# Patient Record
Sex: FEMALE | Race: NATIVE HAWAIIAN OR OTHER PACIFIC ISLANDER | NOT HISPANIC OR LATINO | Employment: UNEMPLOYED | URBAN - METROPOLITAN AREA
[De-identification: names, ages, dates, MRNs, and addresses within clinical notes are randomized per-mention and may not be internally consistent; named-entity substitution may affect disease eponyms.]

---

## 2017-01-17 ENCOUNTER — HOSPITAL ENCOUNTER (OUTPATIENT)
Dept: LAB | Facility: MEDICAL CENTER | Age: 42
End: 2017-01-17
Attending: NURSE PRACTITIONER
Payer: COMMERCIAL

## 2017-01-17 ENCOUNTER — HOSPITAL ENCOUNTER (OUTPATIENT)
Dept: LAB | Facility: MEDICAL CENTER | Age: 42
End: 2017-01-17
Attending: FAMILY MEDICINE
Payer: COMMERCIAL

## 2017-01-17 DIAGNOSIS — N95.1 PERI-MENOPAUSE: ICD-10-CM

## 2017-01-17 DIAGNOSIS — G43.109 MIGRAINE WITH AURA AND WITHOUT STATUS MIGRAINOSUS, NOT INTRACTABLE: ICD-10-CM

## 2017-01-17 DIAGNOSIS — E03.9 HYPOTHYROIDISM, UNSPECIFIED TYPE: ICD-10-CM

## 2017-01-17 DIAGNOSIS — E53.8 VITAMIN B 12 DEFICIENCY: ICD-10-CM

## 2017-01-17 LAB
25(OH)D3 SERPL-MCNC: 21 NG/ML (ref 30–100)
ALBUMIN SERPL BCP-MCNC: 3.9 G/DL (ref 3.2–4.9)
ALBUMIN/GLOB SERPL: 1.6 G/DL
ALP SERPL-CCNC: 57 U/L (ref 30–99)
ALT SERPL-CCNC: 10 U/L (ref 2–50)
ANION GAP SERPL CALC-SCNC: 8 MMOL/L (ref 0–11.9)
AST SERPL-CCNC: 15 U/L (ref 12–45)
BASOPHILS # BLD AUTO: 0.05 K/UL (ref 0–0.12)
BASOPHILS NFR BLD AUTO: 0.8 % (ref 0–1.8)
BILIRUB SERPL-MCNC: 0.5 MG/DL (ref 0.1–1.5)
BUN SERPL-MCNC: 11 MG/DL (ref 8–22)
CALCIUM SERPL-MCNC: 9.3 MG/DL (ref 8.5–10.5)
CHLORIDE SERPL-SCNC: 107 MMOL/L (ref 96–112)
CO2 SERPL-SCNC: 23 MMOL/L (ref 20–33)
CREAT SERPL-MCNC: 0.62 MG/DL (ref 0.5–1.4)
EOSINOPHIL # BLD: 0.09 K/UL (ref 0–0.51)
EOSINOPHIL NFR BLD AUTO: 1.5 % (ref 0–6.9)
ERYTHROCYTE [DISTWIDTH] IN BLOOD BY AUTOMATED COUNT: 43.6 FL (ref 35.9–50)
FERRITIN SERPL-MCNC: 21.4 NG/ML (ref 10–291)
FSH SERPL-ACNC: 3.7 MIU/ML
GLOBULIN SER CALC-MCNC: 2.4 G/DL (ref 1.9–3.5)
GLUCOSE SERPL-MCNC: 92 MG/DL (ref 65–99)
HCT VFR BLD AUTO: 41.4 % (ref 37–47)
HGB BLD-MCNC: 13.9 G/DL (ref 12–16)
IMM GRANULOCYTES # BLD AUTO: 0.01 K/UL (ref 0–0.11)
IMM GRANULOCYTES NFR BLD AUTO: 0.2 % (ref 0–0.9)
LH SERPL-ACNC: 9 IU/L
LYMPHOCYTES # BLD: 1.53 K/UL (ref 1–4.8)
LYMPHOCYTES NFR BLD AUTO: 24.8 % (ref 22–41)
MCH RBC QN AUTO: 29.1 PG (ref 27–33)
MCHC RBC AUTO-ENTMCNC: 33.6 G/DL (ref 33.6–35)
MCV RBC AUTO: 86.8 FL (ref 81.4–97.8)
MONOCYTES # BLD: 0.45 K/UL (ref 0–0.85)
MONOCYTES NFR BLD AUTO: 7.3 % (ref 0–13.4)
NEUTROPHILS # BLD: 4.05 K/UL (ref 2–7.15)
NEUTROPHILS NFR BLD AUTO: 65.4 % (ref 44–72)
NRBC # BLD AUTO: 0 K/UL
NRBC BLD-RTO: 0 /100 WBC
PLATELET # BLD AUTO: 184 K/UL (ref 164–446)
PMV BLD AUTO: 12.4 FL (ref 9–12.9)
POTASSIUM SERPL-SCNC: 4.1 MMOL/L (ref 3.6–5.5)
PROLACTIN SERPL-MCNC: 6.18 NG/ML (ref 2.8–26)
PROT SERPL-MCNC: 6.3 G/DL (ref 6–8.2)
RBC # BLD AUTO: 4.77 M/UL (ref 4.2–5.4)
SODIUM SERPL-SCNC: 138 MMOL/L (ref 135–145)
T4 FREE SERPL-MCNC: 0.88 NG/DL (ref 0.53–1.43)
TSH SERPL DL<=0.005 MIU/L-ACNC: 0.78 UIU/ML (ref 0.3–3.7)
TSH SERPL DL<=0.005 MIU/L-ACNC: 0.84 UIU/ML (ref 0.3–3.7)
VIT B12 SERPL-MCNC: 201 PG/ML (ref 211–911)
VIT B12 SERPL-MCNC: 216 PG/ML (ref 211–911)
WBC # BLD AUTO: 6.2 K/UL (ref 4.8–10.8)

## 2017-01-17 PROCEDURE — 82607 VITAMIN B-12: CPT

## 2017-01-17 PROCEDURE — 80053 COMPREHEN METABOLIC PANEL: CPT

## 2017-01-17 PROCEDURE — 84439 ASSAY OF FREE THYROXINE: CPT

## 2017-01-17 PROCEDURE — 82306 VITAMIN D 25 HYDROXY: CPT

## 2017-01-17 PROCEDURE — 82728 ASSAY OF FERRITIN: CPT

## 2017-01-17 PROCEDURE — 36415 COLL VENOUS BLD VENIPUNCTURE: CPT

## 2017-01-17 PROCEDURE — 83001 ASSAY OF GONADOTROPIN (FSH): CPT

## 2017-01-17 PROCEDURE — 85025 COMPLETE CBC W/AUTO DIFF WBC: CPT

## 2017-01-17 PROCEDURE — 82607 VITAMIN B-12: CPT | Mod: 91

## 2017-01-17 PROCEDURE — 84443 ASSAY THYROID STIM HORMONE: CPT

## 2017-01-17 PROCEDURE — 84146 ASSAY OF PROLACTIN: CPT

## 2017-01-17 PROCEDURE — 84443 ASSAY THYROID STIM HORMONE: CPT | Mod: 91

## 2017-01-17 PROCEDURE — 83002 ASSAY OF GONADOTROPIN (LH): CPT

## 2017-01-19 ENCOUNTER — TELEPHONE (OUTPATIENT)
Dept: NEUROLOGY | Facility: MEDICAL CENTER | Age: 42
End: 2017-01-19

## 2017-01-19 NOTE — TELEPHONE ENCOUNTER
Please let Tamela know that her Vit B12 and Vit D3 levels are low-- needs to supplement Vit D3 2000IU per day and start Vit B12 100mcg per day.

## 2017-01-27 ENCOUNTER — HOSPITAL ENCOUNTER (EMERGENCY)
Facility: MEDICAL CENTER | Age: 42
End: 2017-01-27
Attending: EMERGENCY MEDICINE
Payer: COMMERCIAL

## 2017-01-27 VITALS
WEIGHT: 167.55 LBS | BODY MASS INDEX: 32.89 KG/M2 | HEART RATE: 65 BPM | OXYGEN SATURATION: 98 % | RESPIRATION RATE: 18 BRPM | SYSTOLIC BLOOD PRESSURE: 97 MMHG | HEIGHT: 60 IN | TEMPERATURE: 97.6 F | DIASTOLIC BLOOD PRESSURE: 62 MMHG

## 2017-01-27 DIAGNOSIS — G43.809 OTHER MIGRAINE WITHOUT STATUS MIGRAINOSUS, NOT INTRACTABLE: ICD-10-CM

## 2017-01-27 PROCEDURE — 700105 HCHG RX REV CODE 258: Performed by: EMERGENCY MEDICINE

## 2017-01-27 PROCEDURE — 700111 HCHG RX REV CODE 636 W/ 250 OVERRIDE (IP): Performed by: EMERGENCY MEDICINE

## 2017-01-27 PROCEDURE — 36415 COLL VENOUS BLD VENIPUNCTURE: CPT

## 2017-01-27 PROCEDURE — 96374 THER/PROPH/DIAG INJ IV PUSH: CPT

## 2017-01-27 PROCEDURE — 96375 TX/PRO/DX INJ NEW DRUG ADDON: CPT

## 2017-01-27 PROCEDURE — 99284 EMERGENCY DEPT VISIT MOD MDM: CPT

## 2017-01-27 PROCEDURE — 96361 HYDRATE IV INFUSION ADD-ON: CPT

## 2017-01-27 RX ORDER — METOCLOPRAMIDE HYDROCHLORIDE 5 MG/ML
20 INJECTION INTRAMUSCULAR; INTRAVENOUS ONCE
Status: COMPLETED | OUTPATIENT
Start: 2017-01-27 | End: 2017-01-27

## 2017-01-27 RX ORDER — DEXAMETHASONE SODIUM PHOSPHATE 10 MG/ML
10 INJECTION, SOLUTION INTRAMUSCULAR; INTRAVENOUS ONCE
Status: COMPLETED | OUTPATIENT
Start: 2017-01-27 | End: 2017-01-27

## 2017-01-27 RX ORDER — SODIUM CHLORIDE 9 MG/ML
1000 INJECTION, SOLUTION INTRAVENOUS ONCE
Status: COMPLETED | OUTPATIENT
Start: 2017-01-27 | End: 2017-01-27

## 2017-01-27 RX ORDER — DIPHENHYDRAMINE HYDROCHLORIDE 50 MG/ML
50 INJECTION INTRAMUSCULAR; INTRAVENOUS ONCE
Status: COMPLETED | OUTPATIENT
Start: 2017-01-27 | End: 2017-01-27

## 2017-01-27 RX ORDER — KETOROLAC TROMETHAMINE 30 MG/ML
30 INJECTION, SOLUTION INTRAMUSCULAR; INTRAVENOUS ONCE
Status: COMPLETED | OUTPATIENT
Start: 2017-01-27 | End: 2017-01-27

## 2017-01-27 RX ADMIN — SODIUM CHLORIDE 1000 ML: 9 INJECTION, SOLUTION INTRAVENOUS at 19:28

## 2017-01-27 RX ADMIN — DEXAMETHASONE SODIUM PHOSPHATE 10 MG: 10 INJECTION, SOLUTION INTRAMUSCULAR; INTRAVENOUS at 19:28

## 2017-01-27 RX ADMIN — KETOROLAC TROMETHAMINE 30 MG: 30 INJECTION, SOLUTION INTRAMUSCULAR; INTRAVENOUS at 19:29

## 2017-01-27 RX ADMIN — METOCLOPRAMIDE 20 MG: 5 INJECTION, SOLUTION INTRAMUSCULAR; INTRAVENOUS at 19:29

## 2017-01-27 RX ADMIN — DIPHENHYDRAMINE HYDROCHLORIDE 50 MG: 50 INJECTION INTRAMUSCULAR; INTRAVENOUS at 19:29

## 2017-01-27 ASSESSMENT — PAIN SCALES - GENERAL
PAINLEVEL_OUTOF10: 10
PAINLEVEL_OUTOF10: 6

## 2017-01-27 NOTE — ED AVS SNAPSHOT
After Visit Summary                                                                                                                Tamela Vasquez   MRN: [id number]    Department:  [facility], Emergency Dept   Date of Visit:  1/27/2017            [facility], Emergency Dept    [address]    [place] [address]    Phone:  [phone]      You were seen by     Lee Lennon M.D.      Your Diagnosis Was     Other migraine without status migrainosus, not intractable     G43.809       These are the medications you received during your hospitalization from 01/27/2017 1740 to 01/27/2017 2213     Date/Time Order Dose Route Action    01/27/2017 1928 dexamethasone pf (DECADRON) injection 10 mg 10 mg Oral Given    01/27/2017 1929 diphenhydrAMINE (BENADRYL) injection 50 mg 50 mg Intravenous Given    01/27/2017 1929 ketorolac (TORADOL) injection 30 mg 30 mg Intravenous Given    01/27/2017 1929 metoclopramide (REGLAN) injection 20 mg 20 mg Intravenous Given    01/27/2017 1928 NS infusion 1,000 mL 1,000 mL Intravenous New Bag      Follow-up Information     1. Schedule an appointment as soon as possible for a visit with Asheyl Bender PA-C.    Specialty:  Family Medicine    Contact information    [place] [address] [phone]          2. Follow up with [facility], Emergency Dept.    Specialty:  Emergency Medicine    Why:  If symptoms worsen    Contact information    [address] [address] [phone]      Medication Information     Review all of your home medications and newly ordered medications with your primary doctor and/or pharmacist as soon as possible. Follow medication instructions as directed by your doctor and/or pharmacist.     Please keep your complete medication list with you and share with your physician. Update the information when medications are discontinued, doses are  changed, or new medications (including over-the-counter products) are added; and carry medication information at all times in the event of emergency situations.               Medication List      START taking these medications        Instructions    mupirocin 2 % Oint   Commonly known as:  BACTROBAN    Apply 1 Tube to affected area(s) every day.   Dose:  1 Tube         ASK your doctor about these medications        Instructions    * albuterol 108 (90 BASE) MCG/ACT Aers inhalation aerosol    Inhale 2 Puffs by mouth every 6 hours as needed for Shortness of Breath.   Dose:  2 Puff       * albuterol 2.5mg/3ml Nebu solution for nebulization   Commonly known as:  PROVENTIL    3 mL by Nebulization route every four hours as needed for Shortness of Breath.   Dose:  2.5 mg       FISH OIL PO    Take  by mouth every day.       levothyroxine 50 MCG Tabs   Commonly known as:  SYNTHROID    Take 1 Tab by mouth Every morning on an empty stomach.   Dose:  50 mcg       multivitamin Tabs    Take 1 Tab by mouth every day.   Dose:  1 Tab       ondansetron 8 MG Tbdp   Commonly known as:  ZOFRAN ODT    Take 1/2-1 tablet PO PRN nausea. Do not exceed more than 2 tablets in 24 hours.       rizatriptan 10 MG disintegrating tablet   Commonly known as:  MAXALT-MLT    Take 1/2-1 tablet po at onset of headache, may repeat dose in 2 hours if unrelieved.  Do not exceed more than 2 tablets in 24 hours.       * Notice:  This list has 2 medication(s) that are the same as other medications prescribed for you. Read the directions carefully, and ask your doctor or other care provider to review them with you.              Discharge Instructions       Recurrent Migraine Headache  A migraine headache is an intense, throbbing pain on one or both sides of your head. Recurrent migraines keep coming back. A migraine can last for 30 minutes to several hours.  CAUSES   The exact cause of a migraine headache is not always known. However, a migraine may be caused  when nerves in the brain become irritated and release chemicals that cause inflammation. This causes pain.  Certain things may also trigger migraines, such as:   · Alcohol.  · Smoking.  · Stress.  · Menstruation.  · Aged cheeses.  · Foods or drinks that contain nitrates, glutamate, aspartame, or tyramine.  · Lack of sleep.  · Chocolate.  · Caffeine.  · Hunger.  · Physical exertion.  · Fatigue.  · Medicines used to treat chest pain (nitroglycerine), birth control pills, estrogen, and some blood pressure medicines.  SYMPTOMS   · Pain on one or both sides of your head.  · Pulsating or throbbing pain.  · Severe pain that prevents daily activities.  · Pain that is aggravated by any physical activity.  · Nausea, vomiting, or both.  · Dizziness.  · Pain with exposure to bright lights, loud noises, or activity.  · General sensitivity to bright lights, loud noises, or smells.  Before you get a migraine, you may get warning signs that a migraine is coming (aura). An aura may include:  · Seeing flashing lights.  · Seeing bright spots, halos, or zigzag lines.  · Having tunnel vision or blurred vision.  · Having feelings of numbness or tingling.  · Having trouble talking.  · Having muscle weakness.  DIAGNOSIS   A recurrent migraine headache is often diagnosed based on:  · Symptoms.  · Physical examination.  · A CT scan or MRI of your head. These imaging tests cannot diagnose migraines but can help rule out other causes of headaches.    TREATMENT   Medicines may be given for pain and nausea. Medicines can also be given to help prevent recurrent migraines.  HOME CARE INSTRUCTIONS  · Only take over-the-counter or prescription medicines for pain or discomfort as directed by your health care provider. The use of long-term narcotics is not recommended.  · Lie down in a dark, quiet room when you have a migraine.  · Keep a journal to find out what may trigger your migraine headaches. For example, write down:  ¨ What you eat and  drink.  ¨ How much sleep you get.  ¨ Any change to your diet or medicines.  · Limit alcohol consumption.  · Quit smoking if you smoke.  · Get 7-9 hours of sleep, or as recommended by your health care provider.  · Limit stress.  · Keep lights dim if bright lights bother you and make your migraines worse.  SEEK MEDICAL CARE IF:   · You do not get relief from the medicines given to you.  · You have a recurrence of pain.  · You have a fever.  SEEK IMMEDIATE MEDICAL CARE IF:  · Your migraine becomes severe.  · You have a stiff neck.  · You have loss of vision.  · You have muscular weakness or loss of muscle control.  · You start losing your balance or have trouble walking.  · You feel faint or pass out.  · You have severe symptoms that are different from your first symptoms.  MAKE SURE YOU:   · Understand these instructions.  · Will watch your condition.  · Will get help right away if you are not doing well or get worse.     This information is not intended to replace advice given to you by your health care provider. Make sure you discuss any questions you have with your health care provider.     Document Released: 09/12/2002 Document Revised: 01/08/2016 Document Reviewed: 08/25/2014  [employer] Patient Education ©2016 [employer]            Patient Information     Patient Information    Following emergency treatment: all patient requiring follow-up care must return either to a private physician or a clinic if your condition worsens before you are able to obtain further medical attention, please return to the emergency room.     Billing Information    At [facility], we work to make the billing process streamlined for our patients.  Our Representatives are here to answer any questions you may have regarding your hospital bill.  If you have insurance coverage and have supplied your insurance information to us, we will submit a claim to your insurer on your behalf.  Should you have any questions regarding  your bill, we can be reached online or by phone as follows:  Online: You are able pay your bills online or live chat with our representatives about any billing questions you may have. We are here to help Monday - Friday from 8:00am to 7:30pm and 9:00am - 12:00pm on Saturdays.  Please visit [email or web address]  for more information.   Phone:  [phone] or [phone]    Please note that your emergency physician, surgeon, pathologist, radiologist, anesthesiologist, and other specialists are not employed by [facility] and will therefore bill separately for their services.  Please contact them directly for any questions concerning their bills at the numbers below:     Emergency Physician Services:  [phone]  [facility]:  [phone]  [facility]:  [phone]  [facility]:  [phone]    1. Your final bill may vary from the amount quoted upon discharge if all procedures are not complete at that time, or if your doctor has additional procedures of which we are not aware. You will receive an additional bill if you return to the Emergency Department at [facility] for suture removal regardless of the facility of which the sutures were placed.     2. Please arrange for settlement of this account at the emergency registration.    3. All self-pay accounts are due in full at the time of treatment.  If you are unable to meet this obligation then payment is expected within 4-5 days.     4. If you have had radiology studies (CT, X-ray, Ultrasound, MRI), you have received a preliminary result during your emergency department visit. Please contact the radiology department [phone] to receive a copy of your final result. Please discuss the Final result with your primary physician or with the follow up physician provided.     Crisis Hotline:  National Crisis Hotline:  6-894-OOHNLMM or 1-434.124.2275  [place] Crisis Hotline:     4-168-558-4792 or 925-502-7541         ED Discharge Follow Up Questions    1. In order to provide you with very good care, we would like to follow up with a phone call in the next few days.  May we have your permission to contact you?     YES /  NO    2. What is the best phone number to call you? (       )_____-__________    3. What is the best time to call you?      Morning  /  Afternoon  /  Evening                   Patient Signature:  ____________________________________________________________    Date:  ____________________________________________________________      Your appointments     Jan 31, 2017 10:40 AM   Follow Up Visit with MARYJO Madsen   [facility] (--)    [address], [address]  [facility] [address] [phone]           You will be receiving a confirmation call a few days before your appointment from our automated call confirmation system.

## 2017-01-27 NOTE — ED AVS SNAPSHOT
[employer] Access Code: Activation code not generated  Current [employer] Status: Active    [employer] logohart  A secure, online tool to manage your health information     [employer]’s [employer]® is a secure, online tool that connects you to your personalized health information from the privacy of your home -- day or night - making it very easy for you to manage your healthcare. Once the activation process is completed, you can even access your medical information using the [employer] christos, which is available for free in the Apple Christos store or Google Play store.     [employer] provides the following levels of access (as shown below):   My Chart Features   [facility] Primary Care Doctor [facility]  Specialists [facility]  Urgent  Care Non-[facility]  Primary Care  Doctor   Email your healthcare team securely and privately 24/7 X X X X   Manage appointments: schedule your next appointment; view details of past/upcoming appointments X      Request prescription refills. X      View recent personal medical records, including lab and immunizations X X X X   View health record, including health history, allergies, medications X X X X   Read reports about your outpatient visits, procedures, consult and ER notes X X X X   See your discharge summary, which is a recap of your hospital and/or ER visit that includes your diagnosis, lab results, and care plan. X X       How to register for [employer]:  1. Go to  [email or web address].  2. Click on the Sign Up Now box, which takes you to the New Member Sign Up page. You will need to provide the following information:  a. Enter your [employer] Access Code exactly as it appears at the top of this page. (You will not need to use this code after you’ve completed the sign-up process. If you do not sign up before the expiration date, you must request a new code.)   b. Enter your date of birth.   c. Enter your home email address.   d. Click Submit, and follow the next screen’s instructions.  3. Create a [employer] ID. This will  be your [employer] login ID and cannot be changed, so think of one that is secure and easy to remember.  4. Create a [employer] password. You can change your password at any time.  5. Enter your Password Reset Question and Answer. This can be used at a later time if you forget your password.   6. Enter your e-mail address. This allows you to receive e-mail notifications when new information is available in [employer].  7. Click Sign Up. You can now view your health information.    For assistance activating your [employer] account, call [phone]

## 2017-01-27 NOTE — ED AVS SNAPSHOT
1/27/2017          Tamela Vasquez  [id number]  [place] [address]    Dear Tamela:    [facility] wants to ensure your discharge home is safe and you or your loved ones have had all your questions answered regarding your care after you leave the hospital.    You may receive a telephone call within two days of your discharge.  This call is to make certain you understand your discharge instructions as well as ensure we provided you with the best care possible during your stay with us.     The call will only last approximately 3-5 minutes and will be done by a nurse.    Once again, we want to ensure your discharge home is safe and that you have a clear understanding of any next steps in your care.  If you have any questions or concerns, please do not hesitate to contact us, we are here for you.  Thank you for choosing [facility] for your healthcare needs.    Sincerely,    August Hess    [facility]

## 2017-01-28 NOTE — ED NOTES
Skin pink and warm, walking with steady gait, breathing with equal and unlabored respirations. Discharged in good condition after discharge instructions reviewed with pt in detail, pt verbalizes understanding of all. Ambulated out with steady gait accompanied by significant other with follow up instructions in hand. Instructed pt to not drive d/t receiving benadryl in ER, pt states understanding.

## 2017-01-28 NOTE — ED PROVIDER NOTES
ED Provider Note    ED Provider Note        CHIEF COMPLAINT  Chief Complaint   Patient presents with   • Headache       HPI  Tamela Vasquez is a 41 y.o. female who presents to the Emergency Department with chief complaint of worsening chronic migraines. Patient states that today her headache is bilateral retrobulbar with radiation to the vertex. Minor nausea vision changes at the onset since resolved no neck pain no fevers no chills no sun onset. No thunderclap not aware she's ever had. Patient also complains that she has a small lesion on the top of her head that seems to be intermittently swelling up and progressing.    REVIEW OF SYSTEMS  10 systems reviewed and otherwise negative, pertinent positives and negatives listed in the history of present illness.        PAST MEDICAL HISTORY   has a past medical history of Constipation; Concussion; Thyroid activity decreased; Other specified symptom associated with female genital organs; Right acoustic neuroma (CMS-[facility]); Migraine; Cervical spondylosis (12/11/2015); Shingles (1/13/2016); Pain; Major depression (1/20/2015); Sleep apnea; and Seizure disorder (CMS-[facility]) (1999).    SURGICAL HISTORY   has past surgical history that includes gastric bypass laparoscopic; hchg tube, ear ultrasil; gastroscopy-endo (1/20/2009); colonoscopy - endo (1/22/2009); carpal tunnel release; gyn surgery; pelviscopy (6/16/2009); lysis adhesions gyn (6/16/2009); gastroscopy-endo (8/12/2010); lizz by laparoscopy (8/15/2010); lysis adhesions general (8/15/2010); appendectomy (1994); other abdominal surgery (1997); other abdominal surgery (2009); and cervical disk and fusion anterior (4/27/2016).    SOCIAL HISTORY  Social History   Substance Use Topics   • Smoking status: Current Every Day Smoker -- 0.25 packs/day for 27 years     Types: Cigarettes   • Smokeless tobacco: Never Used      Comment: 1/2 pack per day   • Alcohol Use: No      Comment:                                               uses + e cigarettes      History   Drug Use   • Yes   • Special: Marijuana     Comment: marijuana, last smoked  3 days ago       FAMILY HISTORY  Non-Contributory    CURRENT MEDICATIONS  Home Medications     Reviewed by Miguel Knight R.N. (Registered Nurse) on 01/27/17 at 1750  Med List Status: Complete    Medication Last Dose Status    albuterol (PROVENTIL) 2.5mg/3ml Nebu Soln solution for nebulization  Active    albuterol 108 (90 BASE) MCG/ACT Aero Soln inhalation aerosol 12/22/2016 Active    levothyroxine (SYNTHROID) 50 MCG Tab 1/27/2017 Active    multivitamin (THERAGRAN) Tab 1/26/2017 Active    Omega-3 Fatty Acids (FISH OIL PO) 1/26/2017 Active    ondansetron (ZOFRAN ODT) 8 MG TABLET DISPERSIBLE 1/27/2017 Active    rizatriptan (MAXALT-MLT) 10 MG disintegrating tablet 1/27/2017 Active                ALLERGIES  Allergies   Allergen Reactions   • Phentermine Hcl      Swelling short of breath       PHYSICAL EXAM  VITAL SIGNS: BP 97/62 mmHg  Pulse 65  Temp(Src) 36.4 °C (97.6 °F)  Resp 18  Ht 1.524 m (5')  Wt 76 kg (167 lb 8.8 oz)  BMI 32.72 kg/m2  SpO2 98%  LMP 01/25/2017  Pulse ox interpretation: I interpret this pulse ox as normal.  Constitutional: Alert and oriented x 3, minimal Distress  HEENT: Atraumatic normocephalic, small erythematous lesions surrounding hair follicles right at the vertex approximately half a centimeter. No fluctuance no drainage, pupils are equal round reactive to light extraocular movements are intact. The nares is clear, external ears are normal, mouth shows moist mucous membranes  Neck: Supple, no JVD no tracheal deviation  Cardiovascular: Regular rate and rhythm no murmur rub or gallop 2+ pulses peripherally x4  Thorax & Lungs: No respiratory distress, no wheezes rales or rhonchi, No chest tenderness.   GI: Soft nontender nondistended positive bowel sounds, no peritoneal signs  Skin: Warm dry no acute rash or lesion  Musculoskeletal: Moving all extremities with full  range and 5 of 5 strength, no acute  deformity  Neurologic: Cranial nerves III through XII are grossly intact, no sensory deficit, no cerebellar dysfunction   Psychiatric: Appropriate affect for situation at this time            Medical Decision Making: Patient has no concerning symptoms of intracranial bleed or infection. That's consistent with previous migraines states character the same pain somewhat worse today. She also small area that appears erythematous at the vertex of her head could be small area of cellulitis versus actinic keratosis. She given a prescription of Bactroban for this. In the ER she was given Reglan and Toradol Decadron Benadryl and fluids her headache completely resolved feeling much better. Discharge instructions follow-up with dermatology as well as her neurologist return for worsening pain headache altered mental status confusion dizziness nauseousness any other worrisome symptoms otherwise discharged home in stable condition.  BP 97/62 mmHg  Pulse 65  Temp(Src) 36.4 °C (97.6 °F)  Resp 18  Ht 1.524 m (5')  Wt 76 kg (167 lb 8.8 oz)  BMI 32.72 kg/m2  SpO2 98%  LMP 01/25/2017          FINAL IMPRESSION  1. Other migraine without status migrainosus, not intractable     2. Cellulitis versus actinic keratosis of the scalp      This dictation has been created using voice recognition software and/or scribes. The accuracy of the dictation is limited by the abilities of the software and the expertise of the scribes. I expect there may be some errors of grammar and possibly content. I made every attempt to manually correct the errors within my dictation. However, errors related to voice recognition software and/or scribes may still exist and should be interpreted within the appropriate context.

## 2017-01-28 NOTE — DISCHARGE INSTRUCTIONS
Recurrent Migraine Headache  A migraine headache is an intense, throbbing pain on one or both sides of your head. Recurrent migraines keep coming back. A migraine can last for 30 minutes to several hours.  CAUSES   The exact cause of a migraine headache is not always known. However, a migraine may be caused when nerves in the brain become irritated and release chemicals that cause inflammation. This causes pain.  Certain things may also trigger migraines, such as:   · Alcohol.  · Smoking.  · Stress.  · Menstruation.  · Aged cheeses.  · Foods or drinks that contain nitrates, glutamate, aspartame, or tyramine.  · Lack of sleep.  · Chocolate.  · Caffeine.  · Hunger.  · Physical exertion.  · Fatigue.  · Medicines used to treat chest pain (nitroglycerine), birth control pills, estrogen, and some blood pressure medicines.  SYMPTOMS   · Pain on one or both sides of your head.  · Pulsating or throbbing pain.  · Severe pain that prevents daily activities.  · Pain that is aggravated by any physical activity.  · Nausea, vomiting, or both.  · Dizziness.  · Pain with exposure to bright lights, loud noises, or activity.  · General sensitivity to bright lights, loud noises, or smells.  Before you get a migraine, you may get warning signs that a migraine is coming (aura). An aura may include:  · Seeing flashing lights.  · Seeing bright spots, halos, or zigzag lines.  · Having tunnel vision or blurred vision.  · Having feelings of numbness or tingling.  · Having trouble talking.  · Having muscle weakness.  DIAGNOSIS   A recurrent migraine headache is often diagnosed based on:  · Symptoms.  · Physical examination.  · A CT scan or MRI of your head. These imaging tests cannot diagnose migraines but can help rule out other causes of headaches.    TREATMENT   Medicines may be given for pain and nausea. Medicines can also be given to help prevent recurrent migraines.  HOME CARE INSTRUCTIONS  · Only take over-the-counter or prescription  medicines for pain or discomfort as directed by your health care provider. The use of long-term narcotics is not recommended.  · Lie down in a dark, quiet room when you have a migraine.  · Keep a journal to find out what may trigger your migraine headaches. For example, write down:  ¨ What you eat and drink.  ¨ How much sleep you get.  ¨ Any change to your diet or medicines.  · Limit alcohol consumption.  · Quit smoking if you smoke.  · Get 7-9 hours of sleep, or as recommended by your health care provider.  · Limit stress.  · Keep lights dim if bright lights bother you and make your migraines worse.  SEEK MEDICAL CARE IF:   · You do not get relief from the medicines given to you.  · You have a recurrence of pain.  · You have a fever.  SEEK IMMEDIATE MEDICAL CARE IF:  · Your migraine becomes severe.  · You have a stiff neck.  · You have loss of vision.  · You have muscular weakness or loss of muscle control.  · You start losing your balance or have trouble walking.  · You feel faint or pass out.  · You have severe symptoms that are different from your first symptoms.  MAKE SURE YOU:   · Understand these instructions.  · Will watch your condition.  · Will get help right away if you are not doing well or get worse.     This information is not intended to replace advice given to you by your health care provider. Make sure you discuss any questions you have with your health care provider.     Document Released: 09/12/2002 Document Revised: 01/08/2016 Document Reviewed: 08/25/2014  [employer] Interactive Patient Education ©2016 [employer]

## 2017-01-31 ENCOUNTER — OFFICE VISIT (OUTPATIENT)
Dept: NEUROLOGY | Facility: MEDICAL CENTER | Age: 42
End: 2017-01-31
Payer: COMMERCIAL

## 2017-01-31 VITALS
OXYGEN SATURATION: 91 % | SYSTOLIC BLOOD PRESSURE: 112 MMHG | RESPIRATION RATE: 16 BRPM | WEIGHT: 165 LBS | BODY MASS INDEX: 32.39 KG/M2 | TEMPERATURE: 98.1 F | DIASTOLIC BLOOD PRESSURE: 70 MMHG | HEIGHT: 60 IN | HEART RATE: 83 BPM

## 2017-01-31 DIAGNOSIS — E53.8 VITAMIN B 12 DEFICIENCY: ICD-10-CM

## 2017-01-31 DIAGNOSIS — E66.9 OBESITY (BMI 30-39.9): ICD-10-CM

## 2017-01-31 DIAGNOSIS — G89.4 CHRONIC PAIN SYNDROME: ICD-10-CM

## 2017-01-31 PROCEDURE — 99213 OFFICE O/P EST LOW 20 MIN: CPT | Mod: 25 | Performed by: NURSE PRACTITIONER

## 2017-01-31 PROCEDURE — 96372 THER/PROPH/DIAG INJ SC/IM: CPT | Performed by: NURSE PRACTITIONER

## 2017-01-31 RX ORDER — CYANOCOBALAMIN 1000 UG/ML
1000 INJECTION, SOLUTION INTRAMUSCULAR; SUBCUTANEOUS ONCE
Status: COMPLETED | OUTPATIENT
Start: 2017-01-31 | End: 2017-01-31

## 2017-01-31 RX ADMIN — CYANOCOBALAMIN 1000 MCG: 1000 INJECTION, SOLUTION INTRAMUSCULAR; SUBCUTANEOUS at 11:26

## 2017-01-31 ASSESSMENT — ENCOUNTER SYMPTOMS
MYALGIAS: 1
DOUBLE VISION: 1
HEADACHES: 1
NAUSEA: 1
NERVOUS/ANXIOUS: 0
ABDOMINAL PAIN: 0
COUGH: 0
VOMITING: 0
DEPRESSION: 0
SORE THROAT: 0
DIZZINESS: 0
CONSTIPATION: 1
SEIZURES: 0
DIARRHEA: 0
CONSTITUTIONAL NEGATIVE: 1

## 2017-01-31 NOTE — MR AVS SNAPSHOT
Tamela Romerojetparis Christina   2017 10:40 AM   Office Visit   MRN: [id number]    Department:  [facility]   Dept Phone:  [phone]    Description:  Female : 1975   Provider:  MARYJO Madsen           Reason for Visit     Follow-Up Migraine with aura and without status migrainosus, not intractable      Allergies as of 2017     Allergen Noted Reactions    Phentermine Hcl 2007       Swelling short of breath      You were diagnosed with     Chronic pain syndrome   [338.4.ICD-9-CM]       Vitamin B 12 deficiency   [120268]         Vital Signs     Blood Pressure Pulse Temperature Respirations Height Weight    112/70 mmHg 83 36.7 °C (98.1 °F) 16 1.524 m (5') 74.844 kg (165 lb)    Body Mass Index Oxygen Saturation Last Menstrual Period Smoking Status          32.22 kg/m2 91% 2017 Light Tobacco Smoker        Basic Information     Date Of Birth Sex Race Ethnicity Preferred Language    1975 Female  or other  Non- English      Problem List              ICD-10-CM Priority Class Noted - Resolved    Anxiety F41.9   2010 - Present    Migraine G43.909   2014 - Present    Chronic pain G89.29   2014 - Present    Seizure disorder (CMS-HCC) G40.909   2015 - Present    Obesity (BMI 30-39.9)- s/p gastric bypass E66.9   2015 - Present    Vitamin B 12 deficiency E53.8   2015 - Present    Acute sinus infection J01.90   2015 - Present    Chronic pain syndrome G89.4   2015 - Present    Nausea R11.0   2015 - Present    Major depression F32.9   2015 - Present    Hypothyroid E03.9   2015 - Present    Cervicogenic headache R51   2015 - Present    Cervical spondylosis M47.812   2015 - Present    Cervical radiculopathy M54.12   2015 - Present    Chronic neck pain M54.2, G89.29   2015 - Present    DDD (degenerative disc disease), cervical M50.30   2015 - Present    Shingles B02.9   1/13/2016 - Present    Controlled substance agreement signed Z79.899   3/2/2016 - Present    Cervical radiculitis M54.12   4/27/2016 - Present    Cervical postlaminectomy syndrome M96.1   7/13/2016 - Present    Laine-menopause N95.1   10/6/2016 - Present      Health Maintenance        Date Due Completion Dates    IMM INFLUENZA (1) 9/1/2016 12/2/2012    MAMMOGRAM 11/20/2016 ---    PAP SMEAR 4/22/2018 4/22/2015, 4/22/2015    IMM DTaP/Tdap/Td Vaccine (2 - Td) 8/5/2022 8/5/2012            Current Immunizations     Influenza TIV (IM) 12/2/2012    Pneumococcal polysaccharide vaccine (PPSV-23) 4/3/2013  1:54 PM    Tdap Vaccine 8/5/2012 11:00 PM      Below and/or attached are the medications your provider expects you to take. Review all of your home medications and newly ordered medications with your provider and/or pharmacist. Follow medication instructions as directed by your provider and/or pharmacist. Please keep your medication list with you and share with your provider. Update the information when medications are discontinued, doses are changed, or new medications (including over-the-counter products) are added; and carry medication information at all times in the event of emergency situations     Allergies:  PHENTERMINE HCL - (reactions not documented)               Medications  Valid as of: January 31, 2017 - 11:27 AM    Generic Name Brand Name Tablet Size Instructions for use    Albuterol Sulfate (Aero Soln) albuterol 108 (90 BASE) MCG/ACT Inhale 2 Puffs by mouth every 6 hours as needed for Shortness of Breath.        Albuterol Sulfate (Nebu Soln) PROVENTIL 2.5mg/3ml 3 mL by Nebulization route every four hours as needed for Shortness of Breath.        Levothyroxine Sodium (Tab) SYNTHROID 50 MCG Take 1 Tab by mouth Every morning on an empty stomach.        Multiple Vitamin (Tab) THERAGRAN  Take 1 Tab by mouth every day.        Mupirocin (Ointment) BACTROBAN 2 % Apply 1 Tube to affected area(s) every  day.        Omega-3 Fatty Acids   Take  by mouth every day.        Ondansetron (TABLET DISPERSIBLE) ZOFRAN ODT 8 MG Take 1/2-1 tablet PO PRN nausea. Do not exceed more than 2 tablets in 24 hours.        Rizatriptan Benzoate (TABLET DISPERSIBLE) MAXALT-MLT 10 MG Take 1/2-1 tablet po at onset of headache, may repeat dose in 2 hours if unrelieved.  Do not exceed more than 2 tablets in 24 hours.        .                 Medicines prescribed today were sent to:     [place] [id number] [facility] ([place]), [place] - [address]    [facility] [place] [address]    Phone: [phone] Fax: [phone]    Open 24 Hours?: No      Medication refill instructions:       If your prescription bottle indicates you have medication refills left, it is not necessary to call your provider’s office. Please contact your pharmacy and they will refill your medication.    If your prescription bottle indicates you do not have any refills left, you may request refills at any time through one of the following ways: The online Tugg system (except Urgent Care), by calling your provider’s office, or by asking your pharmacy to contact your provider’s office with a refill request. Medication refills are processed only during regular business hours and may not be available until the next business day. Your provider may request additional information or to have a follow-up visit with you prior to refilling your medication.   *Please Note: Medication refills are assigned a new Rx number when refilled electronically. Your pharmacy may indicate that no refills were authorized even though a new prescription for the same medication is available at the pharmacy. Please request the medicine by name with the pharmacy before contacting your provider for a refill.           Tugg Access Code: Activation code not generated  Current Tugg Status: Active

## 2017-01-31 NOTE — PROGRESS NOTES
"Subjective:      Tamela Vasquez is a 41 y.o. female who presents with Follow-Up Chronic Migraine.      HPI     For the past month, she has \"always been having a headache of some kind\".  Double vision, and nausea which she is not happy with.  She is reporting that there is a place in the crown of her head which will swell and is causing extreme pressure and pain.  There is a small mole in the area indicated by Tamela but no appreciation of swelling, redness, etc.    Here with two different women who are case coordinators.    Vit B12 low  Vit D level low    Current Outpatient Prescriptions   Medication Sig Dispense Refill   • levothyroxine (SYNTHROID) 50 MCG Tab Take 1 Tab by mouth Every morning on an empty stomach. 90 Tab 0   • Omega-3 Fatty Acids (FISH OIL PO) Take  by mouth every day.     • multivitamin (THERAGRAN) Tab Take 1 Tab by mouth every day.     • mupirocin (BACTROBAN) 2 % Ointment Apply 1 Tube to affected area(s) every day. 1 Tube 0   • albuterol (PROVENTIL) 2.5mg/3ml Nebu Soln solution for nebulization 3 mL by Nebulization route every four hours as needed for Shortness of Breath. 75 mL 2   • ondansetron (ZOFRAN ODT) 8 MG TABLET DISPERSIBLE Take 1/2-1 tablet PO PRN nausea. Do not exceed more than 2 tablets in 24 hours. 20 Tab 2   • rizatriptan (MAXALT-MLT) 10 MG disintegrating tablet Take 1/2-1 tablet po at onset of headache, may repeat dose in 2 hours if unrelieved.  Do not exceed more than 2 tablets in 24 hours. 9 Tab 2   • albuterol 108 (90 BASE) MCG/ACT Aero Soln inhalation aerosol Inhale 2 Puffs by mouth every 6 hours as needed for Shortness of Breath. 8.5 g 0     No current facility-administered medications for this visit.       Review of Systems   Constitutional: Negative.    HENT: Negative for hearing loss, nosebleeds and sore throat.         No recent head injury.   Eyes: Positive for double vision.        No new loss of vision.   Respiratory: Negative for cough.         No " recent lung infections.   Cardiovascular: Negative for chest pain.   Gastrointestinal: Positive for nausea and constipation. Negative for vomiting, abdominal pain and diarrhea.   Genitourinary: Negative.    Musculoskeletal: Positive for myalgias and joint pain.   Skin: Negative.    Neurological: Positive for headaches. Negative for dizziness and seizures.   Endo/Heme/Allergies:        No history of endocrine dysfunction.  No new problems.   Psychiatric/Behavioral: Negative for depression. The patient is not nervous/anxious.         No recent mood changes.          Objective:     /70 mmHg  Pulse 83  Temp(Src) 36.7 °C (98.1 °F)  Resp 16  Ht 1.524 m (5')  Wt 74.844 kg (165 lb)  BMI 32.22 kg/m2  SpO2 91%  LMP 01/25/2017     Physical Exam   Constitutional: She is oriented to person, place, and time. She appears well-developed and well-nourished. No distress.   Minimal exam, she is quite irritable today.    Overweight       HENT:   Head: Normocephalic and atraumatic.   Eyes: EOM are normal.   Neck: Normal range of motion.   Cardiovascular: Normal rate and regular rhythm.    Pulmonary/Chest: Effort normal.   Neurological: She is alert and oriented to person, place, and time. She exhibits normal muscle tone. Gait (wide based) abnormal.   No observable changes in neurologic status.  See initial new patient examination for details.    Skin: Skin is warm. There is pallor.   Psychiatric: Her mood appears anxious. Her affect is labile. Thought content is paranoid. She expresses inappropriate judgment.           Assessment/Plan:     Chronic Migraine:  Reporting a daily headache which is different from those in the past.   Vit B12 level is quite low and she has received monthly treatment in years past.    Vit B12 IM provided per MA.    Needs to discuss other lab findings with PCP.  Recommend starting Vit D3 2000IU per day.    Will take ibuprofen 800mg for abortive treatment and small serving of caffeine if she  wishes.    Encourage her to care for herself in the best way possible.    Return for follow-up in 3 months.    I will not be providing an pain management medications.  She is asking what to do in that regard and does not wish to return to narcotics.  We discussed how she can avoid pain medication.  Needs to be active with her counseling team.

## 2017-01-31 NOTE — Clinical Note
January 31, 2017        Tamela Montana [address]  [place] [address]        To whom this may concern:    Due to migraines and low Vitamin B12, please allow Tamela to have a snack at her work space.  Also, she needs to be allowed to drink water which will in turn increase bathroom trips.  Please consider her health needs when at work.    If you have any questions or concerns, please don't hesitate to call.        Sincerely,        CHIRAG Madsen.    Electronically Signed

## 2017-02-06 ENCOUNTER — OFFICE VISIT (OUTPATIENT)
Dept: MEDICAL GROUP | Age: 42
End: 2017-02-06
Payer: COMMERCIAL

## 2017-02-06 ENCOUNTER — TELEPHONE (OUTPATIENT)
Dept: MEDICAL GROUP | Age: 42
End: 2017-02-06

## 2017-02-06 VITALS
TEMPERATURE: 98.7 F | OXYGEN SATURATION: 96 % | SYSTOLIC BLOOD PRESSURE: 100 MMHG | WEIGHT: 168 LBS | HEART RATE: 87 BPM | HEIGHT: 59 IN | DIASTOLIC BLOOD PRESSURE: 62 MMHG | BODY MASS INDEX: 33.87 KG/M2

## 2017-02-06 DIAGNOSIS — F33.42 RECURRENT MAJOR DEPRESSIVE DISORDER, IN FULL REMISSION (HCC): ICD-10-CM

## 2017-02-06 DIAGNOSIS — G89.29 CHRONIC NECK PAIN: ICD-10-CM

## 2017-02-06 DIAGNOSIS — E66.9 OBESITY (BMI 30-39.9): ICD-10-CM

## 2017-02-06 DIAGNOSIS — E03.9 ACQUIRED HYPOTHYROIDISM: ICD-10-CM

## 2017-02-06 DIAGNOSIS — E55.9 VITAMIN D DEFICIENCY: ICD-10-CM

## 2017-02-06 DIAGNOSIS — G43.109 MIGRAINE WITH AURA AND WITHOUT STATUS MIGRAINOSUS, NOT INTRACTABLE: ICD-10-CM

## 2017-02-06 DIAGNOSIS — Z12.31 ENCOUNTER FOR SCREENING MAMMOGRAM FOR BREAST CANCER: ICD-10-CM

## 2017-02-06 DIAGNOSIS — E53.8 VITAMIN B 12 DEFICIENCY: ICD-10-CM

## 2017-02-06 DIAGNOSIS — L98.9 SKIN LESION: ICD-10-CM

## 2017-02-06 DIAGNOSIS — M54.2 CHRONIC NECK PAIN: ICD-10-CM

## 2017-02-06 PROCEDURE — 99214 OFFICE O/P EST MOD 30 MIN: CPT | Performed by: PHYSICIAN ASSISTANT

## 2017-02-06 RX ORDER — ONDANSETRON 8 MG/1
TABLET, ORALLY DISINTEGRATING ORAL
Qty: 20 TAB | Refills: 2 | Status: SHIPPED | OUTPATIENT
Start: 2017-02-06 | End: 2017-06-22 | Stop reason: SDUPTHER

## 2017-02-06 RX ORDER — CYANOCOBALAMIN 1000 UG/ML
1000 INJECTION, SOLUTION INTRAMUSCULAR; SUBCUTANEOUS
Qty: 1 VIAL | Refills: 3 | Status: SHIPPED | OUTPATIENT
Start: 2017-02-06 | End: 2017-06-22 | Stop reason: SDUPTHER

## 2017-02-06 RX ORDER — CYANOCOBALAMIN 1000 UG/ML
1000 INJECTION, SOLUTION INTRAMUSCULAR; SUBCUTANEOUS
Status: COMPLETED | OUTPATIENT
Start: 2017-02-06 | End: 2017-02-06

## 2017-02-06 RX ADMIN — CYANOCOBALAMIN 1000 MCG: 1000 INJECTION, SOLUTION INTRAMUSCULAR; SUBCUTANEOUS at 08:30

## 2017-02-06 ASSESSMENT — PAIN SCALES - GENERAL: PAINLEVEL: NO PAIN

## 2017-02-06 NOTE — TELEPHONE ENCOUNTER
Phone Number Called: [facility] [phone]    Message: Pharmacy states that the needles come with the syringes. Prescription has been verified.    Left Message for patient to call back: no

## 2017-02-06 NOTE — MR AVS SNAPSHOT
"        Tamela DavisJyoti   2017 7:50 AM   Office Visit   MRN: [id number]    Department:  [address] [place] [address]   Dept Phone:  [phone]    Description:  Female : 1975   Provider:  Ashely Bender PA-C           Reason for Visit     Hypothyroidism follow up on labs    Skin Lesion on top of head, would like a referral todermatology       Allergies as of 2017     Allergen Noted Reactions    Phentermine Hcl 2007       Swelling short of breath      You were diagnosed with     Skin lesion   [242358]       Encounter for screening mammogram for breast cancer   [5607340]       Migraine with aura and without status migrainosus, not intractable   [255008]       Vitamin B 12 deficiency   [129454]       Obesity (BMI 30-39.9)   [912805]       Acquired hypothyroidism   [5937203]       Chronic neck pain   [058832]         Vital Signs     Blood Pressure Pulse Temperature Height Weight Body Mass Index    100/62 mmHg 87 37.1 °C (98.7 °F) 1.499 m (4' 11.02\") 76.204 kg (168 lb) 33.91 kg/m2    Oxygen Saturation Last Menstrual Period Smoking Status             96% 2017 Light Tobacco Smoker         Basic Information     Date Of Birth Sex Race Ethnicity Preferred Language    1975 Female  or other  Non- English      Your appointments     Aug 07, 2017 10:50 AM   Established Patient with Ashely Bender PA-C   [address] [id number] [phone]           You will be receiving a confirmation call a few days before your appointment from our automated call confirmation system.              Problem List              ICD-10-CM Priority Class Noted - Resolved    Anxiety F41.9   2010 - Present    Migraine G43.909   2014 - Present    Chronic pain G89.29   2014 - Present    Seizure disorder (CMS-HCC) G40.909   2015 - Present    Obesity (BMI 30-39.9)- s/p gastric bypass E66.9   2015 " - Present    Vitamin B 12 deficiency E53.8   1/20/2015 - Present    Chronic pain syndrome G89.4   1/20/2015 - Present    Nausea R11.0   1/20/2015 - Present    Major depression F32.9   1/20/2015 - Present    Hypothyroid E03.9   1/20/2015 - Present    Cervicogenic headache R51   12/11/2015 - Present    Cervical spondylosis M47.812   12/11/2015 - Present    Cervical radiculopathy M54.12   12/11/2015 - Present    Chronic neck pain M54.2, G89.29   12/11/2015 - Present    DDD (degenerative disc disease), cervical M50.30   12/11/2015 - Present    Shingles B02.9   1/13/2016 - Present    Cervical radiculitis M54.12   4/27/2016 - Present    Cervical postlaminectomy syndrome M96.1   7/13/2016 - Present    Laine-menopause N95.1   10/6/2016 - Present      Health Maintenance        Date Due Completion Dates    MAMMOGRAM 11/20/2016 ---    IMM INFLUENZA (1) 2/6/2018 (Originally 9/1/2016) 12/2/2012    PAP SMEAR 4/22/2018 4/22/2015, 4/22/2015    IMM DTaP/Tdap/Td Vaccine (2 - Td) 8/5/2022 8/5/2012            Current Immunizations     Influenza TIV (IM) 12/2/2012    Pneumococcal polysaccharide vaccine (PPSV-23) 4/3/2013  1:54 PM    Tdap Vaccine 8/5/2012 11:00 PM      Below and/or attached are the medications your provider expects you to take. Review all of your home medications and newly ordered medications with your provider and/or pharmacist. Follow medication instructions as directed by your provider and/or pharmacist. Please keep your medication list with you and share with your provider. Update the information when medications are discontinued, doses are changed, or new medications (including over-the-counter products) are added; and carry medication information at all times in the event of emergency situations     Allergies:  PHENTERMINE HCL - (reactions not documented)               Medications  Valid as of: February 06, 2017 -  8:28 AM    Generic Name Brand Name Tablet Size Instructions for use    Cyanocobalamin (Solution)  VITAMIN B-12 1000 MCG/ML 1 mL by Intramuscular route every 30 days.        Levothyroxine Sodium (Tab) SYNTHROID 50 MCG Take 1 Tab by mouth Every morning on an empty stomach.        Multiple Vitamin (Tab) THERAGRAN  Take 1 Tab by mouth every day.        Needles & Syringes (Misc) Needles & Syringes  1 Application by Does not apply route Q30 DAYS.        Omega-3 Fatty Acids   Take  by mouth every day.        Ondansetron (TABLET DISPERSIBLE) ZOFRAN ODT 8 MG Take 1/2-1 tablet PO PRN nausea. Do not exceed more than 2 tablets in 24 hours.        .                 Medicines prescribed today were sent to:     [facility] [address] [facility] ([place]), [place] - [address]    [address] [facility] [place] [address]    Phone: [phone] Fax: [phone]    Open 24 Hours?: No      Medication refill instructions:       If your prescription bottle indicates you have medication refills left, it is not necessary to call your provider’s office. Please contact your pharmacy and they will refill your medication.    If your prescription bottle indicates you do not have any refills left, you may request refills at any time through one of the following ways: The online Grade[employer] system (except Urgent Care), by calling your provider’s office, or by asking your pharmacy to contact your provider’s office with a refill request. Medication refills are processed only during regular business hours and may not be available until the next business day. Your provider may request additional information or to have a follow-up visit with you prior to refilling your medication.   *Please Note: Medication refills are assigned a new Rx number when refilled electronically. Your pharmacy may indicate that no refills were authorized even though a new prescription for the same medication is available at the pharmacy. Please request the medicine by name with the pharmacy before contacting your provider for a refill.        Your To Do List     Future Labs/Procedures  Complete By Expires    CBC WITH DIFFERENTIAL  As directed 2/7/2018    COMP METABOLIC PANEL  As directed 2/7/2018    LIPID PROFILE  As directed 2/7/2018    MA-SCREEN MAMMO W/CAD-BILAT  As directed 3/10/2018    TSH WITH REFLEX TO FT4  As directed 2/6/2018    VITAMIN B12  As directed 2/7/2018      Referral     A referral request has been sent to our patient care coordination department. Please allow 3-5 business days for us to process this request and contact you either by phone or mail. If you do not hear from us by the 5th business day, please call us at [phone].           Trekea Access Code: Activation code not generated  Current Trekea Status: Active

## 2017-02-06 NOTE — TELEPHONE ENCOUNTER
1. Caller Name: [facility]                                         Call Back Number: [phone]      Patient approves a detailed voicemail message: N\A    Recieved fax from [facility] needing clarification on if you are dispensing pen needles AND syringes? They also need specific directions (times per day)

## 2017-02-06 NOTE — PROGRESS NOTES
"Subjective:     Chief Complaint   Patient presents with   • Hypothyroidism     follow up on labs   • Skin Lesion     on top of head, would like a referral todermatology      Tamela DavisRuddyLeena is a 41 y.o. female here today for evaluation and management of:    Skin lesion  New problem.  Reports lesion on top of head that intermittently is swelling up and getting larger.  Friend (Ashli) in office with her has concerns that it is secondary to her seizure where she struck her head on the ground.  Pt reports it only flares up and is tender when she has migraines.   Was given Bactroban with no relief.  Would like referral to derm.       Migraine with aura and without status migrainosus, not intractable/Vitamin B 12 deficiency/Vitamin D deficiency  Reports under care of neuro (CLIFF Ruggiero)  Recurrent migraines resulting in ED visits.  Recently given B12 injection by neuro and advised to start Vitamin D 3 5,000 IU. Has noticed improvement since then.  Hx of gastric bypass  + vomiting associated with migraines--Zofran helped some but doesn't feel is enough.    Obesity (BMI 30-39.9)- s/p gastric bypass  Reports strict diet and exercising regularly. Trying to lose weight.    Acquired hypothyroidism  Stable. Currently taking levothyroxine 50 mcg daily as prescribed.  Denies palpitations, skin changes, temperature intolerance, or changes in bowel habits    Chronic neck pain  Reports anterior cervical disk fusion in April of 2016.  Found she was becoming too reliant on opioids so discontinued use.  Reports now has \"medical marijuana\" and it is helping significantly.   Has reduced migraines some but not entirely.     Recurrent major depressive disorder, in full remission (CMS-HCC)/Menses  Stable. Not currently on any medications.  Very happy with life.  Depression Screen (PHQ-2/PHQ-9) 4/1/2015 6/19/2015 4/5/2016   PHQ-2 Total Score 0 0 0   PHQ-9 Total Score 0 0 0     Denies SI/HI. (Denies wishing to be " dead, thinking about death, intent to commit suicide)  Hoping to conceive--she has been trying. Wondering how labs were.  Menses monthly but only lasting 1 day--light.          ROS   Denies any recent fevers or chills.   . No diarrhea.   No chest pains or shortness of breath.   No lower extremity edema.    Allergies   Allergen Reactions   • Phentermine Hcl      Swelling short of breath       Current medicines (including changes today)  Current Outpatient Prescriptions   Medication Sig Dispense Refill   • ondansetron (ZOFRAN ODT) 8 MG TABLET DISPERSIBLE Take 1/2-1 tablet PO PRN nausea. Do not exceed more than 2 tablets in 24 hours. 20 Tab 2   • cyanocobalamin (VITAMIN B-12) 1000 MCG/ML Solution 1 mL by Intramuscular route every 30 days. 1 Vial 3   • Needles & Syringes Misc 1 Application by Does not apply route Q30 DAYS. 100 Units 1   • levothyroxine (SYNTHROID) 50 MCG Tab Take 1 Tab by mouth Every morning on an empty stomach. 90 Tab 0   • Omega-3 Fatty Acids (FISH OIL PO) Take  by mouth every day.     • multivitamin (THERAGRAN) Tab Take 1 Tab by mouth every day.       No current facility-administered medications for this visit.       Patient Active Problem List    Diagnosis Date Noted   • Seizure disorder (CMS-HCC) 01/20/2015     Priority: High   • Obesity (BMI 30-39.9)- s/p gastric bypass 01/20/2015     Priority: High   • Migraine 09/01/2014     Priority: High   • Vitamin B 12 deficiency 01/20/2015     Priority: Medium   • Nausea 01/20/2015     Priority: Medium   • Recurrent major depressive disorder, in full remission (CMS-HCC) 01/20/2015     Priority: Medium   • Hypothyroidism due to acquired atrophy of thyroid 01/20/2015     Priority: Medium   • Anxiety 08/13/2010     Priority: Medium   • Laine-menopause 10/06/2016     Priority: Low   • Cervical postlaminectomy syndrome 07/13/2016     Priority: Low   • Cervical spondylosis 12/11/2015     Priority: Low   • Cervical radiculopathy 12/11/2015     Priority: Low   •  "Chronic neck pain 12/11/2015     Priority: Low   • DDD (degenerative disc disease), cervical 12/11/2015     Priority: Low   • Vitamin D deficiency 02/06/2017       Family History   Problem Relation Age of Onset   • Alcohol/Drug Mother      ETOH   • Diabetes Mother    • Cancer Paternal Aunt      breast   • Diabetes Maternal Grandfather    • Cancer Paternal Grandmother      cervical    • Heart Disease Paternal Grandfather      MI   • Cancer Paternal Grandfather      lung   • Hypertension Paternal Grandfather    • Hyperlipidemia Paternal Grandfather           Objective:     Blood pressure 100/62, pulse 87, temperature 37.1 °C (98.7 °F), height 1.499 m (4' 11.02\"), weight 76.204 kg (168 lb), last menstrual period 01/25/2017, SpO2 96 %. Body mass index is 33.91 kg/(m^2).     Physical Exam:  Gen: Well developed, well nourished in no acute distress. Obese female.  Skin: Pink, warm, and dry. Scalp with 3 mm brown papule. No surrounding erythema, edema, no visible wound.  HEENT: conjunctiva non-injected, sclera non-icteric. EOMs intact.   Neck: Supple, trachea midline. No adenopathy or masses in the neck or supraclavicular regions.  Lungs: Effort is normal. Clear to auscultation bilaterally with good excursion.  CV: regular rate and rhythm.  Abdomen: soft, nontender, + BS. No HSM.  No CVAT  Alert and oriented Eye contact is good, speech goal directed, affect calm. Pleasant    Component      Latest Ref Rn 1/17/2017           8:47 AM   WBC      4.8 - 10.8 K/uL 6.2   RBC      4.20 - 5.40 M/uL 4.77   Hemoglobin      12.0 - 16.0 g/dL 13.9   Hematocrit      37.0 - 47.0 % 41.4   MCV      81.4 - 97.8 fL 86.8   MCH      27.0 - 33.0 pg 29.1   MCHC      33.6 - 35.0 g/dL 33.6   RDW      35.9 - 50.0 fL 43.6   Platelet Count      164 - 446 K/uL 184   MPV      9.0 - 12.9 fL 12.4   Neutrophils-Polys      44.00 - 72.00 % 65.40   Lymphocytes      22.00 - 41.00 % 24.80   Monocytes      0.00 - 13.40 % 7.30   Eosinophils      0.00 - 6.90 % 1.50 "   Basophils      0.00 - 1.80 % 0.80   Immature Granulocytes      0.00 - 0.90 % 0.20   Nucleated RBC       0.00   Neutrophils (Absolute)      2.00 - 7.15 K/uL 4.05   Lymphs (Absolute)      1.00 - 4.80 K/uL 1.53   Monos (Absolute)      0.00 - 0.85 K/uL 0.45   Eos (Absolute)      0.00 - 0.51 K/uL 0.09   Baso (Absolute)      0.00 - 0.12 K/uL 0.05   Immature Granulocytes (abs)      0.00 - 0.11 K/uL 0.01   NRBC (Absolute)       0.00   Sodium      135 - 145 mmol/L 138   Potassium      3.6 - 5.5 mmol/L 4.1   Chloride      96 - 112 mmol/L 107   Co2      20 - 33 mmol/L 23   Anion Gap      0.0 - 11.9 8.0   Glucose      65 - 99 mg/dL 92   Bun      8 - 22 mg/dL 11   Creatinine      0.50 - 1.40 mg/dL 0.62   Calcium      8.5 - 10.5 mg/dL 9.3   AST(SGOT)      12 - 45 U/L 15   ALT(SGPT)      2 - 50 U/L 10   Alkaline Phosphatase      30 - 99 U/L 57   Total Bilirubin      0.1 - 1.5 mg/dL 0.5   Albumin      3.2 - 4.9 g/dL 3.9   Total Protein      6.0 - 8.2 g/dL 6.3   Globulin      1.9 - 3.5 g/dL 2.4   A-G Ratio       1.6   Follicle Stimulating Hormone       3.7   Luteinizing Hormone       9.0   Prolactin      2.80 - 26.00 ng/mL 6.18   TSH      0.300 - 3.700 uIU/mL 0.840   Vitamin B12 -True Cobalamin      211 - 911 pg/mL 201 (L)   25-Hydroxy   Vitamin D 25      30 - 100 ng/mL 21 (L)   Ferritin      10.0 - 291.0 ng/mL 21.4   Reviewed and discussed above labs with patient in office.      Assessment and Plan:   The following treatment plan was discussed:     1. Skin lesion - Discussed unlikely of infectious etiology. Does not appear to be a cyst rather a seborrheic keratosis. Will send to [facility] for further management as it will take 4+ months to get in with derm. She agrees.   REFERRAL TO PLASTIC SURGERY    CBC WITH DIFFERENTIAL   2. Migraine with aura and without status migrainosus, not intractable -Stable. Continue current medicines. Monitor labs regularly. Follow-up with specialist as directed.  - refilled Zofran. If pain or nausea  unmanageable advise  or same day appointment. ondansetron (ZOFRAN ODT) 8 MG TABLET DISPERSIBLE    COMP METABOLIC PANEL   3. Vitamin B 12 deficiency - Uncontrolled. 1,000 mcg administered IM today. Rx of cyanocobalamin with needles/syringes to pharmacy for Q monthly administration. Reports  used to administer in past.   Labs ordered for recheck in 6 months.  cyanocobalamin (VITAMIN B-12) 1000 MCG/ML Solution  Needles & Syringes Misc   CBC WITH DIFFERENTIAL   VITAMIN B12                  4. Vitamin D deficiency - Uncontrolled. Continue 5,000 IU daily of Vitamin D3 x 1 month then decrease to 1,000 IU daily.     5. Obesity (BMI 30-39.9)- s/p gastric bypass --Counseled on diet modification and exercise.   Patient identified as having weight management issue.  Appropriate orders and counseling given.   6. Acquired hypothyroidism -Stable. Continue current medicines. Monitor labs regularly. LIPID PROFILE    TSH WITH REFLEX TO FT4   7. Chronic neck pain - Stable. Follow-up with specialists as directed    8. Recurrent major depressive disorder, in full remission (CMS-HCC) - stable. Continue with lifestyle management through meditation and deep breathing. RTC if any return of symptoms.    9. Encounter for screening mammogram for breast cancer -Educated on importance of mammograms for breast cancer screening and current recommendations. Mammogram ordered. MA-SCREEN MAMMO W/CAD-BILAT     - HM: Declines flu. Mammo ordered.  -Any change or worsening of signs or symptoms, patient encouraged to follow-up or report to emergency room for further evaluation. Patient understands and agrees.    Followup: Return in about 6 months (around 8/6/2017) for follow-up on labs.

## 2017-02-10 NOTE — TELEPHONE ENCOUNTER
Called and spoke with pt. All information was given and pt and she verbally understood and will comply with all given. BEBETO

## 2017-02-24 ENCOUNTER — OFFICE VISIT (OUTPATIENT)
Dept: URGENT CARE | Facility: CLINIC | Age: 42
End: 2017-02-24
Payer: COMMERCIAL

## 2017-02-24 VITALS
SYSTOLIC BLOOD PRESSURE: 102 MMHG | DIASTOLIC BLOOD PRESSURE: 64 MMHG | WEIGHT: 167 LBS | OXYGEN SATURATION: 96 % | HEIGHT: 60 IN | RESPIRATION RATE: 14 BRPM | HEART RATE: 68 BPM | TEMPERATURE: 98.2 F | BODY MASS INDEX: 32.79 KG/M2

## 2017-02-24 DIAGNOSIS — J01.00 ACUTE MAXILLARY SINUSITIS, RECURRENCE NOT SPECIFIED: Primary | ICD-10-CM

## 2017-02-24 DIAGNOSIS — R05.9 COUGH: ICD-10-CM

## 2017-02-24 PROCEDURE — 99214 OFFICE O/P EST MOD 30 MIN: CPT | Performed by: PHYSICIAN ASSISTANT

## 2017-02-24 RX ORDER — FLUTICASONE PROPIONATE 50 MCG
1 SPRAY, SUSPENSION (ML) NASAL DAILY
Qty: 16 G | Refills: 0 | Status: SHIPPED | OUTPATIENT
Start: 2017-02-24

## 2017-02-24 RX ORDER — CODEINE PHOSPHATE/GUAIFENESIN 10-100MG/5
5 LIQUID (ML) ORAL 4 TIMES DAILY PRN
Qty: 200 ML | Refills: 0 | Status: SHIPPED | OUTPATIENT
Start: 2017-02-24 | End: 2017-03-28

## 2017-02-24 RX ORDER — AMOXICILLIN AND CLAVULANATE POTASSIUM 400; 57 MG/5ML; MG/5ML
800 POWDER, FOR SUSPENSION ORAL 2 TIMES DAILY
Qty: 200 ML | Refills: 0 | Status: SHIPPED | OUTPATIENT
Start: 2017-02-24 | End: 2017-03-06

## 2017-02-24 ASSESSMENT — ENCOUNTER SYMPTOMS
WHEEZING: 0
VOMITING: 0
RHINORRHEA: 1
SINUS PAIN: 1
SWOLLEN GLANDS: 0
SPUTUM PRODUCTION: 0
COUGH: 1
HEADACHES: 1
FEVER: 0
SORE THROAT: 0

## 2017-02-24 NOTE — MR AVS SNAPSHOT
Tamela DavisRuddyLeena   2017 9:45 AM   Office Visit   MRN: [id number]    Department:  [facility]   Dept Phone:  [phone]    Description:  Female : 1975   Provider:  Ara Dixon PA-C           Reason for Visit     URI with bilateral ear pain mostly on (R) ear. congestion and cough      Allergies as of 2017     Allergen Noted Reactions    Phentermine Hcl 2007       Swelling short of breath      You were diagnosed with     Acute maxillary sinusitis, recurrence not specified   [[id number]]       Cough   [786.2.ICD-9-CM]         Vital Signs     Blood Pressure Pulse Temperature Respirations Height Weight    102/64 mmHg 68 36.8 °C (98.2 °F) 14 1.524 m (5') 75.751 kg (167 lb)    Body Mass Index Oxygen Saturation Last Menstrual Period Smoking Status          32.62 kg/m2 96% 2017 Light Tobacco Smoker        Basic Information     Date Of Birth Sex Race Ethnicity Preferred Language    1975 Female  or other  Non- English      Your appointments     Mar 08, 2017  3:40 PM   MA [id number] with RB MG 2   [facility] ([address])    [address]  [address]  [place] [address]   [phone]           No deodorant, powder, perfume or lotion under the arm or breast area.            May 02, 2017 10:00 AM   New Patient with MARYJO Lopez   [facility] ([facility] [address] [facility])    [address]  [place] [address]   [phone]           Please bring Photo ID, Insurance Cards, All Medication Bottles and copies of any legal documents (such as Living Will, Power of ) If speaking a language besides English please bring an adult . Please arrive 30 minutes prior for check in and registration. You will be receiving a confirmation call a few days before your appointment from our automated call confirmation system.            Aug 07, 2017 10:50 AM   Established Patient  with Ashely Bender PA-C   [facility] [address] ([facility])    [address] [place] [address] [phone]           You will be receiving a confirmation call a few days before your appointment from our automated call confirmation system.              Problem List              ICD-10-CM Priority Class Noted - Resolved    Anxiety F41.9 Medium  8/13/2010 - Present    Migraine G43.909 High  9/1/2014 - Present    Seizure disorder (CMS-HCC) G40.909 High  1/20/2015 - Present    Obesity (BMI 30-39.9)- s/p gastric bypass E66.9 High  1/20/2015 - Present    Vitamin B 12 deficiency E53.8 Medium  1/20/2015 - Present    Nausea R11.0 Medium  1/20/2015 - Present    Recurrent major depressive disorder, in full remission (CMS-HCC) F33.42 Medium  1/20/2015 - Present    Hypothyroidism due to acquired atrophy of thyroid E03.4 Medium  1/20/2015 - Present    Cervical spondylosis M47.812 Low  12/11/2015 - Present    Cervical radiculopathy M54.12 Low  12/11/2015 - Present    Chronic neck pain M54.2, G89.29 Low  12/11/2015 - Present    DDD (degenerative disc disease), cervical M50.30 Low  12/11/2015 - Present    Cervical postlaminectomy syndrome M96.1 Low  7/13/2016 - Present    Laine-menopause N95.1 Low  10/6/2016 - Present    Vitamin D deficiency E55.9   2/6/2017 - Present      Health Maintenance        Date Due Completion Dates    MAMMOGRAM 11/20/2016 ---    IMM INFLUENZA (1) 2/6/2018 (Originally 9/1/2016) 12/2/2012    PAP SMEAR 4/22/2018 4/22/2015, 4/22/2015    IMM DTaP/Tdap/Td Vaccine (2 - Td) 8/5/2022 8/5/2012            Current Immunizations     Influenza TIV (IM) 12/2/2012    Pneumococcal polysaccharide vaccine (PPSV-23) 4/3/2013  1:54 PM    Tdap Vaccine 8/5/2012 11:00 PM      Below and/or attached are the medications your provider expects you to take. Review all of your home medications and newly ordered medications with your provider and/or pharmacist. Follow medication instructions as directed by your provider  and/or pharmacist. Please keep your medication list with you and share with your provider. Update the information when medications are discontinued, doses are changed, or new medications (including over-the-counter products) are added; and carry medication information at all times in the event of emergency situations     Allergies:  PHENTERMINE HCL - (reactions not documented)               Medications  Valid as of: February 24, 2017 - 10:36 AM    Generic Name Brand Name Tablet Size Instructions for use    Amoxicillin-Pot Clavulanate (Recon Susp) AUGMENTIN 400-57 MG/5ML Take 10 mL by mouth 2 times a day for 10 days.        Cyanocobalamin (Solution) VITAMIN B-12 1000 MCG/ML 1 mL by Intramuscular route every 30 days.        Fluticasone Propionate (Suspension) FLONASE 50 MCG/ACT Spray 1 Spray in nose every day.        Guaifenesin-Codeine (Syrup) TUSSI-ORGANIDIN -10 MG/5ML Take 5 mL by mouth 4 times a day as needed for Cough.        Levothyroxine Sodium (Tab) SYNTHROID 50 MCG Take 1 Tab by mouth Every morning on an empty stomach.        Multiple Vitamin (Tab) THERAGRAN  Take 1 Tab by mouth every day.        Needles & Syringes (Misc) Needles & Syringes  1 Application by Does not apply route Q30 DAYS.        Omega-3 Fatty Acids   Take  by mouth every day.        Ondansetron (TABLET DISPERSIBLE) ZOFRAN ODT 8 MG Take 1/2-1 tablet PO PRN nausea. Do not exceed more than 2 tablets in 24 hours.        .                 Medicines prescribed today were sent to:     [address] ([place]), [place] - [address] [address] [facility] ([place]) [place] [address]    Phone: [phone] Fax: [phone]    Open 24 Hours?: No      Medication refill instructions:       If your prescription bottle indicates you have medication refills left, it is not necessary to call your provider’s office. Please contact your pharmacy and they will refill your medication.    If your prescription bottle indicates you do not have any refills  left, you may request refills at any time through one of the following ways: The online [employer] system (except Urgent Care), by calling your provider’s office, or by asking your pharmacy to contact your provider’s office with a refill request. Medication refills are processed only during regular business hours and may not be available until the next business day. Your provider may request additional information or to have a follow-up visit with you prior to refilling your medication.   *Please Note: Medication refills are assigned a new Rx number when refilled electronically. Your pharmacy may indicate that no refills were authorized even though a new prescription for the same medication is available at the pharmacy. Please request the medicine by name with the pharmacy before contacting your provider for a refill.           [employer] Access Code: Activation code not generated  Current [employer] Status: Active

## 2017-02-24 NOTE — PROGRESS NOTES
Subjective:      Tamela Vasquez is a 41 y.o. female who presents with URI    PMH:  has a past medical history of Constipation; Concussion; Thyroid activity decreased; Other specified symptom associated with female genital organs; Right acoustic neuroma (CMS-HCC); Migraine; Cervical spondylosis (12/11/2015); Shingles (1/13/2016); Pain; Major depression (1/20/2015); Sleep apnea; and Seizure disorder (CMS-HCC) (1999).  MEDS:   Current outpatient prescriptions:   •  ondansetron (ZOFRAN ODT) 8 MG TABLET DISPERSIBLE, Take 1/2-1 tablet PO PRN nausea. Do not exceed more than 2 tablets in 24 hours., Disp: 20 Tab, Rfl: 2  •  cyanocobalamin (VITAMIN B-12) 1000 MCG/ML Solution, 1 mL by Intramuscular route every 30 days., Disp: 1 Vial, Rfl: 3  •  Needles & Syringes Misc, 1 Application by Does not apply route Q30 DAYS., Disp: 100 Units, Rfl: 1  •  levothyroxine (SYNTHROID) 50 MCG Tab, Take 1 Tab by mouth Every morning on an empty stomach., Disp: 90 Tab, Rfl: 0  •  Omega-3 Fatty Acids (FISH OIL PO), Take  by mouth every day., Disp: , Rfl:   •  multivitamin (THERAGRAN) Tab, Take 1 Tab by mouth every day., Disp: , Rfl:   ALLERGIES:   Allergies   Allergen Reactions   • Phentermine Hcl      Swelling short of breath     SURGHX:   Past Surgical History   Procedure Laterality Date   • Gastric bypass laparoscopic     • Hchg tube, ear ultrasil     • Gastroscopy-endo  1/20/2009     Performed by BRENDA BENNETT at SURGERY [facility] ORS   • Colonoscopy - endo  1/22/2009     Performed by ASHLEY TALBOT at SURGERY [facility] ORS   • Carpal tunnel release       RUE - 1998, LUE - 2000   • Gyn surgery     • Pelviscopy  6/16/2009     Performed by ABDULKADIR SMITH at SURGERY SAME DAY [facility] ORS   • Lysis adhesions gyn  6/16/2009     Performed by ABDULKADIR SMITH at SURGERY SAME DAY [facility] ORS   • Gastroscopy-endo  8/12/2010     Performed by KHUSHI MURCIA at ENDOSCOPY [facility] ORS   • Shruti by  "laparoscopy  8/15/2010     Performed by DANIEL DONOVAN at SURGERY [facility]   • Lysis adhesions general  8/15/2010     Performed by DANIEL DONOVAN at SURGERY [facility]   • Appendectomy  1994   • Other abdominal surgery  1997     C - section   • Other abdominal surgery  2009     lizz   • Cervical disk and fusion anterior  4/27/2016     Procedure: CERVICAL DISK AND FUSION ANTERIOR C5-6;  Surgeon: Jorge Pozo M.D.;  Location: SURGERY [facility];  Service:      SOCHX:  reports that she has been smoking Cigarettes.  She has a 6.75 pack-year smoking history. She uses smokeless tobacco. She reports that she uses illicit drugs (Marijuana). She reports that she does not drink alcohol.  FH: family history includes Alcohol/Drug in her mother; Cancer in her paternal aunt, paternal grandfather, and paternal grandmother; Diabetes in her maternal grandfather and mother; Heart Disease in her paternal grandfather; Hyperlipidemia in her paternal grandfather; Hypertension in her paternal grandfather.            HPI Comments: Patient presents with:  URI: with bilateral ear pain mostly on (R) ear. congestion and cough \"going on 3 weeks now.\"         URI   This is a new problem. The current episode started 1 to 4 weeks ago. The problem has been gradually worsening. There has been no fever. Associated symptoms include congestion, coughing, ear pain, headaches, a plugged ear sensation, rhinorrhea and sinus pain. Pertinent negatives include no sore throat, swollen glands, vomiting or wheezing. She has tried nothing for the symptoms. The treatment provided no relief.       Review of Systems   Constitutional: Negative for fever.   HENT: Positive for congestion, ear pain and rhinorrhea. Negative for sore throat.    Respiratory: Positive for cough. Negative for sputum production and wheezing.    Gastrointestinal: Negative for vomiting.   Neurological: Positive for headaches.   All other systems reviewed and are " negative.         Objective:     /64 mmHg  Pulse 68  Temp(Src) 36.8 °C (98.2 °F)  Resp 14  Ht 1.524 m (5')  Wt 75.751 kg (167 lb)  BMI 32.62 kg/m2  SpO2 96%  LMP 01/25/2017     Physical Exam   Constitutional: She is oriented to person, place, and time. She appears well-developed and well-nourished.   HENT:   Head: Normocephalic.   Right Ear: Ear canal normal. No middle ear effusion.   Left Ear: Ear canal normal.  No middle ear effusion.   Nose: Mucosal edema and rhinorrhea present. Right sinus exhibits maxillary sinus tenderness. Left sinus exhibits maxillary sinus tenderness.   Mouth/Throat: Uvula is midline and oropharynx is clear and moist.   Pt has scarring to bilateral TM without acute findings.     Eyes: Conjunctivae and EOM are normal. Pupils are equal, round, and reactive to light.   Neck: Normal range of motion. Neck supple.   Cardiovascular: Normal rate, regular rhythm and normal heart sounds.    Pulmonary/Chest: Effort normal. No respiratory distress. She has no decreased breath sounds. She has no wheezes. She has rhonchi.   Abdominal: Soft.   Musculoskeletal: Normal range of motion.   Neurological: She is alert and oriented to person, place, and time.   Skin: Skin is warm and dry.          Assessment/Plan:     1. Acute maxillary sinusitis, recurrence not specified  fluticasone (FLONASE) 50 MCG/ACT nasal spray    amoxicillin-clavulanate (AUGMENTIN) 400-57 MG/5ML Recon Susp suspension    guaifenesin-codeine (TUSSI-ORGANIDIN NR) 100-10 MG/5ML syrup   2. Cough  fluticasone (FLONASE) 50 MCG/ACT nasal spray    amoxicillin-clavulanate (AUGMENTIN) 400-57 MG/5ML Recon Susp suspension    guaifenesin-codeine (TUSSI-ORGANIDIN NR) 100-10 MG/5ML syrup     PT requests liquid medications secondary to gastric bypass surgery, written.      PT should follow up with PCP in 1-2 days for re-evaluation if symptoms have not improved.  Discussed red flags and reasons to return to UC or ED.  Pt and/or family  verbalized understanding of diagnosis and follow up instructions and was given informational handout on diagnosis.  PT discharged.

## 2017-02-24 NOTE — PATIENT INSTRUCTIONS

## 2017-02-24 NOTE — Clinical Note
February 24, 2017         Patient: Tamela Vasquez   YOB: 1975   Date of Visit: 2/24/2017           To Whom it May Concern:    Tamela Vasquez was seen in my clinic on 2/24/2017. She may return to work on 02/27/2017.    If you have any questions or concerns, please don't hesitate to call.        Sincerely,           Ara Dixon PA-C  Electronically Signed

## 2017-03-11 ENCOUNTER — HOSPITAL ENCOUNTER (EMERGENCY)
Facility: MEDICAL CENTER | Age: 42
End: 2017-03-11
Attending: EMERGENCY MEDICINE
Payer: COMMERCIAL

## 2017-03-11 ENCOUNTER — APPOINTMENT (OUTPATIENT)
Dept: RADIOLOGY | Facility: MEDICAL CENTER | Age: 42
End: 2017-03-11
Attending: EMERGENCY MEDICINE
Payer: COMMERCIAL

## 2017-03-11 VITALS — BODY MASS INDEX: 32.79 KG/M2 | HEIGHT: 60 IN | WEIGHT: 167 LBS | RESPIRATION RATE: 15 BRPM

## 2017-03-11 DIAGNOSIS — F10.920 ACUTE ALCOHOL INTOXICATION, UNCOMPLICATED (HCC): ICD-10-CM

## 2017-03-11 DIAGNOSIS — R56.9 SEIZURE (HCC): ICD-10-CM

## 2017-03-11 LAB
ALBUMIN SERPL BCP-MCNC: 4.1 G/DL (ref 3.2–4.9)
ALBUMIN/GLOB SERPL: 1.7 G/DL
ALP SERPL-CCNC: 59 U/L (ref 30–99)
ALT SERPL-CCNC: 10 U/L (ref 2–50)
ANION GAP SERPL CALC-SCNC: 11 MMOL/L (ref 0–11.9)
APPEARANCE UR: CLEAR
AST SERPL-CCNC: 18 U/L (ref 12–45)
BASOPHILS # BLD AUTO: 1.1 % (ref 0–1.8)
BASOPHILS # BLD: 0.07 K/UL (ref 0–0.12)
BILIRUB SERPL-MCNC: 0.2 MG/DL (ref 0.1–1.5)
BILIRUB UR QL STRIP.AUTO: NEGATIVE
BUN SERPL-MCNC: 8 MG/DL (ref 8–22)
CALCIUM SERPL-MCNC: 9.2 MG/DL (ref 8.5–10.5)
CHLORIDE SERPL-SCNC: 111 MMOL/L (ref 96–112)
CO2 SERPL-SCNC: 19 MMOL/L (ref 20–33)
COLOR UR: NORMAL
CREAT SERPL-MCNC: 0.5 MG/DL (ref 0.5–1.4)
CULTURE IF INDICATED INDCX: NO UA CULTURE
EOSINOPHIL # BLD AUTO: 0.05 K/UL (ref 0–0.51)
EOSINOPHIL NFR BLD: 0.8 % (ref 0–6.9)
ERYTHROCYTE [DISTWIDTH] IN BLOOD BY AUTOMATED COUNT: 44.3 FL (ref 35.9–50)
ETHANOL BLD-MCNC: 0.23 G/DL
GFR SERPL CREATININE-BSD FRML MDRD: >60 ML/MIN/1.73 M 2
GLOBULIN SER CALC-MCNC: 2.4 G/DL (ref 1.9–3.5)
GLUCOSE SERPL-MCNC: 100 MG/DL (ref 65–99)
GLUCOSE UR STRIP.AUTO-MCNC: NEGATIVE MG/DL
HCT VFR BLD AUTO: 45.1 % (ref 37–47)
HGB BLD-MCNC: 13.6 G/DL (ref 12–16)
IMM GRANULOCYTES # BLD AUTO: 0.02 K/UL (ref 0–0.11)
IMM GRANULOCYTES NFR BLD AUTO: 0.3 % (ref 0–0.9)
KETONES UR STRIP.AUTO-MCNC: NEGATIVE MG/DL
LEUKOCYTE ESTERASE UR QL STRIP.AUTO: NEGATIVE
LYMPHOCYTES # BLD AUTO: 1.47 K/UL (ref 1–4.8)
LYMPHOCYTES NFR BLD: 22.2 % (ref 22–41)
MCH RBC QN AUTO: 28.2 PG (ref 27–33)
MCHC RBC AUTO-ENTMCNC: 30.2 G/DL (ref 33.6–35)
MCV RBC AUTO: 93.6 FL (ref 81.4–97.8)
MICRO URNS: NORMAL
MONOCYTES # BLD AUTO: 0.36 K/UL (ref 0–0.85)
MONOCYTES NFR BLD AUTO: 5.4 % (ref 0–13.4)
NEUTROPHILS # BLD AUTO: 4.65 K/UL (ref 2–7.15)
NEUTROPHILS NFR BLD: 70.2 % (ref 44–72)
NITRITE UR QL STRIP.AUTO: NEGATIVE
NRBC # BLD AUTO: 0 K/UL
NRBC BLD AUTO-RTO: 0 /100 WBC
PH UR STRIP.AUTO: 6 [PH]
PLATELET # BLD AUTO: 188 K/UL (ref 164–446)
PMV BLD AUTO: 12.1 FL (ref 9–12.9)
POTASSIUM SERPL-SCNC: 3.4 MMOL/L (ref 3.6–5.5)
PROT SERPL-MCNC: 6.5 G/DL (ref 6–8.2)
PROT UR QL STRIP: NEGATIVE MG/DL
RBC # BLD AUTO: 4.82 M/UL (ref 4.2–5.4)
RBC UR QL AUTO: NEGATIVE
SODIUM SERPL-SCNC: 141 MMOL/L (ref 135–145)
SP GR UR STRIP.AUTO: 1.01
WBC # BLD AUTO: 6.6 K/UL (ref 4.8–10.8)

## 2017-03-11 PROCEDURE — 99284 EMERGENCY DEPT VISIT MOD MDM: CPT

## 2017-03-11 PROCEDURE — 85025 COMPLETE CBC W/AUTO DIFF WBC: CPT

## 2017-03-11 PROCEDURE — 81003 URINALYSIS AUTO W/O SCOPE: CPT

## 2017-03-11 PROCEDURE — 80053 COMPREHEN METABOLIC PANEL: CPT

## 2017-03-11 PROCEDURE — 80307 DRUG TEST PRSMV CHEM ANLYZR: CPT

## 2017-03-11 PROCEDURE — 700105 HCHG RX REV CODE 258: Performed by: EMERGENCY MEDICINE

## 2017-03-11 PROCEDURE — 94760 N-INVAS EAR/PLS OXIMETRY 1: CPT

## 2017-03-11 PROCEDURE — 71010 DX-CHEST-PORTABLE (1 VIEW): CPT

## 2017-03-11 RX ORDER — SODIUM CHLORIDE 9 MG/ML
1000 INJECTION, SOLUTION INTRAVENOUS ONCE
Status: COMPLETED | OUTPATIENT
Start: 2017-03-11 | End: 2017-03-11

## 2017-03-11 RX ADMIN — SODIUM CHLORIDE 1000 ML: 9 INJECTION, SOLUTION INTRAVENOUS at 02:30

## 2017-03-11 ASSESSMENT — ENCOUNTER SYMPTOMS: SEIZURES: 1

## 2017-03-11 NOTE — DISCHARGE INSTRUCTIONS
Return if you have a seizure that last longer than 5 minutes, confusion, or fever. Stop drinking alcohol.   Seizure, Adult  A seizure is abnormal electrical activity in the brain. Seizures usually last from 30 seconds to 2 minutes. There are various types of seizures.  Before a seizure, you may have a warning sensation (aura) that a seizure is about to occur. An aura may include the following symptoms:   · Fear or anxiety.  · Nausea.  · Feeling like the room is spinning (vertigo).  · Vision changes, such as seeing flashing lights or spots.  Common symptoms during a seizure include:  · A change in attention or behavior (altered mental status).  · Convulsions with rhythmic jerking movements.  · Drooling.  · Rapid eye movements.  · Grunting.  · Loss of bladder and bowel control.  · Bitter taste in the mouth.  · Tongue biting.  After a seizure, you may feel confused and sleepy. You may also have an injury resulting from convulsions during the seizure.  HOME CARE INSTRUCTIONS   · If you are given medicines, take them exactly as prescribed by your health care provider.  · Keep all follow-up appointments as directed by your health care provider.  · Do not swim or drive or engage in risky activity during which a seizure could cause further injury to you or others until your health care provider says it is OK.  · Get adequate rest.  · Teach friends and family what to do if you have a seizure. They should:  · Lay you on the ground to prevent a fall.  · Put a cushion under your head.  · Loosen any tight clothing around your neck.  · Turn you on your side. If vomiting occurs, this helps keep your airway clear.  · Stay with you until you recover.  · Know whether or not you need emergency care.  SEEK IMMEDIATE MEDICAL CARE IF:  · The seizure lasts longer than 5 minutes.  · The seizure is severe or you do not wake up immediately after the seizure.  · You have an altered mental status after the seizure.  · You are having more  frequent or worsening seizures.  Someone should drive you to the emergency department or call local emergency services (911 in [place]  MAKE SURE YOU:  · Understand these instructions.  · Will watch your condition.  · Will get help right away if you are not doing well or get worse.     This information is not intended to replace advice given to you by your health care provider. Make sure you discuss any questions you have with your health care provider.     Document Released: 12/15/2001 Document Revised: 01/08/2016 Document Reviewed: 07/30/2014  [employer] Interactive Patient Education ©2016 [employer]      Alcohol Intoxication  Alcohol intoxication occurs when you drink enough alcohol that it affects your ability to function. It can be mild or very severe. Drinking a lot of alcohol in a short time is called binge drinking. This can be very harmful. Drinking alcohol can also be more dangerous if you are taking medicines or other drugs. Some of the effects caused by alcohol may include:  · Loss of coordination.  · Changes in mood and behavior.  · Unclear thinking.  · Trouble talking (slurred speech).  · Throwing up (vomiting).  · Confusion.  · Slowed breathing.  · Twitching and shaking (seizures).  · Loss of consciousness.  HOME CARE  · Do not drive after drinking alcohol.  · Drink enough water and fluids to keep your pee (urine) clear or pale yellow. Avoid caffeine.  · Only take medicine as told by your doctor.  GET HELP IF:  · You throw up (vomit) many times.  · You do not feel better after a few days.  · You frequently have alcohol intoxication. Your doctor can help decide if you should see a substance use treatment counselor.  GET HELP RIGHT AWAY IF:  · You become shaky when you stop drinking.  · You have twitching and shaking.  · You throw up blood. It may look bright red or like coffee grounds.  · You notice blood in your poop (bowel movements).  · You become lightheaded or pass out (faint).  MAKE SURE YOU:    · Understand these instructions.  · Will watch your condition.  · Will get help right away if you are not doing well or get worse.     This information is not intended to replace advice given to you by your health care provider. Make sure you discuss any questions you have with your health care provider.     Document Released: 06/05/2009 Document Revised: 08/20/2014 Document Reviewed: 05/23/2014  Elsevier Interactive Patient Education ©2016 [employer] Inc.

## 2017-03-11 NOTE — ED AVS SNAPSHOT
3/11/2017          Tamela Davis[address]  [facility] [address]    Dear Tamela:    [facility] wants to ensure your discharge home is safe and you or your loved ones have had all your questions answered regarding your care after you leave the hospital.    You may receive a telephone call within two days of your discharge.  This call is to make certain you understand your discharge instructions as well as ensure we provided you with the best care possible during your stay with us.     The call will only last approximately 3-5 minutes and will be done by a nurse.    Once again, we want to ensure your discharge home is safe and that you have a clear understanding of any next steps in your care.  If you have any questions or concerns, please do not hesitate to contact us, we are here for you.  Thank you for choosing [facility] for your healthcare needs.    Sincerely,    August Hess    [facility]
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
## 2017-03-11 NOTE — ED AVS SNAPSHOT
ebindle Access Code: Activation code not generated  Current ebindle Status: Active    Ciafohart  A secure, online tool to manage your health information     [employer]’s ebindle® is a secure, online tool that connects you to your personalized health information from the privacy of your home -- day or night - making it very easy for you to manage your healthcare. Once the activation process is completed, you can even access your medical information using the ebindle christos, which is available for free in the Apple Christos store or Google Play store.     ebindle provides the following levels of access (as shown below):   My Chart Features   [facility] Primary Care Doctor [facility]  Specialists [facility]  Urgent  Care Non-[facility]  Primary Care  Doctor   Email your healthcare team securely and privately 24/7 X X X X   Manage appointments: schedule your next appointment; view details of past/upcoming appointments X      Request prescription refills. X      View recent personal medical records, including lab and immunizations X X X X   View health record, including health history, allergies, medications X X X X   Read reports about your outpatient visits, procedures, consult and ER notes X X X X   See your discharge summary, which is a recap of your hospital and/or ER visit that includes your diagnosis, lab results, and care plan. X X       How to register for ebindle:  1. Go to  [email or web address].  2. Click on the Sign Up Now box, which takes you to the New Member Sign Up page. You will need to provide the following information:  a. Enter your ebindle Access Code exactly as it appears at the top of this page. (You will not need to use this code after you’ve completed the sign-up process. If you do not sign up before the expiration date, you must request a new code.)   b. Enter your date of birth.   c. Enter your home email address.   d. Click Submit, and follow the next screen’s instructions.  3. Create a ebindle ID. This will  be your Vertascale login ID and cannot be changed, so think of one that is secure and easy to remember.  4. Create a Vert[employer] password. You can change your password at any time.  5. Enter your Password Reset Question and Answer. This can be used at a later time if you forget your password.   6. Enter your e-mail address. This allows you to receive e-mail notifications when new information is available in Vert[employer]ale.  7. Click Sign Up. You can now view your health information.    For assistance activating your Vertascale account, call [phone]
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
## 2017-03-11 NOTE — ED NOTES
Additionally Pt has a recent hx of a brain mass, states it's benign, doesn't know what type.  at bedside. Pt is highly agitated, irritable, and slightly aggressive/combative. Pt was given 2 mg versed PTA by EMS. Seizure activity described as tonic by  to EMS, which states is out of normal from usual seizure. Unable to draw from line, lab called.

## 2017-03-11 NOTE — ED AVS SNAPSHOT
Home Care Instructions                                                                                                                Tamela Vasquez   MRN: [id number]    Department:  [facility] [place], Emergency Dept   Date of Visit:  3/11/2017            [facility] [place], Emergency Dept    [address]    [place] [address]    Phone:  [phone]      You were seen by     Rosendo Dawson M.D.      Your Diagnosis Was     Seizure (CMS-HCC)     R56.9       These are the medications you received during your hospitalization from 03/11/2017 0110 to 03/11/2017 0620     Date/Time Order Dose Route Action    03/11/2017 0230 NS infusion 1,000 mL 1,000 mL Intravenous New Bag      Follow-up Information     1. Follow up with Ashely Bender PA-C. Schedule an appointment as soon as possible for a visit in 3 days.    Specialty:  Family Medicine    Contact information    [address]  [place] [address] [phone]          2. Follow up with MARYJO Madsen. Schedule an appointment as soon as possible for a visit in 3 days.    Specialty:  Pediatrics    Why:  Tell them you had a seizure.     Contact information    [address] [place] [address] [phone]        Medication Information     Review all of your home medications and newly ordered medications with your primary doctor and/or pharmacist as soon as possible. Follow medication instructions as directed by your doctor and/or pharmacist.     Please keep your complete medication list with you and share with your physician. Update the information when medications are discontinued, doses are changed, or new medications (including over-the-counter products) are added; and carry medication information at all times in the event of emergency situations.               Medication List      ASK your doctor about these medications        Instructions    Morning Afternoon Evening Bedtime    cyanocobalamin 1000  MCG/ML Soln   Commonly known as:  VITAMIN B-12        1 mL by Intramuscular route every 30 days.   Dose:  1000 mcg                        FISH OIL PO        Take  by mouth every day.                        fluticasone 50 MCG/ACT nasal spray   Commonly known as:  FLONASE        Spray 1 Spray in nose every day.   Dose:  1 Spray                        guaifenesin-codeine 100-10 MG/5ML syrup   Commonly known as:  TUSSI-ORGANIDIN NR        Take 5 mL by mouth 4 times a day as needed for Cough.   Dose:  5 mL                        levothyroxine 50 MCG Tabs   Commonly known as:  SYNTHROID        Take 1 Tab by mouth Every morning on an empty stomach.   Dose:  50 mcg                        multivitamin Tabs        Take 1 Tab by mouth every day.   Dose:  1 Tab                        Needles & Syringes Misc        1 Application by Does not apply route Q30 DAYS.   Dose:  1 Application                        ondansetron 8 MG Tbdp   Commonly known as:  ZOFRAN ODT        Take 1/2-1 tablet PO PRN nausea. Do not exceed more than 2 tablets in 24 hours.                                Procedures and tests performed during your visit     CBC WITH DIFFERENTIAL    COMP METABOLIC PANEL    Cardiac Monitoring    DIAGNOSTIC ALCOHOL    DX-CHEST-PORTABLE (1 VIEW)    ESTIMATED GFR    IV Saline Lock    Pulse Ox    URINALYSIS CULTURE, IF INDICATED        Discharge Instructions       Return if you have a seizure that last longer than 5 minutes, confusion, or fever. Stop drinking alcohol.   Seizure, Adult  A seizure is abnormal electrical activity in the brain. Seizures usually last from 30 seconds to 2 minutes. There are various types of seizures.  Before a seizure, you may have a warning sensation (aura) that a seizure is about to occur. An aura may include the following symptoms:   · Fear or anxiety.  · Nausea.  · Feeling like the room is spinning (vertigo).  · Vision changes, such as seeing flashing lights or spots.  Common symptoms during a  seizure include:  · A change in attention or behavior (altered mental status).  · Convulsions with rhythmic jerking movements.  · Drooling.  · Rapid eye movements.  · Grunting.  · Loss of bladder and bowel control.  · Bitter taste in the mouth.  · Tongue biting.  After a seizure, you may feel confused and sleepy. You may also have an injury resulting from convulsions during the seizure.  HOME CARE INSTRUCTIONS   · If you are given medicines, take them exactly as prescribed by your health care provider.  · Keep all follow-up appointments as directed by your health care provider.  · Do not swim or drive or engage in risky activity during which a seizure could cause further injury to you or others until your health care provider says it is OK.  · Get adequate rest.  · Teach friends and family what to do if you have a seizure. They should:  · Lay you on the ground to prevent a fall.  · Put a cushion under your head.  · Loosen any tight clothing around your neck.  · Turn you on your side. If vomiting occurs, this helps keep your airway clear.  · Stay with you until you recover.  · Know whether or not you need emergency care.  SEEK IMMEDIATE MEDICAL CARE IF:  · The seizure lasts longer than 5 minutes.  · The seizure is severe or you do not wake up immediately after the seizure.  · You have an altered mental status after the seizure.  · You are having more frequent or worsening seizures.  Someone should drive you to the emergency department or call local emergency services (911 in [place]  MAKE SURE YOU:  · Understand these instructions.  · Will watch your condition.  · Will get help right away if you are not doing well or get worse.     This information is not intended to replace advice given to you by your health care provider. Make sure you discuss any questions you have with your health care provider.     Document Released: 12/15/2001 Document Revised: 01/08/2016 Document Reviewed: 07/30/2014  [facility]  Patient Education ©2016 [employer]      Alcohol Intoxication  Alcohol intoxication occurs when you drink enough alcohol that it affects your ability to function. It can be mild or very severe. Drinking a lot of alcohol in a short time is called binge drinking. This can be very harmful. Drinking alcohol can also be more dangerous if you are taking medicines or other drugs. Some of the effects caused by alcohol may include:  · Loss of coordination.  · Changes in mood and behavior.  · Unclear thinking.  · Trouble talking (slurred speech).  · Throwing up (vomiting).  · Confusion.  · Slowed breathing.  · Twitching and shaking (seizures).  · Loss of consciousness.  HOME CARE  · Do not drive after drinking alcohol.  · Drink enough water and fluids to keep your pee (urine) clear or pale yellow. Avoid caffeine.  · Only take medicine as told by your doctor.  GET HELP IF:  · You throw up (vomit) many times.  · You do not feel better after a few days.  · You frequently have alcohol intoxication. Your doctor can help decide if you should see a substance use treatment counselor.  GET HELP RIGHT AWAY IF:  · You become shaky when you stop drinking.  · You have twitching and shaking.  · You throw up blood. It may look bright red or like coffee grounds.  · You notice blood in your poop (bowel movements).  · You become lightheaded or pass out (faint).  MAKE SURE YOU:   · Understand these instructions.  · Will watch your condition.  · Will get help right away if you are not doing well or get worse.     This information is not intended to replace advice given to you by your health care provider. Make sure you discuss any questions you have with your health care provider.     Document Released: 06/05/2009 Document Revised: 08/20/2014 Document Reviewed: 05/23/2014  [employer] Patient Education ©2016 [employer].              Patient Information     Patient Information    Following emergency treatment: all patient requiring  follow-up care must return either to a private physician or a clinic if your condition worsens before you are able to obtain further medical attention, please return to the emergency room.     Billing Information    At [facility], we work to make the billing process streamlined for our patients.  Our Representatives are here to answer any questions you may have regarding your hospital bill.  If you have insurance coverage and have supplied your insurance information to us, we will submit a claim to your insurer on your behalf.  Should you have any questions regarding your bill, we can be reached online or by phone as follows:  Online: You are able pay your bills online or live chat with our representatives about any billing questions you may have. We are here to help Monday - Friday from 8:00am to 7:30pm and 9:00am - 12:00pm on Saturdays.  Please visit [email or web address]  for more information.   Phone:  [phone] or [phone]    Please note that your emergency physician, surgeon, pathologist, radiologist, anesthesiologist, and other specialists are not employed by [facility] and will therefore bill separately for their services.  Please contact them directly for any questions concerning their bills at the numbers below:     Emergency Physician Services:  [phone]  [facility]:  [phone]  [facility]:  [phone]  [facility]:  [phone]    1. Your final bill may vary from the amount quoted upon discharge if all procedures are not complete at that time, or if your doctor has additional procedures of which we are not aware. You will receive an additional bill if you return to the Emergency Department at [facility] for suture removal regardless of the facility of which the sutures were placed.     2. Please arrange for settlement of this account at the emergency registration.    3. All self-pay accounts are due in  full at the time of treatment.  If you are unable to meet this obligation then payment is expected within 4-5 days.     4. If you have had radiology studies (CT, X-ray, Ultrasound, MRI), you have received a preliminary result during your emergency department visit. Please contact the radiology department [phone] to receive a copy of your final result. Please discuss the Final result with your primary physician or with the follow up physician provided.     Crisis Hotline:  [place] Crisis Hotline:  4-506-IFEQIVZ or 1-128.254.4608  [place] Crisis Hotline:    1-771.438.4169 or 912-762-0549         ED Discharge Follow Up Questions    1. In order to provide you with very good care, we would like to follow up with a phone call in the next few days.  May we have your permission to contact you?     YES /  NO    2. What is the best phone number to call you? (       )_____-__________    3. What is the best time to call you?      Morning  /  Afternoon  /  Evening                   Patient Signature:  ____________________________________________________________    Date:  ____________________________________________________________      Your appointments     May 02, 2017 10:00 AM   New Patient with MARYJO Lopez   [facility] ([facility])    [address]  [place] [address]   [phone]           Please bring Photo ID, Insurance Cards, All Medication Bottles and copies of any legal documents (such as Living Will, Power of ) If speaking a language besides English please bring an adult . Please arrive 30 minutes prior for check in and registration. You will be receiving a confirmation call a few days before your appointment from our automated call confirmation system.            May 17, 2017  3:00 PM   MA SCRN10 with RB[facility] MG 2   [facility] ([address])    [address]  [facility] [address] [phone]           No  deodorant, powder, perfume or lotion under the arm or breast area.            Aug 07, 2017 10:50 AM   Established Patient with Ashely Bender PA-C   [facility] [address] [place] [address])    [address] [place] [address] [phone]           You will be receiving a confirmation call a few days before your appointment from our automated call confirmation system.

## 2017-03-11 NOTE — ED NOTES
Room 39    [facility] BERNARDINO pt was at bar drinking when she had a seizure witnessed by her  to have lasted 2 mins.  Pt has a hx of seizures with her B12 deficiency and mixing with alcohol.     .  Chief Complaint   Patient presents with   • Seizure     2 mins has low B12 causes seizures when drinking, pt was drinking tonight

## 2017-03-28 ENCOUNTER — OFFICE VISIT (OUTPATIENT)
Dept: URGENT CARE | Facility: CLINIC | Age: 42
End: 2017-03-28
Payer: COMMERCIAL

## 2017-03-28 VITALS
RESPIRATION RATE: 16 BRPM | TEMPERATURE: 99.1 F | BODY MASS INDEX: 31.41 KG/M2 | SYSTOLIC BLOOD PRESSURE: 122 MMHG | DIASTOLIC BLOOD PRESSURE: 70 MMHG | OXYGEN SATURATION: 96 % | HEART RATE: 78 BPM | HEIGHT: 60 IN | WEIGHT: 160 LBS

## 2017-03-28 DIAGNOSIS — L08.9 LOCAL SKIN INFECTION: ICD-10-CM

## 2017-03-28 DIAGNOSIS — L30.9 DERMATITIS: ICD-10-CM

## 2017-03-28 PROCEDURE — 99214 OFFICE O/P EST MOD 30 MIN: CPT | Performed by: NURSE PRACTITIONER

## 2017-03-28 RX ORDER — SULFAMETHOXAZOLE AND TRIMETHOPRIM 800; 160 MG/1; MG/1
1 TABLET ORAL 2 TIMES DAILY
Qty: 14 TAB | Refills: 0 | Status: SHIPPED | OUTPATIENT
Start: 2017-03-28 | End: 2017-04-04

## 2017-03-28 RX ORDER — TRIAMCINOLONE ACETONIDE 1 MG/ML
1 LOTION TOPICAL 2 TIMES DAILY
Qty: 1 BOTTLE | Refills: 0 | Status: SHIPPED | OUTPATIENT
Start: 2017-03-28 | End: 2022-04-08

## 2017-03-28 NOTE — MR AVS SNAPSHOT
Tamela Montana Yinguli   3/28/2017 3:30 PM   Office Visit   MRN: [id number]    Department:  [facility]   Dept Phone:  [phone]    Description:  Female : 1975   Provider:  ALBERTO Roland           Reason for Visit     Herpes Zoster x 3 weeks/ bleeding/blister/red/spreading to shouders and neck/itchy/burning      Allergies as of 3/28/2017     Allergen Noted Reactions    Phentermine Hcl 2007       Swelling short of breath      You were diagnosed with     Local skin infection   [193690]       Dermatitis   [817285]         Vital Signs     Blood Pressure Pulse Temperature Respirations Height Weight    122/70 mmHg 78 37.3 °C (99.1 °F) 16 1.524 m (5') 72.576 kg (160 lb)    Body Mass Index Oxygen Saturation Last Menstrual Period Breastfeeding? Smoking Status       31.25 kg/m2 96% 2017 No Light Tobacco Smoker       Basic Information     Date Of Birth Sex Race Ethnicity Preferred Language    1975 Female  or other  Non- English      Your appointments     May 02, 2017 10:00 AM   New Patient with MARYJO Lopez   [facility] ([facility])    [address]  [place] [address]   [phone]           Please bring Photo ID, Insurance Cards, All Medication Bottles and copies of any legal documents (such as Living Will, Power of ) If speaking a language besides English please bring an adult . Please arrive 30 minutes prior for check in and registration. You will be receiving a confirmation call a few days before your appointment from our automated call confirmation system.            May 17, 2017  3:00 PM   MA SCRN10 with RB[facility] MG 2   [facility] ([address])    [address]  [place] [address] [phone]           No deodorant, powder, perfume or lotion under the arm or breast area.            Aug 07, 2017 10:50 AM   Established  Patient with Ashely Bender PA-C   [facility] [address] ([facility])    [address] [place] [address] [phone]           You will be receiving a confirmation call a few days before your appointment from our automated call confirmation system.              Problem List              ICD-10-CM Priority Class Noted - Resolved    Anxiety F41.9 Medium  8/13/2010 - Present    Migraine G43.909 High  9/1/2014 - Present    Seizure disorder (CMS-HCC) G40.909 High  1/20/2015 - Present    Obesity (BMI 30-39.9)- s/p gastric bypass E66.9 High  1/20/2015 - Present    Vitamin B 12 deficiency E53.8 Medium  1/20/2015 - Present    Nausea R11.0 Medium  1/20/2015 - Present    Recurrent major depressive disorder, in full remission (CMS-HCC) F33.42 Medium  1/20/2015 - Present    Hypothyroidism due to acquired atrophy of thyroid E03.4 Medium  1/20/2015 - Present    Cervical spondylosis M47.812 Low  12/11/2015 - Present    Cervical radiculopathy M54.12 Low  12/11/2015 - Present    Chronic neck pain M54.2, G89.29 Low  12/11/2015 - Present    DDD (degenerative disc disease), cervical M50.30 Low  12/11/2015 - Present    Cervical postlaminectomy syndrome M96.1 Low  7/13/2016 - Present    Laine-menopause N95.1 Low  10/6/2016 - Present    Vitamin D deficiency E55.9   2/6/2017 - Present      Health Maintenance        Date Due Completion Dates    MAMMOGRAM 11/20/2016 ---    IMM INFLUENZA (1) 2/6/2018 (Originally 9/1/2016) 12/2/2012    PAP SMEAR 4/22/2018 4/22/2015, 4/22/2015    IMM DTaP/Tdap/Td Vaccine (2 - Td) 8/5/2022 8/5/2012            Current Immunizations     Influenza TIV (IM) 12/2/2012    Pneumococcal polysaccharide vaccine (PPSV-23) 4/3/2013  1:54 PM    Tdap Vaccine 8/5/2012 11:00 PM      Below and/or attached are the medications your provider expects you to take. Review all of your home medications and newly ordered medications with your provider and/or pharmacist. Follow medication instructions as directed by your  provider and/or pharmacist. Please keep your medication list with you and share with your provider. Update the information when medications are discontinued, doses are changed, or new medications (including over-the-counter products) are added; and carry medication information at all times in the event of emergency situations     Allergies:  PHENTERMINE HCL - (reactions not documented)               Medications  Valid as of: March 28, 2017 -  6:00 PM    Generic Name Brand Name Tablet Size Instructions for use    Cyanocobalamin (Solution) VITAMIN B-12 1000 MCG/ML 1 mL by Intramuscular route every 30 days.        Fluticasone Propionate (Suspension) FLONASE 50 MCG/ACT Spray 1 Spray in nose every day.        Levothyroxine Sodium (Tab) SYNTHROID 50 MCG Take 1 Tab by mouth Every morning on an empty stomach.        Multiple Vitamin (Tab) THERAGRAN  Take 1 Tab by mouth every day.        Needles & Syringes (Misc) Needles & Syringes  1 Application by Does not apply route Q30 DAYS.        Ondansetron (TABLET DISPERSIBLE) ZOFRAN ODT 8 MG Take 1/2-1 tablet PO PRN nausea. Do not exceed more than 2 tablets in 24 hours.        Sulfamethoxazole-Trimethoprim (Tab) BACTRIM -160 MG Take 1 Tab by mouth 2 times a day for 7 days.        Triamcinolone Acetonide (Lotion) KENALOG 0.1 % Apply 1 Application to affected area(s) 2 Times a Day.        .                 Medicines prescribed today were sent to:     [facility] [address] [place] [address] (S), [place] - [address] [id number] [facility] (S) [place] [address]    Phone: [phone] Fax: [phone]    Open 24 Hours?: No      Medication refill instructions:       If your prescription bottle indicates you have medication refills left, it is not necessary to call your provider’s office. Please contact your pharmacy and they will refill your medication.    If your prescription bottle indicates you do not have any refills left, you may request refills at any time through one of the following  ways: The online [employer] system (except Urgent Care), by calling your provider’s office, or by asking your pharmacy to contact your provider’s office with a refill request. Medication refills are processed only during regular business hours and may not be available until the next business day. Your provider may request additional information or to have a follow-up visit with you prior to refilling your medication.   *Please Note: Medication refills are assigned a new Rx number when refilled electronically. Your pharmacy may indicate that no refills were authorized even though a new prescription for the same medication is available at the pharmacy. Please request the medicine by name with the pharmacy before contacting your provider for a refill.           True Style Access Code: Activation code not generated  Current True Style Status: Active          Quit Tobacco Information     Do you want to quit using tobacco?    Quitting tobacco decreases risks of cancer, heart and lung disease, increases life expectancy, improves sense of taste and smell, and increases spending money, among other benefits.    If you are thinking about quitting, we can help.  • [facility] Quit Tobacco Program: 395.508.9068  o Program occurs weekly for four weeks and includes pharmacist consultation on products to support quitting smoking or chewing tobacco. A provider referral is needed for pharmacist consultation.  • Tobacco Users Help Hotline: 3-031-QUITNOW (649-3783) or https://[place].quitlogix.org/  o Free, confidential telephone and online coaching for [place] residents. Sessions are designed on a schedule that is convenient for you. Eligible clients receive free nicotine replacement therapy.  • Nationally: www.smokefree.gov  o Information and professional assistance to support both immediate and long-term needs as you become, and remain, a non-smoker. Smokefree.gov allows you to choose the help that best fits your needs.

## 2017-03-28 NOTE — Clinical Note
March 28, 2017         Patient: Tamela Vasquez   YOB: 1975   Date of Visit: 3/28/2017           To Whom it May Concern:    Tamela Vasquez was seen in my clinic on 3/28/2017. She may be excused from work today.     If you have any questions or concerns, please don't hesitate to call.        Sincerely,           OLMAN Roland.  Electronically Signed

## 2017-03-28 NOTE — PROGRESS NOTES
Subjective:      Chief Complaint   Patient presents with   • Rash     x 3 weeks/ bleeding/blister/red/spreading to shouders and neck/itchy/burning         HPI    The patient is here today with concerns of a rash to her chest. The rash started three weeks ago and has spread. The rash started on the left side of her upper anterior chest and has now spread to the left side. She describes the rash as burning and extremely itchy. She denies associated fever, chills, vomiting, HA, malaise. She denies changes in detergents, soaps, foods. Denies recent travel or camping. She lives at home with her  and he is symptom free. She is concerned because she was hospitalized in 2015 for shingles, states this feels somewhat different but is concerned it may be shingles again. She does admit that the rash started after she stopped using tiger balm to the areas for chronic neck pain. She stopped using the cream after rash started. She has tried OTC steroid cream for symptom relief.     Past Medical History   Diagnosis Date   • Constipation      medicated   • Concussion    • Thyroid activity decreased      medicated - hypothyroid   • Other specified symptom associated with female genital organs      scar tissue   • Right acoustic neuroma (CMS-[facility])      removed 3/3/2105   • Migraine    • Cervical spondylosis 12/11/2015   • Shingles 1/13/2016   • Pain    • Major depression 1/20/2015     depression and anxiety   • Sleep apnea      does not use cpap, last sleep study showed she was clear for apnea   • Seizure disorder (CMS-[facility]) 1999     not medicated at present - was due to apnea in past, last seizure was 12/1/15, last EEG was clear     Past Surgical History   Procedure Laterality Date   • Gastric bypass laparoscopic     • Hchg tube, ear ultrasil     • Gastroscopy-endo  1/20/2009     Performed by BRENDA BENNETT at [facility]   • Colonoscopy - endo  1/22/2009     Performed by ASHLEY TALBOT at [facility]   • Carpal tunnel release       RUE - 1998, LUE - 2000   • Gyn surgery     • Pelviscopy  6/16/2009     Performed by ABDULKADIR SMITH at SURGERY SAME DAY [facility] ORS   • Lysis adhesions gyn  6/16/2009     Performed by ABDULKADIR SMITH at SURGERY SAME DAY [facility] ORS   • Gastroscopy-endo  8/12/2010     Performed by KHUSHI MURCIA at ENDOSCOPY [facility] ORS   • Shruti by laparoscopy  8/15/2010     Performed by DANIEL DONOVAN at SURGERY [facility] ORS   • Lysis adhesions general  8/15/2010     Performed by DANIEL DONOVAN at SURGERY [facility] ORS   • Appendectomy  1994   • Other abdominal surgery  1997     C - section   • Other abdominal surgery  2009     shruti   • Cervical disk and fusion anterior  4/27/2016     Procedure: CERVICAL DISK AND FUSION ANTERIOR C5-6;  Surgeon: Jorge Pozo M.D.;  Location: SURGERY [facility];  Service:      Current Outpatient Prescriptions on File Prior to Visit   Medication Sig Dispense Refill   • fluticasone (FLONASE) 50 MCG/ACT nasal spray Spray 1 Spray in nose every day. 16 g 0   • ondansetron (ZOFRAN ODT) 8 MG TABLET DISPERSIBLE Take 1/2-1 tablet PO PRN nausea. Do not exceed more than 2 tablets in 24 hours. 20 Tab 2   • cyanocobalamin (VITAMIN B-12) 1000 MCG/ML Solution 1 mL by Intramuscular route every 30 days. 1 Vial 3   • levothyroxine (SYNTHROID) 50 MCG Tab Take 1 Tab by mouth Every morning on an empty stomach. 90 Tab 0   • multivitamin (THERAGRAN) Tab Take 1 Tab by mouth every day.     • Needles & Syringes Misc 1 Application by Does not apply route Q30 DAYS. 100 Units 1     No current facility-administered medications on file prior to visit.     ALL: Phentermine hcl    Review of Systems   Constitutional: Negative.  Negative for fever, chills and malaise/fatigue.   HENT: Negative.    Eyes: Negative.    Respiratory: Negative.    Cardiovascular: Negative.    Gastrointestinal: Negative.    Genitourinary: Negative.    Musculoskeletal: Negative.     Skin: Positive for itching and rash.   Neurological: Negative.  Negative for weakness.   Endo/Heme/Allergies: Negative.    Psychiatric/Behavioral: Negative.           Objective:     Pulse 78  Temp(Src) 37.3 °C (99.2 °F)  Resp 16  Ht 1.524 m (5')  Wt 72.576 kg (160 lb)  BMI 31.25 kg/m2  SpO2 96%  LMP 03/18/2017  Breastfeeding? No     Physical Exam   Constitutional: She is oriented to person, place, and time. Vital signs are normal. She appears well-developed and well-nourished. She does not appear ill. No distress.   HENT:   Head: Normocephalic and atraumatic.   Right Ear: External ear normal.   Left Ear: External ear normal.   Nose: Nose normal.   Mouth/Throat: Oropharynx is clear and moist. No oropharyngeal exudate.   Eyes: Conjunctivae are normal. Pupils are equal, round, and reactive to light. Right eye exhibits no discharge. Left eye exhibits no discharge. No scleral icterus.   Neck: Normal range of motion and full passive range of motion without pain. Neck supple. No JVD present. No spinous process tenderness and no muscular tenderness present. No rigidity. No tracheal deviation, no edema, no erythema and normal range of motion present. No thyromegaly present.   Cardiovascular: Normal rate, regular rhythm, normal heart sounds and intact distal pulses.    Pulmonary/Chest: Effort normal and breath sounds normal. No stridor. No respiratory distress. She has no wheezes.   Musculoskeletal: Normal range of motion. She exhibits no edema or tenderness.   Lymphadenopathy:     She has no cervical adenopathy.   Neurological: She is alert and oriented to person, place, and time. She has normal strength. No cranial nerve deficit or sensory deficit. She exhibits normal muscle tone. Coordination and gait normal. GCS eye subscore is 4. GCS verbal subscore is 5. GCS motor subscore is 6.   Skin: Skin is warm. Rash noted. Rash is maculopapular and urticarial. She is not diaphoretic. There is erythema. No pallor.         There is a scattered and confluent erythematous rash to her left and right anterior chest walls, radiating to both shoulders, L>R. The rash is both maculopapular, and in some areas, vesicular where areas have been scratched. There is tenderness, surrounding erythema and swelling to some of those rash lesions. Patient states these are areas she has been scratching more frequently.    Psychiatric: She has a normal mood and affect. Her behavior is normal. Judgment and thought content normal.   Vitals reviewed.              Assessment/Plan:     1. Dermatitis  triamcinolone (KENALOG) 0.1 % lotion   2. Local skin infection  sulfamethoxazole-trimethoprim (BACTRIM DS) 800-160 MG tablet           The patient will be treated for dermatitis, secondary to tiger balm? Instructed to dc tiger balm, kenalog cream as directed.   PO allergy medication such as Allegra and/or Benadryl for symptom relief. Oatmeal baths. Avoid itching.   She will be given a prescription for Bactrim for suspected secondary infection of lesions.   Supportive care, differential diagnoses, and indications for immediate follow-up discussed with patient.   Pathogenesis of diagnosis discussed including typical length and natural progression.   Instructed to return to clinic or nearest emergency department for any change in condition, further concerns, or worsening of symptoms.  Patient states understanding of the plan of care and discharge instructions.  Instructed to make an appointment, for follow up, with their primary care provider.        OLMAN Roland.

## 2017-03-29 ASSESSMENT — ENCOUNTER SYMPTOMS
MUSCULOSKELETAL NEGATIVE: 1
PSYCHIATRIC NEGATIVE: 1
CHILLS: 0
RESPIRATORY NEGATIVE: 1
WEAKNESS: 0
CONSTITUTIONAL NEGATIVE: 1
CARDIOVASCULAR NEGATIVE: 1
FEVER: 0
GASTROINTESTINAL NEGATIVE: 1
EYES NEGATIVE: 1
NEUROLOGICAL NEGATIVE: 1

## 2017-05-02 ENCOUNTER — HOSPITAL ENCOUNTER (OUTPATIENT)
Facility: MEDICAL CENTER | Age: 42
End: 2017-05-02
Attending: NURSE PRACTITIONER
Payer: COMMERCIAL

## 2017-05-02 ENCOUNTER — OFFICE VISIT (OUTPATIENT)
Dept: PLASTIC SURGERY | Facility: IMAGING CENTER | Age: 42
End: 2017-05-02
Payer: COMMERCIAL

## 2017-05-02 DIAGNOSIS — D48.5 NEOPLASM OF UNCERTAIN BEHAVIOR OF SKIN: ICD-10-CM

## 2017-05-02 PROCEDURE — 88305 TISSUE EXAM BY PATHOLOGIST: CPT

## 2017-05-02 PROCEDURE — 11100 PR BIOPSY OF SKIN LESION: CPT | Performed by: NURSE PRACTITIONER

## 2017-05-02 NOTE — MR AVS SNAPSHOT
Tamela Rubén Vasquez   2017 10:00 AM   Office Visit   MRN: [id number]    Department:  [facility]   Dept Phone:  [phone]    Description:  Female : 1975   Provider:  MARYJO Lopez           Reason for Visit     Skin Lesion           Allergies as of 2017     Allergen Noted Reactions    Phentermine Hcl 2007       Swelling short of breath      You were diagnosed with     Neoplasm of uncertain behavior of skin   [238.2.ICD-9-CM]         Vital Signs     Smoking Status                   Light Tobacco Smoker           Basic Information     Date Of Birth Sex Race Ethnicity Preferred Language    1975 Female  or other  Non- English      Your appointments     May 17, 2017  3:00 PM   QUYEN [id number] with RB MG 2   [facility] [place] [facility] ([address])    [address]  [address]  [place] [id number] [phone]           No deodorant, powder, perfume or lotion under the arm or breast area.            Aug 07, 2017 10:50 AM   Established Patient with Ashely Bender PA-C   [address] ([facility])    [address]  [place] [address] [phone]           You will be receiving a confirmation call a few days before your appointment from our automated call confirmation system.              Problem List              ICD-10-CM Priority Class Noted - Resolved    Anxiety F41.9 Medium  2010 - Present    Migraine G43.909 High  2014 - Present    Seizure disorder (CMS-HCC) G40.909 High  2015 - Present    Obesity (BMI 30-39.9)- s/p gastric bypass E66.9 High  2015 - Present    Vitamin B 12 deficiency E53.8 Medium  2015 - Present    Nausea R11.0 Medium  2015 - Present    Recurrent major depressive disorder, in full remission (CMS-[facility]) F33.42 Medium  2015 - Present    Hypothyroidism due to acquired atrophy of thyroid E03.4 Medium  2015 - Present    Cervical  spondylosis M47.812 Low  12/11/2015 - Present    Cervical radiculopathy M54.12 Low  12/11/2015 - Present    Chronic neck pain M54.2, G89.29 Low  12/11/2015 - Present    DDD (degenerative disc disease), cervical M50.30 Low  12/11/2015 - Present    Cervical postlaminectomy syndrome M96.1 Low  7/13/2016 - Present    Laine-menopause N95.1 Low  10/6/2016 - Present    Vitamin D deficiency E55.9   2/6/2017 - Present    Neoplasm of uncertain behavior of skin D48.5   5/2/2017 - Present      Health Maintenance        Date Due Completion Dates    MAMMOGRAM 11/20/2016 ---    PAP SMEAR 4/22/2018 4/22/2015, 4/22/2015    IMM DTaP/Tdap/Td Vaccine (2 - Td) 8/5/2022 8/5/2012            Current Immunizations     Influenza TIV (IM) 12/2/2012    Pneumococcal polysaccharide vaccine (PPSV-23) 4/3/2013  1:54 PM    Tdap Vaccine 8/5/2012 11:00 PM      Below and/or attached are the medications your provider expects you to take. Review all of your home medications and newly ordered medications with your provider and/or pharmacist. Follow medication instructions as directed by your provider and/or pharmacist. Please keep your medication list with you and share with your provider. Update the information when medications are discontinued, doses are changed, or new medications (including over-the-counter products) are added; and carry medication information at all times in the event of emergency situations     Allergies:  PHENTERMINE HCL - (reactions not documented)               Medications  Valid as of: May 02, 2017 - 11:04 AM    Generic Name Brand Name Tablet Size Instructions for use    Cyanocobalamin (Solution) VITAMIN B-12 1000 MCG/ML 1 mL by Intramuscular route every 30 days.        Fluticasone Propionate (Suspension) FLONASE 50 MCG/ACT Spray 1 Spray in nose every day.        Levothyroxine Sodium (Tab) SYNTHROID 50 MCG Take 1 Tab by mouth Every morning on an empty stomach.        Multiple Vitamin (Tab) THERAGRAN  Take 1 Tab by mouth every  day.        Needles & Syringes (Misc) Needles & Syringes  1 Application by Does not apply route Q30 DAYS.        Ondansetron (TABLET DISPERSIBLE) ZOFRAN ODT 8 MG Take 1/2-1 tablet PO PRN nausea. Do not exceed more than 2 tablets in 24 hours.        Triamcinolone Acetonide (Lotion) KENALOG 0.1 % Apply 1 Application to affected area(s) 2 Times a Day.        .                 Medicines prescribed today were sent to:     [address] ([address]), [place] [address] [employer]    [facility] [place] [address]    Phone: [phone] Fax: [phone]    Open 24 Hours?: No      Medication refill instructions:       If your prescription bottle indicates you have medication refills left, it is not necessary to call your provider’s office. Please contact your pharmacy and they will refill your medication.    If your prescription bottle indicates you do not have any refills left, you may request refills at any time through one of the following ways: The online [employer] system (except Urgent Care), by calling your provider’s office, or by asking your pharmacy to contact your provider’s office with a refill request. Medication refills are processed only during regular business hours and may not be available until the next business day. Your provider may request additional information or to have a follow-up visit with you prior to refilling your medication.   *Please Note: Medication refills are assigned a new Rx number when refilled electronically. Your pharmacy may indicate that no refills were authorized even though a new prescription for the same medication is available at the pharmacy. Please request the medicine by name with the pharmacy before contacting your provider for a refill.           [employer] Access Code: Activation code not generated  Current [employer] Status: Active          Quit Tobacco Information     Do you want to quit using tobacco?    Quitting tobacco decreases risks of cancer, heart and lung disease,  increases life expectancy, improves sense of taste and smell, and increases spending money, among other benefits.    If you are thinking about quitting, we can help.  • [facility] Quit Tobacco Program: 177.171.2010  o Program occurs weekly for four weeks and includes pharmacist consultation on products to support quitting smoking or chewing tobacco. A provider referral is needed for pharmacist consultation.  • Tobacco Users Help Hotline: 1-025-QUIT-NOW (333-5595) or https://[place].quitlogix.org/  o Free, confidential telephone and online coaching for [place] residents. Sessions are designed on a schedule that is convenient for you. Eligible clients receive free nicotine replacement therapy.  • Nationally: www.smokefree.gov  o Information and professional assistance to support both immediate and long-term needs as you become, and remain, a non-smoker. Smokefree.gov allows you to choose the help that best fits your needs.

## 2017-05-02 NOTE — ASSESSMENT & PLAN NOTE
She has had a skin lesion on her scalp that has been present for three years but has recently been increasing in size and she has had some pain and tenderness under the lesion.

## 2017-05-03 NOTE — PROGRESS NOTES
Chief Complaint   Patient presents with   • Skin Lesion         HISTORY OF THE PRESENT ILLNESS: Patient is a 41 y.o. Female patient of sAhely Bender. This pleasant patient is here today regarding a skin lesion on her scalp, first noticed by her hairdresser that is tender at times.      Neoplasm of uncertain behavior of skin  She has had a skin lesion on her scalp that has been present for three years but has recently been increasing in size and she has had some pain and tenderness under the lesion.       Allergies: Phentermine hcl    Current Outpatient Prescriptions   Medication Sig Dispense Refill   • triamcinolone (KENALOG) 0.1 % lotion Apply 1 Application to affected area(s) 2 Times a Day. 1 Bottle 0   • fluticasone (FLONASE) 50 MCG/ACT nasal spray Spray 1 Spray in nose every day. 16 g 0   • ondansetron (ZOFRAN ODT) 8 MG TABLET DISPERSIBLE Take 1/2-1 tablet PO PRN nausea. Do not exceed more than 2 tablets in 24 hours. 20 Tab 2   • cyanocobalamin (VITAMIN B-12) 1000 MCG/ML Solution 1 mL by Intramuscular route every 30 days. 1 Vial 3   • Needles & Syringes Misc 1 Application by Does not apply route Q30 DAYS. 100 Units 1   • levothyroxine (SYNTHROID) 50 MCG Tab Take 1 Tab by mouth Every morning on an empty stomach. 90 Tab 0   • multivitamin (THERAGRAN) Tab Take 1 Tab by mouth every day.       No current facility-administered medications for this visit.       Past Medical History   Diagnosis Date   • Constipation      medicated   • Concussion    • Thyroid activity decreased      medicated - hypothyroid   • Other specified symptom associated with female genital organs      scar tissue   • Right acoustic neuroma (CMS-HCC)      removed 3/3/2105   • Migraine    • Cervical spondylosis 12/11/2015   • Shingles 1/13/2016   • Pain    • Major depression (CMS-HCC) 1/20/2015     depression and anxiety   • Sleep apnea      does not use cpap, last sleep study showed she was clear for apnea   • Seizure disorder (CMS-HCC) 1999     not  medicated at present - was due to apnea in past, last seizure was 12/1/15, last EEG was clear   • Neoplasm of uncertain behavior of skin 2017       Past Surgical History   Procedure Laterality Date   • Gastric bypass laparoscopic     • Hchg tube, ear ultrasil     • Gastroscopy-endo  2009     Performed by BRENDA BENNETT at [facility]   • Colonoscopy - endo  2009     Performed by ASHLEY TALBOT at SURGERY [facility]   • Carpal tunnel release       RUE - , LUE -    • Gyn surgery     • Pelviscopy  2009     Performed by ABDULKADIR SMITH at SURGERY SAME DAY [facility] ORS   • Lysis adhesions gyn  2009     Performed by ABDULKADIR SMITH at SURGERY SAME DAY [facility]   • Gastroscopy-endo  2010     Performed by KHUSHI MURCIA at ENDOSCOPY [facility]   • Shruti by laparoscopy  8/15/2010     Performed by DANIEL DONOVAN at SURGERY [facility]   • Lysis adhesions general  8/15/2010     Performed by DANIEL DONOVAN at SURGERY [facility]   • Appendectomy     • Other abdominal surgery       C - section   • Other abdominal surgery       shruti   • Cervical disk and fusion anterior  2016     Procedure: CERVICAL DISK AND FUSION ANTERIOR C5-6;  Surgeon: Jorge Pozo M.D.;  Location: SURGERY [facility];  Service:        Social History   Substance Use Topics   • Smoking status: Light Tobacco Smoker -- 0.25 packs/day for 27 years     Types: Cigarettes   • Smokeless tobacco: Current User      Comment: 1/2 pack per day; uses  e cigarettes   • Alcohol Use: No       Family Status   Relation Status Death Age   • Mother       cirrhosis   • Father Alive    • Paternal Aunt Alive    • Maternal Grandfather Alive    • Paternal Grandfather       Family History   Problem Relation Age of Onset   • Alcohol/Drug Mother      ETOH   • Diabetes Mother    • Cancer Paternal Aunt      breast   • Diabetes Maternal Grandfather    •  Cancer Paternal Grandmother      cervical    • Heart Disease Paternal Grandfather      MI   • Cancer Paternal Grandfather      lung   • Hypertension Paternal Grandfather    • Hyperlipidemia Paternal Grandfather        Review of Systems   Constitutional: Negative for fever, chills, weight loss and malaise/fatigue.   Skin: She has a brown skin lesion on her scalp that is somewhat tender at times    Exam: There were no vitals taken for this visit.  General: Normal appearing. No distress.  Skin: 10 x 9 mm light brown circular lesion on the occiput of the scalp slightly tender to palpation   Consent is signed     Preoperative diagnosis: Neoplasm of uncertain behavior of skin  Postoperative diagnosis: Neoplasm of uncertain behavior of skin          Anesthesia: Half to 1 cc of lidocaine with epinephrine    EBL: Less than 2 cc    Complications: None    Procedure: After informed consent patient was placed on the table supine. An area 2 cm superior lateral to the skin neoplasm was swabbed with iodine. Using a 27-gauge in 1-1/2 inch needle local anesthesia was obtained with half cc of lidocaine with epinephrine.  Using a #for punch biopsy a punch biopsy was performed, lesion wassent to pathology. A #3 Prolene suture was placed. Patient tolerated the procedure well . Antibacterial ointment and a sterile dressing was applied, patient was instructed on wound care. Patient was instructed on signs and symptoms of infection. She will call the office if these occur.  Please note that this dictation was created using voice recognition software. I have made every reasonable attempt to correct obvious errors, but I expect that there are errors of grammar and possibly content that I did not discover before finalizing the note.      Assessment/Plan  1. Neoplasm of uncertain behavior of skin   further treatment based on pathology results

## 2017-05-09 ENCOUNTER — OFFICE VISIT (OUTPATIENT)
Dept: PLASTIC SURGERY | Facility: IMAGING CENTER | Age: 42
End: 2017-05-09
Payer: COMMERCIAL

## 2017-05-09 DIAGNOSIS — L57.0 KERATOSIS: ICD-10-CM

## 2017-05-09 NOTE — PROGRESS NOTES
One suture was removed from the biopsy site on her scalp,site was clean and dry.   Pathology result was reviewed with her that was previously discussed by phone that showed a benign keratosis .   Follow up as needed.

## 2017-05-09 NOTE — MR AVS SNAPSHOT
Tamela Rubén Vasquez   2017 8:30 AM   Office Visit   MRN: [id number]    Department:  [facility]   Dept Phone:  [phone]    Description:  Female : 1975   Provider:  MARYJO Lopez           Reason for Visit     Suture / Staple Removal           Allergies as of 2017     Allergen Noted Reactions    Phentermine Hcl 2007       Swelling short of breath      You were diagnosed with     Keratosis   [252189]         Vital Signs     Smoking Status                   Light Tobacco Smoker           Basic Information     Date Of Birth Sex Race Ethnicity Preferred Language    1975 Female  or other  Non- English      Your appointments     May 17, 2017  3:00 PM   MA SCRN10 with RB MG 2   [facility] ([address])    [address]  [facility] [id number] [phone]           No deodorant, powder, perfume or lotion under the arm or breast area.            Aug 07, 2017 10:50 AM   Established Patient with Ashely Bender PA-C   [address] ([facility])    [address]  [facility] [address] [phone]           You will be receiving a confirmation call a few days before your appointment from our automated call confirmation system.              Problem List              ICD-10-CM Priority Class Noted - Resolved    Anxiety F41.9 Medium  2010 - Present    Migraine G43.909 High  2014 - Present    Seizure disorder (CMS-[facility]) G40.909 High  2015 - Present    Obesity (BMI 30-39.9)- s/p gastric bypass E66.9 High  2015 - Present    Vitamin B 12 deficiency E53.8 Medium  2015 - Present    Nausea R11.0 Medium  2015 - Present    Recurrent major depressive disorder, in full remission (CMS-[facility]) F33.42 Medium  2015 - Present    Hypothyroidism due to acquired atrophy of thyroid E03.4 Medium  2015 - Present    Cervical spondylosis M47.812 Low   12/11/2015 - Present    Cervical radiculopathy M54.12 Low  12/11/2015 - Present    Chronic neck pain M54.2, G89.29 Low  12/11/2015 - Present    DDD (degenerative disc disease), cervical M50.30 Low  12/11/2015 - Present    Cervical postlaminectomy syndrome M96.1 Low  7/13/2016 - Present    Laine-menopause N95.1 Low  10/6/2016 - Present    Vitamin D deficiency E55.9   2/6/2017 - Present    Neoplasm of uncertain behavior of skin D48.5   5/2/2017 - Present    Keratosis L57.0   5/9/2017 - Present      Health Maintenance        Date Due Completion Dates    MAMMOGRAM 11/20/2016 ---    PAP SMEAR 4/22/2018 4/22/2015, 4/22/2015    IMM DTaP/Tdap/Td Vaccine (2 - Td) 8/5/2022 8/5/2012            Current Immunizations     Influenza TIV (IM) 12/2/2012    Pneumococcal polysaccharide vaccine (PPSV-23) 4/3/2013  1:54 PM    Tdap Vaccine 8/5/2012 11:00 PM      Below and/or attached are the medications your provider expects you to take. Review all of your home medications and newly ordered medications with your provider and/or pharmacist. Follow medication instructions as directed by your provider and/or pharmacist. Please keep your medication list with you and share with your provider. Update the information when medications are discontinued, doses are changed, or new medications (including over-the-counter products) are added; and carry medication information at all times in the event of emergency situations     Allergies:  PHENTERMINE HCL - (reactions not documented)               Medications  Valid as of: May 09, 2017 -  3:01 PM    Generic Name Brand Name Tablet Size Instructions for use    Cyanocobalamin (Solution) VITAMIN B-12 1000 MCG/ML 1 mL by Intramuscular route every 30 days.        Fluticasone Propionate (Suspension) FLONASE 50 MCG/ACT Spray 1 Spray in nose every day.        Levothyroxine Sodium (Tab) SYNTHROID 50 MCG Take 1 Tab by mouth Every morning on an empty stomach.        Multiple Vitamin (Tab) THERAGRAN  Take 1 Tab by  mouth every day.        Needles & Syringes (Misc) Needles & Syringes  1 Application by Does not apply route Q30 DAYS.        Ondansetron (TABLET DISPERSIBLE) ZOFRAN ODT 8 MG Take 1/2-1 tablet PO PRN nausea. Do not exceed more than 2 tablets in 24 hours.        Triamcinolone Acetonide (Lotion) KENALOG 0.1 % Apply 1 Application to affected area(s) 2 Times a Day.        .                 Medicines prescribed today were sent to:     [address] ([place]), [place] [address] [place] [address] [facility] ([place]) [place] [address]    Phone: [phone] Fax: [phone]    Open 24 Hours?: No      Medication refill instructions:       If your prescription bottle indicates you have medication refills left, it is not necessary to call your provider’s office. Please contact your pharmacy and they will refill your medication.    If your prescription bottle indicates you do not have any refills left, you may request refills at any time through one of the following ways: The online EndoShape system (except Urgent Care), by calling your provider’s office, or by asking your pharmacy to contact your provider’s office with a refill request. Medication refills are processed only during regular business hours and may not be available until the next business day. Your provider may request additional information or to have a follow-up visit with you prior to refilling your medication.   *Please Note: Medication refills are assigned a new Rx number when refilled electronically. Your pharmacy may indicate that no refills were authorized even though a new prescription for the same medication is available at the pharmacy. Please request the medicine by name with the pharmacy before contacting your provider for a refill.           EndoShape Access Code: Activation code not generated  Current EndoShape Status: Active          Quit Tobacco Information     Do you want to quit using tobacco?    Quitting tobacco decreases risks of cancer, heart and  lung disease, increases life expectancy, improves sense of taste and smell, and increases spending money, among other benefits.    If you are thinking about quitting, we can help.  • [facility] Quit Tobacco Program: 664.769.6963  o Program occurs weekly for four weeks and includes pharmacist consultation on products to support quitting smoking or chewing tobacco. A provider referral is needed for pharmacist consultation.  • Tobacco Users Help Hotline: 8-800QUIT-NOW (333-1948) or https://[place].quitlogix.org/  o Free, confidential telephone and online coaching for [place] residents. Sessions are designed on a schedule that is convenient for you. Eligible clients receive free nicotine replacement therapy.  • Nationally: www.smokefree.gov  o Information and professional assistance to support both immediate and long-term needs as you become, and remain, a non-smoker. Smokefree.gov allows you to choose the help that best fits your needs.

## 2017-05-25 ENCOUNTER — APPOINTMENT (OUTPATIENT)
Dept: RADIOLOGY | Facility: MEDICAL CENTER | Age: 42
End: 2017-05-25
Attending: EMERGENCY MEDICINE
Payer: COMMERCIAL

## 2017-05-25 ENCOUNTER — HOSPITAL ENCOUNTER (EMERGENCY)
Facility: MEDICAL CENTER | Age: 42
End: 2017-05-25
Attending: EMERGENCY MEDICINE
Payer: COMMERCIAL

## 2017-05-25 VITALS
DIASTOLIC BLOOD PRESSURE: 66 MMHG | BODY MASS INDEX: 32.85 KG/M2 | WEIGHT: 167.33 LBS | HEART RATE: 64 BPM | HEIGHT: 60 IN | RESPIRATION RATE: 15 BRPM | OXYGEN SATURATION: 94 % | SYSTOLIC BLOOD PRESSURE: 124 MMHG | TEMPERATURE: 97.9 F

## 2017-05-25 DIAGNOSIS — R56.9 SEIZURE (HCC): ICD-10-CM

## 2017-05-25 LAB
ALBUMIN SERPL BCP-MCNC: 3.8 G/DL (ref 3.2–4.9)
ALBUMIN/GLOB SERPL: 1.7 G/DL
ALP SERPL-CCNC: 62 U/L (ref 30–99)
ALT SERPL-CCNC: 10 U/L (ref 2–50)
ANION GAP SERPL CALC-SCNC: 11 MMOL/L (ref 0–11.9)
APPEARANCE UR: CLEAR
AST SERPL-CCNC: 14 U/L (ref 12–45)
BASOPHILS # BLD AUTO: 0.7 % (ref 0–1.8)
BASOPHILS # BLD: 0.07 K/UL (ref 0–0.12)
BILIRUB SERPL-MCNC: 0.4 MG/DL (ref 0.1–1.5)
BUN SERPL-MCNC: 14 MG/DL (ref 8–22)
CALCIUM SERPL-MCNC: 9 MG/DL (ref 8.5–10.5)
CHLORIDE SERPL-SCNC: 106 MMOL/L (ref 96–112)
CO2 SERPL-SCNC: 18 MMOL/L (ref 20–33)
COLOR UR AUTO: YELLOW
CREAT SERPL-MCNC: 0.57 MG/DL (ref 0.5–1.4)
EOSINOPHIL # BLD AUTO: 0.18 K/UL (ref 0–0.51)
EOSINOPHIL NFR BLD: 1.7 % (ref 0–6.9)
ERYTHROCYTE [DISTWIDTH] IN BLOOD BY AUTOMATED COUNT: 42 FL (ref 35.9–50)
GFR SERPL CREATININE-BSD FRML MDRD: >60 ML/MIN/1.73 M 2
GLOBULIN SER CALC-MCNC: 2.3 G/DL (ref 1.9–3.5)
GLUCOSE SERPL-MCNC: 123 MG/DL (ref 65–99)
GLUCOSE UR QL STRIP.AUTO: NEGATIVE MG/DL
HCG SERPL QL: NEGATIVE
HCT VFR BLD AUTO: 42 % (ref 37–47)
HGB BLD-MCNC: 13.5 G/DL (ref 12–16)
IMM GRANULOCYTES # BLD AUTO: 0.02 K/UL (ref 0–0.11)
IMM GRANULOCYTES NFR BLD AUTO: 0.2 % (ref 0–0.9)
KETONES UR QL STRIP.AUTO: NEGATIVE MG/DL
LEUKOCYTE ESTERASE UR QL STRIP.AUTO: NEGATIVE
LYMPHOCYTES # BLD AUTO: 2.49 K/UL (ref 1–4.8)
LYMPHOCYTES NFR BLD: 23.2 % (ref 22–41)
MCH RBC QN AUTO: 28.4 PG (ref 27–33)
MCHC RBC AUTO-ENTMCNC: 32.1 G/DL (ref 33.6–35)
MCV RBC AUTO: 88.4 FL (ref 81.4–97.8)
MONOCYTES # BLD AUTO: 0.88 K/UL (ref 0–0.85)
MONOCYTES NFR BLD AUTO: 8.2 % (ref 0–13.4)
NEUTROPHILS # BLD AUTO: 7.07 K/UL (ref 2–7.15)
NEUTROPHILS NFR BLD: 66 % (ref 44–72)
NITRITE UR QL STRIP.AUTO: NEGATIVE
NRBC # BLD AUTO: 0 K/UL
NRBC BLD AUTO-RTO: 0 /100 WBC
PH UR STRIP.AUTO: 5.5 [PH]
PLATELET # BLD AUTO: 182 K/UL (ref 164–446)
PMV BLD AUTO: 12.3 FL (ref 9–12.9)
POTASSIUM SERPL-SCNC: 4.2 MMOL/L (ref 3.6–5.5)
PROT SERPL-MCNC: 6.1 G/DL (ref 6–8.2)
PROT UR QL STRIP: NEGATIVE MG/DL
RBC # BLD AUTO: 4.75 M/UL (ref 4.2–5.4)
RBC UR QL AUTO: NEGATIVE
SODIUM SERPL-SCNC: 135 MMOL/L (ref 135–145)
SP GR UR: <=1.005
WBC # BLD AUTO: 10.7 K/UL (ref 4.8–10.8)

## 2017-05-25 PROCEDURE — 700111 HCHG RX REV CODE 636 W/ 250 OVERRIDE (IP): Performed by: EMERGENCY MEDICINE

## 2017-05-25 PROCEDURE — 70450 CT HEAD/BRAIN W/O DYE: CPT

## 2017-05-25 PROCEDURE — 99284 EMERGENCY DEPT VISIT MOD MDM: CPT

## 2017-05-25 PROCEDURE — 85025 COMPLETE CBC W/AUTO DIFF WBC: CPT

## 2017-05-25 PROCEDURE — 81002 URINALYSIS NONAUTO W/O SCOPE: CPT

## 2017-05-25 PROCEDURE — 96375 TX/PRO/DX INJ NEW DRUG ADDON: CPT

## 2017-05-25 PROCEDURE — 84703 CHORIONIC GONADOTROPIN ASSAY: CPT

## 2017-05-25 PROCEDURE — 36415 COLL VENOUS BLD VENIPUNCTURE: CPT

## 2017-05-25 PROCEDURE — 80053 COMPREHEN METABOLIC PANEL: CPT

## 2017-05-25 PROCEDURE — A9270 NON-COVERED ITEM OR SERVICE: HCPCS | Performed by: EMERGENCY MEDICINE

## 2017-05-25 PROCEDURE — 700102 HCHG RX REV CODE 250 W/ 637 OVERRIDE(OP): Performed by: EMERGENCY MEDICINE

## 2017-05-25 PROCEDURE — 96374 THER/PROPH/DIAG INJ IV PUSH: CPT

## 2017-05-25 RX ORDER — TOPIRAMATE SPINKLE 15 MG/1
15 CAPSULE ORAL 2 TIMES DAILY
Qty: 60 CAP | Refills: 3 | Status: SHIPPED | OUTPATIENT
Start: 2017-05-25 | End: 2022-04-08

## 2017-05-25 RX ORDER — PROMETHAZINE HYDROCHLORIDE 25 MG/1
25 TABLET ORAL ONCE
Status: COMPLETED | OUTPATIENT
Start: 2017-05-25 | End: 2017-05-25

## 2017-05-25 RX ORDER — KETOROLAC TROMETHAMINE 30 MG/ML
30 INJECTION, SOLUTION INTRAMUSCULAR; INTRAVENOUS ONCE
Status: COMPLETED | OUTPATIENT
Start: 2017-05-25 | End: 2017-05-25

## 2017-05-25 RX ORDER — DIPHENHYDRAMINE HYDROCHLORIDE 50 MG/ML
25 INJECTION INTRAMUSCULAR; INTRAVENOUS ONCE
Status: COMPLETED | OUTPATIENT
Start: 2017-05-25 | End: 2017-05-25

## 2017-05-25 RX ORDER — OXYCODONE HYDROCHLORIDE AND ACETAMINOPHEN 5; 325 MG/1; MG/1
1 TABLET ORAL ONCE
Status: COMPLETED | OUTPATIENT
Start: 2017-05-25 | End: 2017-05-25

## 2017-05-25 RX ADMIN — DIPHENHYDRAMINE HYDROCHLORIDE 25 MG: 50 INJECTION, SOLUTION INTRAMUSCULAR; INTRAVENOUS at 21:30

## 2017-05-25 RX ADMIN — KETOROLAC TROMETHAMINE 30 MG: 30 INJECTION, SOLUTION INTRAMUSCULAR at 23:46

## 2017-05-25 RX ADMIN — OXYCODONE HYDROCHLORIDE AND ACETAMINOPHEN 1 TABLET: 5; 325 TABLET ORAL at 23:45

## 2017-05-25 RX ADMIN — PROMETHAZINE HYDROCHLORIDE 25 MG: 25 TABLET ORAL at 21:30

## 2017-05-25 ASSESSMENT — PAIN SCALES - GENERAL
PAINLEVEL_OUTOF10: 8
PAINLEVEL_OUTOF10: 8

## 2017-05-25 ASSESSMENT — LIFESTYLE VARIABLES: DO YOU DRINK ALCOHOL: NO

## 2017-05-25 NOTE — ED AVS SNAPSHOT
Yorumla.com Access Code: Activation code not generated  Current Yorumla.com Status: Active    [email or web address]  A secure, online tool to manage your health information     [employer]’s Yorumla.com® is a secure, online tool that connects you to your personalized health information from the privacy of your home -- day or night - making it very easy for you to manage your healthcare. Once the activation process is completed, you can even access your medical information using the Yorumla.com christos, which is available for free in the Apple Christos store or Google Play store.     Yorumla.com provides the following levels of access (as shown below):   My Chart Features   [facility] Primary Care Doctor [facility]  Specialists [facility]  Urgent  Care [facility]  Primary Care  Doctor   Email your healthcare team securely and privately 24/7 X X X X   Manage appointments: schedule your next appointment; view details of past/upcoming appointments X      Request prescription refills. X      View recent personal medical records, including lab and immunizations X X X X   View health record, including health history, allergies, medications X X X X   Read reports about your outpatient visits, procedures, consult and ER notes X X X X   See your discharge summary, which is a recap of your hospital and/or ER visit that includes your diagnosis, lab results, and care plan. X X       How to register for Yorumla.com:  1. Go to  [email or web address].  2. Click on the Sign Up Now box, which takes you to the New Member Sign Up page. You will need to provide the following information:  a. Enter your Yorumla.com Access Code exactly as it appears at the top of this page. (You will not need to use this code after you’ve completed the sign-up process. If you do not sign up before the expiration date, you must request a new code.)   b. Enter your date of birth.   c. Enter your home email address.   d. Click Submit, and follow the next screen’s instructions.  3. Create a Yorumla.com ID. This will  be your Mozenda login ID and cannot be changed, so think of one that is secure and easy to remember.  4. Create a Mozenda password. You can change your password at any time.  5. Enter your Password Reset Question and Answer. This can be used at a later time if you forget your password.   6. Enter your e-mail address. This allows you to receive e-mail notifications when new information is available in Mozenda.  7. Click Sign Up. You can now view your health information.    For assistance activating your Mozenda account, call [phone]

## 2017-05-25 NOTE — ED AVS SNAPSHOT
Home Care Instructions                                                                                                                Tamela Vasquez   MRN: [id number]    Department:  [facility], Emergency Dept   Date of Visit:  5/25/2017            [facility], Emergency Dept    [address] [address]    Phone:  [phone]      You were seen by     Avinash Membreno M.D.      Your Diagnosis Was     Seizure (CMS-HCC)     R56.9       These are the medications you received during your hospitalization from 05/25/2017 1710 to 05/25/2017 2317     Date/Time Order Dose Route Action    05/25/2017 2130 diphenhydrAMINE (BENADRYL) injection 25 mg 25 mg Intravenous Given    05/25/2017 2130 promethazine (PHENERGAN) tablet 25 mg 25 mg Oral Given      Follow-up Information     1. Follow up with Ashely Bender PA-C In 1 week.    Specialty:  Family Medicine    Contact information    [address]  [facility] [address] [phone]          2. Follow up with [facility], Emergency Dept.    Specialty:  Emergency Medicine    Why:  If symptoms worsen    Contact information    [address] [place] [address] [phone]      Medication Information     Review all of your home medications and newly ordered medications with your primary doctor and/or pharmacist as soon as possible. Follow medication instructions as directed by your doctor and/or pharmacist.     Please keep your complete medication list with you and share with your physician. Update the information when medications are discontinued, doses are changed, or new medications (including over-the-counter products) are added; and carry medication information at all times in the event of emergency situations.               Medication List      START taking these medications        Instructions    Morning Afternoon Evening Bedtime    topiramate 15 MG Cpsp   Commonly known as:   TOPOMAX        Take 1 Cap by mouth 2 times a day.   Dose:  15 mg                          ASK your doctor about these medications        Instructions    Morning Afternoon Evening Bedtime    cyanocobalamin 1000 MCG/ML Soln   Commonly known as:  VITAMIN B-12        1 mL by Intramuscular route every 30 days.   Dose:  1000 mcg                        fluticasone 50 MCG/ACT nasal spray   Commonly known as:  FLONASE        Spray 1 Spray in nose every day.   Dose:  1 Spray                        levothyroxine 50 MCG Tabs   Commonly known as:  SYNTHROID        Take 1 Tab by mouth Every morning on an empty stomach.   Dose:  50 mcg                        multivitamin Tabs        Take 1 Tab by mouth every day.   Dose:  1 Tab                        Needles & Syringes Misc        1 Application by Does not apply route Q30 DAYS.   Dose:  1 Application                        ondansetron 8 MG Tbdp   Commonly known as:  ZOFRAN ODT        Take 1/2-1 tablet PO PRN nausea. Do not exceed more than 2 tablets in 24 hours.                        triamcinolone 0.1 % lotion   Commonly known as:  KENALOG        Apply 1 Application to affected area(s) 2 Times a Day.   Dose:  1 Application                             Where to Get Your Medications      You can get these medications from any pharmacy     Bring a paper prescription for each of these medications    - topiramate 15 MG Cpsp            Procedures and tests performed during your visit     BETA-HCG QUALITATIVE SERUM    CARDIAC MONITORING    CBC WITH DIFFERENTIAL    COMP METABOLIC PANEL    CT-HEAD W/O    ESTIMATED GFR    O2 Protocol    POC UA    SALINE LOCK        Discharge Instructions       Epilepsy  People with epilepsy have times when they shake and jerk uncontrollably (seizures). This happens when there is a sudden change in brain function. Epilepsy may have many possible causes. Anything that disturbs the normal pattern of brain cell activity can lead to seizures.  HOME CARE    · Follow your doctor's instructions about driving and safety during normal activities.  · Get enough sleep.  · Only take medicine as told by your doctor.  · Avoid things that you know can cause you to have seizures (triggers).  · Write down when your seizures happen and what you remember about each seizure. Write down anything you think may have caused the seizure to happen.  · Tell the people you live and work with that you have seizures. Make sure they know how to help you. They should:  ¨ Cushion your head and body.  ¨ Turn you on your side.  ¨ Not restrain you.  ¨ Not place anything inside your mouth.  ¨ Call for local emergency medical help if there is any question about what has happened.  · Keep all follow-up visits with your doctor. This is very important.  GET HELP IF:  · You get an infection or start to feel sick. You may have more seizures when you are sick.  · You are having seizures more often.  · Your seizure pattern is changing.  GET HELP RIGHT AWAY IF:   · A seizure does not stop after a few seconds or minutes.  · A seizure causes you to have trouble breathing.  · A seizure gives you a very bad headache.  · A seizure makes you unable to speak or use a part of your body.     This information is not intended to replace advice given to you by your health care provider. Make sure you discuss any questions you have with your health care provider.     Document Released: 10/15/2010 Document Revised: 10/08/2014 Document Reviewed: 07/30/2014  Elsevier Interactive Patient Education ©2016 Elsevier Inc.            Patient Information     Patient Information    Following emergency treatment: all patient requiring follow-up care must return either to a private physician or a clinic if your condition worsens before you are able to obtain further medical attention, please return to the emergency room.     Billing Information    At [facility], we work to make the billing process streamlined for our patients.   Our Representatives are here to answer any questions you may have regarding your hospital bill.  If you have insurance coverage and have supplied your insurance information to us, we will submit a claim to your insurer on your behalf.  Should you have any questions regarding your bill, we can be reached online or by phone as follows:  Online: You are able pay your bills online or live chat with our representatives about any billing questions you may have. We are here to help Monday - Friday from 8:00am to 7:30pm and 9:00am - 12:00pm on Saturdays.  Please visit [email or web address]  for more information.   Phone:  [phone] or [phone]    Please note that your emergency physician, surgeon, pathologist, radiologist, anesthesiologist, and other specialists are not employed by [facility] and will therefore bill separately for their services.  Please contact them directly for any questions concerning their bills at the numbers below:     Emergency Physician Services:  [phone]  [facility]:  [phone]  [facility]:  [phone]  [facility]:  [phone]    1. Your final bill may vary from the amount quoted upon discharge if all procedures are not complete at that time, or if your doctor has additional procedures of which we are not aware. You will receive an additional bill if you return to the Emergency Department at [facility] for suture removal regardless of the facility of which the sutures were placed.     2. Please arrange for settlement of this account at the emergency registration.    3. All self-pay accounts are due in full at the time of treatment.  If you are unable to meet this obligation then payment is expected within 4-5 days.     4. If you have had radiology studies (CT, X-ray, Ultrasound, MRI), you have received a preliminary result during your emergency department visit. Please contact the radiology  department [phone] to receive a copy of your final result. Please discuss the Final result with your primary physician or with the follow up physician provided.     Crisis Hotline:  [place] Crisis Hotline:  0-693-HWGLDQY or 1-623.952.9007  [place] Crisis Hotline:    1-187.663.8040 or 473-831-8728         ED Discharge Follow Up Questions    1. In order to provide you with very good care, we would like to follow up with a phone call in the next few days.  May we have your permission to contact you?     YES /  NO    2. What is the best phone number to call you? (       )_____-__________    3. What is the best time to call you?      Morning  /  Afternoon  /  Evening                   Patient Signature:  ____________________________________________________________    Date:  ____________________________________________________________      Your appointments     Aug 07, 2017 10:50 AM   Establ[facility] [address] [place] [address] ([facility])    [address] [place] [id number] [phone]           You will be receiving a confirmation call a few days before your appointment from our automated call confirmation system.

## 2017-05-25 NOTE — ED AVS SNAPSHOT
5/25/2017    Tameladeshaun Romeorerrol Vasquez  [address]  [place] [address]    Dear Tamela:    [facility] wants to ensure your discharge home is safe and you or your loved ones have had all of your questions answered regarding your care after you leave the hospital.    Below is a list of resources and contact information should you have any questions regarding your hospital stay, follow-up instructions, or active medical symptoms.    Questions or Concerns Regarding… Contact   Medical Questions Related to Your Discharge  (7 days a week, 8am-5pm) Contact a Nurse Care Coordinator   [phone]   Medical Questions Not Related to Your Discharge  (24 hours a day / 7 days a week)  Contact the Nurse Health Line   [phone]    Medications or Discharge Instructions Refer to your discharge packet   or contact your [facility] Primary Care Provider   [phone]   Follow-up Appointment(s) Schedule your appointment via [employer]   or contact Scheduling [phone]   Billing Review your statement via [employer]  or contact Billing [phone]   Medical Records Review your records via [employer]   or contact Medical Records [phone]     You may receive a telephone call within two days of discharge. This call is to make certain you understand your discharge instructions and have the opportunity to have any questions answered. You can also easily access your medical information, test results and upcoming appointments via the [employer] free online health management tool. You can learn more and sign up at [employer]. For assistance setting up your [employer] account, please call [phone].    Once again, we want to ensure your discharge home is safe and that you have a clear understanding of any next steps in your care. If you have any questions or concerns, please do not hesitate to contact us, we are here for you. Thank you for choosing [facility] for your healthcare needs.    Sincerely,    Your [facility] Healthcare Team

## 2017-05-26 NOTE — ED NOTES
Discharge instructions given to patient, prescriptions provided, a verbal understanding of all instructions was stated. IV removed, cathlon intact, site without s/s of infection. Pt preferred to walk out accompanied by . VSS,  all belongings accounted for.

## 2017-05-26 NOTE — ED NOTES
Pt. Ambulated back to Blue 22 with a slow and steady gait via ED Tech. Urine sample obtained. Pt. Hooked up to monitoring equipment. Call light within reach. Will continue to monitor.

## 2017-05-26 NOTE — ED NOTES
Pt. One person assist to the bathroom with stead and slow gait via ED tech. Will continue to monitor.

## 2017-05-26 NOTE — ED PROVIDER NOTES
ED Provider Note    CHIEF COMPLAINT  Chief Complaint   Patient presents with   • Seizure     at work tuesday, EMS assessed pt refused to go to hospital, h/o same, seizure last night in her sleep   • Head Pain     pressure and pain increasing, unsure if she hit her head when she was work but pt reports she was help to the ground but just wants to make sure there is nothing else going on   • N/V     vomiting 3 x today.        HPI  Tamela Vasquez is a 41 y.o. female who presents to the emergency department with a seizure. The patient states she has a known seizure disorder however her neurologist took her off her Topamax possibly a year ago. The patient has been having frequent seizures over the last week. This was her approximate third seizure. She says she does have a headache but does have typical headaches with her seizures. She is unaware if she struck her head. She does have some photophobia. She states her pain is diffusely located and described as pressure and sharp. She states the pain is moderate to severe in intensity. She is also 3 episodes of emesis today. She has not had any associated fevers. She does not have any focal weakness nor pain throughout her extremities. She denies chest pain or difficulty breathing. She does not have any abdominal pain.    REVIEW OF SYSTEMS  See HPI for further details. All other systems are negative.     PAST MEDICAL HISTORY  Past Medical History   Diagnosis Date   • Constipation      medicated   • Concussion    • Thyroid activity decreased      medicated - hypothyroid   • Other specified symptom associated with female genital organs      scar tissue   • Right acoustic neuroma (CMS-HCC)      removed 3/3/2105   • Migraine    • Cervical spondylosis 12/11/2015   • Shingles 1/13/2016   • Pain    • Major depression (CMS-HCC) 1/20/2015     depression and anxiety   • Sleep apnea      does not use cpap, last sleep study showed she was clear for apnea   • Seizure  "disorder (CMS-[facility]) 1999     not medicated at present - was due to apnea in past, last seizure was 12/1/15, last EEG was clear   • Neoplasm of uncertain behavior of skin 5/2/2017       SOCIAL HISTORY  Social History     Social History   • Marital Status:      Spouse Name: N/A   • Number of Children: N/A   • Years of Education: N/A     Social History Main Topics   • Smoking status: Light Tobacco Smoker -- 0.25 packs/day for 27 years     Types: Cigarettes   • Smokeless tobacco: Current User      Comment: 1/2 pack per day; uses  e cigarettes   • Alcohol Use: No   • Drug Use: Yes     Special: Marijuana      Comment: \"medical\" marijuana, last smoked  3 days ago   • Sexual Activity:     Partners: Male     Other Topics Concern   • None     Social History Narrative           PHYSICAL EXAM  VITAL SIGNS: /66 mmHg  Pulse 66  Temp(Src) 36.6 °C (97.9 °F) (Temporal)  Resp 16  Ht 1.524 m (5')  Wt 75.9 kg (167 lb 5.3 oz)  BMI 32.68 kg/m2  SpO2 96%  LMP 05/15/2017  Constitutional: in acute distress, Non-toxic appearance.   HENT: Normocephalic, Atraumatic, tympanic membranes are intact and nonerythematous bilaterally, Oropharynx moist without exudates or erythema, Nose normal.   Eyes: PERRLA, EOMI, Conjunctiva normal.  Neck: Supple without meningismus  Lymphatic: No lymphadenopathy noted.   Cardiovascular: Normal heart rate, Normal rhythm, No murmurs, No rubs, No gallops.   Thorax & Lungs: Normal breath sounds, No respiratory distress, No wheezing, No chest tenderness.   Abdomen: Bowel sounds normal, Soft, No tenderness, no rebound, no guarding, no distention, No masses, No pulsatile masses.   Skin: Warm, Dry, No erythema, No rash.   Back: No tenderness, No CVA tenderness.   Extremities: Atraumatic with symmetric distal pulses, No edema, No tenderness, No cyanosis, No clubbing.   Neurologic: Alert & oriented x 3, cranial nerves II through XII are intact, Normal motor function, Normal sensory function, No focal " deficits noted.   Psychiatric: Affect normal, Judgment normal, Mood normal.     RADIOLOGY/PROCEDURES  CT-HEAD W/O   Final Result      No acute intracranial abnormality is identified.      Right mastoidectomy            COURSE & MEDICAL DECISION MAKING  Pertinent Labs & Imaging studies reviewed. (See chart for details)  This a 41-year-old female who presents emergency department after seizure. I did perform a CT scan the patient's head because she's had a headache and its unclear whether she struck her head or not. CT scan was negative. Initially the patient did not want any narcotics for the pain and we attempted to give Benadryl as well as Phenergan orally and this was not effective. She does not have any meningeal findings. She will receive Toradol intravenously with a CT scan of the head is clear from an intracranial hemorrhage standpoint. She'll also receive Percocet orally. The patient be discharged back on her Topamax for seizure control. She is instructed to follow-up with her neurologist as soon as possible as well as her primary care doctor in 1 week. The patient will return to the emergency department for any further symptoms. She was discharged home in stable condition and neurologically intact.    FINAL IMPRESSION  1. Seizure  2. Headache       Disposition  The patient will be discharged in stable condition    Electronically signed by: Avinash Membreno, 5/25/2017 9:08 PM

## 2017-05-26 NOTE — DISCHARGE INSTRUCTIONS
Epilepsy  People with epilepsy have times when they shake and jerk uncontrollably (seizures). This happens when there is a sudden change in brain function. Epilepsy may have many possible causes. Anything that disturbs the normal pattern of brain cell activity can lead to seizures.  HOME CARE   · Follow your doctor's instructions about driving and safety during normal activities.  · Get enough sleep.  · Only take medicine as told by your doctor.  · Avoid things that you know can cause you to have seizures (triggers).  · Write down when your seizures happen and what you remember about each seizure. Write down anything you think may have caused the seizure to happen.  · Tell the people you live and work with that you have seizures. Make sure they know how to help you. They should:  ¨ Cushion your head and body.  ¨ Turn you on your side.  ¨ Not restrain you.  ¨ Not place anything inside your mouth.  ¨ Call for local emergency medical help if there is any question about what has happened.  · Keep all follow-up visits with your doctor. This is very important.  GET HELP IF:  · You get an infection or start to feel sick. You may have more seizures when you are sick.  · You are having seizures more often.  · Your seizure pattern is changing.  GET HELP RIGHT AWAY IF:   · A seizure does not stop after a few seconds or minutes.  · A seizure causes you to have trouble breathing.  · A seizure gives you a very bad headache.  · A seizure makes you unable to speak or use a part of your body.     This information is not intended to replace advice given to you by your health care provider. Make sure you discuss any questions you have with your health care provider.     Document Released: 10/15/2010 Document Revised: 10/08/2014 Document Reviewed: 07/30/2014  [employer] Interactive Patient Education ©2016 [employer]

## 2017-05-26 NOTE — ED NOTES
Pt to triage with   Chief Complaint   Patient presents with   • Seizure     at work tuesday, EMS assessed pt refused to go to hospital, h/o same, seizure last night in her sleep   • Head Pain     pressure and pain increasing, unsure if she hit her head when she was work but pt reports she was help to the ground but just wants to make sure there is nothing else going on   • N/V     vomiting 3 x today.      Pt here with .  Pt reports compliance with medication.  B12 given last night.  Pt reports head pain 8/10 at this time.  Pt Informed regarding triage process and verbalized understanding to inform triage tech or RN for any changes in condition. Placed in lobby.

## 2017-05-26 NOTE — ED NOTES
Pt. Updated on the plan of care for CT imaging results to come back. Call light within reach.  at bedside. Will continue to monitor.

## 2017-05-26 NOTE — ED NOTES
Pt brought back to [facility] via wheelchair. Assumed care of this pt. Pt. States she had a seizure Tuesday afternoon and this morning but doesn't know if she had hit her head or not. Pt. States she has had associated N/V x 2 since Tuesday and has generalized weakness. Paramedic student at bedside for IV and labs.

## 2017-06-22 DIAGNOSIS — E03.4 HYPOTHYROIDISM DUE TO ACQUIRED ATROPHY OF THYROID: ICD-10-CM

## 2017-06-22 DIAGNOSIS — E53.8 VITAMIN B 12 DEFICIENCY: ICD-10-CM

## 2017-06-22 DIAGNOSIS — G40.909 SEIZURE DISORDER (HCC): ICD-10-CM

## 2017-06-22 DIAGNOSIS — G43.109 MIGRAINE WITH AURA AND WITHOUT STATUS MIGRAINOSUS, NOT INTRACTABLE: ICD-10-CM

## 2017-06-23 ENCOUNTER — TELEPHONE (OUTPATIENT)
Dept: NEUROLOGY | Facility: MEDICAL CENTER | Age: 42
End: 2017-06-23

## 2017-06-23 NOTE — TELEPHONE ENCOUNTER
Patient's Pharmacy is requesting a Rx of Rizatriptan 10MG Tab, Sig: Take 1/2 -1 tab po at onset of migraine.         Was the patient seen in the last year in this department? Yes  1/31/17    Does patient have an active prescription for medications requested? Yes     Received Request Via: Pharmacy     No follow up appointment scheduled at this time.

## 2017-06-26 DIAGNOSIS — G43.109 MIGRAINE WITH AURA AND WITHOUT STATUS MIGRAINOSUS, NOT INTRACTABLE: ICD-10-CM

## 2017-06-26 RX ORDER — RIZATRIPTAN BENZOATE 10 MG/1
TABLET, ORALLY DISINTEGRATING ORAL
Qty: 9 TAB | Refills: 5 | Status: SHIPPED | OUTPATIENT
Start: 2017-06-26 | End: 2022-04-08

## 2017-06-27 RX ORDER — ONDANSETRON 8 MG/1
TABLET, ORALLY DISINTEGRATING ORAL
Qty: 20 TAB | Refills: 0 | Status: SHIPPED | OUTPATIENT
Start: 2017-06-27 | End: 2022-04-08

## 2017-06-27 RX ORDER — CYANOCOBALAMIN 1000 UG/ML
INJECTION, SOLUTION INTRAMUSCULAR; SUBCUTANEOUS
Qty: 1 VIAL | Refills: 1 | Status: SHIPPED | OUTPATIENT
Start: 2017-06-27 | End: 2023-01-23 | Stop reason: SDUPTHER

## 2017-06-27 NOTE — TELEPHONE ENCOUNTER
Please advise patient to complete fasting labs just prior to next B12 injection and to schedule follow-up shortly after.

## 2017-06-27 NOTE — TELEPHONE ENCOUNTER
Phone Number Called: [phone] (home)     Message: called pt left message for pt to call back.     Left Message for patient to call back: yes

## 2017-06-28 RX ORDER — CYANOCOBALAMIN 1000 UG/ML
INJECTION, SOLUTION INTRAMUSCULAR; SUBCUTANEOUS
Qty: 1 VIAL | Refills: 1 | OUTPATIENT
Start: 2017-06-28

## 2017-06-28 NOTE — TELEPHONE ENCOUNTER
Phone Number Called: [phone] (home)     Message: Pt states that the reason that she is requesting the B12 injection because [place] is [place]. Pt states that she leaves on 07/10/17. Pt states that she has not found a new doctor yet and will notify us when she does.    Left Message for patient to call back: no

## 2022-04-08 ENCOUNTER — HOSPITAL ENCOUNTER (INPATIENT)
Facility: MEDICAL CENTER | Age: 47
LOS: 4 days | DRG: 390 | End: 2022-04-12
Attending: EMERGENCY MEDICINE | Admitting: GENERAL PRACTICE
Payer: COMMERCIAL

## 2022-04-08 ENCOUNTER — APPOINTMENT (OUTPATIENT)
Dept: RADIOLOGY | Facility: MEDICAL CENTER | Age: 47
DRG: 390 | End: 2022-04-08
Attending: EMERGENCY MEDICINE
Payer: COMMERCIAL

## 2022-04-08 DIAGNOSIS — F12.20 CANNABIS DEPENDENCE, DAILY USE (HCC): ICD-10-CM

## 2022-04-08 DIAGNOSIS — N83.202 HEMORRHAGIC CYST OF LEFT OVARY: ICD-10-CM

## 2022-04-08 DIAGNOSIS — Z98.84 S/P GASTRIC BYPASS: ICD-10-CM

## 2022-04-08 DIAGNOSIS — N94.9 ADNEXAL CYST: ICD-10-CM

## 2022-04-08 DIAGNOSIS — K56.7 ILEUS (HCC): ICD-10-CM

## 2022-04-08 DIAGNOSIS — R63.4 WEIGHT LOSS: ICD-10-CM

## 2022-04-08 DIAGNOSIS — F12.10 MARIJUANA ABUSE: ICD-10-CM

## 2022-04-08 DIAGNOSIS — D50.8 IRON DEFICIENCY ANEMIA SECONDARY TO INADEQUATE DIETARY IRON INTAKE: ICD-10-CM

## 2022-04-08 DIAGNOSIS — R11.15 CYCLICAL VOMITING WITH NAUSEA: ICD-10-CM

## 2022-04-08 DIAGNOSIS — D50.9 IRON DEFICIENCY ANEMIA, UNSPECIFIED IRON DEFICIENCY ANEMIA TYPE: ICD-10-CM

## 2022-04-08 LAB
ALBUMIN SERPL BCP-MCNC: 4.7 G/DL (ref 3.2–4.9)
ALBUMIN/GLOB SERPL: 2 G/DL
ALP SERPL-CCNC: 90 U/L (ref 30–99)
ALT SERPL-CCNC: 10 U/L (ref 2–50)
ANION GAP SERPL CALC-SCNC: 15 MMOL/L (ref 7–16)
AST SERPL-CCNC: 21 U/L (ref 12–45)
BASOPHILS # BLD AUTO: 1.6 % (ref 0–1.8)
BASOPHILS # BLD: 0.11 K/UL (ref 0–0.12)
BILIRUB SERPL-MCNC: 0.2 MG/DL (ref 0.1–1.5)
BUN SERPL-MCNC: 5 MG/DL (ref 8–22)
CALCIUM SERPL-MCNC: 9.6 MG/DL (ref 8.5–10.5)
CHLORIDE SERPL-SCNC: 106 MMOL/L (ref 96–112)
CO2 SERPL-SCNC: 18 MMOL/L (ref 20–33)
CREAT SERPL-MCNC: 0.45 MG/DL (ref 0.5–1.4)
EOSINOPHIL # BLD AUTO: 0.15 K/UL (ref 0–0.51)
EOSINOPHIL NFR BLD: 2.2 % (ref 0–6.9)
ERYTHROCYTE [DISTWIDTH] IN BLOOD BY AUTOMATED COUNT: 41.4 FL (ref 35.9–50)
GFR SERPLBLD CREATININE-BSD FMLA CKD-EPI: 121 ML/MIN/1.73 M 2
GLOBULIN SER CALC-MCNC: 2.3 G/DL (ref 1.9–3.5)
GLUCOSE SERPL-MCNC: 91 MG/DL (ref 65–99)
HCG SERPL QL: NEGATIVE
HCT VFR BLD AUTO: 37.6 % (ref 37–47)
HGB BLD-MCNC: 11.4 G/DL (ref 12–16)
IMM GRANULOCYTES # BLD AUTO: 0.02 K/UL (ref 0–0.11)
IMM GRANULOCYTES NFR BLD AUTO: 0.3 % (ref 0–0.9)
LACTATE BLD-SCNC: 1.1 MMOL/L (ref 0.5–2)
LIPASE SERPL-CCNC: 65 U/L (ref 11–82)
LYMPHOCYTES # BLD AUTO: 1.59 K/UL (ref 1–4.8)
LYMPHOCYTES NFR BLD: 23 % (ref 22–41)
MCH RBC QN AUTO: 21.6 PG (ref 27–33)
MCHC RBC AUTO-ENTMCNC: 30.3 G/DL (ref 33.6–35)
MCV RBC AUTO: 71.1 FL (ref 81.4–97.8)
MONOCYTES # BLD AUTO: 0.62 K/UL (ref 0–0.85)
MONOCYTES NFR BLD AUTO: 9 % (ref 0–13.4)
NEUTROPHILS # BLD AUTO: 4.41 K/UL (ref 2–7.15)
NEUTROPHILS NFR BLD: 63.9 % (ref 44–72)
NRBC # BLD AUTO: 0 K/UL
NRBC BLD-RTO: 0 /100 WBC
PLATELET # BLD AUTO: 329 K/UL (ref 164–446)
PMV BLD AUTO: 11.4 FL (ref 9–12.9)
POTASSIUM SERPL-SCNC: 4.4 MMOL/L (ref 3.6–5.5)
PROT SERPL-MCNC: 7 G/DL (ref 6–8.2)
RBC # BLD AUTO: 5.29 M/UL (ref 4.2–5.4)
SODIUM SERPL-SCNC: 139 MMOL/L (ref 135–145)
WBC # BLD AUTO: 6.9 K/UL (ref 4.8–10.8)

## 2022-04-08 PROCEDURE — 96375 TX/PRO/DX INJ NEW DRUG ADDON: CPT

## 2022-04-08 PROCEDURE — 96374 THER/PROPH/DIAG INJ IV PUSH: CPT

## 2022-04-08 PROCEDURE — 74177 CT ABD & PELVIS W/CONTRAST: CPT

## 2022-04-08 PROCEDURE — 85025 COMPLETE CBC W/AUTO DIFF WBC: CPT

## 2022-04-08 PROCEDURE — 99223 1ST HOSP IP/OBS HIGH 75: CPT | Mod: AI | Performed by: GENERAL PRACTICE

## 2022-04-08 PROCEDURE — 700101 HCHG RX REV CODE 250: Performed by: GENERAL PRACTICE

## 2022-04-08 PROCEDURE — A9270 NON-COVERED ITEM OR SERVICE: HCPCS | Performed by: GENERAL PRACTICE

## 2022-04-08 PROCEDURE — 700102 HCHG RX REV CODE 250 W/ 637 OVERRIDE(OP): Performed by: GENERAL PRACTICE

## 2022-04-08 PROCEDURE — 700111 HCHG RX REV CODE 636 W/ 250 OVERRIDE (IP): Performed by: GENERAL PRACTICE

## 2022-04-08 PROCEDURE — 80053 COMPREHEN METABOLIC PANEL: CPT

## 2022-04-08 PROCEDURE — 36415 COLL VENOUS BLD VENIPUNCTURE: CPT

## 2022-04-08 PROCEDURE — 700105 HCHG RX REV CODE 258: Performed by: EMERGENCY MEDICINE

## 2022-04-08 PROCEDURE — 83605 ASSAY OF LACTIC ACID: CPT

## 2022-04-08 PROCEDURE — 770001 HCHG ROOM/CARE - MED/SURG/GYN PRIV*

## 2022-04-08 PROCEDURE — 83690 ASSAY OF LIPASE: CPT

## 2022-04-08 PROCEDURE — 700111 HCHG RX REV CODE 636 W/ 250 OVERRIDE (IP): Performed by: EMERGENCY MEDICINE

## 2022-04-08 PROCEDURE — 700117 HCHG RX CONTRAST REV CODE 255: Performed by: EMERGENCY MEDICINE

## 2022-04-08 PROCEDURE — 99285 EMERGENCY DEPT VISIT HI MDM: CPT

## 2022-04-08 PROCEDURE — 84703 CHORIONIC GONADOTROPIN ASSAY: CPT

## 2022-04-08 RX ORDER — BISACODYL 5 MG
10 TABLET, DELAYED RELEASE (ENTERIC COATED) ORAL ONCE
Status: COMPLETED | OUTPATIENT
Start: 2022-04-08 | End: 2022-04-08

## 2022-04-08 RX ORDER — PROMETHAZINE HYDROCHLORIDE 12.5 MG/1
12.5-25 SUPPOSITORY RECTAL EVERY 4 HOURS PRN
Status: DISCONTINUED | OUTPATIENT
Start: 2022-04-08 | End: 2022-04-12 | Stop reason: HOSPADM

## 2022-04-08 RX ORDER — METOCLOPRAMIDE HYDROCHLORIDE 5 MG/ML
10 INJECTION INTRAMUSCULAR; INTRAVENOUS ONCE
Status: COMPLETED | OUTPATIENT
Start: 2022-04-08 | End: 2022-04-08

## 2022-04-08 RX ORDER — MECLIZINE HCL 12.5 MG/1
12.5 TABLET ORAL 3 TIMES DAILY PRN
Status: ON HOLD | COMMUNITY
End: 2022-04-27

## 2022-04-08 RX ORDER — LORAZEPAM 2 MG/ML
1 INJECTION INTRAMUSCULAR ONCE
Status: COMPLETED | OUTPATIENT
Start: 2022-04-08 | End: 2022-04-08

## 2022-04-08 RX ORDER — LEVETIRACETAM 500 MG/1
1000 TABLET ORAL EVERY EVENING
Status: DISCONTINUED | OUTPATIENT
Start: 2022-04-08 | End: 2022-04-09

## 2022-04-08 RX ORDER — POLYETHYLENE GLYCOL 3350 17 G/17G
1 POWDER, FOR SOLUTION ORAL
Status: DISCONTINUED | OUTPATIENT
Start: 2022-04-08 | End: 2022-04-12 | Stop reason: HOSPADM

## 2022-04-08 RX ORDER — LORAZEPAM 2 MG/ML
2 INJECTION INTRAMUSCULAR
Status: DISCONTINUED | OUTPATIENT
Start: 2022-04-08 | End: 2022-04-12 | Stop reason: HOSPADM

## 2022-04-08 RX ORDER — SUMATRIPTAN 100 MG/1
100 TABLET, FILM COATED ORAL
Status: DISCONTINUED | OUTPATIENT
Start: 2022-04-08 | End: 2022-04-08

## 2022-04-08 RX ORDER — HALOPERIDOL 5 MG/ML
2 INJECTION INTRAMUSCULAR ONCE
Status: COMPLETED | OUTPATIENT
Start: 2022-04-08 | End: 2022-04-08

## 2022-04-08 RX ORDER — ONDANSETRON 4 MG/1
4 TABLET, ORALLY DISINTEGRATING ORAL EVERY 6 HOURS PRN
Status: ON HOLD | COMMUNITY
End: 2022-04-12 | Stop reason: SDUPTHER

## 2022-04-08 RX ORDER — LORAZEPAM 1 MG/1
0.5 TABLET ORAL
COMMUNITY
End: 2023-01-04 | Stop reason: SDUPTHER

## 2022-04-08 RX ORDER — SODIUM CHLORIDE 9 MG/ML
1000 INJECTION, SOLUTION INTRAVENOUS ONCE
Status: COMPLETED | OUTPATIENT
Start: 2022-04-08 | End: 2022-04-08

## 2022-04-08 RX ORDER — PROCHLORPERAZINE EDISYLATE 5 MG/ML
5-10 INJECTION INTRAMUSCULAR; INTRAVENOUS EVERY 4 HOURS PRN
Status: DISCONTINUED | OUTPATIENT
Start: 2022-04-08 | End: 2022-04-12 | Stop reason: HOSPADM

## 2022-04-08 RX ORDER — SUMATRIPTAN 100 MG/1
100 TABLET, FILM COATED ORAL
COMMUNITY
End: 2023-01-23 | Stop reason: SDUPTHER

## 2022-04-08 RX ORDER — LEVETIRACETAM 100 MG/ML
500-1000 SOLUTION ORAL 2 TIMES DAILY
COMMUNITY
End: 2023-01-23 | Stop reason: SDUPTHER

## 2022-04-08 RX ORDER — DIPHENHYDRAMINE HCL 25 MG
25 TABLET ORAL NIGHTLY PRN
Status: ON HOLD | COMMUNITY
End: 2022-04-27

## 2022-04-08 RX ORDER — HYDROMORPHONE HYDROCHLORIDE 1 MG/ML
0.25 INJECTION, SOLUTION INTRAMUSCULAR; INTRAVENOUS; SUBCUTANEOUS
Status: DISCONTINUED | OUTPATIENT
Start: 2022-04-08 | End: 2022-04-09

## 2022-04-08 RX ORDER — SODIUM CHLORIDE AND POTASSIUM CHLORIDE 150; 900 MG/100ML; MG/100ML
INJECTION, SOLUTION INTRAVENOUS CONTINUOUS
Status: DISCONTINUED | OUTPATIENT
Start: 2022-04-08 | End: 2022-04-09

## 2022-04-08 RX ORDER — IBUPROFEN 200 MG
200 TABLET ORAL ONCE
Status: ON HOLD | COMMUNITY
End: 2022-04-11

## 2022-04-08 RX ORDER — DULOXETIN HYDROCHLORIDE 60 MG/1
60 CAPSULE, DELAYED RELEASE ORAL DAILY
COMMUNITY
End: 2023-01-23 | Stop reason: SDUPTHER

## 2022-04-08 RX ORDER — BUSPIRONE HYDROCHLORIDE 10 MG/1
10 TABLET ORAL 3 TIMES DAILY
COMMUNITY
End: 2023-01-23 | Stop reason: SDUPTHER

## 2022-04-08 RX ORDER — ACETAMINOPHEN 325 MG/1
650 TABLET ORAL EVERY 6 HOURS PRN
Status: DISCONTINUED | OUTPATIENT
Start: 2022-04-08 | End: 2022-04-12 | Stop reason: HOSPADM

## 2022-04-08 RX ORDER — METOCLOPRAMIDE HYDROCHLORIDE 5 MG/ML
10 INJECTION INTRAMUSCULAR; INTRAVENOUS EVERY 6 HOURS PRN
Status: DISCONTINUED | OUTPATIENT
Start: 2022-04-08 | End: 2022-04-12 | Stop reason: HOSPADM

## 2022-04-08 RX ORDER — LEVETIRACETAM 500 MG/1
500 TABLET ORAL EVERY MORNING
Status: DISCONTINUED | OUTPATIENT
Start: 2022-04-09 | End: 2022-04-09

## 2022-04-08 RX ORDER — BUSPIRONE HYDROCHLORIDE 10 MG/1
5 TABLET ORAL EVERY 8 HOURS
Status: DISCONTINUED | OUTPATIENT
Start: 2022-04-08 | End: 2022-04-12 | Stop reason: HOSPADM

## 2022-04-08 RX ORDER — HYDRALAZINE HYDROCHLORIDE 20 MG/ML
10 INJECTION INTRAMUSCULAR; INTRAVENOUS EVERY 4 HOURS PRN
Status: DISCONTINUED | OUTPATIENT
Start: 2022-04-08 | End: 2022-04-12 | Stop reason: HOSPADM

## 2022-04-08 RX ORDER — AMOXICILLIN 250 MG
2 CAPSULE ORAL 2 TIMES DAILY
Status: DISCONTINUED | OUTPATIENT
Start: 2022-04-09 | End: 2022-04-12 | Stop reason: HOSPADM

## 2022-04-08 RX ORDER — GABAPENTIN 300 MG/1
300 CAPSULE ORAL 3 TIMES DAILY
COMMUNITY
End: 2023-01-04

## 2022-04-08 RX ORDER — PROMETHAZINE HYDROCHLORIDE 25 MG/1
12.5-25 TABLET ORAL EVERY 4 HOURS PRN
Status: DISCONTINUED | OUTPATIENT
Start: 2022-04-08 | End: 2022-04-12 | Stop reason: HOSPADM

## 2022-04-08 RX ORDER — DULOXETIN HYDROCHLORIDE 30 MG/1
60 CAPSULE, DELAYED RELEASE ORAL DAILY
Status: DISCONTINUED | OUTPATIENT
Start: 2022-04-09 | End: 2022-04-12 | Stop reason: HOSPADM

## 2022-04-08 RX ORDER — OXYCODONE HYDROCHLORIDE 5 MG/1
5 TABLET ORAL
Status: DISCONTINUED | OUTPATIENT
Start: 2022-04-08 | End: 2022-04-09

## 2022-04-08 RX ORDER — ONDANSETRON 4 MG/1
4 TABLET, ORALLY DISINTEGRATING ORAL EVERY 4 HOURS PRN
Status: DISCONTINUED | OUTPATIENT
Start: 2022-04-08 | End: 2022-04-12 | Stop reason: HOSPADM

## 2022-04-08 RX ORDER — FLUTICASONE PROPIONATE 50 MCG
1 SPRAY, SUSPENSION (ML) NASAL DAILY
Status: DISCONTINUED | OUTPATIENT
Start: 2022-04-08 | End: 2022-04-08

## 2022-04-08 RX ORDER — BISACODYL 10 MG
10 SUPPOSITORY, RECTAL RECTAL
Status: DISCONTINUED | OUTPATIENT
Start: 2022-04-08 | End: 2022-04-12 | Stop reason: HOSPADM

## 2022-04-08 RX ORDER — ONDANSETRON 2 MG/ML
4 INJECTION INTRAMUSCULAR; INTRAVENOUS EVERY 4 HOURS PRN
Status: DISCONTINUED | OUTPATIENT
Start: 2022-04-08 | End: 2022-04-12 | Stop reason: HOSPADM

## 2022-04-08 RX ORDER — LEVOTHYROXINE SODIUM 0.05 MG/1
50 TABLET ORAL
Status: DISCONTINUED | OUTPATIENT
Start: 2022-04-09 | End: 2022-04-12 | Stop reason: HOSPADM

## 2022-04-08 RX ORDER — DIPHENHYDRAMINE HYDROCHLORIDE 50 MG/ML
25 INJECTION INTRAMUSCULAR; INTRAVENOUS ONCE
Status: COMPLETED | OUTPATIENT
Start: 2022-04-08 | End: 2022-04-08

## 2022-04-08 RX ORDER — GABAPENTIN 300 MG/1
300 CAPSULE ORAL 3 TIMES DAILY
Status: DISCONTINUED | OUTPATIENT
Start: 2022-04-08 | End: 2022-04-12 | Stop reason: HOSPADM

## 2022-04-08 RX ORDER — OXYCODONE HYDROCHLORIDE 5 MG/1
2.5 TABLET ORAL
Status: DISCONTINUED | OUTPATIENT
Start: 2022-04-08 | End: 2022-04-09

## 2022-04-08 RX ORDER — POLYETHYLENE GLYCOL 3350 17 G/17G
1 POWDER, FOR SOLUTION ORAL DAILY
Status: DISCONTINUED | OUTPATIENT
Start: 2022-04-08 | End: 2022-04-12 | Stop reason: HOSPADM

## 2022-04-08 RX ADMIN — POTASSIUM CHLORIDE AND SODIUM CHLORIDE: 900; 150 INJECTION, SOLUTION INTRAVENOUS at 18:15

## 2022-04-08 RX ADMIN — BUSPIRONE HYDROCHLORIDE 5 MG: 10 TABLET ORAL at 18:19

## 2022-04-08 RX ADMIN — HALOPERIDOL LACTATE 2 MG: 5 INJECTION, SOLUTION INTRAMUSCULAR at 13:42

## 2022-04-08 RX ADMIN — LEVETIRACETAM 1000 MG: 500 TABLET, FILM COATED ORAL at 18:19

## 2022-04-08 RX ADMIN — HYDROMORPHONE HYDROCHLORIDE 0.25 MG: 1 INJECTION, SOLUTION INTRAMUSCULAR; INTRAVENOUS; SUBCUTANEOUS at 19:47

## 2022-04-08 RX ADMIN — BUSPIRONE HYDROCHLORIDE 5 MG: 10 TABLET ORAL at 22:00

## 2022-04-08 RX ADMIN — GABAPENTIN 300 MG: 300 CAPSULE ORAL at 22:00

## 2022-04-08 RX ADMIN — BISACODYL 10 MG: 5 TABLET, COATED ORAL at 18:19

## 2022-04-08 RX ADMIN — SODIUM CHLORIDE 1000 ML: 9 INJECTION, SOLUTION INTRAVENOUS at 13:42

## 2022-04-08 RX ADMIN — LORAZEPAM 1 MG: 2 INJECTION INTRAMUSCULAR; INTRAVENOUS at 13:42

## 2022-04-08 RX ADMIN — METOCLOPRAMIDE 10 MG: 5 INJECTION, SOLUTION INTRAMUSCULAR; INTRAVENOUS at 13:42

## 2022-04-08 RX ADMIN — IOHEXOL 80 ML: 350 INJECTION, SOLUTION INTRAVENOUS at 14:11

## 2022-04-08 RX ADMIN — DIPHENHYDRAMINE HYDROCHLORIDE 25 MG: 50 INJECTION INTRAMUSCULAR; INTRAVENOUS at 13:42

## 2022-04-08 RX ADMIN — OXYCODONE HYDROCHLORIDE 5 MG: 5 TABLET ORAL at 18:21

## 2022-04-08 RX ADMIN — OXYCODONE HYDROCHLORIDE 5 MG: 5 TABLET ORAL at 22:58

## 2022-04-08 ASSESSMENT — LIFESTYLE VARIABLES
EVER HAD A DRINK FIRST THING IN THE MORNING TO STEADY YOUR NERVES TO GET RID OF A HANGOVER: NO
CONSUMPTION TOTAL: INCOMPLETE
TOTAL SCORE: 0
HAVE PEOPLE ANNOYED YOU BY CRITICIZING YOUR DRINKING: NO
ALCOHOL_USE: NO
HAVE YOU EVER FELT YOU SHOULD CUT DOWN ON YOUR DRINKING: NO
EVER FELT BAD OR GUILTY ABOUT YOUR DRINKING: NO
TOTAL SCORE: 0
TOTAL SCORE: 0

## 2022-04-08 ASSESSMENT — PAIN DESCRIPTION - PAIN TYPE
TYPE: ACUTE PAIN

## 2022-04-08 ASSESSMENT — ENCOUNTER SYMPTOMS
NAUSEA: 1
ABDOMINAL PAIN: 1
VOMITING: 1

## 2022-04-08 NOTE — ASSESSMENT & PLAN NOTE
reports over the past year, she has lost about 90 pounds, unintentional.  She is eating the same on a food.  She had a Pap smear last year which was unremarkable.  She is due for mammogram.  There is a history of adnexal cyst, unclear if this has increased or decreased in size.   encouraged to have patient follow-up follow-up with GYN for mammogram and for evaluation of this adnexal cyst.  He does admit she is perimenopausal at this time.  He reports she had an endoscopy and colonoscopy last year which were unremarkable.  4/10-no clear explanation at this time.  Transabdominal ultrasound to follow.  Follow-up with upper endoscopy

## 2022-04-08 NOTE — ASSESSMENT & PLAN NOTE
Likely secondary to chronic marijuana use  Patient had endoscopy and colonoscopy performed last year which was unremarkable

## 2022-04-08 NOTE — ED PROVIDER NOTES
ED Provider Note    Scribed for Vijay Younger M.D. by Ernst Ward. 4/8/2022  1:17 PM    Primary care provider: No primary care provider noted  Means of arrival: Walk in  History obtained from: Patient  History limited by: None    CHIEF COMPLAINT  Chief Complaint   Patient presents with   • Abdominal Pain   • Vomiting       \[facility] [place] [facility]\""  Tamela Vasquez is a 46 y.o. female who presents to the Emergency Department for evaluation of moderate to severe abdominal pain onset 1.5 years ago. Patient's symptoms worsened two days ago and became unbearable today. She saw Dr. Fernandez (Surgeon). Patient is on gabapentin for fibromyalgia and smokes marijuana for pain control. She states she uses marijuana products daily for pain management but noticed it wasn't working the past two days. She admits to associated symptoms of nausea, cyclic vomiting, but denies fever. Patient has lost 90 lbs in the past year. No alleviating factors were reported.       REVIEW OF SYSTEMS  Pertinent negatives include no fever. As above, all other systems reviewed and are negative.   See HPI for further details.     PAST MEDICAL HISTORY   has a past medical history of Cervical spondylosis (12/11/2015), Concussion, Constipation, Major depression (1/20/2015), Migraine, Neoplasm of uncertain behavior of skin (5/2/2017), Other specified symptom associated with female genital organs, Pain, Right acoustic neuroma (HCC), Seizure disorder (HCC) (1999), Shingles (1/13/2016), Sleep apnea, and Thyroid activity decreased.    SURGICAL HISTORY   has a past surgical history that includes gastric bypass laparoscopic; hchg tube, ear ultrasil; gastroscopy-endo (1/20/2009); colonoscopy - endo (1/22/2009); carpal tunnel release; gyn surgery; pelviscopy (6/16/2009); lysis adhesions gyn (6/16/2009); gastroscopy-endo (8/12/2010); lizz by laparoscopy (8/15/2010); lysis adhesions general (8/15/2010); appendectomy (1994); other abdominal surgery (1997); other  "abdominal surgery (2009); and cervical disk and fusion anterior (4/27/2016).    SOCIAL HISTORY  Social History     Tobacco Use   • Smoking status: Light Tobacco Smoker     Packs/day: 0.25     Years: 27.00     Pack years: 6.75     Types: Cigarettes   • Smokeless tobacco: Current User   • Tobacco comment: 1/2 pack per day; uses  e cigarettes   Substance Use Topics   • Alcohol use: No     Alcohol/week: 0.0 oz   • Drug use: Yes     Types: Marijuana     Comment: \"medical\" marijuana, last smoked  3 days ago      Social History     Substance and Sexual Activity   Drug Use Yes   • Types: Marijuana    Comment: \"medical\" marijuana, last smoked  3 days ago       FAMILY HISTORY  Family History   Problem Relation Age of Onset   • Alcohol/Drug Mother         ETOH   • Diabetes Mother    • Cancer Paternal Aunt         breast   • Diabetes Maternal Grandfather    • Cancer Paternal Grandmother         cervical    • Heart Disease Paternal Grandfather         MI   • Cancer Paternal Grandfather         lung   • Hypertension Paternal Grandfather    • Hyperlipidemia Paternal Grandfather        CURRENT MEDICATIONS  Home Medications     Reviewed by Caesar Penn R.N. (Registered Nurse) on 04/08/22 at 1059  Med List Status: Partial   Medication Last Dose Status   cyanocobalamin (VITAMIN B-12) 1000 MCG/ML Solution  Active   fluticasone (FLONASE) 50 MCG/ACT nasal spray  Active   levothyroxine (SYNTHROID) 50 MCG Tab  Active   multivitamin (THERAGRAN) Tab  Active   Needles & Syringes Misc  Active   ondansetron (ZOFRAN ODT) 8 MG TABLET DISPERSIBLE  Active   rizatriptan (MAXALT-MLT) 10 MG disintegrating tablet  Active   topiramate (TOPOMAX) 15 MG CAPSULE SPRINKLE  Active   triamcinolone (KENALOG) 0.1 % lotion  Active                ALLERGIES  Allergies   Allergen Reactions   • Morphine Vomiting   • Phentermine Hcl      Swelling short of breath       PHYSICAL EXAM  VITAL SIGNS: /99   Pulse 100   Temp 36.9 °C (98.4 °F) (Tympanic)   Resp " "18   Ht 1.499 m (4' 11\")   Wt 61.2 kg (135 lb)   SpO2 96%   BMI 27.27 kg/m²   Constitutional: Well developed, Well nourished, Moderate distress, distressed and uncomfortable appearance.   HENT: Normocephalic, Atraumatic, Bilateral external ears normal, Oropharynx is clear mucous membranes are dry. No oral exudates or nasal discharge.   Eyes: Pupils are equal round and reactive, EOMI, Conjunctiva normal, No discharge.   Neck: Normal range of motion, No tenderness, Supple, No stridor. No meningismus.  Lymphatic: No lymphadenopathy noted.   Cardiovascular: Tachycardic rate and rhythm without murmur rub or gallop.  Thorax & Lungs: Clear breath sounds bilaterally without wheezes, rhonchi or rales. There is no chest wall tenderness.   Abdomen:  There is no rebound or guarding.  Patient is distended.  No organomegaly is appreciated. Bowel sounds are hypoactive.  Skin: Normal without rash.   Back: No CVA or spinal tenderness.   Extremities: Intact distal pulses, No edema, No tenderness, No cyanosis, No clubbing. Capillary refill is less than 2 seconds.  Musculoskeletal: Good range of motion in all major joints. No tenderness to palpation or major deformities noted.   Neurologic: Alert & oriented x 3, Normal motor function, Normal sensory function, No focal deficits noted. Reflexes are normal.  Psychiatric: Affect normal, Judgment normal, Mood normal. There is no suicidal ideation or patient reported hallucinations.         DIAGNOSTIC STUDIES / PROCEDURES    LABS  Labs Reviewed   CBC WITH DIFFERENTIAL - Abnormal; Notable for the following components:       Result Value    Hemoglobin 11.4 (*)     MCV 71.1 (*)     MCH 21.6 (*)     MCHC 30.3 (*)     All other components within normal limits   COMP METABOLIC PANEL - Abnormal; Notable for the following components:    Co2 18 (*)     Bun 5 (*)     Creatinine 0.45 (*)     All other components within normal limits   LIPASE   HCG QUAL SERUM   ESTIMATED GFR   URINALYSIS,CULTURE IF " INDICATED      All labs reviewed by me.    RADIOLOGY  CT-ABDOMEN-PELVIS WITH   Final Result      1.  Adynamic ileus. Recommend follow-up films      2.  Previous cholecystectomy.      3.  4 x 2.5 cm left adnexal cyst recommend follow-up pelvic ultrasound.        The radiologist's interpretation of all radiological studies have been reviewed by me.    COURSE & MEDICAL DECISION MAKING  Nursing notes, VS, PMSFHx reviewed in chart.    1:17 PM Patient seen and examined at bedside. Ordered for CT-Abdomen-Pelvis and labs to evaluate. Patient was treated with NS infusion 1,000 mL, Reglan 10 mg injection, Benadryl 25 mg injection, Ativan 1 mg injection, and Haldol 2 mg injection for her symptoms.      3:20 PM Paged Dr. Fernandez (Surgeon).    3:44 PM I discussed the patient's case and the above findings with Dr. Fernandez (Surgeon)Conveyed that the patient has an adynamic ileus based on CAT scan findings.  She will require admission to the hospital.  Dr. Martinez is asking for GI consultation and she has seen Dr. Alvarez and Leda in the past.  She had last had endoscopy in 2009 based on chart review.    Patient's laboratory evaluation shows no leukocytosis, minimal anemia, no evidence of significant acidosis and normal lipase.    I believe the patient has an adynamic ileus but also likely cannabinoid hyperemesis syndrome.    3:57 PM I discussed the patient's case and the above findings with Dr. Root (Hospitalist) who agrees to evaluate the patient.    4:01 PM - I reevaluated the patient at bedside. I discussed the patient's diagnostic study results. I informed the patient of my plan to admit today given the patient's current presentation and diagnostic study results. Patient verbalizes understanding and support with my plan for admission.     DISPOSITION:  Patient will be hospitalized by Dr. Root (Hospitalist) in guarded condition.    FINAL IMPRESSION  1. Ileus (HCC)    2. Cannabis dependence, daily use (HCC)          Ernst GREER  Sylvia (Scribe), am scribing for, and in the presence of, Vijay Younger M.D..    Electronically signed by: Ernst Ward (Danielleibe), 4/8/2022    IVijay M.D. personally performed the services described in this documentation, as scribed by Ernst Ward in my presence, and it is both accurate and complete. C    The note accurately reflects work and decisions made by me.  Vijay Younger M.D.  4/8/2022  4:13 PM

## 2022-04-08 NOTE — ASSESSMENT & PLAN NOTE
Labs unremarkable, lipase negative, pregnancy test negative, CT of the abdomen and pelvis with, oral and IV contrast noted adynamic ileus,   4 x 2.5 cm left adnexal cyst recommend follow-up pelvic ultrasound.  Patient's belly is soft, slightly tender to palpation. We will refrain from any NGT placement at this time.  We will continue with serial abdominal exams.  4/9-serial upper GI x-ray.  If the normal will try to clear liquid diet.  No surgical indication per Dr. Fernandez. amytriptiline per GI  Recent colonoscopy and EGD unremarkable  4/10-upper GI series showed resolution of ileus.  Patient amitriptyline.  Started Bentyl per GI recommendation.  Plan for upper endoscopy tomorrow.  Also complete transabdominal ultrasound is ordered for further evaluation of the adnexal left cyst

## 2022-04-08 NOTE — H&P
Hospital Medicine History & Physical Note    Date of Service  4/8/2022    Primary Care Physician  Pcp Pt States None    Consultants  general surgery    Specialist Names: Dr. Fernandez    Code Status  Full Code    Chief Complaint  Chief Complaint   Patient presents with   • Abdominal Pain   • Vomiting       History of Presenting Illness  This is a 46 year old female with PMHx of fibromyalgia, daily marijuana use gastric bypass 2007 who presented to the ED on 04/8/2022 with progressive abdominal pain x 1.5 years , patient was sent from Dr. Fernandez's office.     Patient reports has been experiencing abdominal pain with associated nausea and vomiting for a year and a half.  She reports she uses marijuana daily to cope with the pain, however pain is significantly worsened over the past 2-3 days.  She admits to nausea and vomiting, and reports 90 pound weight loss over the past year.    History provided by  at bedside as patient received Ativan, Benadryl, Haldol and unable to answer questions appropriately.  She did answer questions in terms of she was able to pass flatus and bowel movement this morning.     reports over the past year, she has lost about 90 pounds, unintentional.  She is eating the same on a food.  She had a Pap smear last year which was unremarkable.  She is due for mammogram.  There is a history of adnexal cyst, unclear if this has increased or decreased in size.   encouraged to have patient follow-up follow-up with GYN for mammogram and for evaluation of this adnexal cyst.  He does admit she is perimenopausal at this time.  He reports she had an endoscopy and colonoscopy last year which were unremarkable.    Labs unremarkable, lipase negative, pregnancy test negative, CT of the abdomen and pelvis with, oral and IV contrast noted adynamic ileus,   4 x 2.5 cm left adnexal cyst recommend follow-up pelvic ultrasound.  Patient's belly is soft, slightly tender to palpation. We will refrain  from any NGT placement at this time.  We will continue with serial abdominal exams.    I discussed the plan of care with patient, family and bedside RN.    Review of Systems  Review of Systems   Gastrointestinal: Positive for abdominal pain, nausea and vomiting.   All other systems reviewed and are negative.      Past Medical History   has a past medical history of Cervical spondylosis (12/11/2015), Concussion, Constipation, Major depression (1/20/2015), Migraine, Neoplasm of uncertain behavior of skin (5/2/2017), Other specified symptom associated with female genital organs, Pain, Right acoustic neuroma (HCC), Seizure disorder (HCC) (1999), Shingles (1/13/2016), Sleep apnea, and Thyroid activity decreased.    Surgical History   has a past surgical history that includes gastric bypass laparoscopic; hchg tube, ear ultrasil; gastroscopy-endo (1/20/2009); colonoscopy - endo (1/22/2009); carpal tunnel release; gyn surgery; pelviscopy (6/16/2009); lysis adhesions gyn (6/16/2009); gastroscopy-endo (8/12/2010); lizz by laparoscopy (8/15/2010); lysis adhesions general (8/15/2010); appendectomy (1994); other abdominal surgery (1997); other abdominal surgery (2009); and cervical disk and fusion anterior (4/27/2016).     Family History  family history includes Alcohol/Drug in her mother; Cancer in her paternal aunt, paternal grandfather, and paternal grandmother; Diabetes in her maternal grandfather and mother; Heart Disease in her paternal grandfather; Hyperlipidemia in her paternal grandfather; Hypertension in her paternal grandfather.   Family history reviewed with patient. There is no family history that is pertinent to the chief complaint.     Social History   reports that she has been smoking cigarettes. She has a 6.75 pack-year smoking history. She uses smokeless tobacco. She reports current drug use. Drug: Marijuana. She reports that she does not drink alcohol.    Allergies  Allergies   Allergen Reactions   • Morphine  Vomiting   • Phentermine Hcl      Swelling short of breath       Medications  Prior to Admission Medications   Prescriptions Last Dose Informant Patient Reported? Taking?   DULoxetine (CYMBALTA) 60 MG Cap DR Particles delayed-release capsule unknown at Unknown time Family Member Yes Yes   Sig: Take 60 mg by mouth every day.   LORazepam (ATIVAN) 1 MG Tab <2 weeks at Unknown time Family Member Yes Yes   Sig: Take 0.5 mg by mouth 1 time a day as needed (seizure).   busPIRone (BUSPAR) 5 MG tablet 4/7/2022 at pm Family Member Yes Yes   Sig: Take 5 mg by mouth 3 times a day.   cyanocobalamin (VITAMIN B-12) 1000 MCG/ML Solution 3/28/2022 at Unknown time Family Member No No   Sig: INJECT 1 ML INTRAMUSCULARLY ONCE EVERY 30 DAYS   Patient taking differently: Inject 1,000 mcg under the skin every 7 days.   diphenhydrAMINE (BENADRYL) 25 MG Tab 4/7/2022 at pm Family Member Yes Yes   Sig: Take 25 mg by mouth at bedtime as needed for Sleep or Itching.   fluticasone (FLONASE) 50 MCG/ACT nasal spray <2 weeks at Unknown time Family Member No No   Sig: Spray 1 Spray in nose every day.   gabapentin (NEURONTIN) 300 MG Cap 4/7/2022 at pm Family Member Yes Yes   Sig: Take 300 mg by mouth 3 times a day.   ibuprofen (MOTRIN) 200 MG Tab 4/7/2022 at am Family Member Yes Yes   Sig: Take 200 mg by mouth one time. Indications: Migraine Headache   levETIRAcetam (KEPPRA) 100 MG/ML Solution 4/7/2022 at pm Family Member Yes Yes   Sig: Take 500-1,000 mg by mouth 2 times a day. 500 mg in am and 1000 mg in pm   levothyroxine (SYNTHROID) 50 MCG Tab 4/7/2022 at am Family Member No No   Sig: Take 1 Tab by mouth Every morning on an empty stomach.   meclizine (ANTIVERT) 12.5 MG Tab unknown at Unknown time Family Member Yes Yes   Sig: Take 12.5 mg by mouth 3 times a day as needed for Dizziness or Nausea/Vomiting.   ondansetron (ZOFRAN ODT) 4 MG TABLET DISPERSIBLE <2 weeks at Unknown time Family Member Yes Yes   Sig: Take 4 mg by mouth every 6 hours as needed  for Nausea.   sumatriptan (IMITREX) 100 MG tablet <2 weeks at Unknown time Family Member Yes Yes   Sig: Take 100 mg by mouth one time as needed for Migraine.      Facility-Administered Medications: None       Physical Exam  Temp:  [36.9 °C (98.4 °F)] 36.9 °C (98.4 °F)  Pulse:  [] 72  Resp:  [18] 18  BP: (117-135)/(75-99) 117/75  SpO2:  [92 %-96 %] 92 %  Blood Pressure: 117/75   Temperature: 36.9 °C (98.4 °F)   Pulse: 72   Respiration: 18   Pulse Oximetry: 92 %       Physical Exam  Vitals and nursing note reviewed.   Constitutional:       General: She is not in acute distress.     Appearance: Normal appearance.   HENT:      Head: Normocephalic and atraumatic.      Mouth/Throat:      Mouth: Mucous membranes are moist.      Pharynx: No oropharyngeal exudate.   Eyes:      Extraocular Movements: Extraocular movements intact.      Pupils: Pupils are equal, round, and reactive to light.   Cardiovascular:      Rate and Rhythm: Normal rate and regular rhythm.      Pulses: Normal pulses.      Heart sounds: No murmur heard.    No friction rub. No gallop.   Pulmonary:      Effort: Pulmonary effort is normal. No respiratory distress.      Breath sounds: No wheezing, rhonchi or rales.   Abdominal:      General: Bowel sounds are normal. There is no distension.      Palpations: Abdomen is soft. There is no mass.      Tenderness: There is abdominal tenderness.   Musculoskeletal:         General: No swelling or tenderness. Normal range of motion.      Cervical back: Normal range of motion. No rigidity. No muscular tenderness.      Right lower leg: No edema.      Left lower leg: No edema.   Skin:     General: Skin is warm and dry.      Capillary Refill: Capillary refill takes less than 2 seconds.      Findings: No erythema or rash.   Neurological:      General: No focal deficit present.      Mental Status: She is alert and oriented to person, place, and time.      Motor: No weakness.      Gait: Gait normal.          Laboratory:  Recent Labs     04/08/22  1104   WBC 6.9   RBC 5.29   HEMOGLOBIN 11.4*   HEMATOCRIT 37.6   MCV 71.1*   MCH 21.6*   MCHC 30.3*   RDW 41.4   PLATELETCT 329   MPV 11.4     Recent Labs     04/08/22  1104   SODIUM 139   POTASSIUM 4.4   CHLORIDE 106   CO2 18*   GLUCOSE 91   BUN 5*   CREATININE 0.45*   CALCIUM 9.6     Recent Labs     04/08/22  1104   ALTSGPT 10   ASTSGOT 21   ALKPHOSPHAT 90   TBILIRUBIN 0.2   LIPASE 65   GLUCOSE 91         No results for input(s): NTPROBNP in the last 72 hours.      No results for input(s): TROPONINT in the last 72 hours.    Imaging:  CT-ABDOMEN-PELVIS WITH   Final Result      1.  Adynamic ileus. Recommend follow-up films      2.  Previous cholecystectomy.      3.  4 x 2.5 cm left adnexal cyst recommend follow-up pelvic ultrasound.          no X-Ray or EKG requiring interpretation    Assessment/Plan:  I anticipate this patient is appropriate for observation status at this time.    * Ileus (HCC)  Assessment & Plan  Labs unremarkable, lipase negative, pregnancy test negative, CT of the abdomen and pelvis with, oral and IV contrast noted adynamic ileus,   4 x 2.5 cm left adnexal cyst recommend follow-up pelvic ultrasound.  Patient's belly is soft, slightly tender to palpation. We will refrain from any NGT placement at this time.  We will continue with serial abdominal exams.    Weight loss  Assessment & Plan   reports over the past year, she has lost about 90 pounds, unintentional.  She is eating the same on a food.  She had a Pap smear last year which was unremarkable.  She is due for mammogram.  There is a history of adnexal cyst, unclear if this has increased or decreased in size.   encouraged to have patient follow-up follow-up with GYN for mammogram and for evaluation of this adnexal cyst.  He does admit she is perimenopausal at this time.  He reports she had an endoscopy and colonoscopy last year which were unremarkable.    Adnexal cyst  Assessment &  Plan  Noted on CT imaging, 4 x 2.5 cm left adnexal cyst recommend follow-up pelvic ultrasound outpatient    Cyclical vomiting with nausea  Assessment & Plan  Likely secondary to chronic marijuana use  Patient had endoscopy and colonoscopy performed last year which was unremarkable    Marijuana abuse  Assessment & Plan   reports she uses daily for the past several years    Hypothyroidism due to acquired atrophy of thyroid- (present on admission)  Assessment & Plan  Resume home medications      Seizure disorder (HCC)- (present on admission)  Assessment & Plan  Resume home medications      Migraine- (present on admission)  Assessment & Plan  Resume home medications      VTE prophylaxis: SCDs/TEDs and enoxaparin ppx

## 2022-04-08 NOTE — ASSESSMENT & PLAN NOTE
Noted on CT imaging, 4 x 2.5 cm left adnexal cyst recommend follow-up pelvic ultrasound outpatient  Ultrasound pelvis as per above, hemorrhagic cyst

## 2022-04-08 NOTE — ED NOTES
Med rec complete per pt's family at bedside with list  Family unsure if Pt had any medications this morning.  Per  , he gives Pt B12 injections Once a week.     Allergies reviewed and updated.

## 2022-04-08 NOTE — ASSESSMENT & PLAN NOTE
reports she uses daily for the past several years  Nicotine use also chronic cyclic vomiting and gastroparesis due to pancreatic sphincter dysfunction  Cessation counseling provided

## 2022-04-08 NOTE — ED TRIAGE NOTES
Pt to triage in WC w/ .  Chief Complaint   Patient presents with   • Abdominal Pain   • Vomiting     Hx of gastric bypass 2007. Has had worsening pain x1.5yrs. Pt in obvious discomfort. Sent by Sas's office. Hx of adhesions.

## 2022-04-09 ENCOUNTER — APPOINTMENT (OUTPATIENT)
Dept: RADIOLOGY | Facility: MEDICAL CENTER | Age: 47
DRG: 390 | End: 2022-04-09
Attending: COLON & RECTAL SURGERY
Payer: COMMERCIAL

## 2022-04-09 ENCOUNTER — APPOINTMENT (OUTPATIENT)
Dept: RADIOLOGY | Facility: MEDICAL CENTER | Age: 47
DRG: 390 | End: 2022-04-09
Attending: INTERNAL MEDICINE
Payer: COMMERCIAL

## 2022-04-09 LAB
ANION GAP SERPL CALC-SCNC: 9 MMOL/L (ref 7–16)
APPEARANCE UR: CLEAR
BILIRUB UR QL STRIP.AUTO: NEGATIVE
BUN SERPL-MCNC: 7 MG/DL (ref 8–22)
CALCIUM SERPL-MCNC: 8.6 MG/DL (ref 8.5–10.5)
CHLORIDE SERPL-SCNC: 109 MMOL/L (ref 96–112)
CO2 SERPL-SCNC: 23 MMOL/L (ref 20–33)
COLOR UR: YELLOW
CREAT SERPL-MCNC: 0.48 MG/DL (ref 0.5–1.4)
ERYTHROCYTE [DISTWIDTH] IN BLOOD BY AUTOMATED COUNT: 42.6 FL (ref 35.9–50)
GFR SERPLBLD CREATININE-BSD FMLA CKD-EPI: 119 ML/MIN/1.73 M 2
GLUCOSE SERPL-MCNC: 83 MG/DL (ref 65–99)
GLUCOSE UR STRIP.AUTO-MCNC: NEGATIVE MG/DL
HCT VFR BLD AUTO: 34.1 % (ref 37–47)
HGB BLD-MCNC: 9.8 G/DL (ref 12–16)
KETONES UR STRIP.AUTO-MCNC: NEGATIVE MG/DL
LEUKOCYTE ESTERASE UR QL STRIP.AUTO: NEGATIVE
MAGNESIUM SERPL-MCNC: 2 MG/DL (ref 1.5–2.5)
MCH RBC QN AUTO: 20.9 PG (ref 27–33)
MCHC RBC AUTO-ENTMCNC: 28.7 G/DL (ref 33.6–35)
MCV RBC AUTO: 72.9 FL (ref 81.4–97.8)
MICRO URNS: NORMAL
MORPHOLOGY BLD-IMP: NORMAL
NITRITE UR QL STRIP.AUTO: NEGATIVE
PH UR STRIP.AUTO: 5 [PH] (ref 5–8)
PHOSPHATE SERPL-MCNC: 4.2 MG/DL (ref 2.5–4.5)
PLATELET # BLD AUTO: 247 K/UL (ref 164–446)
PMV BLD AUTO: 11 FL (ref 9–12.9)
POTASSIUM SERPL-SCNC: 4.3 MMOL/L (ref 3.6–5.5)
PROT UR QL STRIP: NEGATIVE MG/DL
RBC # BLD AUTO: 4.68 M/UL (ref 4.2–5.4)
RBC UR QL AUTO: NEGATIVE
SODIUM SERPL-SCNC: 141 MMOL/L (ref 135–145)
SP GR UR STRIP.AUTO: 1.02
UROBILINOGEN UR STRIP.AUTO-MCNC: 1 MG/DL
WBC # BLD AUTO: 6.5 K/UL (ref 4.8–10.8)

## 2022-04-09 PROCEDURE — 74240 X-RAY XM UPR GI TRC 1CNTRST: CPT

## 2022-04-09 PROCEDURE — 700101 HCHG RX REV CODE 250: Performed by: GENERAL PRACTICE

## 2022-04-09 PROCEDURE — 80048 BASIC METABOLIC PNL TOTAL CA: CPT

## 2022-04-09 PROCEDURE — 700102 HCHG RX REV CODE 250 W/ 637 OVERRIDE(OP): Performed by: GENERAL PRACTICE

## 2022-04-09 PROCEDURE — 36415 COLL VENOUS BLD VENIPUNCTURE: CPT

## 2022-04-09 PROCEDURE — 770001 HCHG ROOM/CARE - MED/SURG/GYN PRIV*

## 2022-04-09 PROCEDURE — A9270 NON-COVERED ITEM OR SERVICE: HCPCS | Performed by: GENERAL PRACTICE

## 2022-04-09 PROCEDURE — 700102 HCHG RX REV CODE 250 W/ 637 OVERRIDE(OP): Performed by: INTERNAL MEDICINE

## 2022-04-09 PROCEDURE — 700111 HCHG RX REV CODE 636 W/ 250 OVERRIDE (IP): Performed by: GENERAL PRACTICE

## 2022-04-09 PROCEDURE — 99233 SBSQ HOSP IP/OBS HIGH 50: CPT | Performed by: INTERNAL MEDICINE

## 2022-04-09 PROCEDURE — A9270 NON-COVERED ITEM OR SERVICE: HCPCS | Performed by: INTERNAL MEDICINE

## 2022-04-09 PROCEDURE — 81003 URINALYSIS AUTO W/O SCOPE: CPT

## 2022-04-09 PROCEDURE — 83735 ASSAY OF MAGNESIUM: CPT

## 2022-04-09 PROCEDURE — 84100 ASSAY OF PHOSPHORUS: CPT

## 2022-04-09 PROCEDURE — 700111 HCHG RX REV CODE 636 W/ 250 OVERRIDE (IP): Performed by: INTERNAL MEDICINE

## 2022-04-09 PROCEDURE — 85027 COMPLETE CBC AUTOMATED: CPT

## 2022-04-09 PROCEDURE — 700117 HCHG RX CONTRAST REV CODE 255: Performed by: COLON & RECTAL SURGERY

## 2022-04-09 RX ORDER — HYDROMORPHONE HYDROCHLORIDE 1 MG/ML
0.5 INJECTION, SOLUTION INTRAMUSCULAR; INTRAVENOUS; SUBCUTANEOUS ONCE
Status: COMPLETED | OUTPATIENT
Start: 2022-04-09 | End: 2022-04-09

## 2022-04-09 RX ORDER — OXYCODONE HYDROCHLORIDE 10 MG/1
10 TABLET ORAL
Status: DISCONTINUED | OUTPATIENT
Start: 2022-04-09 | End: 2022-04-12 | Stop reason: HOSPADM

## 2022-04-09 RX ORDER — LEVETIRACETAM 100 MG/ML
1000 SOLUTION ORAL EVERY EVENING
Status: DISCONTINUED | OUTPATIENT
Start: 2022-04-10 | End: 2022-04-12 | Stop reason: HOSPADM

## 2022-04-09 RX ORDER — AMITRIPTYLINE HYDROCHLORIDE 10 MG/1
25 TABLET, FILM COATED ORAL EVERY EVENING
Status: DISCONTINUED | OUTPATIENT
Start: 2022-04-09 | End: 2022-04-12 | Stop reason: HOSPADM

## 2022-04-09 RX ORDER — HYDROMORPHONE HYDROCHLORIDE 1 MG/ML
0.5 INJECTION, SOLUTION INTRAMUSCULAR; INTRAVENOUS; SUBCUTANEOUS
Status: DISCONTINUED | OUTPATIENT
Start: 2022-04-09 | End: 2022-04-12 | Stop reason: HOSPADM

## 2022-04-09 RX ORDER — LEVETIRACETAM 100 MG/ML
500 SOLUTION ORAL EVERY MORNING
Status: DISCONTINUED | OUTPATIENT
Start: 2022-04-10 | End: 2022-04-12 | Stop reason: HOSPADM

## 2022-04-09 RX ORDER — OXYCODONE HYDROCHLORIDE 5 MG/1
5 TABLET ORAL
Status: DISCONTINUED | OUTPATIENT
Start: 2022-04-09 | End: 2022-04-12 | Stop reason: HOSPADM

## 2022-04-09 RX ADMIN — AMITRIPTYLINE HYDROCHLORIDE 25 MG: 10 TABLET, FILM COATED ORAL at 17:10

## 2022-04-09 RX ADMIN — LEVETIRACETAM 500 MG: 500 TABLET, FILM COATED ORAL at 05:39

## 2022-04-09 RX ADMIN — OXYCODONE HYDROCHLORIDE 10 MG: 10 TABLET ORAL at 21:13

## 2022-04-09 RX ADMIN — HYDROMORPHONE HYDROCHLORIDE 0.5 MG: 1 INJECTION, SOLUTION INTRAMUSCULAR; INTRAVENOUS; SUBCUTANEOUS at 17:10

## 2022-04-09 RX ADMIN — PROCHLORPERAZINE EDISYLATE 10 MG: 5 INJECTION INTRAMUSCULAR; INTRAVENOUS at 14:19

## 2022-04-09 RX ADMIN — BUSPIRONE HYDROCHLORIDE 5 MG: 10 TABLET ORAL at 15:15

## 2022-04-09 RX ADMIN — DULOXETINE HYDROCHLORIDE 60 MG: 30 CAPSULE, DELAYED RELEASE ORAL at 05:39

## 2022-04-09 RX ADMIN — HYDROMORPHONE HYDROCHLORIDE 0.5 MG: 1 INJECTION, SOLUTION INTRAMUSCULAR; INTRAVENOUS; SUBCUTANEOUS at 10:14

## 2022-04-09 RX ADMIN — HYDROMORPHONE HYDROCHLORIDE 0.5 MG: 1 INJECTION, SOLUTION INTRAMUSCULAR; INTRAVENOUS; SUBCUTANEOUS at 20:18

## 2022-04-09 RX ADMIN — GABAPENTIN 300 MG: 300 CAPSULE ORAL at 05:40

## 2022-04-09 RX ADMIN — OXYCODONE HYDROCHLORIDE 5 MG: 5 TABLET ORAL at 05:38

## 2022-04-09 RX ADMIN — BUSPIRONE HYDROCHLORIDE 5 MG: 10 TABLET ORAL at 20:58

## 2022-04-09 RX ADMIN — HYDROMORPHONE HYDROCHLORIDE 0.5 MG: 1 INJECTION, SOLUTION INTRAMUSCULAR; INTRAVENOUS; SUBCUTANEOUS at 12:11

## 2022-04-09 RX ADMIN — HYDROMORPHONE HYDROCHLORIDE 0.25 MG: 1 INJECTION, SOLUTION INTRAMUSCULAR; INTRAVENOUS; SUBCUTANEOUS at 03:33

## 2022-04-09 RX ADMIN — POTASSIUM CHLORIDE AND SODIUM CHLORIDE: 900; 150 INJECTION, SOLUTION INTRAVENOUS at 04:58

## 2022-04-09 RX ADMIN — IOHEXOL 400 ML: 350 INJECTION, SOLUTION INTRAVENOUS at 13:15

## 2022-04-09 RX ADMIN — GABAPENTIN 300 MG: 300 CAPSULE ORAL at 20:58

## 2022-04-09 RX ADMIN — GABAPENTIN 300 MG: 300 CAPSULE ORAL at 15:16

## 2022-04-09 RX ADMIN — ONDANSETRON 4 MG: 2 INJECTION INTRAMUSCULAR; INTRAVENOUS at 10:20

## 2022-04-09 RX ADMIN — BUSPIRONE HYDROCHLORIDE 5 MG: 10 TABLET ORAL at 05:38

## 2022-04-09 RX ADMIN — LEVETIRACETAM 1000 MG: 500 TABLET, FILM COATED ORAL at 17:11

## 2022-04-09 RX ADMIN — OXYCODONE HYDROCHLORIDE 10 MG: 10 TABLET ORAL at 15:16

## 2022-04-09 RX ADMIN — HYDROMORPHONE HYDROCHLORIDE 0.25 MG: 1 INJECTION, SOLUTION INTRAMUSCULAR; INTRAVENOUS; SUBCUTANEOUS at 07:47

## 2022-04-09 RX ADMIN — OXYCODONE HYDROCHLORIDE 5 MG: 5 TABLET ORAL at 02:30

## 2022-04-09 RX ADMIN — SENNOSIDES AND DOCUSATE SODIUM 2 TABLET: 50; 8.6 TABLET ORAL at 05:40

## 2022-04-09 RX ADMIN — LEVOTHYROXINE SODIUM 50 MCG: 0.05 TABLET ORAL at 05:40

## 2022-04-09 ASSESSMENT — ENCOUNTER SYMPTOMS
RESPIRATORY NEGATIVE: 1
FEVER: 0
VOMITING: 0
ABDOMINAL PAIN: 1
PALPITATIONS: 0
NAUSEA: 1
DIAPHORESIS: 0
COUGH: 0
PSYCHIATRIC NEGATIVE: 1
SHORTNESS OF BREATH: 0
CONSTIPATION: 0
NEUROLOGICAL NEGATIVE: 1
NECK PAIN: 0
DIZZINESS: 0
EYES NEGATIVE: 1
NERVOUS/ANXIOUS: 1
BLOOD IN STOOL: 0
SORE THROAT: 0
MUSCULOSKELETAL NEGATIVE: 1
CHILLS: 0
BACK PAIN: 1
CARDIOVASCULAR NEGATIVE: 1
WEIGHT LOSS: 1
HEADACHES: 0
DIARRHEA: 0

## 2022-04-09 ASSESSMENT — FIBROSIS 4 INDEX: FIB4 SCORE: 1.21

## 2022-04-09 ASSESSMENT — LIFESTYLE VARIABLES
HOW MANY TIMES IN THE PAST YEAR HAVE YOU HAD 5 OR MORE DRINKS IN A DAY: 0
EVER FELT BAD OR GUILTY ABOUT YOUR DRINKING: NO
DOES PATIENT WANT TO STOP DRINKING: NO
AVERAGE NUMBER OF DAYS PER WEEK YOU HAVE A DRINK CONTAINING ALCOHOL: 0
EVER HAD A DRINK FIRST THING IN THE MORNING TO STEADY YOUR NERVES TO GET RID OF A HANGOVER: NO
TOTAL SCORE: 0
ALCOHOL_USE: NO
CONSUMPTION TOTAL: NEGATIVE
TOTAL SCORE: 0
ON A TYPICAL DAY WHEN YOU DRINK ALCOHOL HOW MANY DRINKS DO YOU HAVE: 0
HAVE YOU EVER FELT YOU SHOULD CUT DOWN ON YOUR DRINKING: NO
HAVE PEOPLE ANNOYED YOU BY CRITICIZING YOUR DRINKING: NO
TOTAL SCORE: 0

## 2022-04-09 ASSESSMENT — COGNITIVE AND FUNCTIONAL STATUS - GENERAL
MOBILITY SCORE: 24
DAILY ACTIVITIY SCORE: 24
SUGGESTED CMS G CODE MODIFIER DAILY ACTIVITY: CH
SUGGESTED CMS G CODE MODIFIER MOBILITY: CH

## 2022-04-09 ASSESSMENT — PATIENT HEALTH QUESTIONNAIRE - PHQ9
1. LITTLE INTEREST OR PLEASURE IN DOING THINGS: NOT AT ALL
SUM OF ALL RESPONSES TO PHQ9 QUESTIONS 1 AND 2: 0
2. FEELING DOWN, DEPRESSED, IRRITABLE, OR HOPELESS: NOT AT ALL

## 2022-04-09 ASSESSMENT — PAIN DESCRIPTION - PAIN TYPE: TYPE: ACUTE PAIN

## 2022-04-09 NOTE — HOSPITAL COURSE
This is a 46 year old female with PMHx of fibromyalgia, daily marijuana use gastric bypass 2007 who presented to the ED on 04/8/2022 with progressive abdominal pain x 1.5 years , patient was sent from Dr. Fernandez's office.      Patient reports has been experiencing abdominal pain with associated nausea and vomiting for a year and a half.  She reports she uses marijuana daily to cope with the pain, however pain is significantly worsened over the past 2-3 days.  She admits to nausea and vomiting, and reports 90 pound weight loss over the past year.     History provided by  at bedside as patient received Ativan, Benadryl, Haldol and unable to answer questions appropriately.  She did answer questions in terms of she was able to pass flatus and bowel movement this morning.      reports over the past year, she has lost about 90 pounds, unintentional.  She is eating the same on a food.  She had a Pap smear last year which was unremarkable.  She is due for mammogram.  There is a history of adnexal cyst, unclear if this has increased or decreased in size.   encouraged to have patient follow-up follow-up with GYN for mammogram and for evaluation of this adnexal cyst.  He does admit she is perimenopausal at this time.  He reports she had an endoscopy and colonoscopy last year which were unremarkable.     Labs unremarkable, lipase negative, pregnancy test negative, CT of the abdomen and pelvis with, oral and IV contrast noted adynamic ileus,   4 x 2.5 cm left adnexal cyst recommend follow-up pelvic ultrasound.     Following day patient still persistent with abdominal pain.  Serial small bowel upper GI series was ordered.  GI consulted.  Patient on supportive measurement, pain medications, IV fluids and antiemetics.

## 2022-04-09 NOTE — PROGRESS NOTES
Patient was seen and examined.  18 months of abdominal pain, vomiting and weight loss, remote history of RYGB at outside facility. Vital signs, CT scan and labs reviewed.  This CT most consistent with adynamic ileus with small and large bowel findings. Will order UGIS with SBFT - no imaging evidence of surgical process at this point. Has been a [facility] of [facility], last colonoscopy aborted due to inadequate bowel prep. Counseled patient on diet, actively level and progress this far.  Questions answered.  No role for surgery at this time.

## 2022-04-09 NOTE — PROGRESS NOTES
Patient to xray with transport. Consent form signed and in chart, medicated for pain prior to departure.

## 2022-04-09 NOTE — PROGRESS NOTES
Assumed care of patient at 1900 from [facility]. Patient is A&O x4, states pain level is 8/10. Bed locked in lowest position with 2 rail up. Call light  in place, belongings at bedside. Hourly rounding is in place.

## 2022-04-09 NOTE — CARE PLAN
Problem: Pain - Standard  Goal: Alleviation of pain or a reduction in pain to the patient’s comfort goal  Outcome: Not Met   The patient is Stable - Low risk of patient condition declining or worsening    Shift Goals  Clinical Goals: NPO, monitor pain  Patient Goals: Pain control    Progress made toward(s) clinical / shift goals:  Patient is still in pain after giving pain medication as ordered.    Patient is not progressing towards the following goals:      Problem: Pain - Standard  Goal: Alleviation of pain or a reduction in pain to the patient’s comfort goal  Outcome: Not Met

## 2022-04-09 NOTE — CONSULTS
Gastroenterology Consult Note:    ALBERTO Merino  Date & Time note created:    4/9/2022   10:40 AM     Referring MD:  Dr. Neha Jarvis    Patient ID:  Name:             Tamela Vasquez   YOB: 1975  Age:                 46 y.o.  female   MRN:               [id number]                                                             Reason for Consult:      Abdominal pain  N/v  Unintentional weight loss    History of Present Illness:    47 yo female PMH fibromyalgia, gastric bypass [place]oux[place] 2007, seizure disorder,  marijuana user 4 bowls per day x 3 years, who was admitted to the hospital 4/9/22 with a 1.5 years of abdominal pain, describes it as a burning sensation, dry heaves, with occasional vomiting that has progressively gotten worse over the last 2-3 days. She was seen by Dr. Fernandez who recommended admission. Unintentional weight loss 90 lbs in 1 year. VSS. Notable labs: Hgb: 9.8.  Hct: 34.1. CT scan abdomen/pelvis: Adynamic ileus. Recommend follow-up films. A  4 x 2.5 cm left adnexal cyst recommend follow-up pelvic ultrasound. No further episodes of vomiting but complaints of generalized abdominal pain. Bowel movements regular, non bloody. No pain with defecation.    She has found that taking hot showers helps with abdominal pain and nausea.    Previously lived in [place], [place] when she began to have GI symptoms. Attempted colonoscopy 2020 was aborted due to poor prep. She was told she had diverticulitis but does not remember being treated with antibiotics. She moved to [place] last year.    EGD 2010 performed by Dr. Ramírez for abdominal pain:   1. Unremarkable esophagus.  2.  Small gastric pouch from 35 cm to 40 cm from the incisors.  3.  Tristan-en-Y type anastomosis, with small blind pouch.  The retroflex  views within the pouch and forward facing views do not demonstrate  anastomotic ulcer.  There is suture material visible, however, there is  no evidence of  inflammation.  The larissa limb stretching from the stomach  was followed to the extent of the GI endoscope into the jejunum, and no  mucosal abnormality was present.  The distal anastomosis site was not  reached with the GI endoscope.    Review of Systems:      Review of Systems   Constitutional: Positive for malaise/fatigue and weight loss.   HENT: Negative.    Eyes: Negative.    Respiratory: Negative.    Cardiovascular: Negative.    Gastrointestinal: Positive for abdominal pain and nausea.   Genitourinary: Negative.    Musculoskeletal: Negative.    Skin: Negative.    Neurological: Negative.    Psychiatric/Behavioral: Negative.              Physical Exam:  Vitals/ General Appearance:   Weight/BMI: Body mass index is 27.43 kg/m².    Vitals:    04/08/22 1918 04/09/22 0413 04/09/22 0639 04/09/22 0710   BP: 109/56 110/66  128/71   Pulse: 70 64  70   Resp: 17 15  (!) 22   Temp: 36.7 °C (98 °F) 36.3 °C (97.4 °F)  36.6 °C (97.9 °F)   TempSrc: Temporal Temporal  Temporal   SpO2: 98% 100%  98%   Weight:   61.6 kg (135 lb 12.9 oz)    Height:         Oxygen Therapy:  Pulse Oximetry: 98 %, O2 (LPM): 0, O2 Delivery Device: None - Room Air    Physical Exam  Vitals and nursing note reviewed.   Constitutional:       Appearance: She is ill-appearing.   HENT:      Head: Normocephalic and atraumatic.      Right Ear: External ear normal.      Left Ear: External ear normal.      Mouth/Throat:      Mouth: Mucous membranes are dry.   Eyes:      General: No scleral icterus.     Extraocular Movements: Extraocular movements intact.      Pupils: Pupils are equal, round, and reactive to light.   Cardiovascular:      Rate and Rhythm: Normal rate and regular rhythm.      Pulses: Normal pulses.      Heart sounds: Normal heart sounds.   Pulmonary:      Effort: Pulmonary effort is normal.      Breath sounds: Normal breath sounds.   Abdominal:      General: Bowel sounds are normal.      Palpations: Abdomen is soft.      Tenderness: There is abdominal  tenderness (generalized).   Musculoskeletal:         General: Normal range of motion.      Cervical back: Normal range of motion and neck supple.   Skin:     General: Skin is warm and dry.   Neurological:      General: No focal deficit present.      Mental Status: She is alert and oriented to person, place, and time.   Psychiatric:         Mood and Affect: Mood normal.         Behavior: Behavior normal.         Thought Content: Thought content normal.         Judgment: Judgment normal.         Past Medical History:   Past Medical History:   Diagnosis Date   • Cervical spondylosis 12/11/2015   • Concussion    • Constipation     medicated   • Major depression 1/20/2015    depression and anxiety   • Migraine    • Neoplasm of uncertain behavior of skin 5/2/2017   • Other specified symptom associated with female genital organs     scar tissue   • Pain    • Right acoustic neuroma (HCC)     removed 3/3/2105   • Seizure disorder (HCC) 1999    not medicated at present - was due to apnea in past, last seizure was 12/1/15, last EEG was clear   • Shingles 1/13/2016   • Sleep apnea     does not use cpap, last sleep study showed she was clear for apnea   • Thyroid activity decreased     medicated - hypothyroid       Past Surgical History:  Past Surgical History:   Procedure Laterality Date   • CERVICAL DISK AND FUSION ANTERIOR  4/27/2016    Procedure: CERVICAL DISK AND FUSION ANTERIOR C5-6;  Surgeon: Jorge Pozo M.D.;  Location: SURGERY [facility];  Service:    • ARIADNA BY LAPAROSCOPY  8/15/2010    Performed by DANIEL DONOVAN at SURGERY [facility]   • LYSIS ADHESIONS GENERAL  8/15/2010    Performed by DANIEL DONOVAN at SURGERY [facility]   • GASTROSCOPY-ENDO  8/12/2010    Performed by KHUSHI MURCIA at ENDOSCOPY [facility] [place]   • PELVISCOPY  6/16/2009    Performed by ABDULKADIR SMITH at SURGERY SAME DAY [facility]   • LYSIS ADHESIONS GYN  6/16/2009    Performed by ABDULKADIR SMITH at SURGERY  SAME DAY [facility] ORS   • COLONOSCOPY - ENDO  1/22/2009    Performed by ASHLEY TALBOT at SURGERY [facility] ORS   • GASTROSCOPY-ENDO  1/20/2009    Performed by BRENDA BENNETT at SURGERY [facility] ORS   • OTHER ABDOMINAL SURGERY  2009    lizz   • OTHER ABDOMINAL SURGERY  1997    C - section   • APPENDECTOMY  1994   • CARPAL TUNNEL RELEASE      RUE - 1998, LUE - 2000   • GASTRIC BYPASS LAPAROSCOPIC     • GYN SURGERY     • TUBE, EAR [facility] Medications:    Current Facility-Administered Medications:   •  oxyCODONE immediate-release (ROXICODONE) tablet 5 mg, 5 mg, Oral, Q3HRS PRN **OR** oxyCODONE immediate release (ROXICODONE) tablet 10 mg, 10 mg, Oral, Q3HRS PRN **OR** HYDROmorphone (Dilaudid) injection 0.5 mg, 0.5 mg, Intravenous, Q3HRS PRN, Neha Jarvis M.D.  •  busPIRone (BUSPAR) tablet 5 mg, 5 mg, Oral, Q8HRS, Irasema Root, D.O., 5 mg at 04/09/22 0538  •  DULoxetine (CYMBALTA) capsule 60 mg, 60 mg, Oral, DAILY, Irasemachetan Root, D.O., 60 mg at 04/09/22 0539  •  gabapentin (NEURONTIN) capsule 300 mg, 300 mg, Oral, TID, Irasemachetan Root, D.O., 300 mg at 04/09/22 0540  •  levETIRAcetam (KEPPRA) tablet 500 mg, 500 mg, Oral, QAM, Irasema Root, D.O., 500 mg at 04/09/22 0539  •  levETIRAcetam (KEPPRA) tablet 1,000 mg, 1,000 mg, Oral, Q EVENING, Irasema Root, D.O., 1,000 mg at 04/08/22 1819  •  levothyroxine (SYNTHROID) tablet 50 mcg, 50 mcg, Oral, AM ES, Irasema Root, D.O., 50 mcg at 04/09/22 0540  •  LORazepam (ATIVAN) injection 2 mg, 2 mg, Intravenous, Q2HRS PRN, Irasema Root, D.O.  •  senna-docusate (PERICOLACE or SENOKOT S) 8.6-50 MG per tablet 2 Tablet, 2 Tablet, Oral, BID, 2 Tablet at 04/09/22 0540 **AND** polyethylene glycol/lytes (MIRALAX) PACKET 1 Packet, 1 Packet, Oral, QDAY PRN **AND** magnesium hydroxide (MILK OF MAGNESIA) suspension 30 mL, 30 mL, Oral, QDAY PRN **AND** bisacodyl (DULCOLAX) suppository 10 mg, 10 mg, Rectal, QDAY PRN, Irasema Root D.O.  •  0.9 % NaCl with  KCl 20 mEq infusion, , Intravenous, Continuous, JOIE Flores.O., Paused at 04/09/22 0829  •  acetaminophen (Tylenol) tablet 650 mg, 650 mg, Oral, Q6HRS PRN, Irasema Root D.O.  •  hydrALAZINE (APRESOLINE) injection 10 mg, 10 mg, Intravenous, Q4HRS PRN, JOIE Flores.O.  •  ondansetron (ZOFRAN) syringe/vial injection 4 mg, 4 mg, Intravenous, Q4HRS PRN, Irasema Root D.O., 4 mg at 04/09/22 1020  •  ondansetron (ZOFRAN ODT) dispertab 4 mg, 4 mg, Oral, Q4HRS PRN, Irasema Root D.O.  •  promethazine (PHENERGAN) tablet 12.5-25 mg, 12.5-25 mg, Oral, Q4HRS PRN, JOIE Flores.O.  •  promethazine (PHENERGAN) suppository 12.5-25 mg, 12.5-25 mg, Rectal, Q4HRS PRN, Irasema Root D.O.  •  prochlorperazine (COMPAZINE) injection 5-10 mg, 5-10 mg, Intravenous, Q4HRS PRN, Irasema Root D.O.  •  metoclopramide (REGLAN) injection 10 mg, 10 mg, Intravenous, Q6HRS PRN, JOIE Flores.O.  •  benzocaine-menthol (Cepacol) lozenge 1 Lozenge, 1 Lozenge, Mouth/Throat, Q2HRS PRN, JOIE Flores.O.  •  Pharmacy Consult Request ...Pain Management Review 1 Each, 1 Each, Other, PHARMACY TO DOSE, Irasema Root D.O.  •  polyethylene glycol/lytes (MIRALAX) PACKET 1 Packet, 1 Packet, Oral, DAILY, Irasema Root D.O.    Current Outpatient Medications:  Current Facility-Administered Medications   Medication Dose Route Frequency Provider Last Rate Last Admin   • oxyCODONE immediate-release (ROXICODONE) tablet 5 mg  5 mg Oral Q3HRS PRN Neha Jarvis M.D.        Or   • oxyCODONE immediate release (ROXICODONE) tablet 10 mg  10 mg Oral Q3HRS PRN Neha Jarvis M.D.        Or   • HYDROmorphone (Dilaudid) injection 0.5 mg  0.5 mg Intravenous Q3HRS PRN Neha Jarvis M.D.       • busPIRone (BUSPAR) tablet 5 mg  5 mg Oral Q8HRS Irasema Root D.O.   5 mg at 04/09/22 0538   • DULoxetine (CYMBALTA) capsule 60 mg  60 mg Oral DAILY Irasema Root, D.O.   60 mg at 04/09/22 0539   • gabapentin (NEURONTIN) capsule 300 mg  300 mg Oral TID Irasema  JOANNA RootO.   300 mg at 04/09/22 0540   • levETIRAcetam (KEPPRA) tablet 500 mg  500 mg Oral QAM JOANNA FloresOAdonay   500 mg at 04/09/22 0539   • levETIRAcetam (KEPPRA) tablet 1,000 mg  1,000 mg Oral Q EVENING JOIE Flores.O.   1,000 mg at 04/08/22 1819   • levothyroxine (SYNTHROID) tablet 50 mcg  50 mcg Oral AM ES JOIE Flores.O.   50 mcg at 04/09/22 0540   • LORazepam (ATIVAN) injection 2 mg  2 mg Intravenous Q2HRS PRN JOANNA FloresO.       • senna-docusate (PERICOLACE or SENOKOT S) 8.6-50 MG per tablet 2 Tablet  2 Tablet Oral BID JOANNA FloresO.   2 Tablet at 04/09/22 0540    And   • polyethylene glycol/lytes (MIRALAX) PACKET 1 Packet  1 Packet Oral QDAY PRN JOANNA FloresOAdonay        And   • magnesium hydroxide (MILK OF MAGNESIA) suspension 30 mL  30 mL Oral QDAY PRN JOANNA FloresOAdonay        And   • bisacodyl (DULCOLAX) suppository 10 mg  10 mg Rectal QDAY PRN JOIE Flores.O.       • 0.9 % NaCl with KCl 20 mEq infusion   Intravenous Continuous JOANNA FloresOAdonay   Paused at 04/09/22 0829   • acetaminophen (Tylenol) tablet 650 mg  650 mg Oral Q6HRS PRN JOANNA FloresO.       • hydrALAZINE (APRESOLINE) injection 10 mg  10 mg Intravenous Q4HRS PRN JOANNA FloresO.       • ondansetron (ZOFRAN) syringe/vial injection 4 mg  4 mg Intravenous Q4HRS PRN JOIE Flores.O.   4 mg at 04/09/22 1020   • ondansetron (ZOFRAN ODT) dispertab 4 mg  4 mg Oral Q4HRS PRN JOIE Flores.O.       • promethazine (PHENERGAN) tablet 12.5-25 mg  12.5-25 mg Oral Q4HRS PRN JOIE Flores.O.       • promethazine (PHENERGAN) suppository 12.5-25 mg  12.5-25 mg Rectal Q4HRS PRN JOIE Flores.O.       • prochlorperazine (COMPAZINE) injection 5-10 mg  5-10 mg Intravenous Q4HRS PRN JOIE Flores.O.       • metoclopramide (REGLAN) injection 10 mg  10 mg Intravenous Q6HRS PRN Irasema Root D.O.       • benzocaine-menthol (Cepacol) lozenge 1 Lozenge  1 Lozenge Mouth/Throat Q2HRS PRN Irasema  "JOCELYN Root       • Pharmacy Consult Request ...Pain Management Review 1 Each  1 Each Other PHARMACY TO DOSE Irasema Root D.O.       • polyethylene glycol/lytes (MIRALAX) PACKET 1 Packet  1 Packet Oral DAILY Irasema Root D.O.           Medication Allergy:  Allergies   Allergen Reactions   • Morphine Vomiting   • Phentermine Hcl      Swelling short of breath       Family History:  Family History   Problem Relation Age of Onset   • Alcohol/Drug Mother         ETOH   • Diabetes Mother    • Cancer Paternal Aunt         breast   • Diabetes Maternal Grandfather    • Cancer Paternal Grandmother         cervical    • Heart Disease Paternal Grandfather         MI   • Cancer Paternal Grandfather         lung   • Hypertension Paternal Grandfather    • Hyperlipidemia Paternal Grandfather        Social History:  Social History     Socioeconomic History   • Marital status:      Spouse name: Not on file   • Number of children: Not on file   • Years of education: Not on file   • Highest education level: Not on file   Occupational History   • Not on file   Tobacco Use   • Smoking status: Light Tobacco Smoker     Packs/day: 0.25     Years: 27.00     Pack years: 6.75     Types: Cigarettes   • Smokeless tobacco: Current User   • Tobacco comment: 1/2 pack per day; uses  e cigarettes   Substance and Sexual Activity   • Alcohol use: No     Alcohol/week: 0.0 oz   • Drug use: Yes     Types: Marijuana     Comment: \"medical\" marijuana, last smoked  3 days ago   • Sexual activity: Yes     Partners: Male   Other Topics Concern   • Not on file   Social History Narrative   • Not on file     Social Determinants of Health     Financial Resource Strain: Not on file   Food Insecurity: Not on file   Transportation Needs: Not on file   Physical Activity: Not on file   Stress: Not on file   Social Connections: Not on file   Intimate Partner Violence: Not on file   Housing Stability: Not on file         MDM (Data Review):     Records " reviewed and summarized in current documentation    Lab Data Review:  Recent Results (from the past 24 hour(s))   CBC WITH DIFFERENTIAL    Collection Time: 04/08/22 11:04 AM   Result Value Ref Range    WBC 6.9 4.8 - 10.8 K/uL    RBC 5.29 4.20 - 5.40 M/uL    Hemoglobin 11.4 (L) 12.0 - 16.0 g/dL    Hematocrit 37.6 37.0 - 47.0 %    MCV 71.1 (L) 81.4 - 97.8 fL    MCH 21.6 (L) 27.0 - 33.0 pg    MCHC 30.3 (L) 33.6 - 35.0 g/dL    RDW 41.4 35.9 - 50.0 fL    Platelet Count 329 164 - 446 K/uL    MPV 11.4 9.0 - 12.9 fL    Neutrophils-Polys 63.90 44.00 - 72.00 %    Lymphocytes 23.00 22.00 - 41.00 %    Monocytes 9.00 0.00 - 13.40 %    Eosinophils 2.20 0.00 - 6.90 %    Basophils 1.60 0.00 - 1.80 %    Immature Granulocytes 0.30 0.00 - 0.90 %    Nucleated RBC 0.00 /100 WBC    Neutrophils (Absolute) 4.41 2.00 - 7.15 K/uL    Lymphs (Absolute) 1.59 1.00 - 4.80 K/uL    Monos (Absolute) 0.62 0.00 - 0.85 K/uL    Eos (Absolute) 0.15 0.00 - 0.51 K/uL    Baso (Absolute) 0.11 0.00 - 0.12 K/uL    Immature Granulocytes (abs) 0.02 0.00 - 0.11 K/uL    NRBC (Absolute) 0.00 K/uL   COMP METABOLIC PANEL    Collection Time: 04/08/22 11:04 AM   Result Value Ref Range    Sodium 139 135 - 145 mmol/L    Potassium 4.4 3.6 - 5.5 mmol/L    Chloride 106 96 - 112 mmol/L    Co2 18 (L) 20 - 33 mmol/L    Anion Gap 15.0 7.0 - 16.0    Glucose 91 65 - 99 mg/dL    Bun 5 (L) 8 - 22 mg/dL    Creatinine 0.45 (L) 0.50 - 1.40 mg/dL    Calcium 9.6 8.5 - 10.5 mg/dL    AST(SGOT) 21 12 - 45 U/L    ALT(SGPT) 10 2 - 50 U/L    Alkaline Phosphatase 90 30 - 99 U/L    Total Bilirubin 0.2 0.1 - 1.5 mg/dL    Albumin 4.7 3.2 - 4.9 g/dL    Total Protein 7.0 6.0 - 8.2 g/dL    Globulin 2.3 1.9 - 3.5 g/dL    A-G Ratio 2.0 g/dL   LIPASE    Collection Time: 04/08/22 11:04 AM   Result Value Ref Range    Lipase 65 11 - 82 U/L   HCG QUAL SERUM    Collection Time: 04/08/22 11:04 AM   Result Value Ref Range    Beta-Hcg Qualitative Serum Negative Negative   ESTIMATED GFR    Collection Time:  04/08/22 11:04 AM   Result Value Ref Range    GFR (CKD-EPI) 121 >60 mL/min/1.73 m 2   LACTIC ACID    Collection Time: 04/08/22  4:36 PM   Result Value Ref Range    Lactic Acid 1.1 0.5 - 2.0 mmol/L   CBC WITHOUT DIFFERENTIAL    Collection Time: 04/09/22  2:23 AM   Result Value Ref Range    WBC 6.5 4.8 - 10.8 K/uL    RBC 4.68 4.20 - 5.40 M/uL    Hemoglobin 9.8 (L) 12.0 - 16.0 g/dL    Hematocrit 34.1 (L) 37.0 - 47.0 %    MCV 72.9 (L) 81.4 - 97.8 fL    MCH 20.9 (L) 27.0 - 33.0 pg    MCHC 28.7 (L) 33.6 - 35.0 g/dL    RDW 42.6 35.9 - 50.0 fL    Platelet Count 247 164 - 446 K/uL    MPV 11.0 9.0 - 12.9 fL   Basic Metabolic Panel    Collection Time: 04/09/22  2:23 AM   Result Value Ref Range    Sodium 141 135 - 145 mmol/L    Potassium 4.3 3.6 - 5.5 mmol/L    Chloride 109 96 - 112 mmol/L    Co2 23 20 - 33 mmol/L    Glucose 83 65 - 99 mg/dL    Bun 7 (L) 8 - 22 mg/dL    Creatinine 0.48 (L) 0.50 - 1.40 mg/dL    Calcium 8.6 8.5 - 10.5 mg/dL    Anion Gap 9.0 7.0 - 16.0   MAGNESIUM    Collection Time: 04/09/22  2:23 AM   Result Value Ref Range    Magnesium 2.0 1.5 - 2.5 mg/dL   PHOSPHORUS    Collection Time: 04/09/22  2:23 AM   Result Value Ref Range    Phosphorus 4.2 2.5 - 4.5 mg/dL   PERIPHERAL SMEAR REVIEW    Collection Time: 04/09/22  2:23 AM   Result Value Ref Range    Peripheral Smear Review see below    ESTIMATED GFR    Collection Time: 04/09/22  2:23 AM   Result Value Ref Range    GFR (CKD-EPI) 119 >60 mL/min/1.73 m 2   URINALYSIS,CULTURE IF INDICATED    Collection Time: 04/09/22  6:00 AM    Specimen: Urine   Result Value Ref Range    Color Yellow     Character Clear     Specific Gravity 1.023 <1.035    Ph 5.0 5.0 - 8.0    Glucose Negative Negative mg/dL    Ketones Negative Negative mg/dL    Protein Negative Negative mg/dL    Bilirubin Negative Negative    Urobilinogen, Urine 1.0 Negative    Nitrite Negative Negative    Leukocyte Esterase Negative Negative    Occult Blood Negative Negative    Micro Urine Req see below         Imaging/Procedures Review:      CT-ABDOMEN-PELVIS WITH   Final Result      1.  Adynamic ileus. Recommend follow-up films      2.  Previous cholecystectomy.      3.  4 x 2.5 cm left adnexal cyst recommend follow-up pelvic ultrasound.      IR-US GUIDED PIV    (Results Pending)   DX-SMALL BOWEL SERIES    (Results Pending)   DX-UPPER GI-SMALL BOWEL FOLLOW THRU    (Results Pending)        MDM (Assessment and Plan):     Patient Active Problem List    Diagnosis Date Noted   • Ileus (HCC) 04/08/2022   • Marijuana abuse 04/08/2022   • Cyclical vomiting with nausea 04/08/2022   • Adnexal cyst 04/08/2022   • Weight loss 04/08/2022   • Keratosis 05/09/2017   • Neoplasm of uncertain behavior of skin 05/02/2017   • Vitamin D deficiency 02/06/2017   • Laine-menopause 10/06/2016   • Cervical postlaminectomy syndrome 07/13/2016   • Cervical spondylosis 12/11/2015   • Cervical radiculopathy 12/11/2015   • Chronic neck pain 12/11/2015   • DDD (degenerative disc disease), cervical 12/11/2015   • Seizure disorder ([facility]) 01/20/2015   • Obesity (BMI 30-39.9)- s/p gastric bypass 01/20/2015   • Vitamin B 12 deficiency 01/20/2015   • Nausea 01/20/2015   • Recurrent major depressive disorder, in full remission ([facility]) 01/20/2015   • Hypothyroidism due to acquired atrophy of thyroid 01/20/2015   • Migraine 09/01/2014   • Anxiety 08/13/2010     This is a pleasant 45 yo female who was admitted to the hospital 4/9/22 with chronic abdominal pain x 1.5 years, nausea with dry heaves, occasional vomiting and unintentional weight loss 90 lbs in 1 year.  VSS. Labs: unremarkable. CT scan adynamic ileus with adnexal mass. She began to have abdominal pain when she was living in [place], [place]. Colonoscopy aborted due to poor bowel prep. She is not sure if she had an upper endoscopy. She was told she had diverticulitis. Currently, denies any pain with defecation. Bowel movements are soft, formed, non bloody going daily. She smokes 4 bowls of  marijuana daily to help with her symptoms. She has found that Amitriptyline and hot showers helps with her symptoms.    ASSESSMENT:  1. NPO with sips of liquids  2. Recommend Amitryptyline 25 mg QHS  3. Upper GI with SBFT  4. Continue Gabapentin 300mg three times per day  5. IV fluids, antiemetics, pain medication per primary team     Thank your for the opportunity to assist in the care of your patient.  Please call for any questions or concerns.    OLMAN Merino.

## 2022-04-09 NOTE — PROGRESS NOTES
Assumed care of pt at 0700. Report received by NOC RN. Pt is A&O x 4. Pain is 10/10, medicated per MAR. Pt is sitting up in bed. Fluids held d/t infiltrated IVs. Patient NPO, Gi series ordered for this AM. Bed in lowest locked position, call light in reach, hourly rounding in place. Labs reviewed, orders reviewed, communication board updated. WCTM.

## 2022-04-09 NOTE — PROGRESS NOTES
[facility] Medicine Daily Progress Note    Date of Service  4/9/2022    Chief Complaint  Tamela Vasquez is a 46 y.o. female admitted 4/8/2022 with abdominal pain, nausea, vomiting    Hospital Course  This is a 46 year old female with PMHx of fibromyalgia, daily marijuana use gastric bypass 2007 who presented to the ED on 04/8/2022 with progressive abdominal pain x 1.5 years , patient was sent from Dr. Fernandez's office.      Patient reports has been experiencing abdominal pain with associated nausea and vomiting for a year and a half.  She reports she uses marijuana daily to cope with the pain, however pain is significantly worsened over the past 2-3 days.  She admits to nausea and vomiting, and reports 90 pound weight loss over the past year.     History provided by  at bedside as patient received Ativan, Benadryl, Haldol and unable to answer questions appropriately.  She did answer questions in terms of she was able to pass flatus and bowel movement this morning.      reports over the past year, she has lost about 90 pounds, unintentional.  She is eating the same on a food.  She had a Pap smear last year which was unremarkable.  She is due for mammogram.  There is a history of adnexal cyst, unclear if this has increased or decreased in size.   encouraged to have patient follow-up follow-up with GYN for mammogram and for evaluation of this adnexal cyst.  He does admit she is perimenopausal at this time.  He reports she had an endoscopy and colonoscopy last year which were unremarkable.     Labs unremarkable, lipase negative, pregnancy test negative, CT of the abdomen and pelvis with, oral and IV contrast noted adynamic ileus,   4 x 2.5 cm left adnexal cyst recommend follow-up pelvic ultrasound.     Following day patient still persistent with abdominal pain.  Serial small bowel upper GI series was ordered.  GI consulted.  Patient on supportive measurement, pain medications, IV  fluids and antiemetics.      Interval Problem Update  Patient still with persistent abdominal pain, severe sometimes shooting pains. She is hoping for oral pain meds. SB UGI series XR ordered. Vitals are stable. IV access line very challenging. GI consulted  Dr. Fernandez note reviewed and recs in place   Hope to transition to clear liquid and sips with meds soon    I have personally seen and examined the patient at bedside. I discussed the plan of care with patient, family, bedside RN and GI.    Consultants/Specialty  general surgery and GI    Code Status  Full Code    Disposition  Patient is not medically cleared for discharge.   Anticipate discharge to to home with close outpatient follow-up.  I have placed the appropriate orders for post-discharge needs.    Review of Systems  Review of Systems   Constitutional: Positive for malaise/fatigue and weight loss. Negative for chills, diaphoresis and fever.   HENT: Negative for sore throat.    Respiratory: Negative for cough and shortness of breath.    Cardiovascular: Negative for chest pain, palpitations and leg swelling.   Gastrointestinal: Positive for abdominal pain and nausea. Negative for blood in stool, constipation, diarrhea, melena and vomiting.   Genitourinary: Negative for dysuria and urgency.   Musculoskeletal: Positive for back pain. Negative for neck pain.   Skin: Negative for rash.   Neurological: Negative for dizziness and headaches.   Psychiatric/Behavioral: The patient is nervous/anxious.         Physical Exam  Temp:  [36.3 °C (97.4 °F)-36.8 °C (98.2 °F)] 36.6 °C (97.9 °F)  Pulse:  [64-77] 70  Resp:  [15-22] 22  BP: (109-129)/(56-76) 128/71  SpO2:  [93 %-100 %] 98 %    Physical Exam  Vitals and nursing note reviewed.   Constitutional:       General: She is in acute distress.      Appearance: She is ill-appearing.   HENT:      Head: Normocephalic and atraumatic.   Eyes:      General: No scleral icterus.  Cardiovascular:      Rate and Rhythm: Normal rate.       Heart sounds: No murmur heard.  Pulmonary:      Effort: Pulmonary effort is normal. No respiratory distress.      Breath sounds: No wheezing or rales.   Abdominal:      General: There is no distension.      Palpations: Abdomen is soft. There is no mass.      Tenderness: There is abdominal tenderness. There is no guarding.      Hernia: No hernia is present.   Skin:     General: Skin is dry.      Coloration: Skin is pale.      Findings: No erythema.   Neurological:      Mental Status: She is alert and oriented to person, place, and time.   Psychiatric:         Mood and Affect: Mood normal.         Behavior: Behavior normal.         Thought Content: Thought content normal.         Judgment: Judgment normal.         Fluids  No intake or output data in the 24 hours ending 04/09/22 1458    Laboratory  Recent Labs     04/08/22  1104 04/09/22  0223   WBC 6.9 6.5   RBC 5.29 4.68   HEMOGLOBIN 11.4* 9.8*   HEMATOCRIT 37.6 34.1*   MCV 71.1* 72.9*   MCH 21.6* 20.9*   MCHC 30.3* 28.7*   RDW 41.4 42.6   PLATELETCT 329 247   MPV 11.4 11.0     Recent Labs     04/08/22  1104 04/09/22  0223   SODIUM 139 141   POTASSIUM 4.4 4.3   CHLORIDE 106 109   CO2 18* 23   GLUCOSE 91 83   BUN 5* 7*   CREATININE 0.45* 0.48*   CALCIUM 9.6 8.6                   Imaging  CT-ABDOMEN-PELVIS WITH   Final Result      1.  Adynamic ileus. Recommend follow-up films      2.  Previous cholecystectomy.      3.  4 x 2.5 cm left adnexal cyst recommend follow-up pelvic ultrasound.      IR-US GUIDED PIV    (Results Pending)   DX-UPPER GI-SMALL BOWEL FOLLOW THRU    (Results Pending)        Assessment/Plan  * Ileus (HCC)- (present on admission)  Assessment & Plan  Labs unremarkable, lipase negative, pregnancy test negative, CT of the abdomen and pelvis with, oral and IV contrast noted adynamic ileus,   4 x 2.5 cm left adnexal cyst recommend follow-up pelvic ultrasound.  Patient's belly is soft, slightly tender to palpation. We will refrain from any NGT placement at  this time.  We will continue with serial abdominal exams.  4/9-serial upper GI x-ray.  If the normal will try to clear liquid diet.  No surgical indication per Dr. Fernandez. amytriptiline per GI  Recent colonoscopy and EGD unremarkable    Weight loss- (present on admission)  Assessment & Plan   reports over the past year, she has lost about 90 pounds, unintentional.  She is eating the same on a food.  She had a Pap smear last year which was unremarkable.  She is due for mammogram.  There is a history of adnexal cyst, unclear if this has increased or decreased in size.   encouraged to have patient follow-up follow-up with GYN for mammogram and for evaluation of this adnexal cyst.  He does admit she is perimenopausal at this time.  He reports she had an endoscopy and colonoscopy last year which were unremarkable.    Adnexal cyst- (present on admission)  Assessment & Plan  Noted on CT imaging, 4 x 2.5 cm left adnexal cyst recommend follow-up pelvic ultrasound outpatient    Cyclical vomiting with nausea- (present on admission)  Assessment & Plan  Likely secondary to chronic marijuana use  Patient had endoscopy and colonoscopy performed last year which was unremarkable    Marijuana abuse- (present on admission)  Assessment & Plan   reports she uses daily for the past several years  Nicotine use also chronic cyclic vomiting and gastroparesis due to pancreatic sphincter dysfunction    Hypothyroidism due to acquired atrophy of thyroid- (present on admission)  Assessment & Plan  Resume home medications      Seizure disorder (HCC)- (present on admission)  Assessment & Plan  Resume home medications      Migraine- (present on admission)  Assessment & Plan  Resume home medications       VTE prophylaxis: SCDs/TEDs, if in case of complications     I have performed a physical exam and reviewed and updated ROS and Plan today (4/9/2022). In review of yesterday's note (4/8/2022), there are no changes except as  documented above.

## 2022-04-09 NOTE — PROGRESS NOTES
Assume care of pt at 0700. Report received from NOC RN. Pt is A/O x4. Pain is 8/10. Pt is resting in bed. Bed in lowest and locked position, call light within reach, hourly rounding in place. Labs reviewed. Communication board updated. Will continue to implement care at this time.

## 2022-04-09 NOTE — CARE PLAN
Problem: Pain - Standard  Goal: Alleviation of pain or a reduction in pain to the patient’s comfort goal  Outcome: Progressing     Problem: Knowledge Deficit - Standard  Goal: Patient and family/care givers will demonstrate understanding of plan of care, disease process/condition, diagnostic tests and medications  Outcome: Progressing     The patient is Stable - Low risk of patient condition declining or worsening    Shift Goals  Clinical Goals: monitor pain, BM, diagnostics  Patient Goals: pain control    Progress made toward(s) clinical / shift goals:  patient medicated for pain per MAR, had small bowel movement, receiving Xray for diagnostics     Patient is not progressing towards the following goals:

## 2022-04-09 NOTE — PROGRESS NOTES
4 Eyes Skin Assessment Completed by TORRI Garg and TORRI Doyle.    Head WDL  Ears WDL  Nose WDL  Mouth WDL  Neck WDL  Breast/Chest WDL  Shoulder Blades WDL  Spine WDL  (R) Arm/Elbow/Hand WDL  (L) Arm/Elbow/Hand WDL  Abdomen WDL  Groin Incision  Scrotum/Coccyx/Buttocks WDL  (R) Leg WDL  (L) Leg WDL  (R) Heel/Foot/Toe WDL  (L) Heel/Foot/Toe WDL          Devices In Places Nasal Cannula      Interventions In Place N/A    Possible Skin Injury No    Pictures Uploaded Into Epic N/A  Wound Consult Placed N/A  RN Wound Prevention Protocol Ordered No

## 2022-04-09 NOTE — PROGRESS NOTES
Patient refusing bed alarm. Call light and belongings within reach. Bed locked and in lowest position. Environment clutter and spill free. Patient educated on risk. Patient educated to call for assistance.

## 2022-04-10 LAB
ALBUMIN SERPL BCP-MCNC: 4.1 G/DL (ref 3.2–4.9)
ALBUMIN/GLOB SERPL: 2.3 G/DL
ALP SERPL-CCNC: 78 U/L (ref 30–99)
ALT SERPL-CCNC: 12 U/L (ref 2–50)
ANION GAP SERPL CALC-SCNC: 11 MMOL/L (ref 7–16)
ANISOCYTOSIS BLD QL SMEAR: ABNORMAL
AST SERPL-CCNC: 23 U/L (ref 12–45)
BASOPHILS # BLD AUTO: 1.3 % (ref 0–1.8)
BASOPHILS # BLD: 0.09 K/UL (ref 0–0.12)
BILIRUB SERPL-MCNC: 0.3 MG/DL (ref 0.1–1.5)
BUN SERPL-MCNC: 5 MG/DL (ref 8–22)
CALCIUM SERPL-MCNC: 8.6 MG/DL (ref 8.5–10.5)
CHLORIDE SERPL-SCNC: 107 MMOL/L (ref 96–112)
CO2 SERPL-SCNC: 21 MMOL/L (ref 20–33)
COMMENT 1642: NORMAL
CREAT SERPL-MCNC: 0.45 MG/DL (ref 0.5–1.4)
EOSINOPHIL # BLD AUTO: 0.46 K/UL (ref 0–0.51)
EOSINOPHIL NFR BLD: 6.6 % (ref 0–6.9)
ERYTHROCYTE [DISTWIDTH] IN BLOOD BY AUTOMATED COUNT: 41.7 FL (ref 35.9–50)
FERRITIN SERPL-MCNC: 7.7 NG/ML (ref 10–291)
GFR SERPLBLD CREATININE-BSD FMLA CKD-EPI: 121 ML/MIN/1.73 M 2
GLOBULIN SER CALC-MCNC: 1.8 G/DL (ref 1.9–3.5)
GLUCOSE SERPL-MCNC: 77 MG/DL (ref 65–99)
HCT VFR BLD AUTO: 32.6 % (ref 37–47)
HGB BLD-MCNC: 9.7 G/DL (ref 12–16)
HYPOCHROMIA BLD QL SMEAR: ABNORMAL
IMM GRANULOCYTES # BLD AUTO: 0.02 K/UL (ref 0–0.11)
IMM GRANULOCYTES NFR BLD AUTO: 0.3 % (ref 0–0.9)
IRON SATN MFR SERPL: 8 % (ref 15–55)
IRON SERPL-MCNC: 26 UG/DL (ref 40–170)
LYMPHOCYTES # BLD AUTO: 1.76 K/UL (ref 1–4.8)
LYMPHOCYTES NFR BLD: 25.2 % (ref 22–41)
MAGNESIUM SERPL-MCNC: 1.9 MG/DL (ref 1.5–2.5)
MCH RBC QN AUTO: 21.4 PG (ref 27–33)
MCHC RBC AUTO-ENTMCNC: 29.8 G/DL (ref 33.6–35)
MCV RBC AUTO: 71.8 FL (ref 81.4–97.8)
MICROCYTES BLD QL SMEAR: ABNORMAL
MONOCYTES # BLD AUTO: 0.63 K/UL (ref 0–0.85)
MONOCYTES NFR BLD AUTO: 9 % (ref 0–13.4)
MORPHOLOGY BLD-IMP: NORMAL
NEUTROPHILS # BLD AUTO: 4.02 K/UL (ref 2–7.15)
NEUTROPHILS NFR BLD: 57.6 % (ref 44–72)
NRBC # BLD AUTO: 0 K/UL
NRBC BLD-RTO: 0 /100 WBC
OVALOCYTES BLD QL SMEAR: NORMAL
PHOSPHATE SERPL-MCNC: 4.7 MG/DL (ref 2.5–4.5)
PLATELET # BLD AUTO: 237 K/UL (ref 164–446)
PLATELET BLD QL SMEAR: NORMAL
PMV BLD AUTO: 10.7 FL (ref 9–12.9)
POIKILOCYTOSIS BLD QL SMEAR: NORMAL
POTASSIUM SERPL-SCNC: 3.7 MMOL/L (ref 3.6–5.5)
PROT SERPL-MCNC: 5.9 G/DL (ref 6–8.2)
RBC # BLD AUTO: 4.54 M/UL (ref 4.2–5.4)
RBC BLD AUTO: PRESENT
SARS-COV+SARS-COV-2 AG RESP QL IA.RAPID: NOTDETECTED
SCHISTOCYTES BLD QL SMEAR: NORMAL
SODIUM SERPL-SCNC: 139 MMOL/L (ref 135–145)
SPECIMEN SOURCE: NORMAL
TIBC SERPL-MCNC: 324 UG/DL (ref 250–450)
UIBC SERPL-MCNC: 298 UG/DL (ref 110–370)
VIT B12 SERPL-MCNC: 724 PG/ML (ref 211–911)
WBC # BLD AUTO: 7 K/UL (ref 4.8–10.8)

## 2022-04-10 PROCEDURE — 85025 COMPLETE CBC W/AUTO DIFF WBC: CPT

## 2022-04-10 PROCEDURE — A9270 NON-COVERED ITEM OR SERVICE: HCPCS | Performed by: GENERAL PRACTICE

## 2022-04-10 PROCEDURE — 700102 HCHG RX REV CODE 250 W/ 637 OVERRIDE(OP): Performed by: INTERNAL MEDICINE

## 2022-04-10 PROCEDURE — 700102 HCHG RX REV CODE 250 W/ 637 OVERRIDE(OP): Performed by: STUDENT IN AN ORGANIZED HEALTH CARE EDUCATION/TRAINING PROGRAM

## 2022-04-10 PROCEDURE — 99233 SBSQ HOSP IP/OBS HIGH 50: CPT | Performed by: INTERNAL MEDICINE

## 2022-04-10 PROCEDURE — 83735 ASSAY OF MAGNESIUM: CPT

## 2022-04-10 PROCEDURE — 700111 HCHG RX REV CODE 636 W/ 250 OVERRIDE (IP): Performed by: INTERNAL MEDICINE

## 2022-04-10 PROCEDURE — 36415 COLL VENOUS BLD VENIPUNCTURE: CPT

## 2022-04-10 PROCEDURE — 770001 HCHG ROOM/CARE - MED/SURG/GYN PRIV*

## 2022-04-10 PROCEDURE — A9270 NON-COVERED ITEM OR SERVICE: HCPCS | Performed by: INTERNAL MEDICINE

## 2022-04-10 PROCEDURE — A9270 NON-COVERED ITEM OR SERVICE: HCPCS | Performed by: STUDENT IN AN ORGANIZED HEALTH CARE EDUCATION/TRAINING PROGRAM

## 2022-04-10 PROCEDURE — 84100 ASSAY OF PHOSPHORUS: CPT

## 2022-04-10 PROCEDURE — 83550 IRON BINDING TEST: CPT

## 2022-04-10 PROCEDURE — 87426 SARSCOV CORONAVIRUS AG IA: CPT

## 2022-04-10 PROCEDURE — C9803 HOPD COVID-19 SPEC COLLECT: HCPCS | Performed by: INTERNAL MEDICINE

## 2022-04-10 PROCEDURE — 700102 HCHG RX REV CODE 250 W/ 637 OVERRIDE(OP): Performed by: GENERAL PRACTICE

## 2022-04-10 PROCEDURE — 82607 VITAMIN B-12: CPT

## 2022-04-10 PROCEDURE — 83540 ASSAY OF IRON: CPT

## 2022-04-10 PROCEDURE — 80053 COMPREHEN METABOLIC PANEL: CPT

## 2022-04-10 PROCEDURE — 700105 HCHG RX REV CODE 258: Performed by: INTERNAL MEDICINE

## 2022-04-10 PROCEDURE — 700111 HCHG RX REV CODE 636 W/ 250 OVERRIDE (IP): Performed by: GENERAL PRACTICE

## 2022-04-10 PROCEDURE — 82728 ASSAY OF FERRITIN: CPT

## 2022-04-10 RX ORDER — DICYCLOMINE HYDROCHLORIDE 10 MG/1
10 CAPSULE ORAL 3 TIMES DAILY
Status: DISCONTINUED | OUTPATIENT
Start: 2022-04-10 | End: 2022-04-12 | Stop reason: HOSPADM

## 2022-04-10 RX ORDER — MAGNESIUM SULFATE HEPTAHYDRATE 40 MG/ML
2 INJECTION, SOLUTION INTRAVENOUS ONCE
Status: DISCONTINUED | OUTPATIENT
Start: 2022-04-10 | End: 2022-04-10

## 2022-04-10 RX ADMIN — HYDROMORPHONE HYDROCHLORIDE 0.5 MG: 1 INJECTION, SOLUTION INTRAMUSCULAR; INTRAVENOUS; SUBCUTANEOUS at 13:54

## 2022-04-10 RX ADMIN — GABAPENTIN 300 MG: 300 CAPSULE ORAL at 05:28

## 2022-04-10 RX ADMIN — ONDANSETRON 4 MG: 2 INJECTION INTRAMUSCULAR; INTRAVENOUS at 05:28

## 2022-04-10 RX ADMIN — HYDROMORPHONE HYDROCHLORIDE 0.5 MG: 1 INJECTION, SOLUTION INTRAMUSCULAR; INTRAVENOUS; SUBCUTANEOUS at 09:30

## 2022-04-10 RX ADMIN — Medication 400 MG: at 17:51

## 2022-04-10 RX ADMIN — GABAPENTIN 300 MG: 300 CAPSULE ORAL at 21:11

## 2022-04-10 RX ADMIN — SODIUM CHLORIDE 125 MG: 9 INJECTION, SOLUTION INTRAVENOUS at 17:50

## 2022-04-10 RX ADMIN — HYDROMORPHONE HYDROCHLORIDE 0.5 MG: 1 INJECTION, SOLUTION INTRAMUSCULAR; INTRAVENOUS; SUBCUTANEOUS at 04:37

## 2022-04-10 RX ADMIN — BUSPIRONE HYDROCHLORIDE 5 MG: 10 TABLET ORAL at 13:51

## 2022-04-10 RX ADMIN — LEVETIRACETAM 500 MG: 100 SOLUTION ORAL at 05:23

## 2022-04-10 RX ADMIN — GABAPENTIN 300 MG: 300 CAPSULE ORAL at 13:51

## 2022-04-10 RX ADMIN — HYDROMORPHONE HYDROCHLORIDE 0.5 MG: 1 INJECTION, SOLUTION INTRAMUSCULAR; INTRAVENOUS; SUBCUTANEOUS at 21:11

## 2022-04-10 RX ADMIN — DICYCLOMINE HYDROCHLORIDE 10 MG: 10 CAPSULE ORAL at 17:51

## 2022-04-10 RX ADMIN — DICYCLOMINE HYDROCHLORIDE 10 MG: 10 CAPSULE ORAL at 11:59

## 2022-04-10 RX ADMIN — DULOXETINE HYDROCHLORIDE 60 MG: 30 CAPSULE, DELAYED RELEASE ORAL at 05:28

## 2022-04-10 RX ADMIN — OXYCODONE HYDROCHLORIDE 10 MG: 10 TABLET ORAL at 15:58

## 2022-04-10 RX ADMIN — ONDANSETRON 4 MG: 2 INJECTION INTRAMUSCULAR; INTRAVENOUS at 17:58

## 2022-04-10 RX ADMIN — BUSPIRONE HYDROCHLORIDE 5 MG: 10 TABLET ORAL at 05:28

## 2022-04-10 RX ADMIN — OXYCODONE HYDROCHLORIDE 10 MG: 10 TABLET ORAL at 11:59

## 2022-04-10 RX ADMIN — BUSPIRONE HYDROCHLORIDE 5 MG: 10 TABLET ORAL at 21:14

## 2022-04-10 RX ADMIN — SENNOSIDES AND DOCUSATE SODIUM 2 TABLET: 50; 8.6 TABLET ORAL at 17:51

## 2022-04-10 RX ADMIN — LEVETIRACETAM 1000 MG: 100 SOLUTION ORAL at 17:50

## 2022-04-10 RX ADMIN — HYDROMORPHONE HYDROCHLORIDE 0.5 MG: 1 INJECTION, SOLUTION INTRAMUSCULAR; INTRAVENOUS; SUBCUTANEOUS at 01:26

## 2022-04-10 RX ADMIN — LEVOTHYROXINE SODIUM 50 MCG: 0.05 TABLET ORAL at 05:29

## 2022-04-10 RX ADMIN — OXYCODONE HYDROCHLORIDE 10 MG: 10 TABLET ORAL at 07:36

## 2022-04-10 RX ADMIN — AMITRIPTYLINE HYDROCHLORIDE 25 MG: 10 TABLET, FILM COATED ORAL at 17:51

## 2022-04-10 RX ADMIN — NICOTINE 7 MG: 7 PATCH TRANSDERMAL at 05:22

## 2022-04-10 RX ADMIN — HYDROMORPHONE HYDROCHLORIDE 0.5 MG: 1 INJECTION, SOLUTION INTRAMUSCULAR; INTRAVENOUS; SUBCUTANEOUS at 17:50

## 2022-04-10 RX ADMIN — OXYCODONE HYDROCHLORIDE 10 MG: 10 TABLET ORAL at 19:23

## 2022-04-10 RX ADMIN — Medication 400 MG: at 09:30

## 2022-04-10 ASSESSMENT — ENCOUNTER SYMPTOMS
DIZZINESS: 0
BRUISES/BLEEDS EASILY: 0
SEIZURES: 0
SHORTNESS OF BREATH: 0
NAUSEA: 1
HALLUCINATIONS: 0
CONSTIPATION: 0
STRIDOR: 0
EYE DISCHARGE: 0
POLYDIPSIA: 0
EYE REDNESS: 0
DIARRHEA: 0
NERVOUS/ANXIOUS: 1
BLOOD IN STOOL: 0
DIAPHORESIS: 0
LOSS OF CONSCIOUSNESS: 0
CHILLS: 0
NECK PAIN: 0
SORE THROAT: 0
WEIGHT LOSS: 1
FALLS: 0
BACK PAIN: 1
FLANK PAIN: 0
PALPITATIONS: 0
HEADACHES: 0
VOMITING: 0
NAUSEA: 0
COUGH: 0
ABDOMINAL PAIN: 1
FEVER: 0

## 2022-04-10 ASSESSMENT — PATIENT HEALTH QUESTIONNAIRE - PHQ9
1. LITTLE INTEREST OR PLEASURE IN DOING THINGS: NOT AT ALL
2. FEELING DOWN, DEPRESSED, IRRITABLE, OR HOPELESS: NOT AT ALL
SUM OF ALL RESPONSES TO PHQ9 QUESTIONS 1 AND 2: 0

## 2022-04-10 NOTE — DIETARY
"Nutrition services: Day 2 of admit.  Tamela Vasquez is a 46 y.o. female with admitting DX of Ileus.    Consult received for wt loss (34 lbs or more in 6 months), no poor PO per admit screen. Attempted to speak with pt at bedside. Upon visit, pt was sleeping and did not rouse to name. Pt appeared adequately nourished. Hx of Tristan-en-Y gastric bypass in 2007. Pt reported a 90 lb wt loss per MD note. No recent wt hx in chart to assess. Cyclic vomiting syndrome noted. GI saw pt today, endoscopy planned for tomorrow. Pt may benefit from clear liquid oral nutrition supplement to optimize protein. RD will add supplement order and adjust menu accordingly.    Assessment:  Height: 149.9 cm (4' 11\")  Weight: 61.6 kg (135 lb 12.9 oz)  Body mass index is 27.43 kg/m²., BMI classification: overweight  Diet/Intake: Clear liquid    Malnutrition Risk: No new risk identified.    Recommendations/Plan:  1. Diet advancement per MD.  2. Encourage intake of meals and supplements.  3. Document intake of all meals and supplements as % taken in ADLs to provide interdisciplinary communication across all shifts.   4. Monitor weight.  5. Nutrition rep will continue to see patient for ongoing meal and snack preferences.     RD will follow per dept guidelines.        "

## 2022-04-10 NOTE — PROGRESS NOTES
Gastroenterology Progress Note     Author: Luís Martínez M.D.   Date & Time Created: 4/10/2022 9:57 AM    Chief Complaint:  Abdominal pain    Interval History:  Still with abdominal pain, now more bilateral lower abdominal, pelvic, cramping, severe, constant, wondering if it could be her ovarians. Denied further modifying factors, associated symptoms or timing issues.       History of Present Illness per consult note form 4/9/2022:    45 yo female PMH fibromyalgia, gastric bypass [place] 2007, seizure disorder,  marijuana user 4 bowls per day x 3 years, who was admitted to the hospital 4/9/22 with a 1.5 years of abdominal pain, describes it as a burning sensation, dry heaves, with occasional vomiting that has progressively gotten worse over the last 2-3 days. She was seen by Dr. Fernandez who recommended admission. Unintentional weight loss 90 lbs in 1 year. VSS. Notable labs: Hgb: 9.8.  Hct: 34.1. CT scan abdomen/pelvis: Adynamic ileus. Recommend follow-up films. A  4 x 2.5 cm left adnexal cyst recommend follow-up pelvic ultrasound. No further episodes of vomiting but complaints of generalized abdominal pain. Bowel movements regular, non bloody. No pain with defecation.     She has found that taking hot showers helps with abdominal pain and nausea.     Previously lived in [place], [place] when she began to have GI symptoms. Attempted colonoscopy 2020 was aborted due to poor prep. She was told she had diverticulitis but does not remember being treated with antibiotics. She moved to [place] last year.     EGD 2010 performed by Dr. Ramírez for abdominal pain:   1. Unremarkable esophagus.  2.  Small gastric pouch from 35 cm to 40 cm from the incisors.  3.  Tristan-en-Y type anastomosis, with small blind pouch.  The retroflex  views within the pouch and forward facing views do not demonstrate  anastomotic ulcer.  There is suture material visible, however, there is  no evidence of inflammation.  The tristan limb stretching  from the stomach  was followed to the extent of the GI endoscope into the jejunum, and no  mucosal abnormality was present.  The distal anastomosis site was not  reached with the GI endoscope.     Review of Systems:  Review of Systems   Constitutional: Negative for chills and fever.   HENT: Negative for nosebleeds.    Eyes: Negative for discharge and redness.   Respiratory: Negative for cough, shortness of breath and stridor.    Cardiovascular: Negative for chest pain and palpitations.   Gastrointestinal: Positive for abdominal pain and nausea. Negative for diarrhea and melena.   Genitourinary: Negative for flank pain and hematuria.   Musculoskeletal: Negative for falls and joint pain.   Skin: Negative for itching and rash.   Neurological: Negative for seizures and loss of consciousness.   Endo/Heme/Allergies: Negative for polydipsia. Does not bruise/bleed easily.   Psychiatric/Behavioral: Negative for hallucinations. The patient is nervous/anxious.        Physical Exam:  Physical Exam  Vitals and nursing note reviewed. Exam conducted with a chaperone present.   Constitutional:       General: She is not in acute distress.     Appearance: She is normal weight. She is ill-appearing. She is not toxic-appearing or diaphoretic.   HENT:      Head: Normocephalic and atraumatic.      Nose: Nose normal. No congestion or rhinorrhea.      Mouth/Throat:      Mouth: Mucous membranes are moist.      Pharynx: Oropharynx is clear. No oropharyngeal exudate.   Eyes:      General: No scleral icterus.        Right eye: No discharge.         Left eye: No discharge.      Conjunctiva/sclera: Conjunctivae normal.      Pupils: Pupils are equal, round, and reactive to light.   Cardiovascular:      Rate and Rhythm: Normal rate and regular rhythm.      Pulses: Normal pulses.      Heart sounds: Normal heart sounds.   Pulmonary:      Effort: Pulmonary effort is normal. No respiratory distress.      Breath sounds: Normal breath sounds. No  stridor. No rhonchi.   Abdominal:      General: Abdomen is flat. Bowel sounds are normal. There is no distension.      Tenderness: There is abdominal tenderness. There is no guarding or rebound.   Musculoskeletal:         General: Normal range of motion.      Cervical back: Normal range of motion and neck supple. No rigidity.      Right lower leg: No edema.   Skin:     General: Skin is warm.      Coloration: Skin is not jaundiced.   Neurological:      General: No focal deficit present.      Mental Status: She is alert. Mental status is at baseline.      Cranial Nerves: No cranial nerve deficit.   Psychiatric:         Mood and Affect: Mood is anxious.         Behavior: Behavior normal.         Labs:          Recent Labs     22  1104 04/09/22  0223 04/10/22  0115   SODIUM 139 141 139   POTASSIUM 4.4 4.3 3.7   CHLORIDE 106 109 107   CO2 18* 23 21   BUN 5* 7* 5*   CREATININE 0.45* 0.48* 0.45*   MAGNESIUM  --  2.0 1.9   PHOSPHORUS  --  4.2 4.7*   CALCIUM 9.6 8.6 8.6     Recent Labs     22  1104 223 04/10/22  0115   ALTSGPT 10  --  12   ASTSGOT 21  --  23   ALKPHOSPHAT 90  --  78   TBILIRUBIN 0.2  --  0.3   LIPASE 65  --   --    GLUCOSE 91 83 77     Recent Labs     04/08/22  1104 04/09/22  0223 04/10/22  0115   RBC 5.29 4.68 4.54   HEMOGLOBIN 11.4* 9.8* 9.7*   HEMATOCRIT 37.6 34.1* 32.6*   PLATELETCT 329 247 237   IRON  --   --  26*   FERRITIN  --   --  7.7*   TOTIRONBC  --   --  324     Recent Labs     22  110223 04/10/22  0115   WBC 6.9 6.5 7.0   NEUTSPOLYS 63.90  --  57.60   LYMPHOCYTES 23.00  --  25.20   MONOCYTES 9.00  --  9.00   EOSINOPHILS 2.20  --  6.60   BASOPHILS 1.60  --  1.30   ASTSGOT 21  --  23   ALTSGPT 10  --  12   ALKPHOSPHAT 90  --  78   TBILIRUBIN 0.2  --  0.3     Hemodynamics:  Temp (24hrs), Av.8 °C (98.3 °F), Min:36.7 °C (98 °F), Max:36.9 °C (98.5 °F)  Temperature: 36.9 °C (98.5 °F)  Pulse  Av.9  Min: 53  Max: 100   Blood Pressure: 137/67      Respiratory:    Respiration: 19, Pulse Oximetry: 99 %           Fluids:  No intake or output data in the 24 hours ending 04/10/22 0957     GI/Nutrition:  Orders Placed This Encounter   Procedures   • Diet Order Diet: Clear Liquid     Standing Status:   Standing     Number of Occurrences:   1     Order Specific Question:   Diet:     Answer:   Clear Liquid [10]   • Diet NPO Restrict to: Sips with Medications (strict NPO after 6am)     Standing Status:   Standing     Number of Occurrences:   8     Order Specific Question:   Diet NPO Restrict to:     Answer:   Sips with Medications [3]     Comments:   strict NPO after 6am     Medical Decision Making, by Problem:  Active Hospital Problems    Diagnosis    • *Ileus (HCC) [K56.7]    • Marijuana abuse [F12.10]    • Cyclical vomiting with nausea [R11.15]    • Adnexal cyst [N94.9]    • Weight loss [R63.4]    • Seizure disorder (HCC) [G40.909]    • Hypothyroidism due to acquired atrophy of thyroid [E03.4]    • Migraine [G43.909]      Impression: This is a pleasant 45 yo female who was admitted to the hospital 4/9/22 with chronic abdominal pain x 1.5 years, nausea with dry heaves, occasional vomiting and unintentional weight loss 90 lbs in 1 year.  VSS. Labs: unremarkable. CT scan adynamic ileus with adnexal mass. She began to have abdominal pain when she was living in [place], [place]. Colonoscopy aborted due to poor bowel prep. She is not sure if she had an upper endoscopy. She was told she had diverticulitis. Currently, denies any pain with defecation. Bowel movements are soft, formed, non bloody going daily. She smokes 4 bowls of marijuana daily to help with her symptoms. She has found that Amitriptyline and hot showers helps with her symptoms.     Problems:  1. Cyclic vomiting syndrome from marijuana  2. Bilateral lower abdominal pain  3. Nausea  4. Possible ileus by CT but no evidence of distention by examination  5. 4 x 2.5 cm left adnexal cyst   6. Weight loss  7.  Hypothyroidism  8. Seizure disorder  9. Migraines  10. BMI of 27  11. History of Tristan-en-Y gastric bypass  12. Increased complexity of medical decision making due to aforementioned.      Recommendations:  1. Continue amitriptyline and gabapentin.  2. Added bentyl  3. Since SBFT x-rays are negative scheduled for upper endoscopy tomorrow by Dr. Sandra Reed to follow her other cases.  4. Check rapid COVID  5. IV fluids, antiemetics, pain medication per primary team   6. Marijuana cessation.  7. Consider adding reglan depending on upper endoscopy findings.  I did not order today since her predominant symptom is lower abdominal pelvic pain and not vomiting.  8. Note to hospitalist to consider pelvic ultrasound.      Risks, benefits, and alternatives of aforementioned procedures were discussed with patient in detail in layman's terms.  Patient was given opportunities to ask questions and discuss other options.  Risks including but not limited to perforation, infection, bleeding, missed lesion(s), possible need for surgery(ies) and/or interventional radiology, possible need for repeat procedure(s) and/or additional testing, hospitalization possibly prolonged, cardiac and/or pulmonary event, aspiration, hypoxia, stroke, medication (s) and/or anesthesia reaction(s), indefinite diagnosis, discomfort/pain, unsuccessful and/or incomplete procedure, ineffective therapy, persistent symptoms, damage to adjacent organs/structure and/or vascular, and other adverse event(s) possibly life-threatening.  Interactive discussion was undertaken with Layman's terms.  I answered questions in full and to satisfaction.  Patient stated understanding and acceptance of these risks, and wished to proceed.   Informed consent was given in clear state of mind.    Quality-Core Measures

## 2022-04-10 NOTE — CARE PLAN
Problem: Pain - Standard  Goal: Alleviation of pain or a reduction in pain to the patient’s comfort goal  Outcome: Progressing     Problem: Knowledge Deficit - Standard  Goal: Patient and family/care givers will demonstrate understanding of plan of care, disease process/condition, diagnostic tests and medications  Outcome: Progressing     Problem: Fall Risk  Goal: Patient will remain free from falls  Outcome: Progressing   The patient is Watcher - Medium risk of patient condition declining or worsening    Shift Goals  Clinical Goals: pain control, monitor diagnostics  Patient Goals: pain control, advance diet    Progress made toward(s) clinical / shift goals:  medicated for pain per MAR, pt scheduled for endoscopy tomorrow, tolerating clears     Patient is not progressing towards the following goals:

## 2022-04-10 NOTE — CARE PLAN
Problem: Nutritional:  Goal: Achieve adequate nutritional intake  Description: Patient will consume 50% of meals, and diet to advance past clear liquid  Outcome: Not Met

## 2022-04-10 NOTE — PROGRESS NOTES
[facility] Medicine Daily Progress Note    Date of Service  4/10/2022    Chief Complaint  Tamela Vasquez is a 46 y.o. female admitted 4/8/2022 with abdominal pain, nausea, vomiting    Hospital Course  This is a 46 year old female with PMHx of fibromyalgia, daily marijuana use gastric bypass 2007 who presented to the ED on 04/8/2022 with progressive abdominal pain x 1.5 years , patient was sent from Dr. Fernandez's office.      Patient reports has been experiencing abdominal pain with associated nausea and vomiting for a year and a half.  She reports she uses marijuana daily to cope with the pain, however pain is significantly worsened over the past 2-3 days.  She admits to nausea and vomiting, and reports 90 pound weight loss over the past year.     History provided by  at bedside as patient received Ativan, Benadryl, Haldol and unable to answer questions appropriately.  She did answer questions in terms of she was able to pass flatus and bowel movement this morning.      reports over the past year, she has lost about 90 pounds, unintentional.  She is eating the same on a food.  She had a Pap smear last year which was unremarkable.  She is due for mammogram.  There is a history of adnexal cyst, unclear if this has increased or decreased in size.   encouraged to have patient follow-up follow-up with GYN for mammogram and for evaluation of this adnexal cyst.  He does admit she is perimenopausal at this time.  He reports she had an endoscopy and colonoscopy last year which were unremarkable.     Labs unremarkable, lipase negative, pregnancy test negative, CT of the abdomen and pelvis with, oral and IV contrast noted adynamic ileus,   4 x 2.5 cm left adnexal cyst recommend follow-up pelvic ultrasound.     Following day patient still persistent with abdominal pain.  Serial small bowel upper GI series was ordered.  GI consulted.  Patient on supportive measurement, pain medications, IV  fluids and antiemetics.      Interval Problem Update  Patient still with persistent abdominal pain, severe sometimes shooting pains. She is hoping for oral pain meds. SB UGI series XR ordered. Vitals are stable. IV access line very challenging. GI consulted  Dr. Fernandez note reviewed and recs in place   Hope to transition to clear liquid and sips with meds soon    4/10-patient still report severe abdominal pain that is unchanged from yesterday.  She started to have regular bowel movements.  Vitals remained stable.  Her abdominal pain is still like shooting in nature, and almost constant.  She has been able to tolerate clear liquid diet.  I did discuss with patient the results of upper GI series study which showed resolution of the ileus.  However her symptoms remain the same.  Patient is very emotional and frustrated that we do not have a clear diagnosis of her pain.  She is concerning that she has cancer that is causing her pain and the weight loss.  I did reassure her that there is no objective findings to that diagnosis at this time.  I did reviewed note of gastroenterologist Dr. Martínez.  Plan is to take patient for endoscopy tomorrow.  She will be n.p.o. at midnight.  We will continue clear liquids at this time.  We will continue pain medication.  Patient is on Oxycodone 10 mg every 3 hours and Dilaudid 0.5 mg IV every 3 hours.  She is taking both almost around-the-clock with some relief on the pain.  I did order complete transabdominal/transvaginal/pelvic ultrasound for further evaluation of left adnexal cyst.  Patient is agreeable to all above plan and grateful to the care.  In addition iron studies showed iron deficiency anemia.  Patient tells me she is not taking any iron supplement.  With her history of gastric bypass she will need iron infusions, which I already started.    I have personally seen and examined the patient at bedside. I discussed the plan of care with patient, family, bedside RN and  GI.    Consultants/Specialty  general surgery and GI    Code Status  Full Code    Disposition  Patient is not medically cleared for discharge.   Anticipate discharge to to home with close outpatient follow-up.  I have placed the appropriate orders for post-discharge needs.    Review of Systems  Review of Systems   Constitutional: Positive for malaise/fatigue and weight loss. Negative for chills, diaphoresis and fever.   HENT: Negative for sore throat.    Respiratory: Negative for cough and shortness of breath.    Cardiovascular: Negative for chest pain, palpitations and leg swelling.   Gastrointestinal: Positive for abdominal pain. Negative for blood in stool, constipation, diarrhea, melena, nausea and vomiting.   Genitourinary: Negative for dysuria and urgency.   Musculoskeletal: Positive for back pain. Negative for neck pain.   Skin: Negative for rash.   Neurological: Negative for dizziness and headaches.   Psychiatric/Behavioral: The patient is nervous/anxious.         Physical Exam  Temp:  [36.7 °C (98 °F)-36.9 °C (98.5 °F)] 36.9 °C (98.5 °F)  Pulse:  [53-76] 67  Resp:  [16-19] 19  BP: (112-137)/(67-90) 137/67  SpO2:  [95 %-100 %] 99 %    Physical Exam  Vitals and nursing note reviewed.   Constitutional:       General: She is in acute distress.      Appearance: She is ill-appearing.   HENT:      Head: Normocephalic and atraumatic.   Eyes:      General: No scleral icterus.  Cardiovascular:      Rate and Rhythm: Normal rate.      Heart sounds: No murmur heard.  Pulmonary:      Effort: Pulmonary effort is normal. No respiratory distress.      Breath sounds: No wheezing or rales.   Abdominal:      General: There is no distension.      Palpations: Abdomen is soft. There is no mass.      Tenderness: There is abdominal tenderness. There is no guarding.      Hernia: No hernia is present.   Skin:     General: Skin is dry.      Coloration: Skin is pale.      Findings: No erythema.   Neurological:      Mental Status: She  is alert and oriented to person, place, and time.   Psychiatric:         Mood and Affect: Mood normal.         Behavior: Behavior normal.         Thought Content: Thought content normal.         Judgment: Judgment normal.         Fluids    Intake/Output Summary (Last 24 hours) at 4/10/2022 1144  Last data filed at 4/10/2022 0930  Gross per 24 hour   Intake 240 ml   Output --   Net 240 ml       Laboratory  Recent Labs     04/08/22  1104 04/09/22 0223 04/10/22  0115   WBC 6.9 6.5 7.0   RBC 5.29 4.68 4.54   HEMOGLOBIN 11.4* 9.8* 9.7*   HEMATOCRIT 37.6 34.1* 32.6*   MCV 71.1* 72.9* 71.8*   MCH 21.6* 20.9* 21.4*   MCHC 30.3* 28.7* 29.8*   RDW 41.4 42.6 41.7   PLATELETCT 329 247 237   MPV 11.4 11.0 10.7     Recent Labs     04/08/22  1104 04/09/22 0223 04/10/22  0115   SODIUM 139 141 139   POTASSIUM 4.4 4.3 3.7   CHLORIDE 106 109 107   CO2 18* 23 21   GLUCOSE 91 83 77   BUN 5* 7* 5*   CREATININE 0.45* 0.48* 0.45*   CALCIUM 9.6 8.6 8.6                   Imaging  DX-UPPER GI-SMALL BOWEL FOLLOW THRU   Final Result      1.  Postsurgical changes gastric bypass surgery.   2.  No evidence of bowel obstruction or significant ileus.      CT-ABDOMEN-PELVIS WITH   Final Result      1.  Adynamic ileus. Recommend follow-up films      2.  Previous cholecystectomy.      3.  4 x 2.5 cm left adnexal cyst recommend follow-up pelvic ultrasound.      US-PELVIC COMPLETE (TRANSABDOMINAL/TRANSVAGINAL) (COMBO)    (Results Pending)        Assessment/Plan  * Ileus (HCC)- (present on admission)  Assessment & Plan  Labs unremarkable, lipase negative, pregnancy test negative, CT of the abdomen and pelvis with, oral and IV contrast noted adynamic ileus,   4 x 2.5 cm left adnexal cyst recommend follow-up pelvic ultrasound.  Patient's belly is soft, slightly tender to palpation. We will refrain from any NGT placement at this time.  We will continue with serial abdominal exams.  4/9-serial upper GI x-ray.  If the normal will try to clear liquid diet.   No surgical indication per Dr. Fernandez. amytriptiline per GI  Recent colonoscopy and EGD unremarkable  4/10-upper GI series showed resolution of ileus.  Patient amitriptyline.  Started Bentyl per GI recommendation.  Plan for upper endoscopy tomorrow.  Also complete transabdominal ultrasound is ordered for further evaluation of the adnexal left cyst    Weight loss- (present on admission)  Assessment & Plan   reports over the past year, she has lost about 90 pounds, unintentional.  She is eating the same on a food.  She had a Pap smear last year which was unremarkable.  She is due for mammogram.  There is a history of adnexal cyst, unclear if this has increased or decreased in size.   encouraged to have patient follow-up follow-up with GYN for mammogram and for evaluation of this adnexal cyst.  He does admit she is perimenopausal at this time.  He reports she had an endoscopy and colonoscopy last year which were unremarkable.  4/10-no clear explanation at this time.  Transabdominal ultrasound to follow.  Follow-up with upper endoscopy    Adnexal cyst- (present on admission)  Assessment & Plan  Noted on CT imaging, 4 x 2.5 cm left adnexal cyst recommend follow-up pelvic ultrasound outpatient    Cyclical vomiting with nausea- (present on admission)  Assessment & Plan  Likely secondary to chronic marijuana use  Patient had endoscopy and colonoscopy performed last year which was unremarkable    Marijuana abuse- (present on admission)  Assessment & Plan   reports she uses daily for the past several years  Nicotine use also chronic cyclic vomiting and gastroparesis due to pancreatic sphincter dysfunction  Cessation counseling provided    Hypothyroidism due to acquired atrophy of thyroid- (present on admission)  Assessment & Plan  Resume home medications      Seizure disorder (HCC)- (present on admission)  Assessment & Plan  Resume home medications      Migraine- (present on admission)  Assessment &  Plan  Resume home medications       VTE prophylaxis: SCDs/TEDs, if in case of complications     I have performed a physical exam and reviewed and updated ROS and Plan today (4/10/2022). In review of yesterday's note (4/9/2022), there are no changes except as documented above.

## 2022-04-10 NOTE — PROGRESS NOTES
Assumed care of pt at 0700. Report received by NOC RN. Pt is A&O x 4. Pain is 10/10, patient crying and grimacing. Medicated per MAR and provided with hot packs. Pt is sitting up in bed. Bed in lowest locked position, call light in reach, hourly rounding in place. Labs reviewed, orders reviewed, communication board updated. WCTM.

## 2022-04-10 NOTE — CARE PLAN
The patient is Stable - Low risk of patient condition declining or worsening    Shift Goals  Clinical Goals: monitor pain, BM, diagnostics  Patient Goals: to have less pain    Progress made toward(s) clinical / shift goals:  vitals are stable    Patient is not progressing towards the following goals:

## 2022-04-11 ENCOUNTER — ANESTHESIA (OUTPATIENT)
Dept: SURGERY | Facility: MEDICAL CENTER | Age: 47
DRG: 390 | End: 2022-04-11
Payer: COMMERCIAL

## 2022-04-11 ENCOUNTER — ANESTHESIA EVENT (OUTPATIENT)
Dept: SURGERY | Facility: MEDICAL CENTER | Age: 47
DRG: 390 | End: 2022-04-11
Payer: COMMERCIAL

## 2022-04-11 ENCOUNTER — APPOINTMENT (OUTPATIENT)
Dept: RADIOLOGY | Facility: MEDICAL CENTER | Age: 47
DRG: 390 | End: 2022-04-11
Attending: INTERNAL MEDICINE
Payer: COMMERCIAL

## 2022-04-11 LAB
ALBUMIN SERPL BCP-MCNC: 4.3 G/DL (ref 3.2–4.9)
ALBUMIN/GLOB SERPL: 2 G/DL
ALP SERPL-CCNC: 87 U/L (ref 30–99)
ALT SERPL-CCNC: 12 U/L (ref 2–50)
ANION GAP SERPL CALC-SCNC: 11 MMOL/L (ref 7–16)
AST SERPL-CCNC: 26 U/L (ref 12–45)
BASOPHILS # BLD AUTO: 1.6 % (ref 0–1.8)
BASOPHILS # BLD: 0.08 K/UL (ref 0–0.12)
BILIRUB SERPL-MCNC: 0.2 MG/DL (ref 0.1–1.5)
BUN SERPL-MCNC: 4 MG/DL (ref 8–22)
CALCIUM SERPL-MCNC: 8.9 MG/DL (ref 8.5–10.5)
CHLORIDE SERPL-SCNC: 104 MMOL/L (ref 96–112)
CO2 SERPL-SCNC: 25 MMOL/L (ref 20–33)
CREAT SERPL-MCNC: 0.51 MG/DL (ref 0.5–1.4)
EOSINOPHIL # BLD AUTO: 0.28 K/UL (ref 0–0.51)
EOSINOPHIL NFR BLD: 5.4 % (ref 0–6.9)
ERYTHROCYTE [DISTWIDTH] IN BLOOD BY AUTOMATED COUNT: 41.7 FL (ref 35.9–50)
GFR SERPLBLD CREATININE-BSD FMLA CKD-EPI: 117 ML/MIN/1.73 M 2
GLOBULIN SER CALC-MCNC: 2.2 G/DL (ref 1.9–3.5)
GLUCOSE SERPL-MCNC: 80 MG/DL (ref 65–99)
HCT VFR BLD AUTO: 35.6 % (ref 37–47)
HGB BLD-MCNC: 10.6 G/DL (ref 12–16)
IMM GRANULOCYTES # BLD AUTO: 0.01 K/UL (ref 0–0.11)
IMM GRANULOCYTES NFR BLD AUTO: 0.2 % (ref 0–0.9)
LYMPHOCYTES # BLD AUTO: 1.57 K/UL (ref 1–4.8)
LYMPHOCYTES NFR BLD: 30.5 % (ref 22–41)
MCH RBC QN AUTO: 21.3 PG (ref 27–33)
MCHC RBC AUTO-ENTMCNC: 29.8 G/DL (ref 33.6–35)
MCV RBC AUTO: 71.6 FL (ref 81.4–97.8)
MONOCYTES # BLD AUTO: 0.5 K/UL (ref 0–0.85)
MONOCYTES NFR BLD AUTO: 9.7 % (ref 0–13.4)
NEUTROPHILS # BLD AUTO: 2.71 K/UL (ref 2–7.15)
NEUTROPHILS NFR BLD: 52.6 % (ref 44–72)
NRBC # BLD AUTO: 0 K/UL
NRBC BLD-RTO: 0 /100 WBC
PATHOLOGY CONSULT NOTE: NORMAL
PLATELET # BLD AUTO: 285 K/UL (ref 164–446)
PMV BLD AUTO: 11.7 FL (ref 9–12.9)
POTASSIUM SERPL-SCNC: 4.3 MMOL/L (ref 3.6–5.5)
PROT SERPL-MCNC: 6.5 G/DL (ref 6–8.2)
RBC # BLD AUTO: 4.97 M/UL (ref 4.2–5.4)
SODIUM SERPL-SCNC: 140 MMOL/L (ref 135–145)
WBC # BLD AUTO: 5.2 K/UL (ref 4.8–10.8)

## 2022-04-11 PROCEDURE — 160203 HCHG ENDO MINUTES - 1ST 30 MINS LEVEL 4: Performed by: INTERNAL MEDICINE

## 2022-04-11 PROCEDURE — 700102 HCHG RX REV CODE 250 W/ 637 OVERRIDE(OP): Performed by: INTERNAL MEDICINE

## 2022-04-11 PROCEDURE — 770001 HCHG ROOM/CARE - MED/SURG/GYN PRIV*

## 2022-04-11 PROCEDURE — 160002 HCHG RECOVERY MINUTES (STAT): Performed by: INTERNAL MEDICINE

## 2022-04-11 PROCEDURE — 160048 HCHG OR STATISTICAL LEVEL 1-5: Performed by: INTERNAL MEDICINE

## 2022-04-11 PROCEDURE — 80053 COMPREHEN METABOLIC PANEL: CPT

## 2022-04-11 PROCEDURE — 99232 SBSQ HOSP IP/OBS MODERATE 35: CPT | Performed by: INTERNAL MEDICINE

## 2022-04-11 PROCEDURE — 85025 COMPLETE CBC W/AUTO DIFF WBC: CPT

## 2022-04-11 PROCEDURE — 00731 ANES UPR GI NDSC PX NOS: CPT | Performed by: ANESTHESIOLOGY

## 2022-04-11 PROCEDURE — 700111 HCHG RX REV CODE 636 W/ 250 OVERRIDE (IP): Performed by: ANESTHESIOLOGY

## 2022-04-11 PROCEDURE — A9270 NON-COVERED ITEM OR SERVICE: HCPCS | Performed by: GENERAL PRACTICE

## 2022-04-11 PROCEDURE — 700102 HCHG RX REV CODE 250 W/ 637 OVERRIDE(OP): Performed by: STUDENT IN AN ORGANIZED HEALTH CARE EDUCATION/TRAINING PROGRAM

## 2022-04-11 PROCEDURE — 700105 HCHG RX REV CODE 258: Performed by: INTERNAL MEDICINE

## 2022-04-11 PROCEDURE — 160009 HCHG ANES TIME/MIN: Performed by: INTERNAL MEDICINE

## 2022-04-11 PROCEDURE — 0DB68ZX EXCISION OF STOMACH, VIA NATURAL OR ARTIFICIAL OPENING ENDOSCOPIC, DIAGNOSTIC: ICD-10-PCS | Performed by: INTERNAL MEDICINE

## 2022-04-11 PROCEDURE — A9270 NON-COVERED ITEM OR SERVICE: HCPCS | Performed by: INTERNAL MEDICINE

## 2022-04-11 PROCEDURE — 700102 HCHG RX REV CODE 250 W/ 637 OVERRIDE(OP): Performed by: GENERAL PRACTICE

## 2022-04-11 PROCEDURE — 76856 US EXAM PELVIC COMPLETE: CPT

## 2022-04-11 PROCEDURE — 0DB38ZX EXCISION OF LOWER ESOPHAGUS, VIA NATURAL OR ARTIFICIAL OPENING ENDOSCOPIC, DIAGNOSTIC: ICD-10-PCS | Performed by: INTERNAL MEDICINE

## 2022-04-11 PROCEDURE — 88305 TISSUE EXAM BY PATHOLOGIST: CPT

## 2022-04-11 PROCEDURE — 0DBA8ZX EXCISION OF JEJUNUM, VIA NATURAL OR ARTIFICIAL OPENING ENDOSCOPIC, DIAGNOSTIC: ICD-10-PCS | Performed by: INTERNAL MEDICINE

## 2022-04-11 PROCEDURE — A9270 NON-COVERED ITEM OR SERVICE: HCPCS | Performed by: STUDENT IN AN ORGANIZED HEALTH CARE EDUCATION/TRAINING PROGRAM

## 2022-04-11 PROCEDURE — 700111 HCHG RX REV CODE 636 W/ 250 OVERRIDE (IP): Performed by: INTERNAL MEDICINE

## 2022-04-11 PROCEDURE — 700101 HCHG RX REV CODE 250: Performed by: ANESTHESIOLOGY

## 2022-04-11 PROCEDURE — 700111 HCHG RX REV CODE 636 W/ 250 OVERRIDE (IP): Performed by: GENERAL PRACTICE

## 2022-04-11 PROCEDURE — 160035 HCHG PACU - 1ST 60 MINS PHASE I: Performed by: INTERNAL MEDICINE

## 2022-04-11 PROCEDURE — RXMED WILLOW AMBULATORY MEDICATION CHARGE: Performed by: INTERNAL MEDICINE

## 2022-04-11 PROCEDURE — 88312 SPECIAL STAINS GROUP 1: CPT

## 2022-04-11 RX ORDER — HALOPERIDOL 5 MG/ML
1 INJECTION INTRAMUSCULAR
Status: DISCONTINUED | OUTPATIENT
Start: 2022-04-11 | End: 2022-04-11 | Stop reason: HOSPADM

## 2022-04-11 RX ORDER — HYDROMORPHONE HYDROCHLORIDE 1 MG/ML
0.2 INJECTION, SOLUTION INTRAMUSCULAR; INTRAVENOUS; SUBCUTANEOUS
Status: DISCONTINUED | OUTPATIENT
Start: 2022-04-11 | End: 2022-04-11 | Stop reason: HOSPADM

## 2022-04-11 RX ORDER — LIDOCAINE HYDROCHLORIDE 20 MG/ML
INJECTION, SOLUTION EPIDURAL; INFILTRATION; INTRACAUDAL; PERINEURAL PRN
Status: DISCONTINUED | OUTPATIENT
Start: 2022-04-11 | End: 2022-04-11 | Stop reason: SURG

## 2022-04-11 RX ORDER — SODIUM CHLORIDE, SODIUM LACTATE, POTASSIUM CHLORIDE, CALCIUM CHLORIDE 600; 310; 30; 20 MG/100ML; MG/100ML; MG/100ML; MG/100ML
INJECTION, SOLUTION INTRAVENOUS CONTINUOUS
Status: DISCONTINUED | OUTPATIENT
Start: 2022-04-11 | End: 2022-04-11 | Stop reason: HOSPADM

## 2022-04-11 RX ORDER — LABETALOL HYDROCHLORIDE 5 MG/ML
5 INJECTION, SOLUTION INTRAVENOUS
Status: DISCONTINUED | OUTPATIENT
Start: 2022-04-11 | End: 2022-04-11 | Stop reason: HOSPADM

## 2022-04-11 RX ORDER — MEPERIDINE HYDROCHLORIDE 25 MG/ML
12.5 INJECTION INTRAMUSCULAR; INTRAVENOUS; SUBCUTANEOUS
Status: DISCONTINUED | OUTPATIENT
Start: 2022-04-11 | End: 2022-04-11 | Stop reason: HOSPADM

## 2022-04-11 RX ORDER — ONDANSETRON 2 MG/ML
4 INJECTION INTRAMUSCULAR; INTRAVENOUS
Status: COMPLETED | OUTPATIENT
Start: 2022-04-11 | End: 2022-04-11

## 2022-04-11 RX ORDER — HYDRALAZINE HYDROCHLORIDE 20 MG/ML
5 INJECTION INTRAMUSCULAR; INTRAVENOUS
Status: DISCONTINUED | OUTPATIENT
Start: 2022-04-11 | End: 2022-04-11 | Stop reason: HOSPADM

## 2022-04-11 RX ORDER — OXYCODONE HYDROCHLORIDE 10 MG/1
10 TABLET ORAL EVERY 6 HOURS PRN
Qty: 20 TABLET | Refills: 0 | Status: SHIPPED | OUTPATIENT
Start: 2022-04-11 | End: 2022-04-19

## 2022-04-11 RX ORDER — HYDROMORPHONE HYDROCHLORIDE 1 MG/ML
0.1 INJECTION, SOLUTION INTRAMUSCULAR; INTRAVENOUS; SUBCUTANEOUS
Status: DISCONTINUED | OUTPATIENT
Start: 2022-04-11 | End: 2022-04-11 | Stop reason: HOSPADM

## 2022-04-11 RX ORDER — AMITRIPTYLINE HYDROCHLORIDE 25 MG/1
25 TABLET, FILM COATED ORAL EVERY EVENING
Qty: 30 TABLET | Refills: 0 | Status: SHIPPED | OUTPATIENT
Start: 2022-04-11 | End: 2022-10-26

## 2022-04-11 RX ORDER — DICYCLOMINE HYDROCHLORIDE 10 MG/1
10 CAPSULE ORAL 3 TIMES DAILY
Qty: 90 CAPSULE | Refills: 0 | Status: SHIPPED | OUTPATIENT
Start: 2022-04-11 | End: 2022-10-26

## 2022-04-11 RX ORDER — OXYCODONE HCL 5 MG/5 ML
10 SOLUTION, ORAL ORAL
Status: DISCONTINUED | OUTPATIENT
Start: 2022-04-11 | End: 2022-04-11 | Stop reason: HOSPADM

## 2022-04-11 RX ORDER — DIPHENHYDRAMINE HYDROCHLORIDE 50 MG/ML
12.5 INJECTION INTRAMUSCULAR; INTRAVENOUS
Status: DISCONTINUED | OUTPATIENT
Start: 2022-04-11 | End: 2022-04-11 | Stop reason: HOSPADM

## 2022-04-11 RX ORDER — OXYCODONE HCL 5 MG/5 ML
5 SOLUTION, ORAL ORAL
Status: DISCONTINUED | OUTPATIENT
Start: 2022-04-11 | End: 2022-04-11 | Stop reason: HOSPADM

## 2022-04-11 RX ORDER — HYDROMORPHONE HYDROCHLORIDE 1 MG/ML
0.4 INJECTION, SOLUTION INTRAMUSCULAR; INTRAVENOUS; SUBCUTANEOUS
Status: DISCONTINUED | OUTPATIENT
Start: 2022-04-11 | End: 2022-04-11 | Stop reason: HOSPADM

## 2022-04-11 RX ORDER — SODIUM CHLORIDE, SODIUM LACTATE, POTASSIUM CHLORIDE, CALCIUM CHLORIDE 600; 310; 30; 20 MG/100ML; MG/100ML; MG/100ML; MG/100ML
INJECTION, SOLUTION INTRAVENOUS CONTINUOUS
Status: ACTIVE | OUTPATIENT
Start: 2022-04-11 | End: 2022-04-11

## 2022-04-11 RX ORDER — IPRATROPIUM BROMIDE AND ALBUTEROL SULFATE 2.5; .5 MG/3ML; MG/3ML
3 SOLUTION RESPIRATORY (INHALATION)
Status: DISCONTINUED | OUTPATIENT
Start: 2022-04-11 | End: 2022-04-11 | Stop reason: HOSPADM

## 2022-04-11 RX ADMIN — BUSPIRONE HYDROCHLORIDE 5 MG: 10 TABLET ORAL at 04:16

## 2022-04-11 RX ADMIN — BUSPIRONE HYDROCHLORIDE 5 MG: 10 TABLET ORAL at 15:46

## 2022-04-11 RX ADMIN — HYDROMORPHONE HYDROCHLORIDE 0.5 MG: 1 INJECTION, SOLUTION INTRAMUSCULAR; INTRAVENOUS; SUBCUTANEOUS at 04:16

## 2022-04-11 RX ADMIN — OXYCODONE HYDROCHLORIDE 10 MG: 10 TABLET ORAL at 05:34

## 2022-04-11 RX ADMIN — DICYCLOMINE HYDROCHLORIDE 10 MG: 10 CAPSULE ORAL at 17:36

## 2022-04-11 RX ADMIN — LIDOCAINE HYDROCHLORIDE 60 MG: 20 INJECTION, SOLUTION EPIDURAL; INFILTRATION; INTRACAUDAL at 13:54

## 2022-04-11 RX ADMIN — BUSPIRONE HYDROCHLORIDE 5 MG: 10 TABLET ORAL at 21:49

## 2022-04-11 RX ADMIN — HYDROMORPHONE HYDROCHLORIDE 0.5 MG: 1 INJECTION, SOLUTION INTRAMUSCULAR; INTRAVENOUS; SUBCUTANEOUS at 11:34

## 2022-04-11 RX ADMIN — Medication 400 MG: at 17:37

## 2022-04-11 RX ADMIN — GABAPENTIN 300 MG: 300 CAPSULE ORAL at 21:48

## 2022-04-11 RX ADMIN — Medication 400 MG: at 04:16

## 2022-04-11 RX ADMIN — HYDROMORPHONE HYDROCHLORIDE 0.5 MG: 1 INJECTION, SOLUTION INTRAMUSCULAR; INTRAVENOUS; SUBCUTANEOUS at 17:45

## 2022-04-11 RX ADMIN — HYDROMORPHONE HYDROCHLORIDE 0.5 MG: 1 INJECTION, SOLUTION INTRAMUSCULAR; INTRAVENOUS; SUBCUTANEOUS at 07:57

## 2022-04-11 RX ADMIN — ONDANSETRON 4 MG: 2 INJECTION INTRAMUSCULAR; INTRAVENOUS at 14:19

## 2022-04-11 RX ADMIN — PROPOFOL 30 MG: 10 INJECTION, EMULSION INTRAVENOUS at 13:57

## 2022-04-11 RX ADMIN — OXYCODONE HYDROCHLORIDE 10 MG: 10 TABLET ORAL at 15:43

## 2022-04-11 RX ADMIN — SODIUM CHLORIDE, POTASSIUM CHLORIDE, SODIUM LACTATE AND CALCIUM CHLORIDE: 600; 310; 30; 20 INJECTION, SOLUTION INTRAVENOUS at 13:47

## 2022-04-11 RX ADMIN — DICYCLOMINE HYDROCHLORIDE 10 MG: 10 CAPSULE ORAL at 04:16

## 2022-04-11 RX ADMIN — DICYCLOMINE HYDROCHLORIDE 10 MG: 10 CAPSULE ORAL at 11:17

## 2022-04-11 RX ADMIN — DULOXETINE HYDROCHLORIDE 60 MG: 30 CAPSULE, DELAYED RELEASE ORAL at 04:16

## 2022-04-11 RX ADMIN — ONDANSETRON 4 MG: 2 INJECTION INTRAMUSCULAR; INTRAVENOUS at 08:10

## 2022-04-11 RX ADMIN — SODIUM CHLORIDE 125 MG: 9 INJECTION, SOLUTION INTRAVENOUS at 17:36

## 2022-04-11 RX ADMIN — PROPOFOL 150 MG: 10 INJECTION, EMULSION INTRAVENOUS at 13:54

## 2022-04-11 RX ADMIN — LEVOTHYROXINE SODIUM 50 MCG: 0.05 TABLET ORAL at 04:16

## 2022-04-11 RX ADMIN — LEVETIRACETAM 1000 MG: 100 SOLUTION ORAL at 17:36

## 2022-04-11 RX ADMIN — LEVETIRACETAM 500 MG: 100 SOLUTION ORAL at 04:16

## 2022-04-11 RX ADMIN — HYDROMORPHONE HYDROCHLORIDE 0.5 MG: 1 INJECTION, SOLUTION INTRAMUSCULAR; INTRAVENOUS; SUBCUTANEOUS at 00:32

## 2022-04-11 RX ADMIN — NICOTINE 7 MG: 7 PATCH TRANSDERMAL at 04:16

## 2022-04-11 RX ADMIN — PROPOFOL 20 MG: 10 INJECTION, EMULSION INTRAVENOUS at 14:09

## 2022-04-11 RX ADMIN — GABAPENTIN 300 MG: 300 CAPSULE ORAL at 04:16

## 2022-04-11 RX ADMIN — AMITRIPTYLINE HYDROCHLORIDE 25 MG: 10 TABLET, FILM COATED ORAL at 17:37

## 2022-04-11 RX ADMIN — OXYCODONE HYDROCHLORIDE 10 MG: 10 TABLET ORAL at 21:48

## 2022-04-11 ASSESSMENT — ENCOUNTER SYMPTOMS
DIARRHEA: 0
NERVOUS/ANXIOUS: 1
BACK PAIN: 1
NECK PAIN: 0
CHILLS: 0
COUGH: 0
SHORTNESS OF BREATH: 0
HEADACHES: 0
DIZZINESS: 0
FEVER: 0
HEARTBURN: 0
VOMITING: 0
NAUSEA: 1
BLOOD IN STOOL: 0
SORE THROAT: 0
DIAPHORESIS: 0
WEIGHT LOSS: 1
CONSTIPATION: 0
ABDOMINAL PAIN: 1
NAUSEA: 0
PALPITATIONS: 0

## 2022-04-11 ASSESSMENT — PAIN SCALES - GENERAL: PAIN_LEVEL: 8

## 2022-04-11 NOTE — ANESTHESIA POSTPROCEDURE EVALUATION
Patient: Tamela Montana Ryan-Wallner    Procedure Summary     Date: 04/11/22 Room / Location: [facility] ROOM 26 / SURGERY SAME DAY [facility]    Anesthesia Start: 1347 Anesthesia Stop: 1413    Procedures:       GASTROSCOPY (N/A Esophagus)      GASTROSCOPY, WITH BIOPSY (N/A Esophagus) Diagnosis: ( ABD PAIN)    Surgeons: Sandra Reed M.D. Responsible Provider: Vivienne Gonzalez M.D.    Anesthesia Type: MAC ASA Status: 3          Final Anesthesia Type: MAC  Last vitals  BP   Blood Pressure: 100/73    Temp   36.2 °C (97.2 °F)    Pulse   63   Resp   16    SpO2   95 %      Anesthesia Post Evaluation    Patient location during evaluation: PACU  Patient participation: complete - patient participated  Level of consciousness: awake and alert  Pain score: 8  Chronic pain/narcotic use  Airway patency: patent  Anesthetic complications: no  Cardiovascular status: hemodynamically stable  Respiratory status: acceptable  Hydration status: euvolemic    PONV: none          No complications documented.     Nurse Pain Score: 9 (NPRS)

## 2022-04-11 NOTE — ANESTHESIA PREPROCEDURE EVALUATION
Case: [id number] Date/Time: 04/11/22 1450    Procedure: GASTROSCOPY    Location: [facility] ROOM 26 / SURGERY SAME DAY [facility]    Surgeons: Sandra Reed M.D.          Relevant Problems   NEURO   (positive) Migraine   (positive) Seizure disorder (HCC)      CARDIAC   (positive) Migraine      ENDO   (positive) Hypothyroidism due to acquired atrophy of thyroid      Other   (positive) Cyclical vomiting with nausea   (positive) DDD (degenerative disc disease), cervical   (positive) Marijuana abuse   (positive) Weight loss       Physical Exam    Airway   Mallampati: II  TM distance: >3 FB  Neck ROM: full       Cardiovascular - normal exam  Rhythm: regular  Rate: normal  (-) murmur     Dental - normal exam           Pulmonary - normal exam  Breath sounds clear to auscultation     Abdominal    Neurological - normal exam         Other findings: Multiple broken teeth throughout.          Anesthesia Plan    ASA 3       Plan - MAC               Induction: intravenous    Postoperative Plan: Postoperative administration of opioids is intended.    Pertinent diagnostic labs and testing reviewed    Informed Consent:    Anesthetic plan and risks discussed with patient.    Use of blood products discussed with: patient whom consented to blood products.

## 2022-04-11 NOTE — OR NURSING
1412 received patient from OR. Report from Anesthesiologist and OR RN. Patient on 4L at 100 % O2. VSS. Monitor connected. S/P EGD c biopsies, patient awake and oriented to the PACU    1419 Zofran given for nausea    1430 Patient tolerating apple juice, Dr. Reed at bedside    1440 Report given to Angela WILD    1445 Patient transported to room by RN in [facility]. All belongings sent with patient

## 2022-04-11 NOTE — PROGRESS NOTES
[facility] Medicine Daily Progress Note    Date of Service  4/11/2022    Chief Complaint  Tamela Vasquez is a 46 y.o. female admitted 4/8/2022 with abdominal pain, nausea, vomiting    Hospital Course  This is a 46 year old female with PMHx of fibromyalgia, daily marijuana use gastric bypass 2007 who presented to the ED on 04/8/2022 with progressive abdominal pain x 1.5 years , patient was sent from Dr. Fernandez's office.      Patient reports has been experiencing abdominal pain with associated nausea and vomiting for a year and a half.  She reports she uses marijuana daily to cope with the pain, however pain is significantly worsened over the past 2-3 days.  She admits to nausea and vomiting, and reports 90 pound weight loss over the past year.     History provided by  at bedside as patient received Ativan, Benadryl, Haldol and unable to answer questions appropriately.  She did answer questions in terms of she was able to pass flatus and bowel movement this morning.      reports over the past year, she has lost about 90 pounds, unintentional.  She is eating the same on a food.  She had a Pap smear last year which was unremarkable.  She is due for mammogram.  There is a history of adnexal cyst, unclear if this has increased or decreased in size.   encouraged to have patient follow-up follow-up with GYN for mammogram and for evaluation of this adnexal cyst.  He does admit she is perimenopausal at this time.  He reports she had an endoscopy and colonoscopy last year which were unremarkable.     Labs unremarkable, lipase negative, pregnancy test negative, CT of the abdomen and pelvis with, oral and IV contrast noted adynamic ileus,   4 x 2.5 cm left adnexal cyst recommend follow-up pelvic ultrasound.     Following day patient still persistent with abdominal pain.  Serial small bowel upper GI series was ordered.  GI consulted.  Patient on supportive measurement, pain medications, IV  fluids and antiemetics.      Interval Problem Update  Patient still with persistent abdominal pain, severe sometimes shooting pains. She is hoping for oral pain meds. SB UGI series XR ordered. Vitals are stable. IV access line very challenging. GI consulted  Dr. Fernandez note reviewed and recs in place   Hope to transition to clear liquid and sips with meds soon    4/10-patient still report severe abdominal pain that is unchanged from yesterday.  She started to have regular bowel movements.  Vitals remained stable.  Her abdominal pain is still like shooting in nature, and almost constant.  She has been able to tolerate clear liquid diet.  I did discuss with patient the results of upper GI series study which showed resolution of the ileus.  However her symptoms remain the same.  Patient is very emotional and frustrated that we do not have a clear diagnosis of her pain.  She is concerning that she has cancer that is causing her pain and the weight loss.  I did reassure her that there is no objective findings to that diagnosis at this time.  I did reviewed note of gastroenterologist Dr. Martínez.  Plan is to take patient for endoscopy tomorrow.  She will be n.p.o. at midnight.  We will continue clear liquids at this time.  We will continue pain medication.  Patient is on Oxycodone 10 mg every 3 hours and Dilaudid 0.5 mg IV every 3 hours.  She is taking both almost around-the-clock with some relief on the pain.  I did order complete transabdominal/transvaginal/pelvic ultrasound for further evaluation of left adnexal cyst.  Patient is agreeable to all above plan and grateful to the care.  In addition iron studies showed iron deficiency anemia.  Patient tells me she is not taking any iron supplement.  With her history of gastric bypass she will need iron infusions, which I already started.    4/11- no event. Pain about the same per pt. + bs and movement. Vitals are stable. NPO post midnight. Vitals are stable. Plan for EGD.  transabdominal US hemorrhagic cyst on the left. Discuss case with GYN Dr. Tierney. Pain can be from cyst, but usually when cyst is acute. Difficult to tell from US. However cyst are managed on conservative way.  Usually with pain medications.  Cysts are usually reabsorbed on their own or very rare may rupture.  Patient is still scheduled for outpatient elective surgery Dr. Suresh for exploratory laparotomy.  Follow-up with GI recommendation post EGD is completed.  Continue pain management.  Goal is to discharge patient tomorrow home if everything remains stable.  Pain prescription was sent to our pharmacy so he can be delivered tomorrow earlier.    I have personally seen and examined the patient at bedside. I discussed the plan of care with patient, family, bedside RN and GI.    Consultants/Specialty  general surgery and GI    Code Status  Full Code    Disposition  Patient is not medically cleared for discharge.   Anticipate discharge to to home with close outpatient follow-up.  I have placed the appropriate orders for post-discharge needs.    Review of Systems  Review of Systems   Constitutional: Positive for malaise/fatigue and weight loss. Negative for chills, diaphoresis and fever.   HENT: Negative for sore throat.    Respiratory: Negative for cough and shortness of breath.    Cardiovascular: Negative for chest pain, palpitations and leg swelling.   Gastrointestinal: Positive for abdominal pain. Negative for blood in stool, constipation, diarrhea, melena, nausea and vomiting.   Genitourinary: Negative for dysuria and urgency.   Musculoskeletal: Positive for back pain. Negative for neck pain.   Skin: Negative for rash.   Neurological: Negative for dizziness and headaches.   Psychiatric/Behavioral: The patient is nervous/anxious.         Physical Exam  Temp:  [36.1 °C (97 °F)-37.8 °C (100 °F)] 37.8 °C (100 °F)  Pulse:  [64-91] 69  Resp:  [16-18] 18  BP: (104-133)/(59-86) 122/75  SpO2:  [91 %-96 %] 96 %    Physical  Exam  Vitals and nursing note reviewed.   Constitutional:       General: She is in acute distress.      Appearance: She is ill-appearing.   HENT:      Head: Normocephalic and atraumatic.   Eyes:      General: No scleral icterus.  Cardiovascular:      Rate and Rhythm: Normal rate.      Heart sounds: No murmur heard.  Pulmonary:      Effort: Pulmonary effort is normal. No respiratory distress.      Breath sounds: No wheezing or rales.   Abdominal:      General: There is no distension.      Palpations: Abdomen is soft. There is no mass.      Tenderness: There is abdominal tenderness. There is no guarding.      Hernia: No hernia is present.   Skin:     General: Skin is dry.      Coloration: Skin is pale.      Findings: No erythema.   Neurological:      Mental Status: She is alert and oriented to person, place, and time.   Psychiatric:         Mood and Affect: Mood normal.         Behavior: Behavior normal.         Thought Content: Thought content normal.         Judgment: Judgment normal.         Fluids  No intake or output data in the 24 hours ending 04/11/22 1408    Laboratory  Recent Labs     04/09/22  0223 04/10/22  0115 04/11/22  0046   WBC 6.5 7.0 5.2   RBC 4.68 4.54 4.97   HEMOGLOBIN 9.8* 9.7* 10.6*   HEMATOCRIT 34.1* 32.6* 35.6*   MCV 72.9* 71.8* 71.6*   MCH 20.9* 21.4* 21.3*   MCHC 28.7* 29.8* 29.8*   RDW 42.6 41.7 41.7   PLATELETCT 247 237 285   MPV 11.0 10.7 11.7     Recent Labs     04/09/22  0223 04/10/22  0115 04/11/22  0046   SODIUM 141 139 140   POTASSIUM 4.3 3.7 4.3   CHLORIDE 109 107 104   CO2 23 21 25   GLUCOSE 83 77 80   BUN 7* 5* 4*   CREATININE 0.48* 0.45* 0.51   CALCIUM 8.6 8.6 8.9                   Imaging  US-PELVIC COMPLETE (TRANSABDOMINAL/TRANSVAGINAL) (COMBO)   Final Result      1.  There is a probable hemorrhagic cyst in the left ovary.   2.  Normal right ovary.   3.  Heterogeneous uterus with a small 1 cm uterine fibroid.      DX-UPPER GI-SMALL BOWEL FOLLOW THRU   Final Result      1.   Postsurgical changes gastric bypass surgery.   2.  No evidence of bowel obstruction or significant ileus.      CT-ABDOMEN-PELVIS WITH   Final Result      1.  Adynamic ileus. Recommend follow-up films      2.  Previous cholecystectomy.      3.  4 x 2.5 cm left adnexal cyst recommend follow-up pelvic ultrasound.           Assessment/Plan  * Ileus (HCC)- (present on admission)  Assessment & Plan  Labs unremarkable, lipase negative, pregnancy test negative, CT of the abdomen and pelvis with, oral and IV contrast noted adynamic ileus,   4 x 2.5 cm left adnexal cyst recommend follow-up pelvic ultrasound.  Patient's belly is soft, slightly tender to palpation. We will refrain from any NGT placement at this time.  We will continue with serial abdominal exams.  4/9-serial upper GI x-ray.  If the normal will try to clear liquid diet.  No surgical indication per Dr. Fernandez. amytriptiline per GI  Recent colonoscopy and EGD unremarkable  4/10-upper GI series showed resolution of ileus.  Patient amitriptyline.  Started Bentyl per GI recommendation.  Plan for upper endoscopy tomorrow.  Also complete transabdominal ultrasound is ordered for further evaluation of the adnexal left cyst    Weight loss- (present on admission)  Assessment & Plan   reports over the past year, she has lost about 90 pounds, unintentional.  She is eating the same on a food.  She had a Pap smear last year which was unremarkable.  She is due for mammogram.  There is a history of adnexal cyst, unclear if this has increased or decreased in size.   encouraged to have patient follow-up follow-up with GYN for mammogram and for evaluation of this adnexal cyst.  He does admit she is perimenopausal at this time.  He reports she had an endoscopy and colonoscopy last year which were unremarkable.  4/10-no clear explanation at this time.  Transabdominal ultrasound to follow.  Follow-up with upper endoscopy    Adnexal cyst- (present on admission)  Assessment  & Plan  Noted on CT imaging, 4 x 2.5 cm left adnexal cyst recommend follow-up pelvic ultrasound outpatient  Ultrasound pelvis as per above, hemorrhagic cyst    Cyclical vomiting with nausea- (present on admission)  Assessment & Plan  Likely secondary to chronic marijuana use  Patient had endoscopy and colonoscopy performed last year which was unremarkable    Marijuana abuse- (present on admission)  Assessment & Plan   reports she uses daily for the past several years  Nicotine use also chronic cyclic vomiting and gastroparesis due to pancreatic sphincter dysfunction  Cessation counseling provided    Hypothyroidism due to acquired atrophy of thyroid- (present on admission)  Assessment & Plan  Resume home medications      Seizure disorder (HCC)- (present on admission)  Assessment & Plan  Resume home medications      Migraine- (present on admission)  Assessment & Plan  Resume home medications       VTE prophylaxis: SCDs/TEDs, if in case of complications     I have performed a physical exam and reviewed and updated ROS and Plan today (4/11/2022). In review of yesterday's note (4/10/2022), there are no changes except as documented above.

## 2022-04-11 NOTE — PROGRESS NOTES
"Surgical Progress Note    Author: Ira Kruse P.A.-C. Date & Time created: 2022   7:44 AM     Interval Events:  Pt is a 46 year old female with remote history of Gastric Bypass. She was admitted for severe abdominal pain and reports that she has had N/V and abdominal pain ongoing for the last year or more. Dr. Fernandez was consulted. UGI with SBFT showed resolution of her ileus and no obstruction. Pelvic U/S this morning revealed \"There is a probable hemorrhagic cyst in the left ovary. Normal right ovary. Heterogeneous uterus with a small 1 cm uterine fibroid\"    She is sitting up in bed this morning for check of VS by CNA, appearing comfortable. She continues to report nausea. She is has been NPO since midnight for EGD to be performed by Gastroenterology. She reports lower abdominal and pelvic pain with shooting pain in the lower left flank.     Review of Systems   Constitutional: Negative for chills and fever.   Respiratory: Negative for cough and shortness of breath.    Cardiovascular: Negative for chest pain and palpitations.   Gastrointestinal: Positive for abdominal pain (lower abdominal pain, pelvic pain with shooting pains in the Left lower flank) and nausea. Negative for diarrhea, heartburn and vomiting.   Genitourinary: Negative for dysuria.     Hemodynamics:  Temp (24hrs), Av.7 °C (98.1 °F), Min:36.1 °C (97 °F), Max:37.2 °C (98.9 °F)  Temperature: 36.4 °C (97.6 °F)  Pulse  Av.9  Min: 53  Max: 100   Blood Pressure: 104/59     Respiratory:    Respiration: 16, Pulse Oximetry: 91 %           Neuro:  GCS       Fluids:    Intake/Output Summary (Last 24 hours) at 2022 0744  Last data filed at 4/10/2022 0930  Gross per 24 hour   Intake 240 ml   Output --   Net 240 ml        Current Diet Order   Procedures   • Diet NPO Restrict to: Sips with Medications (strict NPO after 6am)     Physical Exam  HENT:      Head: Normocephalic and atraumatic.      Mouth/Throat:      Pharynx: Oropharynx is clear. "   Eyes:      Conjunctiva/sclera: Conjunctivae normal.   Cardiovascular:      Rate and Rhythm: Normal rate and regular rhythm.   Pulmonary:      Effort: Pulmonary effort is normal.   Abdominal:      General: There is no distension.      Palpations: Abdomen is soft. There is no mass.      Tenderness: There is abdominal tenderness (generalized). There is no guarding or rebound.   Musculoskeletal:         General: Normal range of motion.      Cervical back: Normal range of motion.   Skin:     General: Skin is warm and dry.      Findings: No erythema or rash.   Neurological:      Mental Status: She is alert and oriented to person, place, and time.   Psychiatric:         Mood and Affect: Mood normal.       Labs:  Recent Results (from the past 24 hour(s))   SARS-COV Antigen DINO    Collection Time: 04/10/22 12:03 PM   Result Value Ref Range    SARS-CoV-2 Source Nasal Swab     SARS-COV ANTIGEN DINO NotDetected NotDetected   CBC WITH DIFFERENTIAL    Collection Time: 04/11/22 12:46 AM   Result Value Ref Range    WBC 5.2 4.8 - 10.8 K/uL    RBC 4.97 4.20 - 5.40 M/uL    Hemoglobin 10.6 (L) 12.0 - 16.0 g/dL    Hematocrit 35.6 (L) 37.0 - 47.0 %    MCV 71.6 (L) 81.4 - 97.8 fL    MCH 21.3 (L) 27.0 - 33.0 pg    MCHC 29.8 (L) 33.6 - 35.0 g/dL    RDW 41.7 35.9 - 50.0 fL    Platelet Count 285 164 - 446 K/uL    MPV 11.7 9.0 - 12.9 fL    Neutrophils-Polys 52.60 44.00 - 72.00 %    Lymphocytes 30.50 22.00 - 41.00 %    Monocytes 9.70 0.00 - 13.40 %    Eosinophils 5.40 0.00 - 6.90 %    Basophils 1.60 0.00 - 1.80 %    Immature Granulocytes 0.20 0.00 - 0.90 %    Nucleated RBC 0.00 /100 WBC    Neutrophils (Absolute) 2.71 2.00 - 7.15 K/uL    Lymphs (Absolute) 1.57 1.00 - 4.80 K/uL    Monos (Absolute) 0.50 0.00 - 0.85 K/uL    Eos (Absolute) 0.28 0.00 - 0.51 K/uL    Baso (Absolute) 0.08 0.00 - 0.12 K/uL    Immature Granulocytes (abs) 0.01 0.00 - 0.11 K/uL    NRBC (Absolute) 0.00 K/uL   Comp Metabolic Panel    Collection Time: 04/11/22 12:46 AM    Result Value Ref Range    Sodium 140 135 - 145 mmol/L    Potassium 4.3 3.6 - 5.5 mmol/L    Chloride 104 96 - 112 mmol/L    Co2 25 20 - 33 mmol/L    Anion Gap 11.0 7.0 - 16.0    Glucose 80 65 - 99 mg/dL    Bun 4 (L) 8 - 22 mg/dL    Creatinine 0.51 0.50 - 1.40 mg/dL    Calcium 8.9 8.5 - 10.5 mg/dL    AST(SGOT) 26 12 - 45 U/L    ALT(SGPT) 12 2 - 50 U/L    Alkaline Phosphatase 87 30 - 99 U/L    Total Bilirubin 0.2 0.1 - 1.5 mg/dL    Albumin 4.3 3.2 - 4.9 g/dL    Total Protein 6.5 6.0 - 8.2 g/dL    Globulin 2.2 1.9 - 3.5 g/dL    A-G Ratio 2.0 g/dL   ESTIMATED GFR    Collection Time: 04/11/22 12:46 AM   Result Value Ref Range    GFR (CKD-EPI) 117 >60 mL/min/1.73 m 2     Medical Decision Making, by Problem:  Active Hospital Problems    Diagnosis    • Seizure disorder (HCC) [G40.909]      Priority: High   • Migraine [G43.909]      Priority: High   • Hypothyroidism due to acquired atrophy of thyroid [E03.4]      Priority: Medium   • Ileus (HCC) [K56.7]    • Marijuana abuse [F12.10]    • Cyclical vomiting with nausea [R11.15]    • Adnexal cyst [N94.9]    • Weight loss [R63.4]      Plan:  Pt is alert and oriented, NAD. Breathing unlabored. VS stable. Labs reviewed. No Leukocytosis. Iron deficiency anemia and getting iron infusions. Abdomen soft, generalized grimace pain with palpation. EGD planned today by Gastroenterology. She and I discussed that her pain this hospitalization is likely GYN. She will discuss further with Hospitalist.  She is scheduled for Diagnostic Laparoscopy at the end of the month with Dr. Fernandez as an outpatient. If no source of her pain is identified, we can continue with the scheduled surgery, outpatient. We discussed that Laparoscopy has low likelihood of identifying a source of her pain and low likelihood of improving pain syndromes. Please call with questions. Surgery signing off.  Discussed with Dr. Fernandez.      Quality Measures:  Quality-Core Measures   Reviewed items::  Labs reviewed  Lucia  catheter::  No Myers  DVT prophylaxis pharmacological::  Enoxaparin (Lovenox)  DVT prophylaxis - mechanical:  SCDs  Ulcer Prophylaxis::  Yes      Discussed patient condition with Patient and Dr. Fernandez

## 2022-04-11 NOTE — PROCEDURES
DATE OF PROCEDURE:  04/11/2022     PROCEDURE:  Esophagogastroduodenoscopy with biopsies.     PREPROCEDURE DIAGNOSES:  A 46-year-old female with chronic abdominal pain for   1.5 years.  She has a remote Tristan-en-Y gastric bypass.  She has had   unintentional 90-pound weight loss past one year.  She has had nausea and   vomiting.  Smokes marijuana regularly.  She also has microcytic anemia and   unknown if she was taking supplementation prior to this admission.     POSTPROCEDURE DIAGNOSES:    1.  Mild desquamation of the distal esophagus, biopsied.  2.  Double-barrel appearance to gastric pouch without ulceration, biopsies   taken.  3.  Normal-appearing jejunum, biopsies taken.     CONSENT:  Risks, benefits, alternatives explained to the patient.  The patient   gave consent to proceed.     ANESTHESIA:  Monitored anesthesia care.     ANESTHESIOLOGIST:  Vivienne Gonzalez MD     DESCRIPTION OF PROCEDURE:  The patient was turned in the left lateral   decubitus position and anesthesia monitoring was in place.  The Olympus adult   flexible gastroscope was inserted into the esophagus under direct   visualization.  It was slowly advanced to the distal esophagus.  The mucosa   had a desquamating appearance and biopsies were taken.  The endoscope was   advanced into the stomach where superiorly the gastroscope could be advanced   into the jejunum and inferiorly, there was a second smaller gastric pouch   leading to a dead end.  There were no mucosal abnormalities.  A few staples   were seen proximally.  Biopsies were taken of the gastric mucosa.  The   endoscope was advanced into the jejunum with some difficulty but once in the   jejunum, it was advanced without difficulty.  The mucosa of the jejunum   appeared normal.  Biopsies were taken.  The endoscope was pulled back into the   stomach.  Air was removed and the endoscope was removed.  The patient   tolerated the procedure well.  No  complications.        ______________________________  MD ARLYN RUIZ/DENIA/VALENTINE    DD:  04/11/2022 14:21  DT:  04/11/2022 14:46    Job#:  [id number]

## 2022-04-11 NOTE — CARE PLAN
The patient is Stable - Low risk of patient condition declining or worsening    Shift Goals  Clinical Goals: EGD  Patient Goals: manage pain    Progress made toward(s) clinical / shift goals:  EGD normal, pain remains the same, but patient requesting food.    Patient is not progressing towards the following goals:      Problem: Pain - Standard  Goal: Alleviation of pain or a reduction in pain to the patient’s comfort goal  Outcome: Progressing

## 2022-04-11 NOTE — OR SURGEON
Immediate Post OP Note    PreOp Diagnosis: chronic abdominal pain, remote Tristan en y gastric bypass, unintentional 90 lb weight loss past 1 year, nausea and vomiting, microcytic anemia      PostOp Diagnosis: mild desquamation of distal esophagus, bx'd; double barrel appearance to gastric pouch, normal appearing jejunum      Procedure(s):  GASTROSCOPY - Wound Class: Clean Contaminated  GASTROSCOPY, WITH BIOPSY - Wound Class: Clean Contaminated    Surgeon(s):  Sandra Reed M.D.    Anesthesiologist/Type of Anesthesia:  Anesthesiologist: Vivienne Gonzalez M.D./FABIANA    Surgical Staff:  Endoscopy Technician: Leyda Zavala  Endoscopy Nurse: Sonia Reeves R.N.; Fanta Son R.N.    Specimens removed if any:  ID Type Source Tests Collected by Time Destination   A : JEJUNUM Tissue Small intestine PATHOLOGY SPECIMEN Sandra Reed M.D. 4/11/2022  2:01 PM    B :  Tissue Gastric PATHOLOGY SPECIMEN Sandra Reed M.D. 4/11/2022  2:04 PM    C :  Tissue Esophagus PATHOLOGY SPECIMEN Sandra Reed M.D. 4/11/2022  2:07 PM        Estimated Blood Loss: none    Findings: as above    Complications: none    Recs:  This is an outpatient workup.  She has had a CT abd/pelvis, UGI-SBFT, US pelvis.  No acute abnormalities.  Will follow up on biopsies. Fe replacement.  Unsure if she was taking Fe replacement due to history of gastric bypass        4/11/2022 2:13 PM Sandra Reed M.D.

## 2022-04-11 NOTE — ANESTHESIA TIME REPORT
Anesthesia Start and Stop Event Times     Date Time Event    4/11/2022 1319 Ready for Procedure     1347 Anesthesia Start     1413 Anesthesia Stop        Responsible Staff  04/11/22    Name Role Begin End    Vivienne Gonzalez M.D. Anesth 1347 1413        Overtime Reason:  no overtime (within assigned shift)    Comments:

## 2022-04-11 NOTE — CARE PLAN
The patient is Stable - Low risk of patient condition declining or worsening    Shift Goals  Clinical Goals: pain control, monitor diagnostics  Patient Goals: to have less pain    Progress made toward(s) clinical / shift goals:  vitals are stable    Patient is not progressing towards the following goals:

## 2022-04-11 NOTE — PROGRESS NOTES
Patient back in room after endo. A&Ox4. Rates abdominal pain 7/10 but denies nausea a this time. Sitting up in bed eating jello, requesting a full meal.

## 2022-04-11 NOTE — PROGRESS NOTES
Pt asked RN if she could get a doctor's note for working informing them of the date she was admitted. RN told pt that the doctor had left for the day, but passed it along to the next nurse.

## 2022-04-12 ENCOUNTER — PHARMACY VISIT (OUTPATIENT)
Dept: PHARMACY | Facility: MEDICAL CENTER | Age: 47
End: 2022-04-12
Payer: COMMERCIAL

## 2022-04-12 VITALS
TEMPERATURE: 97.5 F | HEIGHT: 59 IN | WEIGHT: 135.8 LBS | SYSTOLIC BLOOD PRESSURE: 101 MMHG | RESPIRATION RATE: 18 BRPM | HEART RATE: 75 BPM | OXYGEN SATURATION: 96 % | BODY MASS INDEX: 27.38 KG/M2 | DIASTOLIC BLOOD PRESSURE: 73 MMHG

## 2022-04-12 PROBLEM — K56.7 ILEUS (HCC): Status: RESOLVED | Noted: 2022-04-08 | Resolved: 2022-04-12

## 2022-04-12 PROBLEM — Z98.84 S/P GASTRIC BYPASS: Status: ACTIVE | Noted: 2022-04-12

## 2022-04-12 PROBLEM — D50.9 IRON DEFICIENCY ANEMIA: Status: ACTIVE | Noted: 2022-04-12

## 2022-04-12 PROBLEM — N83.202 HEMORRHAGIC CYST OF LEFT OVARY: Status: ACTIVE | Noted: 2022-04-08

## 2022-04-12 PROCEDURE — A9270 NON-COVERED ITEM OR SERVICE: HCPCS | Performed by: STUDENT IN AN ORGANIZED HEALTH CARE EDUCATION/TRAINING PROGRAM

## 2022-04-12 PROCEDURE — RXMED WILLOW AMBULATORY MEDICATION CHARGE: Performed by: HOSPITALIST

## 2022-04-12 PROCEDURE — A9270 NON-COVERED ITEM OR SERVICE: HCPCS | Performed by: GENERAL PRACTICE

## 2022-04-12 PROCEDURE — 700102 HCHG RX REV CODE 250 W/ 637 OVERRIDE(OP): Performed by: INTERNAL MEDICINE

## 2022-04-12 PROCEDURE — 700102 HCHG RX REV CODE 250 W/ 637 OVERRIDE(OP): Performed by: GENERAL PRACTICE

## 2022-04-12 PROCEDURE — A9270 NON-COVERED ITEM OR SERVICE: HCPCS | Performed by: INTERNAL MEDICINE

## 2022-04-12 PROCEDURE — 700111 HCHG RX REV CODE 636 W/ 250 OVERRIDE (IP): Performed by: GENERAL PRACTICE

## 2022-04-12 PROCEDURE — 99239 HOSP IP/OBS DSCHRG MGMT >30: CPT | Performed by: HOSPITALIST

## 2022-04-12 PROCEDURE — 700111 HCHG RX REV CODE 636 W/ 250 OVERRIDE (IP): Performed by: INTERNAL MEDICINE

## 2022-04-12 PROCEDURE — 700102 HCHG RX REV CODE 250 W/ 637 OVERRIDE(OP): Performed by: STUDENT IN AN ORGANIZED HEALTH CARE EDUCATION/TRAINING PROGRAM

## 2022-04-12 RX ORDER — LANOLIN ALCOHOL/MO/W.PET/CERES
325 CREAM (GRAM) TOPICAL DAILY
Qty: 30 TABLET | Refills: 3 | Status: SHIPPED | OUTPATIENT
Start: 2022-04-12 | End: 2022-05-12

## 2022-04-12 RX ORDER — AMOXICILLIN 250 MG
1 CAPSULE ORAL 2 TIMES DAILY
Qty: 60 TABLET | Refills: 3 | Status: SHIPPED | OUTPATIENT
Start: 2022-04-12 | End: 2022-10-26

## 2022-04-12 RX ORDER — ASCORBIC ACID 500 MG
500 TABLET ORAL DAILY
Qty: 30 TABLET | Refills: 3 | Status: SHIPPED | OUTPATIENT
Start: 2022-04-12 | End: 2022-10-26

## 2022-04-12 RX ORDER — ONDANSETRON 4 MG/1
4 TABLET, ORALLY DISINTEGRATING ORAL EVERY 6 HOURS PRN
Qty: 30 TABLET | Refills: 0 | Status: ON HOLD | OUTPATIENT
Start: 2022-04-12 | End: 2022-04-27 | Stop reason: SDUPTHER

## 2022-04-12 RX ADMIN — BUSPIRONE HYDROCHLORIDE 5 MG: 10 TABLET ORAL at 06:21

## 2022-04-12 RX ADMIN — Medication 400 MG: at 06:21

## 2022-04-12 RX ADMIN — DICYCLOMINE HYDROCHLORIDE 10 MG: 10 CAPSULE ORAL at 06:22

## 2022-04-12 RX ADMIN — OXYCODONE HYDROCHLORIDE 10 MG: 10 TABLET ORAL at 06:21

## 2022-04-12 RX ADMIN — ONDANSETRON 4 MG: 2 INJECTION INTRAMUSCULAR; INTRAVENOUS at 08:08

## 2022-04-12 RX ADMIN — GABAPENTIN 300 MG: 300 CAPSULE ORAL at 06:20

## 2022-04-12 RX ADMIN — LEVETIRACETAM 500 MG: 100 SOLUTION ORAL at 06:22

## 2022-04-12 RX ADMIN — HYDROMORPHONE HYDROCHLORIDE 0.5 MG: 1 INJECTION, SOLUTION INTRAMUSCULAR; INTRAVENOUS; SUBCUTANEOUS at 02:59

## 2022-04-12 RX ADMIN — OXYCODONE HYDROCHLORIDE 10 MG: 10 TABLET ORAL at 01:51

## 2022-04-12 RX ADMIN — DULOXETINE HYDROCHLORIDE 60 MG: 30 CAPSULE, DELAYED RELEASE ORAL at 06:20

## 2022-04-12 RX ADMIN — NICOTINE 7 MG: 7 PATCH TRANSDERMAL at 06:22

## 2022-04-12 RX ADMIN — HYDROMORPHONE HYDROCHLORIDE 0.5 MG: 1 INJECTION, SOLUTION INTRAMUSCULAR; INTRAVENOUS; SUBCUTANEOUS at 08:08

## 2022-04-12 RX ADMIN — OXYCODONE HYDROCHLORIDE 10 MG: 10 TABLET ORAL at 09:57

## 2022-04-12 RX ADMIN — LEVOTHYROXINE SODIUM 50 MCG: 0.05 TABLET ORAL at 06:21

## 2022-04-12 ASSESSMENT — ENCOUNTER SYMPTOMS
NAUSEA: 0
EYE DISCHARGE: 0
FALLS: 0
ABDOMINAL PAIN: 1
PALPITATIONS: 0
NERVOUS/ANXIOUS: 1
EYE REDNESS: 0
SHORTNESS OF BREATH: 0
COUGH: 0
FEVER: 0
CHILLS: 0
BRUISES/BLEEDS EASILY: 0
FLANK PAIN: 0
DIARRHEA: 0
POLYDIPSIA: 0
HALLUCINATIONS: 0

## 2022-04-12 NOTE — PROGRESS NOTES
Gastroenterology Progress Note     Author: CLIFF Morris   Date & Time Created: 4/12/2022 9:51 AM    Chief Complaint:  Abdominal pain    Interval History:  4/10/2022: Still with abdominal pain, now more bilateral lower abdominal, pelvic, cramping, severe, constant, wondering if it could be her ovarians. Denied further modifying factors, associated symptoms or timing issues.     4/11/2022: EGD  POSTPROCEDURE DIAGNOSES:    1.  Mild desquamation of the distal esophagus, biopsied.  2.  Double-barrel appearance to gastric pouch without ulceration, biopsies   taken.  3.  Normal-appearing jejunum, biopsies taken.    4/12/2022: Stable. Still with abdominal pain. No nausea or vomiting. Tolerating soft diet.     History of Present Illness per consult note form 4/9/2022:    45 yo female PMH fibromyalgia, gastric bypass RouxNY 2007, seizure disorder,  marijuana user 4 bowls per day x 3 years, who was admitted to the hospital 4/9/22 with a 1.5 years of abdominal pain, describes it as a burning sensation, dry heaves, with occasional vomiting that has progressively gotten worse over the last 2-3 days. She was seen by Dr. Fernandez who recommended admission. Unintentional weight loss 90 lbs in 1 year. VSS. Notable labs: Hgb: 9.8.  Hct: 34.1. CT scan abdomen/pelvis: Adynamic ileus. Recommend follow-up films. A  4 x 2.5 cm left adnexal cyst recommend follow-up pelvic ultrasound. No further episodes of vomiting but complaints of generalized abdominal pain. Bowel movements regular, non bloody. No pain with defecation.     She has found that taking hot showers helps with abdominal pain and nausea.     Previously lived in [place], [place] when she began to have GI symptoms. Attempted colonoscopy 2020 was aborted due to poor prep. She was told she had diverticulitis but does not remember being treated with antibiotics. She moved to [place] last year.     EGD 2010 performed by Dr. Ramírez for abdominal pain:   1. Unremarkable  esophagus.  2.  Small gastric pouch from 35 cm to 40 cm from the incisors.  3.  Tristan-en-Y type anastomosis, with small blind pouch.  The retroflex  views within the pouch and forward facing views do not demonstrate  anastomotic ulcer.  There is suture material visible, however, there is  no evidence of inflammation.  The tristan limb stretching from the stomach  was followed to the extent of the GI endoscope into the jejunum, and no  mucosal abnormality was present.  The distal anastomosis site was not  reached with the GI endoscope.     Review of Systems:  Review of Systems   Constitutional: Negative for chills and fever.   Eyes: Negative for discharge and redness.   Respiratory: Negative for cough and shortness of breath.    Cardiovascular: Negative for chest pain and palpitations.   Gastrointestinal: Positive for abdominal pain. Negative for diarrhea, melena and nausea.   Genitourinary: Negative for flank pain and hematuria.   Musculoskeletal: Negative for falls and joint pain.   Skin: Negative for itching and rash.   Endo/Heme/Allergies: Negative for polydipsia. Does not bruise/bleed easily.   Psychiatric/Behavioral: Negative for hallucinations. The patient is nervous/anxious.        Physical Exam:  Physical Exam  Vitals and nursing note reviewed. Exam conducted with a chaperone present.   Constitutional:       General: She is not in acute distress.     Appearance: She is normal weight. She is ill-appearing. She is not toxic-appearing or diaphoretic.   HENT:      Head: Normocephalic and atraumatic.      Nose: Nose normal. No congestion or rhinorrhea.      Mouth/Throat:      Mouth: Mucous membranes are moist.      Pharynx: Oropharynx is clear. No oropharyngeal exudate.   Eyes:      General: No scleral icterus.        Right eye: No discharge.         Left eye: No discharge.      Conjunctiva/sclera: Conjunctivae normal.      Pupils: Pupils are equal, round, and reactive to light.   Cardiovascular:      Rate and  Rhythm: Normal rate and regular rhythm.      Pulses: Normal pulses.      Heart sounds: Normal heart sounds.   Pulmonary:      Effort: Pulmonary effort is normal. No respiratory distress.      Breath sounds: Normal breath sounds. No stridor. No rhonchi.   Abdominal:      General: Abdomen is flat. Bowel sounds are normal. There is no distension.      Tenderness: There is abdominal tenderness. There is no guarding or rebound.   Musculoskeletal:         General: Normal range of motion.      Cervical back: Normal range of motion and neck supple. No rigidity.      Right lower leg: No edema.   Skin:     General: Skin is warm.      Coloration: Skin is not jaundiced.   Neurological:      General: No focal deficit present.      Mental Status: She is alert. Mental status is at baseline.      Cranial Nerves: No cranial nerve deficit.   Psychiatric:         Mood and Affect: Mood is anxious.         Behavior: Behavior normal.         Labs:          Recent Labs     04/10/22  0115 04/11/22  0046   SODIUM 139 140   POTASSIUM 3.7 4.3   CHLORIDE 107 104   CO2 21 25   BUN 5* 4*   CREATININE 0.45* 0.51   MAGNESIUM 1.9  --    PHOSPHORUS 4.7*  --    CALCIUM 8.6 8.9     Recent Labs     04/10/22  0115 04/11/22  0046   ALTSGPT 12 12   ASTSGOT 23 26   ALKPHOSPHAT 78 87   TBILIRUBIN 0.3 0.2   GLUCOSE 77 80     Recent Labs     04/10/22  0115 04/11/22  0046   RBC 4.54 4.97   HEMOGLOBIN 9.7* 10.6*   HEMATOCRIT 32.6* 35.6*   PLATELETCT 237 285   IRON 26*  --    FERRITIN 7.7*  --    TOTIRONBC 324  --      Recent Labs     04/10/22  0115 04/11/22  0046   WBC 7.0 5.2   NEUTSPOLYS 57.60 52.60   LYMPHOCYTES 25.20 30.50   MONOCYTES 9.00 9.70   EOSINOPHILS 6.60 5.40   BASOPHILS 1.30 1.60   ASTSGOT 23 26   ALTSGPT 12 12   ALKPHOSPHAT 78 87   TBILIRUBIN 0.3 0.2     Hemodynamics:  Temp (24hrs), Av.7 °C (98.1 °F), Min:36.2 °C (97.2 °F), Max:37.8 °C (100 °F)  Temperature: 36.4 °C (97.5 °F)  Pulse  Av.1  Min: 53  Max: 100   Blood Pressure: 101/73      Respiratory:    Respiration: 18, Pulse Oximetry: 96 %        RUL Breath Sounds: Clear, RML Breath Sounds: Clear, RLL Breath Sounds: Clear, AUSTIN Breath Sounds: Clear, LLL Breath Sounds: Clear  Fluids:  No intake or output data in the 24 hours ending 04/10/22 0957     GI/Nutrition:  Orders Placed This Encounter   Procedures   • Diet Order Diet: Low Fiber(GI Soft)     Standing Status:   Standing     Number of Occurrences:   1     Order Specific Question:   Diet:     Answer:   Low Fiber(GI Soft) [2]     Medical Decision Making, by Problem:  Active Hospital Problems    Diagnosis    • *Ileus (HCC) [K56.7]    • Marijuana abuse [F12.10]    • Cyclical vomiting with nausea [R11.15]    • Adnexal cyst [N94.9]    • Weight loss [R63.4]    • Seizure disorder (HCC) [G40.909]    • Hypothyroidism due to acquired atrophy of thyroid [E03.4]    • Migraine [G43.909]      Impression: This is a pleasant 47 yo female who was admitted to the hospital 4/9/22 with chronic abdominal pain x 1.5 years, nausea with dry heaves, occasional vomiting and unintentional weight loss 90 lbs in 1 year.  VSS. Labs: unremarkable. CT scan adynamic ileus with adnexal mass. UGI and SBFT normal. EGD yesterday with some desquamation of the distal esophagus and normal appearing double barrel appearance of the gastric pouch. Biopsies taken     Problems:  1. Cyclic vomiting syndrome from marijuana  2. Bilateral lower abdominal pain  3. Nausea  4. 4 x 2.5 cm left adnexal cyst   5. Weight loss  6. Hypothyroidism  7. Seizure disorder  8. Migraines  9. BMI of 27  10. History of Tristan-en-Y gastric bypass  11. Increased complexity of medical decision making due to aforementioned.      Recommendations:  1. Continue amitriptyline and gabapentin.  2. Continue Bentyl   3. Marijuana cessation.  4. If persistent nausea and vomiting can consider reglan  5. Soft diet with supplementation    GI will sign off. Follow up with [facility] in 8-12 weeks, and biopsy results will be  sent to patient via mail.     Quality-Core Measures

## 2022-04-12 NOTE — PROGRESS NOTES
Assumed care of pt at 0700. Report received by NOC RN. Pt is A&O x 4. Pain is 8/10, medicated per MAR. Pt is sitting up at chair. Plan is to discharge today. Bed in lowest locked position, call light in reach, hourly rounding in place. Labs reviewed, orders reviewed, communication board updated. [facility].

## 2022-04-12 NOTE — PROGRESS NOTES
1140 arrive to dc lounge via wheelchair. A/O, no complaints. DC instructions reviewed w/ pt. Verbalized understanding.  Meds to beds delivered.     DC home with family in stable condition.

## 2022-04-12 NOTE — PROGRESS NOTES
Discharge orders placed, patient aware of discharge plan. Medicated for pain per MAR prior to departure. Meds to beds ordered and pending delivery. Discharge lounge order placed. PIV removed.

## 2022-04-12 NOTE — DISCHARGE SUMMARY
Discharge Summary    CHIEF COMPLAINT ON ADMISSION  Chief Complaint   Patient presents with   • Abdominal Pain   • Vomiting       Reason for Admission  Abdominal pain; N/V     Admission Date  4/8/2022    CODE STATUS  Full Code    HPI & HOSPITAL COURSE     This is a 46 year old female with PMHx of fibromyalgia, daily marijuana use gastric bypass 2007 who presented to the ED on 04/8/2022 with progressive abdominal pain x 1.5 years , patient was sent from Dr. Fernandez's office.      Patient reports has been experiencing abdominal pain with associated nausea and vomiting for a year and a half.  She reports she uses marijuana daily to cope with the pain, however pain is significantly worsened over the past 2-3 days.  She admits to nausea and vomiting, and reports 90 pound weight loss over the past year.     History provided by  at bedside as patient received Ativan, Benadryl, Haldol and unable to answer questions appropriately.  She did answer questions in terms of she was able to pass flatus and bowel movement this morning.      reports over the past year, she has lost about 90 pounds, unintentional.  She is eating the same on a food.  She had a Pap smear last year which was unremarkable.  She is due for mammogram.  There is a history of adnexal cyst, unclear if this has increased or decreased in size.   encouraged to have patient follow-up follow-up with GYN for mammogram and for evaluation of this adnexal cyst.  He does admit she is perimenopausal at this time.  He reports she had an endoscopy and colonoscopy last year which were unremarkable.     Labs unremarkable, lipase negative, pregnancy test negative, CT of the abdomen and pelvis with, oral and IV contrast noted adynamic ileus,   4 x 2.5 cm left adnexal cyst recommend follow-up pelvic ultrasound.     Following day patient still persistent with abdominal pain.  Serial small bowel upper GI series was ordered.  GI consulted.  Patient on supportive  measurement, pain medications, IV fluids and antiemetics.    Small bowel follow through without obstruction.  EGD on 4/11 with mild desquamation noted only.  Biopsy pending pathology read.  Patient tolerating GI soft diet, having bowel movements, ambulatory and ready for dc home with outpatient follow up.  Of note,on 4/10/22 ferritin low at 7.7, patient would benefit from outpatient iron infusions.  She was given 2 days of 5 doses of IV iron prior to discharge home.  Patient is aware and will start oral iron daily with senna-docusate.    Therefore, she is discharged in good and stable condition to home with close outpatient follow-up.    The patient met 2-midnight criteria for an inpatient stay at the time of discharge.    Discharge Date  4/12/22    FOLLOW UP ITEMS POST DISCHARGE  Follow up with Dr. Fernandez for chronic abdominal pain s/p larissa-en-Y gastric bypass 2007.  Follow up Dr. Reed for EGD biopsy result.    DISCHARGE DIAGNOSES  Principal Problem (Resolved):    Ileus (HCC) POA: Yes  Active Problems:    Migraine POA: Yes    Seizure disorder (HCC) POA: Yes    Hypothyroidism due to acquired atrophy of thyroid POA: Yes    Marijuana abuse POA: Yes    Cyclical vomiting with nausea POA: Yes    Hemorrhagic cyst of left ovary POA: Yes    Weight loss POA: Yes    S/P gastric bypass POA: Yes    Iron deficiency anemia POA: Yes      FOLLOW UP  No future appointments.  [facility] - [address] [id number] [phone]  Schedule an appointment as soon as possible for a visit in 1 week  for biopsy results.    Jonnie Fernandez M.D.  [address] [place] [id number] [phone]    Call in 1 day        MEDICATIONS ON DISCHARGE     Medication List      START taking these medications      Instructions   amitriptyline 25 MG Tabs  Commonly known as: ELAVIL   Take 1 Tablet by mouth every evening.  Dose: 25 mg     ascorbic acid 500 MG tablet  Commonly known as: Vitamin C    Take 1 Tablet by mouth every day.  Dose: 500 mg     dicyclomine 10 MG Caps  Commonly known as: BENTYL   Take 1 Capsule by mouth 3 times a day.  Dose: 10 mg     ferrous sulfate 325 (65 Fe) MG EC tablet   Take 1 Tablet by mouth every day for 30 days.  Dose: 325 mg     magnesium oxide 400 MG Tabs tablet  Commonly known as: MAG-OX   Take 1 Tablet by mouth 2 times a day.  Dose: 400 mg     oxyCODONE immediate release 10 MG immediate release tablet  Commonly known as: ROXICODONE   Take 1 Tablet by mouth every 6 hours as needed for Severe Pain for up to 5 days.  Dose: 10 mg     senna-docusate 8.6-50 MG Tabs  Commonly known as: PERICOLACE or SENOKOT S   Take 1 Tablet by mouth 2 times a day.  Dose: 1 Tablet        CHANGE how you take these medications      Instructions   cyanocobalamin 1000 MCG/ML Soln  What changed: See the new instructions.  Commonly known as: VITAMIN B-12   INJECT 1 ML INTRAMUSCULARLY ONCE EVERY 30 DAYS        CONTINUE taking these medications      Instructions   busPIRone 5 MG tablet  Commonly known as: BUSPAR   Take 5 mg by mouth 3 times a day.  Dose: 5 mg     diphenhydrAMINE 25 MG Tabs  Commonly known as: BENADRYL   Take 25 mg by mouth at bedtime as needed for Sleep or Itching.  Dose: 25 mg     DULoxetine 60 MG Cpep delayed-release capsule  Commonly known as: CYMBALTA   Take 60 mg by mouth every day.  Dose: 60 mg     fluticasone 50 MCG/ACT nasal spray  Commonly known as: FLONASE   Spray 1 Spray in nose every day.  Dose: 1 Spray     gabapentin 300 MG Caps  Commonly known as: NEURONTIN   Take 300 mg by mouth 3 times a day.  Dose: 300 mg     levETIRAcetam 100 MG/ML Soln  Commonly known as: KEPPRA   Take 500-1,000 mg by mouth 2 times a day. 500 mg in am and 1000 mg in pm  Dose: 500-1,000 mg     levothyroxine 50 MCG Tabs  Commonly known as: SYNTHROID   Take 1 Tab by mouth Every morning on an empty stomach.  Dose: 50 mcg     LORazepam 1 MG Tabs  Commonly known as: ATIVAN   Take 0.5 mg by mouth 1 time a day as  needed (seizure).  Dose: 0.5 mg     meclizine 12.5 MG Tabs  Commonly known as: ANTIVERT   Take 12.5 mg by mouth 3 times a day as needed for Dizziness or Nausea/Vomiting.  Dose: 12.5 mg     ondansetron 4 MG Tbdp  Commonly known as: ZOFRAN ODT   Dissolve 1 Tablet by mouth every 6 hours as needed for Nausea.  Dose: 4 mg     sumatriptan 100 MG tablet  Commonly known as: IMITREX   Take 100 mg by mouth one time as needed for Migraine.  Dose: 100 mg        STOP taking these medications    ibuprofen 200 MG Tabs  Commonly known as: MOTRIN            Allergies  Allergies   Allergen Reactions   • Morphine Vomiting   • Phentermine Hcl      Swelling short of breath       DIET  Orders Placed This Encounter   Procedures   • Diet Order Diet: Low Fiber(GI Soft)     Standing Status:   Standing     Number of Occurrences:   1     Order Specific Question:   Diet:     Answer:   Low Fiber(GI Soft) [2]       ACTIVITY  As tolerated.  Weight bearing as tolerated    CONSULTATIONS  Dr. Martínez    PROCEDURES  DATE OF PROCEDURE:  04/11/2022      PROCEDURE:  Esophagogastroduodenoscopy with biopsies.     PREPROCEDURE DIAGNOSES:  A 46-year-old female with chronic abdominal pain for   1.5 years.  She has a remote Tristan-en-Y gastric bypass.  She has had   unintentional 90-pound weight loss past one year.  She has had nausea and   vomiting.  Smokes marijuana regularly.  She also has microcytic anemia and   unknown if she was taking supplementation prior to this admission.     POSTPROCEDURE DIAGNOSES:    1.  Mild desquamation of the distal esophagus, biopsied.  2.  Double-barrel appearance to gastric pouch without ulceration, biopsies   taken.  3.  Normal-appearing jejunum, biopsies taken.     CONSENT:  Risks, benefits, alternatives explained to the patient.  The patient   gave consent to proceed.     ANESTHESIA:  Monitored anesthesia care.     ANESTHESIOLOGIST:  Vivienne Gonzalez MD     DESCRIPTION OF PROCEDURE:  The patient was turned in the left lateral    decubitus position and anesthesia monitoring was in place.  The Olympus adult   flexible gastroscope was inserted into the esophagus under direct   visualization.  It was slowly advanced to the distal esophagus.  The mucosa   had a desquamating appearance and biopsies were taken.  The endoscope was   advanced into the stomach where superiorly the gastroscope could be advanced   into the jejunum and inferiorly, there was a second smaller gastric pouch   leading to a dead end.  There were no mucosal abnormalities.  A few staples   were seen proximally.  Biopsies were taken of the gastric mucosa.  The   endoscope was advanced into the jejunum with some difficulty but once in the   jejunum, it was advanced without difficulty.  The mucosa of the jejunum   appeared normal.  Biopsies were taken.  The endoscope was pulled back into the   stomach.  Air was removed and the endoscope was removed.  The patient   tolerated the procedure well.  No complications.           ______________________________  ANYA CARTER MD    LABORATORY  Lab Results   Component Value Date    SODIUM 140 04/11/2022    POTASSIUM 4.3 04/11/2022    CHLORIDE 104 04/11/2022    CO2 25 04/11/2022    GLUCOSE 80 04/11/2022    BUN 4 (L) 04/11/2022    CREATININE 0.51 04/11/2022    CREATININE 0.8 03/06/2009        Lab Results   Component Value Date    WBC 5.2 04/11/2022    HEMOGLOBIN 10.6 (L) 04/11/2022    HEMATOCRIT 35.6 (L) 04/11/2022    PLATELETCT 285 04/11/2022        Total time of the discharge process exceeds 38 minutes.

## 2022-04-12 NOTE — DISCHARGE PLANNING
Meds-to-Beds: Discharge prescription orders listed below delivered to patient in discharge lounge by pharmacy technician Anay and pharmacist Davion. RNs Hal and Cady notified. Written information regarding the dispensed prescriptions was provided to the patient including the phone number of the pharmacy to call for any questions. Patient elected to have co-payment billed to patient account.       Current Outpatient Medications   Medication Sig Dispense Refill   • ondansetron (ZOFRAN ODT) 4 MG TABLET DISPERSIBLE Dissolve 1 Tablet by mouth every 6 hours as needed for Nausea. 30 Tablet 0   • ferrous sulfate 325 (65 Fe) MG EC tablet Take 1 Tablet by mouth every day for 30 days. 30 Tablet 3   • senna-docusate (PERICOLACE OR SENOKOT S) 8.6-50 MG Tab Take 1 Tablet by mouth 2 times a day. 60 Tablet 3   • ascorbic acid (VITAMIN C) 500 MG tablet Take 1 Tablet by mouth every day. 30 Tablet 3   • amitriptyline (ELAVIL) 25 MG Tab Take 1 Tablet by mouth every evening. 30 Tablet 0   • dicyclomine (BENTYL) 10 MG Cap Take 1 Capsule by mouth 3 times a day. 90 Capsule 0   • magnesium oxide (MAG-OX) 400 MG Tab tablet Take 1 Tablet by mouth 2 times a day. 30 Tablet 0   • oxyCODONE immediate release (ROXICODONE) 10 MG immediate release tablet Take 1 Tablet by mouth every 6 hours as needed for Severe Pain for up to 5 days. 20 Tablet 0      Katherin Kwan, PharmD

## 2022-04-12 NOTE — DISCHARGE INSTRUCTIONS
Discharge Instructions    Discharged to home by car with relative. Discharged via wheelchair, hospital escort: Yes.  Special equipment needed: Not Applicable    Be sure to schedule a follow-up appointment with your primary care doctor or any specialists as instructed.     Discharge Plan:   Diet Plan: Discussed  Activity Level: Discussed  Confirmed Follow up Appointment: Patient to Call and Schedule Appointment  Confirmed Symptoms Management: Discussed  Medication Reconciliation Updated: Yes    I understand that a diet low in cholesterol, fat, and sodium is recommended for good health. Unless I have been given specific instructions below for another diet, I accept this instruction as my diet prescription.   Other diet:     Special Instructions: None    · Is patient discharged on Warfarin / Coumadin?   No     Depression / Suicide Risk    As you are discharged from this [facility] facility, it is important to learn how to keep safe from harming yourself.    Recognize the warning signs:  · Abrupt changes in personality, positive or negative- including increase in energy   · Giving away possessions  · Change in eating patterns- significant weight changes-  positive or negative  · Change in sleeping patterns- unable to sleep or sleeping all the time   · Unwillingness or inability to communicate  · Depression  · Unusual sadness, discouragement and loneliness  · Talk of wanting to die  · Neglect of personal appearance   · Rebelliousness- reckless behavior  · Withdrawal from people/activities they love  · Confusion- inability to concentrate     If you or a loved one observes any of these behaviors or has concerns about self-harm, here's what you can do:  · Talk about it- your feelings and reasons for harming yourself    Ileus    Ileus is a condition that happens when the intestines, which are also called bowels, stop working correctly. The intestines are hollow organs that digest food after the food leaves the stomach.  "These organs are long, muscular tubes that connect the stomach to the rectum. When ileus occurs, the muscular contractions that cause food to move through the intestines do not happen as they normally would.  If the intestines stop working, food cannot pass through to get digested. This condition is a serious problem that usually requires hospitalization. It can cause symptoms such as nausea, abdominal pain, and bloating. Ileus can last from a few hours to a few days.  What are the causes?  This condition may be caused by:  Surgery on the abdomen.  An infection or inflammation in the abdomen. This includes inflammation of the lining of the abdomen (peritonitis).  Infection or inflammation in other parts of the body, such as pneumonia or pancreatitis.  Passage of gallstones or kidney stones.  Damage to the nerves or blood vessels that go to the intestines.  A collection of blood within the abdominal cavity.  Imbalance in the salts in the blood (electrolytes).  Injury to the brain or spinal cord.  Medicines. Many medicines, including strong pain medicines, can cause ileus or make it worse.  If the intestines stop working because of a blockage, that is a different condition that is called a bowel obstruction.  What are the signs or symptoms?  Symptoms of this condition include:  Bloating of the abdomen.  Pain or discomfort in the abdomen.  Poor appetite.  Nausea and vomiting.  Lack of normal bowel sounds, such as \"growling\" in the stomach.  How is this diagnosed?  This condition may be diagnosed with:  A physical exam and medical history.  X-rays or a CT scan of the abdomen.  You may also have other tests to help find the cause of the condition.  How is this treated?  This condition may be treated by:  Resting the intestines until they start to work again. This is often done by:  Stopping oral intake of food and drink. You will be given fluid through an IV to prevent dehydration.  Placing a small tube (nasogastric tube " or NG tube) that is passed through your nose and into your stomach. The tube is attached to a suction device and keeps the stomach emptied out. This allows the bowels to rest and helps to reduce nausea and vomiting.  Correcting any electrolyte imbalance by giving supplements in the IV fluid.  Stopping any medicines that might make ileus worse.  Treating any condition that may have caused ileus.  Follow these instructions at home:  Eating and drinking    Follow instructions from your health care provider about:  What to eat and drink. You may be told to start eating a bland diet. Over time, you may slowly resume a more normal, healthy diet.  How much to eat and drink. You should eat small meals often and stop eating when you feel full.  Avoid alcohol.  General instructions  Take over-the-counter and prescription medicines only as told by your health care provider.  Rest as told by your health care provider.  Avoid sitting for a long time without moving. Get up to take short walks every 1-2 hours. Ask for help if you feel weak or unsteady.  Keep all follow-up visits as told by your health care provider. This is important.  Contact a health care provider if:  You have nausea, vomiting, or abdominal discomfort.  You have a fever.  Get help right away if:  You have severe abdominal pain or bloating.  You cannot eat or drink without vomiting.  Summary  Ileus is a condition that happens when the intestines, which are also called bowels, stop working correctly.  When ileus occurs, the muscular contractions that cause food to move through the intestines do not happen as they normally would.  Ileus can cause symptoms such as nausea, abdominal pain, and bloating.  Treatment may involve getting IV fluids and having a nasogastric tube placed to keep your stomach emptied out until the intestines start working again.  This information is not intended to replace advice given to you by your health care provider. Make sure you  discuss any questions you have with your health care provider.  Document Released: 12/20/2004 Document Revised: 04/15/2019 Document Reviewed: 04/15/2019  Elsevier Patient Education © 2020 [employer]  · Remove any means that you might use to hurt yourself (examples: pills, rope, extension cords, firearm)  · Get professional help from the community (Mental Health, Substance Abuse, psychological counseling)  · Do not be alone:Call your Safe Contact- someone whom you trust who will be there for you.  · Call your local CRISIS HOTLINE 470-5146 or 206-450-9944  · Call your local Children's Mobile Crisis Response Team [place] (351) 192-5627 or www.[employer]  · Call the toll free National Suicide Prevention Hotlines   · National Suicide Prevention Lifeline 750-360-XWIR (1211)  · National Hope Line Network 800-SUICIDE (550-1375)    Infection Prevention in the Home  If you have an infection, may have been exposed to an infection, or are taking care of someone who has an infection, it is important to know how to keep the infection from spreading. Follow your health care provider's instructions and use these guidelines to help stop the spread of infection.  How infections are spread  In order for an infection to spread, the following must be present:  · A germ. This may be a virus, bacteria, fungus, or parasite.  · A place for the germ to live. This may be:  ? On or in a person, animal, plant, or food.  ? In soil or water.  ? On surfaces, such as a door handle.  · A person or animal who can develop a disease if the germ enters the body (host). The host does not have resistance to the germ.  · A way for the germ to enter the host. This may occur by:  ? Direct contact with an infected person or animal. This can happen through shaking hands or hugging. Some germs can also travel through the air and spread to others. This can happen when an infected person coughs or sneezes on or near other people.  ? Indirect  contact. This occurs when the germ enters the host through contact with an infected object. Examples include:  § Eating or drinking food or water that has the germ (is contaminated).  § Touching a contaminated surface with your hands, and then touching your face, eyes, nose, or mouth.  Supplies needed:  · Soap.  · Alcohol-based hand .  · Standard cleaning products.  · Disinfectants, such as bleach.  · Reusable cleaning cloths, sponges, or paper towels.  · Disposable or reusable utility gloves.  How to prevent infection from spreading  There are several things that you can do to help prevent infection from spreading.  Take these general actions  Everyone should take the following actions to prevent the spread of infection:  · Wash your hands often with soap and water for at least 20 seconds. If soap and water are not available, use alcohol-based hand .  · Avoid touching your face, mouth, nose, or eyes.  · Cough or sneeze into a tissue, sleeve, or elbow instead of into your hand or into the air.  ? If you cough or sneeze into a tissue, throw it away immediately and wash your hands.    Keep your bathroom clean  · Provide soap.  · Change towels and washcloths frequently.  · Change toothbrushes often and store them separately in a clean, dry place.  · Clean and disinfect all surfaces, including the toilet, floor, tub, shower, and sink.  · Do not share personal items, such as razors, toothbrushes, deodorant, brito, brushes, towels, and washcloths.  Maintain hygiene in the kitchen    · Wash your hands before and after preparing food and before you eat.  · Clean the inside of your refrigerator each week.  · Keep your refrigerator set at 40°F (4°C) or less, and set your freezer at 0°F (-18°C) or less.  · Keep work surfaces clean. Disinfect them regularly.  · Wash your dishes in hot, soapy water. Air-dry your dishes or use a .  · Do not share dishes or eating utensils.  Handle food safely  · Store  food carefully.  · Refrigerate leftovers promptly in covered containers.  · Throw out stale or spoiled food.  · Thaw foods in the refrigerator or microwave, not at room temperature.  · Serve foods at the proper temperature. Do not eat raw meat. Make sure it is cooked to the appropriate temperature. Cook eggs until they are firm.  · Wash fruits and vegetables under running water.  · Use separate cutting boards, plates, and utensils for raw foods and cooked foods.  · Use a clean spoon each time you sample food while cooking.  Do laundry the right way  · Wear gloves if laundry is visibly soiled.  · Do not shake soiled laundry. Doing that may send germs into the air.  · Wash laundry in hot water.  · If you cannot wash the laundry right away, place it in a plastic bag and wash it as soon as possible.  Be careful around animals and pets  · Wash your hands before and after touching animals.  · If you have a pet, ensure that your pet stays clean. Do not let people with weak immune systems touch bird droppings, fish tank water, or a litter box.  ? If you have a pet cage or litter box, be sure to clean it every day.  · If you are sick, stay away from animals and have someone else care for them if possible.  How to clean and disinfect objects and surfaces  Precautions  · Some disinfectants work for certain germs and not others. Read the 's instructions or read online resources to determine if the product you are using will work for the germ you are trying to remove.  · If you choose to use bleach, use it safely. Never mix it with other cleaning products, especially those that contain ammonia. This mixture can create a dangerous gas that may be deadly.  · Keep proper movement of fresh air in your home (ventilation).  · Pour used mop water down the utility sink or toilet. Do not pour this water down the kitchen sink.  Objects and surfaces    · If surfaces are visibly soiled, clean them first with soap and water  before disinfecting.  · Disinfect surfaces that are frequently touched every day. This may include:  ? Counters.  ? Tables.  ? Doorknobs.  ? Sinks and faucets.  ? Electronics, such as:  § Phones.  § Remote controls.  § Keyboards.  § Computers and tablets.  Cleaning supplies  Some cleaning supplies can breed germs. Take good care of them to prevent germs from spreading. To do this:  · Soak toilet brushes, mops, and sponges in bleach and water for 5 minutes after each use, or according to 's instructions.  · Wash reusable cleaning cloths and sanitize sponges after each use.  · Throw away disposable gloves after one use.  · Replace reusable utility gloves if they are cracked or torn or if they start to peel.  Additional actions if you are sick  If you live with other people:    · Avoid close contact with those around you. Stay at least 3 ft (1 m) away from others, if possible.  · Use a separate bathroom, if possible.  · If possible, sleep in a separate bedroom or in a separate bed to prevent infecting other household members.  ? Change bedroom linens each week or whenever they are soiled.  · Have everyone in the household wash hands often with soap and water. If soap and water are not available, use alcohol-based hand .  In general:  · Stay home except to get medical care. Call ahead before visiting your health care provider.  · Ask others to get groceries and household supplies and to refill prescriptions for you.  · Avoid public areas. Try not to take public transportation.  · If you can, wear a mask if you need to go out of the house, or if you are in close contact with someone who is not sick.  · Avoid visitors until you have completely recovered, or until you have no signs and symptoms of infection.  · Avoid preparing food or providing care for others. If you must prepare food or provide care for others, wear a mask and wash your hands before and after doing these things.  Where to find more  information  · Centers for Disease Control and Prevention: www.cdc.gov/nonpharmaceutical-interventions/index.html  · World Health Organization (WHO): www.who.int/infection-prevention/about/en/  · Association for Professionals in Infection Control and Epidemiology: professionals.site.apic.org/settings-of-care/non-healthcare-setting/home/  Summary  · It is important to know how to keep the infection from spreading.  · Make sure everyone in your household washes their hands often with soap and water.  · Disinfect surfaces that are frequently touched every day.  · If you are sick, stay home except to get medical care.  This information is not intended to replace advice given to you by your health care provider. Make sure you discuss any questions you have with your health care provider.  Document Released: 09/26/2009 Document Revised: 04/14/2020 Document Reviewed: 03/13/2020  Elsevier Patient Education © 2020 [employer]vier Inc.

## 2022-04-12 NOTE — CARE PLAN
Problem: Pain - Standard  Goal: Alleviation of pain or a reduction in pain to the patient’s comfort goal  Outcome: Progressing     Problem: Fall Risk  Goal: Patient will remain free from falls  Outcome: Progressing   The patient is Stable - Low risk of patient condition declining or worsening    Shift Goals  Clinical Goals: Pain control  Patient Goals: comfort    Progress made toward(s) clinical / shift goals:  Patient is at comfort goal. Patient remains free from falls.     Patient is not progressing towards the following goals:

## 2022-04-12 NOTE — PROGRESS NOTES
Assumed care of patient at 1900 from [facility]. Patient is A&O x4, states pain level is 0/10. Bed locked in lowest position with 2 rail up. Call light  in place, belongings at bedside. Hourly rounding is in place.

## 2022-04-19 ENCOUNTER — PRE-ADMISSION TESTING (OUTPATIENT)
Dept: ADMISSIONS | Facility: MEDICAL CENTER | Age: 47
End: 2022-04-19
Attending: COLON & RECTAL SURGERY
Payer: COMMERCIAL

## 2022-04-19 DIAGNOSIS — Z01.810 PRE-OPERATIVE CARDIOVASCULAR EXAMINATION: ICD-10-CM

## 2022-04-19 LAB — EKG IMPRESSION: NORMAL

## 2022-04-19 PROCEDURE — 93010 ELECTROCARDIOGRAM REPORT: CPT | Performed by: INTERNAL MEDICINE

## 2022-04-19 PROCEDURE — 93005 ELECTROCARDIOGRAM TRACING: CPT

## 2022-04-19 ASSESSMENT — FIBROSIS 4 INDEX: FIB4 SCORE: 1.19

## 2022-04-26 NOTE — OR NURSING
COVID-19 Pre-surgery screening:    Do you have an undiagnosed respiratory illness or symptoms such as coughing or sneezing? No  Onset of Sx  Acute vs. chronic respiratory illness    Do you have an unexplained fever greater than 100.4 degrees Fahrenheit or 38 degrees Celsius? No    Have you had direct exposure to a patient who tested positive for Covid-19? No    Have you had any loss of your taste or smell,  Any N/V or sore throat? No    Patient has been informed of 2 visitor policy and asked to wear a mask upon entering the hospital. Yes

## 2022-04-27 ENCOUNTER — ANESTHESIA (OUTPATIENT)
Dept: SURGERY | Facility: MEDICAL CENTER | Age: 47
End: 2022-04-27
Payer: COMMERCIAL

## 2022-04-27 ENCOUNTER — PHARMACY VISIT (OUTPATIENT)
Dept: PHARMACY | Facility: MEDICAL CENTER | Age: 47
End: 2022-04-27
Payer: COMMERCIAL

## 2022-04-27 ENCOUNTER — HOSPITAL ENCOUNTER (OUTPATIENT)
Facility: MEDICAL CENTER | Age: 47
End: 2022-04-27
Attending: COLON & RECTAL SURGERY | Admitting: COLON & RECTAL SURGERY
Payer: COMMERCIAL

## 2022-04-27 ENCOUNTER — ANESTHESIA EVENT (OUTPATIENT)
Dept: SURGERY | Facility: MEDICAL CENTER | Age: 47
End: 2022-04-27
Payer: COMMERCIAL

## 2022-04-27 VITALS
RESPIRATION RATE: 18 BRPM | WEIGHT: 131.84 LBS | BODY MASS INDEX: 25.88 KG/M2 | HEIGHT: 60 IN | HEART RATE: 75 BPM | SYSTOLIC BLOOD PRESSURE: 155 MMHG | TEMPERATURE: 97.3 F | OXYGEN SATURATION: 99 % | DIASTOLIC BLOOD PRESSURE: 75 MMHG

## 2022-04-27 DIAGNOSIS — K59.00 CONSTIPATION, UNSPECIFIED CONSTIPATION TYPE: ICD-10-CM

## 2022-04-27 DIAGNOSIS — R11.15 CYCLICAL VOMITING WITH NAUSEA: ICD-10-CM

## 2022-04-27 DIAGNOSIS — G89.18 POSTOPERATIVE PAIN: ICD-10-CM

## 2022-04-27 LAB — HCG UR QL: NEGATIVE

## 2022-04-27 PROCEDURE — RXMED WILLOW AMBULATORY MEDICATION CHARGE: Performed by: STUDENT IN AN ORGANIZED HEALTH CARE EDUCATION/TRAINING PROGRAM

## 2022-04-27 PROCEDURE — 700111 HCHG RX REV CODE 636 W/ 250 OVERRIDE (IP): Performed by: ANESTHESIOLOGY

## 2022-04-27 PROCEDURE — 160028 HCHG SURGERY MINUTES - 1ST 30 MINS LEVEL 3: Performed by: COLON & RECTAL SURGERY

## 2022-04-27 PROCEDURE — 81025 URINE PREGNANCY TEST: CPT

## 2022-04-27 PROCEDURE — 160036 HCHG PACU - EA ADDL 30 MINS PHASE I: Performed by: COLON & RECTAL SURGERY

## 2022-04-27 PROCEDURE — 700101 HCHG RX REV CODE 250: Performed by: ANESTHESIOLOGY

## 2022-04-27 PROCEDURE — 700105 HCHG RX REV CODE 258: Performed by: ANESTHESIOLOGY

## 2022-04-27 PROCEDURE — 501838 HCHG SUTURE GENERAL: Performed by: COLON & RECTAL SURGERY

## 2022-04-27 PROCEDURE — 160035 HCHG PACU - 1ST 60 MINS PHASE I: Performed by: COLON & RECTAL SURGERY

## 2022-04-27 PROCEDURE — A9270 NON-COVERED ITEM OR SERVICE: HCPCS | Performed by: ANESTHESIOLOGY

## 2022-04-27 PROCEDURE — 160039 HCHG SURGERY MINUTES - EA ADDL 1 MIN LEVEL 3: Performed by: COLON & RECTAL SURGERY

## 2022-04-27 PROCEDURE — 160025 RECOVERY II MINUTES (STATS): Performed by: COLON & RECTAL SURGERY

## 2022-04-27 PROCEDURE — 700105 HCHG RX REV CODE 258: Performed by: COLON & RECTAL SURGERY

## 2022-04-27 PROCEDURE — 501570 HCHG TROCAR, SEPARATOR: Performed by: COLON & RECTAL SURGERY

## 2022-04-27 PROCEDURE — 00790 ANES IPER UPR ABD NOS: CPT | Performed by: ANESTHESIOLOGY

## 2022-04-27 PROCEDURE — 700102 HCHG RX REV CODE 250 W/ 637 OVERRIDE(OP): Performed by: ANESTHESIOLOGY

## 2022-04-27 PROCEDURE — 160046 HCHG PACU - 1ST 60 MINS PHASE II: Performed by: COLON & RECTAL SURGERY

## 2022-04-27 PROCEDURE — 700101 HCHG RX REV CODE 250: Performed by: COLON & RECTAL SURGERY

## 2022-04-27 PROCEDURE — 501571 HCHG TROCAR, SEPARATOR 12X100: Performed by: COLON & RECTAL SURGERY

## 2022-04-27 PROCEDURE — 160009 HCHG ANES TIME/MIN: Performed by: COLON & RECTAL SURGERY

## 2022-04-27 PROCEDURE — 160002 HCHG RECOVERY MINUTES (STAT): Performed by: COLON & RECTAL SURGERY

## 2022-04-27 PROCEDURE — 160048 HCHG OR STATISTICAL LEVEL 1-5: Performed by: COLON & RECTAL SURGERY

## 2022-04-27 RX ORDER — SODIUM CHLORIDE, SODIUM LACTATE, POTASSIUM CHLORIDE, CALCIUM CHLORIDE 600; 310; 30; 20 MG/100ML; MG/100ML; MG/100ML; MG/100ML
INJECTION, SOLUTION INTRAVENOUS CONTINUOUS
Status: DISCONTINUED | OUTPATIENT
Start: 2022-04-27 | End: 2022-04-27 | Stop reason: HOSPADM

## 2022-04-27 RX ORDER — OXYCODONE HCL 5 MG/5 ML
10 SOLUTION, ORAL ORAL
Status: COMPLETED | OUTPATIENT
Start: 2022-04-27 | End: 2022-04-27

## 2022-04-27 RX ORDER — HYDROMORPHONE HYDROCHLORIDE 1 MG/ML
0.1 INJECTION, SOLUTION INTRAMUSCULAR; INTRAVENOUS; SUBCUTANEOUS
Status: DISCONTINUED | OUTPATIENT
Start: 2022-04-27 | End: 2022-04-27 | Stop reason: HOSPADM

## 2022-04-27 RX ORDER — OXYCODONE HCL 5 MG/5 ML
5 SOLUTION, ORAL ORAL
Status: COMPLETED | OUTPATIENT
Start: 2022-04-27 | End: 2022-04-27

## 2022-04-27 RX ORDER — ONDANSETRON 4 MG/1
4 TABLET, ORALLY DISINTEGRATING ORAL EVERY 6 HOURS PRN
Qty: 10 TABLET | Refills: 0 | Status: SHIPPED | OUTPATIENT
Start: 2022-04-27 | End: 2023-01-04

## 2022-04-27 RX ORDER — ONDANSETRON 2 MG/ML
4 INJECTION INTRAMUSCULAR; INTRAVENOUS
Status: DISCONTINUED | OUTPATIENT
Start: 2022-04-27 | End: 2022-04-27 | Stop reason: HOSPADM

## 2022-04-27 RX ORDER — HALOPERIDOL 5 MG/ML
1 INJECTION INTRAMUSCULAR
Status: DISCONTINUED | OUTPATIENT
Start: 2022-04-27 | End: 2022-04-27 | Stop reason: HOSPADM

## 2022-04-27 RX ORDER — MIDAZOLAM HYDROCHLORIDE 1 MG/ML
INJECTION INTRAMUSCULAR; INTRAVENOUS
Status: COMPLETED
Start: 2022-04-27 | End: 2022-04-27

## 2022-04-27 RX ORDER — CEFAZOLIN SODIUM 1 G/3ML
INJECTION, POWDER, FOR SOLUTION INTRAMUSCULAR; INTRAVENOUS PRN
Status: DISCONTINUED | OUTPATIENT
Start: 2022-04-27 | End: 2022-04-27 | Stop reason: SURG

## 2022-04-27 RX ORDER — SENNOSIDES 8.6 MG
1 TABLET ORAL
Qty: 14 TABLET | Refills: 0 | Status: SHIPPED | OUTPATIENT
Start: 2022-04-27 | End: 2022-05-11

## 2022-04-27 RX ORDER — SODIUM CHLORIDE, SODIUM LACTATE, POTASSIUM CHLORIDE, CALCIUM CHLORIDE 600; 310; 30; 20 MG/100ML; MG/100ML; MG/100ML; MG/100ML
INJECTION, SOLUTION INTRAVENOUS CONTINUOUS
Status: ACTIVE | OUTPATIENT
Start: 2022-04-27 | End: 2022-04-27

## 2022-04-27 RX ORDER — OXYCODONE HYDROCHLORIDE 10 MG/1
10 TABLET ORAL EVERY 6 HOURS PRN
COMMUNITY
Start: 2022-04-19 | End: 2022-10-26

## 2022-04-27 RX ORDER — HYDROMORPHONE HYDROCHLORIDE 1 MG/ML
0.2 INJECTION, SOLUTION INTRAMUSCULAR; INTRAVENOUS; SUBCUTANEOUS
Status: DISCONTINUED | OUTPATIENT
Start: 2022-04-27 | End: 2022-04-27 | Stop reason: HOSPADM

## 2022-04-27 RX ORDER — OXYCODONE HYDROCHLORIDE 5 MG/1
10 TABLET ORAL EVERY 4 HOURS PRN
Qty: 30 TABLET | Refills: 0 | Status: SHIPPED | OUTPATIENT
Start: 2022-04-27 | End: 2022-05-07

## 2022-04-27 RX ORDER — HYDRALAZINE HYDROCHLORIDE 20 MG/ML
5 INJECTION INTRAMUSCULAR; INTRAVENOUS
Status: DISCONTINUED | OUTPATIENT
Start: 2022-04-27 | End: 2022-04-27 | Stop reason: HOSPADM

## 2022-04-27 RX ORDER — ROCURONIUM BROMIDE 10 MG/ML
INJECTION, SOLUTION INTRAVENOUS PRN
Status: DISCONTINUED | OUTPATIENT
Start: 2022-04-27 | End: 2022-04-27 | Stop reason: SURG

## 2022-04-27 RX ORDER — LIDOCAINE HYDROCHLORIDE 20 MG/ML
INJECTION, SOLUTION EPIDURAL; INFILTRATION; INTRACAUDAL; PERINEURAL PRN
Status: DISCONTINUED | OUTPATIENT
Start: 2022-04-27 | End: 2022-04-27 | Stop reason: SURG

## 2022-04-27 RX ORDER — MEPERIDINE HYDROCHLORIDE 25 MG/ML
12.5 INJECTION INTRAMUSCULAR; INTRAVENOUS; SUBCUTANEOUS
Status: DISCONTINUED | OUTPATIENT
Start: 2022-04-27 | End: 2022-04-27 | Stop reason: HOSPADM

## 2022-04-27 RX ORDER — LABETALOL HYDROCHLORIDE 5 MG/ML
5 INJECTION, SOLUTION INTRAVENOUS
Status: DISCONTINUED | OUTPATIENT
Start: 2022-04-27 | End: 2022-04-27 | Stop reason: HOSPADM

## 2022-04-27 RX ORDER — METOPROLOL TARTRATE 1 MG/ML
1 INJECTION, SOLUTION INTRAVENOUS
Status: DISCONTINUED | OUTPATIENT
Start: 2022-04-27 | End: 2022-04-27 | Stop reason: HOSPADM

## 2022-04-27 RX ORDER — DEXAMETHASONE SODIUM PHOSPHATE 4 MG/ML
INJECTION, SOLUTION INTRA-ARTICULAR; INTRALESIONAL; INTRAMUSCULAR; INTRAVENOUS; SOFT TISSUE PRN
Status: DISCONTINUED | OUTPATIENT
Start: 2022-04-27 | End: 2022-04-27 | Stop reason: SURG

## 2022-04-27 RX ORDER — HYDROMORPHONE HYDROCHLORIDE 1 MG/ML
0.4 INJECTION, SOLUTION INTRAMUSCULAR; INTRAVENOUS; SUBCUTANEOUS
Status: DISCONTINUED | OUTPATIENT
Start: 2022-04-27 | End: 2022-04-27 | Stop reason: HOSPADM

## 2022-04-27 RX ORDER — ONDANSETRON 2 MG/ML
INJECTION INTRAMUSCULAR; INTRAVENOUS PRN
Status: DISCONTINUED | OUTPATIENT
Start: 2022-04-27 | End: 2022-04-27 | Stop reason: SURG

## 2022-04-27 RX ORDER — BUPIVACAINE HYDROCHLORIDE AND EPINEPHRINE 5; 5 MG/ML; UG/ML
INJECTION, SOLUTION EPIDURAL; INTRACAUDAL; PERINEURAL
Status: DISCONTINUED | OUTPATIENT
Start: 2022-04-27 | End: 2022-04-27 | Stop reason: HOSPADM

## 2022-04-27 RX ORDER — MIDAZOLAM HYDROCHLORIDE 1 MG/ML
1 INJECTION INTRAMUSCULAR; INTRAVENOUS
Status: DISCONTINUED | OUTPATIENT
Start: 2022-04-27 | End: 2022-04-27 | Stop reason: HOSPADM

## 2022-04-27 RX ORDER — SODIUM CHLORIDE, SODIUM LACTATE, POTASSIUM CHLORIDE, CALCIUM CHLORIDE 600; 310; 30; 20 MG/100ML; MG/100ML; MG/100ML; MG/100ML
INJECTION, SOLUTION INTRAVENOUS
Status: DISCONTINUED | OUTPATIENT
Start: 2022-04-27 | End: 2022-04-27 | Stop reason: SURG

## 2022-04-27 RX ORDER — DIPHENHYDRAMINE HYDROCHLORIDE 50 MG/ML
12.5 INJECTION INTRAMUSCULAR; INTRAVENOUS
Status: DISCONTINUED | OUTPATIENT
Start: 2022-04-27 | End: 2022-04-27 | Stop reason: HOSPADM

## 2022-04-27 RX ADMIN — LIDOCAINE HYDROCHLORIDE 60 MG: 20 INJECTION, SOLUTION EPIDURAL; INFILTRATION; INTRACAUDAL at 09:40

## 2022-04-27 RX ADMIN — CEFAZOLIN 2 G: 330 INJECTION, POWDER, FOR SOLUTION INTRAMUSCULAR; INTRAVENOUS at 09:40

## 2022-04-27 RX ADMIN — PROPOFOL 160 MG: 10 INJECTION, EMULSION INTRAVENOUS at 09:40

## 2022-04-27 RX ADMIN — OXYCODONE HYDROCHLORIDE 10 MG: 5 SOLUTION ORAL at 10:26

## 2022-04-27 RX ADMIN — FENTANYL CITRATE 100 MCG: 50 INJECTION, SOLUTION INTRAMUSCULAR; INTRAVENOUS at 09:40

## 2022-04-27 RX ADMIN — FENTANYL CITRATE 50 MCG: 50 INJECTION, SOLUTION INTRAMUSCULAR; INTRAVENOUS at 10:25

## 2022-04-27 RX ADMIN — SODIUM CHLORIDE, POTASSIUM CHLORIDE, SODIUM LACTATE AND CALCIUM CHLORIDE: 600; 310; 30; 20 INJECTION, SOLUTION INTRAVENOUS at 08:54

## 2022-04-27 RX ADMIN — HALOPERIDOL LACTATE 1 MG: 5 INJECTION, SOLUTION INTRAMUSCULAR at 10:21

## 2022-04-27 RX ADMIN — LIDOCAINE HYDROCHLORIDE 0.5 ML: 10 INJECTION, SOLUTION EPIDURAL; INFILTRATION; INTRACAUDAL at 08:54

## 2022-04-27 RX ADMIN — FENTANYL CITRATE 50 MCG: 50 INJECTION, SOLUTION INTRAMUSCULAR; INTRAVENOUS at 10:19

## 2022-04-27 RX ADMIN — ONDANSETRON 4 MG: 2 INJECTION INTRAMUSCULAR; INTRAVENOUS at 10:02

## 2022-04-27 RX ADMIN — ROCURONIUM BROMIDE 50 MG: 10 INJECTION, SOLUTION INTRAVENOUS at 09:40

## 2022-04-27 RX ADMIN — SUGAMMADEX 200 MG: 100 INJECTION, SOLUTION INTRAVENOUS at 10:02

## 2022-04-27 RX ADMIN — SODIUM CHLORIDE, POTASSIUM CHLORIDE, SODIUM LACTATE AND CALCIUM CHLORIDE: 600; 310; 30; 20 INJECTION, SOLUTION INTRAVENOUS at 09:34

## 2022-04-27 RX ADMIN — FENTANYL CITRATE 100 MCG: 50 INJECTION, SOLUTION INTRAMUSCULAR; INTRAVENOUS at 09:30

## 2022-04-27 RX ADMIN — MIDAZOLAM 2 MG: 1 INJECTION INTRAMUSCULAR; INTRAVENOUS at 09:30

## 2022-04-27 RX ADMIN — DEXAMETHASONE SODIUM PHOSPHATE 8 MG: 4 INJECTION, SOLUTION INTRA-ARTICULAR; INTRALESIONAL; INTRAMUSCULAR; INTRAVENOUS; SOFT TISSUE at 09:40

## 2022-04-27 ASSESSMENT — FIBROSIS 4 INDEX: FIB4 SCORE: 1.19

## 2022-04-27 ASSESSMENT — PAIN SCALES - GENERAL: PAIN_LEVEL: 6

## 2022-04-27 ASSESSMENT — PAIN DESCRIPTION - PAIN TYPE: TYPE: CHRONIC PAIN

## 2022-04-27 NOTE — ANESTHESIA PROCEDURE NOTES
Airway    Date/Time: 4/27/2022 9:41 AM  Performed by: Allan Ma D.O.  Authorized by: Allan Ma D.O.     Location:  OR  Urgency:  Elective  Indications for Airway Management:  Anesthesia      Spontaneous Ventilation: absent    Sedation Level:  Deep  Preoxygenated: Yes    Patient Position:  Sniffing  Mask Difficulty Assessment:  1 - vent by mask  Final Airway Type:  Endotracheal airway  Final Endotracheal Airway:  ETT  Cuffed: Yes    Technique Used for Successful ETT Placement:  Direct laryngoscopy    Insertion Site:  Oral  Blade Type:  Leonor  Laryngoscope Blade/Videolaryngoscope Blade Size:  4  ETT Size (mm):  7.0  Measured from:  Teeth  ETT to Teeth (cm):  21  Placement Verified by: auscultation and capnometry    Cormack-Lehane Classification:  Grade I - full view of glottis  Number of Attempts at Approach:  1

## 2022-04-27 NOTE — DISCHARGE INSTRUCTIONS
ACTIVITY: Rest and take it easy for the first 24 hours.  A responsible adult is recommended to remain with you during that time.  It is normal to feel sleepy.  We encourage you to not do anything that requires balance, judgment or coordination.    MILD FLU-LIKE SYMPTOMS ARE NORMAL. YOU MAY EXPERIENCE GENERALIZED MUSCLE ACHES, THROAT IRRITATION, HEADACHE AND/OR SOME NAUSEA.    FOR 24 HOURS DO NOT:  Drive, operate machinery or run household appliances.  Drink beer or alcoholic beverages.   Make important decisions or sign legal documents.    SPECIAL INSTRUCTIONS:     DIET: To avoid nausea, slowly advance diet as tolerated, avoiding spicy or greasy foods for the first day.  Add more substantial food to your diet according to your physician's instructions.  Babies can be fed formula or breast milk as soon as they are hungry.  INCREASE FLUIDS AND FIBER TO AVOID CONSTIPATION.    SURGICAL DRESSING/BATHING:     You may get the wound wet 2 days after surgery.   You may shower, but do not submerge in a bath for at least a week.   Dressings may come off after 48 hours.   You have steri-strips under your dressings.   These steri-strips should stay in place. They will fall off over 5-7 days.     FOLLOW-UP APPOINTMENT:  A follow-up appointment should be arranged with your doctor in 1-2 weeks; call to schedule.    You should CALL YOUR PHYSICIAN if you develop:  Fever greater than 101 degrees F.  Pain not relieved by medication, or persistent nausea or vomiting.  Excessive bleeding (blood soaking through dressing) or unexpected drainage from the wound.  Extreme redness or swelling around the incision site, drainage of pus or foul smelling drainage.  Inability to urinate or empty your bladder within 8 hours.  Problems with breathing or chest pain.    You should call 911 if you develop problems with breathing or chest pain.  If you are unable to contact your doctor or surgical center, you should go to the nearest emergency room or  urgent care center.  Physician's telephone #: [phone]    If any questions arise, call your doctor.  If your doctor is not available, please feel free to call the Surgical Center at [phone].     A registered nurse may call you a few days after your surgery to see how you are doing after your procedure.    MEDICATIONS: Resume taking daily medication.  Take prescribed pain medication with food.  If no medication is prescribed, you may take non-aspirin pain medication if needed.  PAIN MEDICATION CAN BE VERY CONSTIPATING.  Take a stool softener or laxative such as senokot, pericolace, or milk of magnesia if needed.    Prescription given for oxycodone.  Last pain medication given at 1036.    If your physician has prescribed pain medication that includes Acetaminophen (Tylenol), do not take additional Acetaminophen (Tylenol) while taking the prescribed medication.    Depression / Suicide Risk    As you are discharged from this [facility] facility, it is important to learn how to keep safe from harming yourself.    Recognize the warning signs:  · Abrupt changes in personality, positive or negative- including increase in energy   · Giving away possessions  · Change in eating patterns- significant weight changes-  positive or negative  · Change in sleeping patterns- unable to sleep or sleeping all the time   · Unwillingness or inability to communicate  · Depression  · Unusual sadness, discouragement and loneliness  · Talk of wanting to die  · Neglect of personal appearance   · Rebelliousness- reckless behavior  · Withdrawal from people/activities they love  · Confusion- inability to concentrate     If you or a loved one observes any of these behaviors or has concerns about self-harm, here's what you can do:  · Talk about it- your feelings and reasons for harming yourself  · Remove any means that you might use to hurt yourself (examples: pills, rope, extension cords, firearm)  · Get professional help from the  community (Mental Health, Substance Abuse, psychological counseling)  · Do not be alone:Call your Safe Contact- someone whom you trust who will be there for you.  · Call your local CRISIS HOTLINE 119-5257 or 034-809-2732  · Call your local Children's Mobile Crisis Response Team [place] (526) 152-8626 or www.[employer]  · Call the toll free National Suicide Prevention Hotlines   · National Suicide Prevention Lifeline 505-989-KNHZ (9043)  · National Hope Line Network 800-SUICIDE (770-6242)

## 2022-04-27 NOTE — OR NURSING
"Pt arrived to PACU from OR at 1012. Report and care of pt received from Anesthesiologist and RN. Pt awake an c/o pain 9/10, medicated per orders, states she is nauseated as well, medicated for nausea. Pt stating to nurse that she is feeling \"seizure-esque\", reassurance and emotional support provided. Pt seen rolling her eyes back, but remains able to make eye contact, nod her head and answer questions throughout, no seizure activity witnessed.     Pt sleeping on gurney and appears in no acute distress, c/o pain 9/10 while awake but able to fall back asleep.      Pt sitting up in gurney, provided with ice chips, tolerating well. Pt  (Ryan) updated on plan of care, he is going to pick her prescription up from the pharmacy. Report to TORRI Solomon in Phase II. Pt transported to Phase II room 32.  "

## 2022-04-27 NOTE — ANESTHESIA POSTPROCEDURE EVALUATION
Patient: Tamela Davis Wallner    Procedure Summary     Date: 04/27/22 Room / Location: [place] [address] / SURGERY [facility]    Anesthesia Start: 0934 Anesthesia Stop: 1015    Procedures:       LAPAROSCOPY - DIAGNOSTIC (Abdomen)      LYSIS, ADHESIONS, LAPAROSCOPIC (Abdomen) Diagnosis: (LEFT UPPER QUADRANT ABDOMINAL PAIN; adhesions)    Surgeons: Jonnie Fernandez M.D. Responsible Provider: Allan Ma D.O.    Anesthesia Type: general ASA Status: 2          Final Anesthesia Type: general  Last vitals  BP   Blood Pressure: 114/63    Temp   36.4 °C (97.6 °F)    Pulse   75   Resp   18    SpO2   92 %      Anesthesia Post Evaluation    Patient location during evaluation: PACU  Patient participation: complete - patient participated  Level of consciousness: awake and alert  Pain score: 6    Airway patency: patent  Anesthetic complications: no  Cardiovascular status: hemodynamically stable  Respiratory status: acceptable  Hydration status: euvolemic    PONV: none          No complications documented.     Nurse Pain Score: 9 (NPRS)

## 2022-04-27 NOTE — ANESTHESIA PREPROCEDURE EVALUATION
Case: [id number] Date/Time: 04/27/22 0945    Procedures:       LAPAROSCOPY - DIAGNOSTIC      LYSIS, ADHESIONS, LAPAROSCOPIC    Pre-op diagnosis: LEFT UPPER QUADRANT ABDOMINAL PAIN    Location: [facility] OR 10 / SURGERY [facility]    Surgeons: Jonnie Fernandez M.D.          Relevant Problems   NEURO   (positive) Migraine   (positive) Seizure disorder (HCC)      CARDIAC   (positive) Migraine      ENDO   (positive) Hypothyroidism due to acquired atrophy of thyroid       Physical Exam    Airway   Mallampati: II  TM distance: >3 FB  Neck ROM: full       Cardiovascular - normal exam  Rhythm: regular  Rate: normal  (-) murmur     Dental - normal exam           Pulmonary - normal exam  Breath sounds clear to auscultation     Abdominal    Neurological - normal exam                 Anesthesia Plan    ASA 2       Plan - general       Airway plan will be ETT          Induction: intravenous    Postoperative Plan: Postoperative administration of opioids is intended.    Pertinent diagnostic labs and testing reviewed    Informed Consent:    Anesthetic plan and risks discussed with patient.    Use of blood products discussed with: patient whom consented to blood products.

## 2022-04-27 NOTE — OP REPORT
DATE OF SERVICE:  04/27/2022     PREOPERATIVE DIAGNOSIS:  Persistent abdominal pain.     POSTOPERATIVE DIAGNOSES:  Normal laparoscopy and pelviscopy.     OPERATIONS PERFORMED:  Diagnostic laparoscopy and pelviscopy.     SURGEON:  Jonnie Fernandez MD     ASSISTANT:  Chelle Gloria PA-C     ANESTHESIOLOGIST:  Allan Ma DO     INDICATIONS FOR PROCEDURE:  The patient is a 46-year-old female who has been   experiencing unremitting abdominal pain.  She describes that this is a 24x7   phenomenon and mostly radiating in the mid lower abdomen across to the right   and the left.  She also has a more severe right lower quadrant abdominal pain,   episodically and also pelvic and bladder discomfort.  There is no   relationship to eating and there is no nausea or vomiting or fevers.  She   comes today for diagnostic laparoscopy.     DETAILS OF PROCEDURE:  After an extensive informed consent discussion process,   the patient was brought to the operating room.  She was placed in a supine   position on the operating table.  After induction of general anesthesia and   placement of an endotracheal tube, the abdomen was prepped and draped in the   usual sterile fashion.  After administration of intravenous antibiotics, local   anesthetic mixture of bupivacaine with epinephrine was used to infiltrate the   skin and subcutaneous tissues.  Left-sided bladeless optical entry trocar was   carefully introduced and the laparoscope was introduced.  Two additional 5 mm   bladeless trocars were placed in the left side.     We then began a careful inspection of the abdomen and pelvis anatomy.     With the patient in Trendelenburg position, a careful exploration of the   pelvis was performed.  This demonstrated a normal uterus and normal right tube   and ovary and a normal left tube and ovary.  There was no evidence of any   masses or visible endometriosis.  There were no adhesions of the small bowel   or large bowel and no  inflammatory change in the rectum or pelvis of any kind.     The area of worst pain in the right side of the abdomen and right lower   quadrant was inspected and appeared normal.  There were no adhesions in the   area.  She had a surgically absent appendix and gallbladder.  The intestines   were all of normal caliber and without inflammatory change.  The liver and   remaining organs and omentum appeared normal.  The omentum was freely mobile   without adhesions.  The remainder of the small bowel was carefully inspected.    An antecolic antegastric Tristan-en-Y gastric bypass anatomy was present.  There   was no evidence of any obstruction or inflammation.  A small area of omentum   was adherent to the left lateral segment of the liver and the Tristan limb. Cold   scissors lysed this adhesion, which appeared to cause no impingement or   obstruction of the bowel.  The remainder of the Tristan limb was entirely normal   and the enteroenterostomy area was normal.  There was no evidence of internal   hernia or obstruction or inflammation.  The remainder of the   pancreaticoduodenal and common channel limbs were all normal.  Final   inspections demonstrated otherwise entirely normal anatomy without a source   identified for her pain.     IMPRESSION:  Normal laparoscopy and pelviscopy.     PLAN:  She has scheduled plans to follow up with her gastroenterologist and   GYN.  Pain syndrome without anatomic identifiable etiology.        ______________________________  MD ELISA NORRIS/DENIA    DD:  04/27/2022 10:18  DT:  04/27/2022 10:52    Job#:  [id number]    CC:CLIFF Johns

## 2022-04-27 NOTE — ANESTHESIA TIME REPORT
Anesthesia Start and Stop Event Times     Date Time Event    4/27/2022 0933 Ready for Procedure     0934 Anesthesia Start     1015 Anesthesia Stop        Responsible Staff  04/27/22    Name Role Begin End    Allan Ma D.O. Anesth 0934 1015        Overtime Reason:  no overtime (within assigned shift)    Comments:

## 2022-10-21 NOTE — H&P
"Obstetrics & Gynecology History & Physical Note    Date  10/21/2022    Primary Care Physician  ALBERTO Johns    CC  Chronic pelvic pain perisistent ovarian cyst   H/o endometriosis.    HPI  This is a 46 y.o. female   Scheduled for   * No Diagnosis Codes entered *  Procedure(s):  TOTAL LAPAROSCOPIC HYSTERECTOMY BILATERAL SALPINGO-OOPHERECTOMY, LYSIS OF ENDOMETRIOSIS, POSSIBLE LAPAROTOMY, POSSIBLE CYSTOSCOPY, ALL INDICATED PROCEDURES  SALPINGO-OOPHORECTOMY, BILATERAL, LAPAROSCOPIC  LYSIS, ENDOMETRIOSIS, LAPAROSCOPIC  MINILAPAROTOMY  CYSTOSCOPY    Past Medical History:   Diagnosis Date    Anemia     Anesthesia 04/19/2022    \"vitals dropped making it hard to come out\" after surgery in 2009    Arthritis 04/19/2022    rheumatoid-ankle    Cancer (HCC)     ? pineal tumor    Cervical spondylosis 12/11/2015    Concussion     Constipation     medicated    Major depression 1/20/2015    depression and anxiety    Migraine     Neoplasm of uncertain behavior of skin 5/2/2017    Other specified symptom associated with female genital organs     scar tissue    Pain 04/19/2022    abdomen    Right acoustic neuroma (HCC)     removed 3/3/2105    Seizure disorder (HCC) 1999    not medicated at present - was due to apnea in past, last seizure was 04/12/2022, last EEG was clear    Shingles 1/13/2016    Sleep apnea     does not use cpap, last sleep study showed she was clear for apnea    Stroke (HCC) 01/2016    TIA-residual weakness and some left sided drooping occasionally    Thyroid activity decreased     medicated - hypothyroid       Past Surgical History:   Procedure Laterality Date    IL LAP,DIAGNOSTIC ABDOMEN  4/27/2022    Procedure: LAPAROSCOPY - DIAGNOSTIC;  Surgeon: Jonnie Fernandez M.D.;  Location: SURGERY [facility];  Service: General    LAPAROSCOPIC LYSIS OF ADHESIONS  4/27/2022    Procedure: LYSIS, ADHESIONS, LAPAROSCOPIC;  Surgeon: Jonnie Fernandez M.D.;  Location: SURGERY [facility];  Service: General    IL UPPER GI " ENDOSCOPY,DIAGNOSIS N/A 4/11/2022    Procedure: GASTROSCOPY;  Surgeon: Sandra Reed M.D.;  Location: SURGERY SAME DAY [facility];  Service: Gastroenterology    [place] UPPER GI ENDOSCOPY,BIOPSY N/A 4/11/2022    Procedure: GASTROSCOPY, WITH BIOPSY;  Surgeon: Sandra Reed M.D.;  Location: SURGERY SAME DAY [facility];  Service: Gastroenterology    OTHER ORTHOPEDIC SURGERY Right 2019    ankle fusion    OTHER ORTHOPEDIC SURGERY Right 2018    ankle fusion    CERVICAL DISK AND FUSION ANTERIOR  4/27/2016    Procedure: CERVICAL DISK AND FUSION ANTERIOR C5-6;  Surgeon: Jorge Pozo M.D.;  Location: SURGERY [facility];  Service:     MASTOIDECTOMY Right 2015    ARIADNA BY LAPAROSCOPY  8/15/2010    Performed by DANIEL DONOVAN at SURGERY [facility]    LYSIS ADHESIONS GENERAL  8/15/2010    Performed by DANIEL DONOVAN at SURGERY [facility]    GASTROSCOPY-ENDO  8/12/2010    Performed by KHUSHI MURCIA at ENDOSCOPY [facility] ORS    PELVISCOPY  6/16/2009    Performed by ABDULKADIR SMITH at SURGERY SAME DAY [facility] ORS    LYSIS ADHESIONS GYN  6/16/2009    Performed by ABDULKADIR SMITH at SURGERY SAME DAY [facility] ORS    COLONOSCOPY - ENDO  1/22/2009    Performed by ASHLEY TALBOT at SURGERY [facility]    GASTROSCOPY-ENDO  1/20/2009    Performed by BRENDA BENNETT at SURGERY [facility]    OTHER ABDOMINAL SURGERY  2009    ariadna    OTHER ABDOMINAL SURGERY  1997    C - section    APPENDECTOMY  1994    CARPAL TUNNEL RELEASE      RUE - 1998, LUE - 2000    GASTRIC BYPASS LAPAROSCOPIC      GYN SURGERY      TUBE, EAR ULTRASIL         No current facility-administered medications for this encounter.     Current Outpatient Medications   Medication Sig Dispense Refill    oxyCODONE immediate release (ROXICODONE) 10 MG immediate release tablet Take 10 mg by mouth every 6 hours as needed for Severe Pain.      ondansetron (ZOFRAN ODT) 4 MG TABLET DISPERSIBLE Dissolve 1 Tablet by mouth every 6 hours  as needed for Nausea. 10 Tablet 0    senna-docusate (PERICOLACE OR SENOKOT S) 8.6-50 MG Tab Take 1 Tablet by mouth 2 times a day. 60 Tablet 3    ascorbic acid (VITAMIN C) 500 MG tablet Take 1 Tablet by mouth every day. 30 Tablet 3    amitriptyline (ELAVIL) 25 MG Tab Take 1 Tablet by mouth every evening. 30 Tablet 0    dicyclomine (BENTYL) 10 MG Cap Take 1 Capsule by mouth 3 times a day. 90 Capsule 0    magnesium oxide (MAG-OX) 400 MG Tab tablet Take 1 Tablet by mouth 2 times a day. 30 Tablet 0    DULoxetine (CYMBALTA) 60 MG Cap DR Particles delayed-release capsule Take 60 mg by mouth every day.      busPIRone (BUSPAR) 5 MG tablet Take 5 mg by mouth 3 times a day.      gabapentin (NEURONTIN) 300 MG Cap Take 300 mg by mouth 3 times a day.      sumatriptan (IMITREX) 100 MG tablet Take 100 mg by mouth one time as needed for Migraine.      LORazepam (ATIVAN) 1 MG Tab Take 0.5 mg by mouth 1 time a day as needed (seizure).      levETIRAcetam (KEPPRA) 100 MG/ML Solution Take 500-1,000 mg by mouth 2 times a day. 500 mg in am and 1000 mg in pm      cyanocobalamin (VITAMIN B-12) 1000 MCG/ML Solution INJECT 1 ML INTRAMUSCULARLY ONCE EVERY 30 DAYS (Patient taking differently: Inject 1,000 mcg under the skin every 7 days.) 1 Vial 1    fluticasone (FLONASE) 50 MCG/ACT nasal spray Spray 1 Spray in nose every day. 16 g 0    levothyroxine (SYNTHROID) 50 MCG Tab Take 1 Tab by mouth Every morning on an empty stomach. 90 Tab 0       Social History     Socioeconomic History    Marital status:      Spouse name: Not on file    Number of children: Not on file    Years of education: Not on file    Highest education level: Not on file   Occupational History    Not on file   Tobacco Use    Smoking status: Heavy Smoker     Packs/day: 0.50     Years: 27.00     Pack years: 13.50     Types: Cigarettes    Smokeless tobacco: Current    Tobacco comments:     2 cigs/day; uses  e cigarettes   Vaping Use    Vaping Use: Every day    Substances:  "Nicotine, Flavoring   Substance and Sexual Activity    Alcohol use: No     Alcohol/week: 0.0 oz    Drug use: Yes     Types: Marijuana     Comment: \"medical\" marijuana, last smoked this morning 2022    Sexual activity: Yes     Partners: Male   Other Topics Concern    Not on file   Social History Narrative    Not on file     Social Determinants of Health     Financial Resource Strain: Not on file   Food Insecurity: Not on file   Transportation Needs: Not on file   Physical Activity: Not on file   Stress: Not on file   Social Connections: Not on file   Intimate Partner Violence: Not on file   Housing Stability: Not on file       Family History   Problem Relation Age of Onset    Alcohol/Drug Mother         ETOH    Diabetes Mother     Cancer Paternal Aunt         breast    Diabetes Maternal Grandfather     Cancer Paternal Grandmother         cervical     Heart Disease Paternal Grandfather         MI    Cancer Paternal Grandfather         lung    Hypertension Paternal Grandfather     Hyperlipidemia Paternal Grandfather        Allergies  Phentermine hcl and Morphine    Review of Systems  Negative except for chronic pelvic pain.     Physical Exam   Vital stable today.   HEART- S1 S2 heard normal rhythm no added sounds.   LUNGS- clear to auscultate bilaterally.  Abdomen- soft nontender.   P/e- deffered.    Vital Signs SEE EMR>                             Assessment/Plan:    45 y/o     c-sec x 1    gastric bypass surgery    laproscopically diagnosed endometriosis .--pt unsure extent or grading    seizure disorder secondary to pineal cyst     pelvic pain chronic and severe    AUB-light cycles infrequent for last 5 years but had very heavy cycle one month ago. severe dysmenorrhea during all menses  EMB denegerative endometrium  states has been perimenopausal x 5 years     USG_ hemorrhagic ovarian cyst 3.7cm on ultrasound 22-as per pt this cyst has been persistent over one year including noted " during laproscopy by Dr. Fernandez 2020  myometrium in-homogenous left adenxal hemorhagic cyst 3cms.     Scheduled for   * No Diagnosis Codes entered *  Procedure(s):  TOTAL LAPAROSCOPIC HYSTERECTOMY BILATERAL SALPINGO-OOPHERECTOMY, LYSIS OF ENDOMETRIOSIS, POSSIBLE LAPAROTOMY, POSSIBLE CYSTOSCOPY, ALL INDICATED PROCEDURES  SALPINGO-OOPHORECTOMY, BILATERAL, LAPAROSCOPIC  LYSIS, ENDOMETRIOSIS, LAPAROSCOPIC  MINILAPAROTOMY  CYSTOSCOPY  Discussed with the patient indications for Scheduled for   * No Diagnosis Codes entered *  Procedure(s):  TOTAL LAPAROSCOPIC HYSTERECTOMY BILATERAL SALPINGO-OOPHERECTOMY, LYSIS OF ENDOMETRIOSIS, POSSIBLE LAPAROTOMY, POSSIBLE CYSTOSCOPY, ALL INDICATED PROCEDURES  SALPINGO-OOPHORECTOMY, BILATERAL, LAPAROSCOPIC  LYSIS, ENDOMETRIOSIS, LAPAROSCOPIC  MINILAPAROTOMY  CYSTOSCOPY . The patient voiced understanding of indications for surgery  at this time.   Discussed with the patient the risks of Surgery . The risks include infection, bleeding, scarring, damage to other organs in the area of operation. Specifically organs that can be damaged are bowel, bladder, ureters. I also discussed with the patient the risk of wound infection and wound breakdown. We discussed that these risks are greater in people with high risk factors I also discussed the risk of emergency blood transfusion during procedure .   Patient had the opportunity to ask questions regarding procedures. All questions answered to the patient's satisfaction.   Proceed with Surgery

## 2022-10-26 ENCOUNTER — PRE-ADMISSION TESTING (OUTPATIENT)
Dept: ADMISSIONS | Facility: MEDICAL CENTER | Age: 47
End: 2022-10-26
Attending: OBSTETRICS & GYNECOLOGY
Payer: COMMERCIAL

## 2022-10-26 DIAGNOSIS — Z01.810 PRE-OPERATIVE CARDIOVASCULAR EXAMINATION: ICD-10-CM

## 2022-10-26 DIAGNOSIS — Z01.812 PRE-OPERATIVE LABORATORY EXAMINATION: ICD-10-CM

## 2022-10-26 LAB
ANION GAP SERPL CALC-SCNC: 15 MMOL/L (ref 7–16)
APPEARANCE UR: CLEAR
BASOPHILS # BLD AUTO: 1.7 % (ref 0–1.8)
BASOPHILS # BLD: 0.11 K/UL (ref 0–0.12)
BILIRUB UR QL STRIP.AUTO: NEGATIVE
BUN SERPL-MCNC: 10 MG/DL (ref 8–22)
CALCIUM SERPL-MCNC: 9.8 MG/DL (ref 8.5–10.5)
CHLORIDE SERPL-SCNC: 104 MMOL/L (ref 96–112)
CO2 SERPL-SCNC: 21 MMOL/L (ref 20–33)
COLOR UR: YELLOW
CREAT SERPL-MCNC: 0.61 MG/DL (ref 0.5–1.4)
EKG IMPRESSION: NORMAL
EOSINOPHIL # BLD AUTO: 0.31 K/UL (ref 0–0.51)
EOSINOPHIL NFR BLD: 4.9 % (ref 0–6.9)
ERYTHROCYTE [DISTWIDTH] IN BLOOD BY AUTOMATED COUNT: 51.4 FL (ref 35.9–50)
GFR SERPLBLD CREATININE-BSD FMLA CKD-EPI: 112 ML/MIN/1.73 M 2
GLUCOSE SERPL-MCNC: 82 MG/DL (ref 65–99)
GLUCOSE UR STRIP.AUTO-MCNC: NEGATIVE MG/DL
HCT VFR BLD AUTO: 52.3 % (ref 37–47)
HGB BLD-MCNC: 16.6 G/DL (ref 12–16)
IMM GRANULOCYTES # BLD AUTO: 0.02 K/UL (ref 0–0.11)
IMM GRANULOCYTES NFR BLD AUTO: 0.3 % (ref 0–0.9)
KETONES UR STRIP.AUTO-MCNC: NEGATIVE MG/DL
LEUKOCYTE ESTERASE UR QL STRIP.AUTO: NEGATIVE
LYMPHOCYTES # BLD AUTO: 1.31 K/UL (ref 1–4.8)
LYMPHOCYTES NFR BLD: 20.6 % (ref 22–41)
MCH RBC QN AUTO: 29.5 PG (ref 27–33)
MCHC RBC AUTO-ENTMCNC: 31.7 G/DL (ref 33.6–35)
MCV RBC AUTO: 92.9 FL (ref 81.4–97.8)
MICRO URNS: NORMAL
MONOCYTES # BLD AUTO: 0.5 K/UL (ref 0–0.85)
MONOCYTES NFR BLD AUTO: 7.9 % (ref 0–13.4)
NEUTROPHILS # BLD AUTO: 4.1 K/UL (ref 2–7.15)
NEUTROPHILS NFR BLD: 64.6 % (ref 44–72)
NITRITE UR QL STRIP.AUTO: NEGATIVE
NRBC # BLD AUTO: 0 K/UL
NRBC BLD-RTO: 0 /100 WBC
PH UR STRIP.AUTO: 7 [PH] (ref 5–8)
PLATELET # BLD AUTO: 165 K/UL (ref 164–446)
PMV BLD AUTO: 11.5 FL (ref 9–12.9)
POTASSIUM SERPL-SCNC: 4.6 MMOL/L (ref 3.6–5.5)
PROT UR QL STRIP: NEGATIVE MG/DL
RBC # BLD AUTO: 5.63 M/UL (ref 4.2–5.4)
RBC UR QL AUTO: NEGATIVE
SODIUM SERPL-SCNC: 140 MMOL/L (ref 135–145)
SP GR UR STRIP.AUTO: 1
UROBILINOGEN UR STRIP.AUTO-MCNC: 0.2 MG/DL
WBC # BLD AUTO: 6.4 K/UL (ref 4.8–10.8)

## 2022-10-26 PROCEDURE — 93010 ELECTROCARDIOGRAM REPORT: CPT | Performed by: INTERNAL MEDICINE

## 2022-10-26 PROCEDURE — 80048 BASIC METABOLIC PNL TOTAL CA: CPT

## 2022-10-26 PROCEDURE — 81003 URINALYSIS AUTO W/O SCOPE: CPT

## 2022-10-26 PROCEDURE — 85025 COMPLETE CBC W/AUTO DIFF WBC: CPT

## 2022-10-26 PROCEDURE — 36415 COLL VENOUS BLD VENIPUNCTURE: CPT

## 2022-10-26 PROCEDURE — 93005 ELECTROCARDIOGRAM TRACING: CPT

## 2022-10-26 ASSESSMENT — FIBROSIS 4 INDEX: FIB4 SCORE: 1.19

## 2022-11-02 ENCOUNTER — ANESTHESIA EVENT (OUTPATIENT)
Dept: SURGERY | Facility: MEDICAL CENTER | Age: 47
End: 2022-11-02
Payer: COMMERCIAL

## 2022-11-02 ENCOUNTER — PHARMACY VISIT (OUTPATIENT)
Dept: PHARMACY | Facility: MEDICAL CENTER | Age: 47
End: 2022-11-02
Payer: COMMERCIAL

## 2022-11-02 ENCOUNTER — ANESTHESIA (OUTPATIENT)
Dept: SURGERY | Facility: MEDICAL CENTER | Age: 47
End: 2022-11-02
Payer: COMMERCIAL

## 2022-11-02 ENCOUNTER — HOSPITAL ENCOUNTER (OUTPATIENT)
Facility: MEDICAL CENTER | Age: 47
End: 2022-11-02
Attending: OBSTETRICS & GYNECOLOGY | Admitting: OBSTETRICS & GYNECOLOGY
Payer: COMMERCIAL

## 2022-11-02 VITALS
HEART RATE: 76 BPM | TEMPERATURE: 97.6 F | DIASTOLIC BLOOD PRESSURE: 72 MMHG | HEIGHT: 60 IN | SYSTOLIC BLOOD PRESSURE: 125 MMHG | RESPIRATION RATE: 16 BRPM | WEIGHT: 125.66 LBS | OXYGEN SATURATION: 94 % | BODY MASS INDEX: 24.67 KG/M2

## 2022-11-02 DIAGNOSIS — Z98.890 POST-OPERATIVE STATE: ICD-10-CM

## 2022-11-02 LAB
HCG UR QL: NEGATIVE
PATHOLOGY CONSULT NOTE: NORMAL

## 2022-11-02 PROCEDURE — 160002 HCHG RECOVERY MINUTES (STAT): Performed by: OBSTETRICS & GYNECOLOGY

## 2022-11-02 PROCEDURE — A9270 NON-COVERED ITEM OR SERVICE: HCPCS | Performed by: ANESTHESIOLOGY

## 2022-11-02 PROCEDURE — 700105 HCHG RX REV CODE 258: Performed by: OBSTETRICS & GYNECOLOGY

## 2022-11-02 PROCEDURE — 160046 HCHG PACU - 1ST 60 MINS PHASE II: Performed by: OBSTETRICS & GYNECOLOGY

## 2022-11-02 PROCEDURE — 88307 TISSUE EXAM BY PATHOLOGIST: CPT

## 2022-11-02 PROCEDURE — 700111 HCHG RX REV CODE 636 W/ 250 OVERRIDE (IP): Performed by: ANESTHESIOLOGY

## 2022-11-02 PROCEDURE — 81025 URINE PREGNANCY TEST: CPT

## 2022-11-02 PROCEDURE — 160035 HCHG PACU - 1ST 60 MINS PHASE I: Performed by: OBSTETRICS & GYNECOLOGY

## 2022-11-02 PROCEDURE — 160029 HCHG SURGERY MINUTES - 1ST 30 MINS LEVEL 4: Performed by: OBSTETRICS & GYNECOLOGY

## 2022-11-02 PROCEDURE — 160009 HCHG ANES TIME/MIN: Performed by: OBSTETRICS & GYNECOLOGY

## 2022-11-02 PROCEDURE — 110371 HCHG SHELL REV 272: Performed by: OBSTETRICS & GYNECOLOGY

## 2022-11-02 PROCEDURE — RXMED WILLOW AMBULATORY MEDICATION CHARGE: Performed by: OBSTETRICS & GYNECOLOGY

## 2022-11-02 PROCEDURE — 160025 RECOVERY II MINUTES (STATS): Performed by: OBSTETRICS & GYNECOLOGY

## 2022-11-02 PROCEDURE — 00840 ANES IPER PX LOWER ABD NOS: CPT | Performed by: ANESTHESIOLOGY

## 2022-11-02 PROCEDURE — 160036 HCHG PACU - EA ADDL 30 MINS PHASE I: Performed by: OBSTETRICS & GYNECOLOGY

## 2022-11-02 PROCEDURE — 160041 HCHG SURGERY MINUTES - EA ADDL 1 MIN LEVEL 4: Performed by: OBSTETRICS & GYNECOLOGY

## 2022-11-02 PROCEDURE — 700101 HCHG RX REV CODE 250: Performed by: ANESTHESIOLOGY

## 2022-11-02 PROCEDURE — 700102 HCHG RX REV CODE 250 W/ 637 OVERRIDE(OP): Performed by: ANESTHESIOLOGY

## 2022-11-02 PROCEDURE — 160047 HCHG PACU  - EA ADDL 30 MINS PHASE II: Performed by: OBSTETRICS & GYNECOLOGY

## 2022-11-02 PROCEDURE — 160048 HCHG OR STATISTICAL LEVEL 1-5: Performed by: OBSTETRICS & GYNECOLOGY

## 2022-11-02 RX ORDER — HYDROMORPHONE HYDROCHLORIDE 1 MG/ML
0.1 INJECTION, SOLUTION INTRAMUSCULAR; INTRAVENOUS; SUBCUTANEOUS
Status: DISCONTINUED | OUTPATIENT
Start: 2022-11-02 | End: 2022-11-02 | Stop reason: HOSPADM

## 2022-11-02 RX ORDER — ONDANSETRON 2 MG/ML
INJECTION INTRAMUSCULAR; INTRAVENOUS PRN
Status: DISCONTINUED | OUTPATIENT
Start: 2022-11-02 | End: 2022-11-02 | Stop reason: SURG

## 2022-11-02 RX ORDER — LIDOCAINE HYDROCHLORIDE 20 MG/ML
INJECTION, SOLUTION EPIDURAL; INFILTRATION; INTRACAUDAL; PERINEURAL PRN
Status: DISCONTINUED | OUTPATIENT
Start: 2022-11-02 | End: 2022-11-02 | Stop reason: SURG

## 2022-11-02 RX ORDER — MIDAZOLAM HYDROCHLORIDE 1 MG/ML
2 INJECTION INTRAMUSCULAR; INTRAVENOUS ONCE
Status: COMPLETED | OUTPATIENT
Start: 2022-11-02 | End: 2022-11-02

## 2022-11-02 RX ORDER — HALOPERIDOL 5 MG/ML
1 INJECTION INTRAMUSCULAR
Status: DISCONTINUED | OUTPATIENT
Start: 2022-11-02 | End: 2022-11-02 | Stop reason: HOSPADM

## 2022-11-02 RX ORDER — OXYCODONE HYDROCHLORIDE AND ACETAMINOPHEN 5; 325 MG/1; MG/1
1 TABLET ORAL
Status: COMPLETED | OUTPATIENT
Start: 2022-11-02 | End: 2022-11-02

## 2022-11-02 RX ORDER — DIPHENHYDRAMINE HYDROCHLORIDE 50 MG/ML
12.5 INJECTION INTRAMUSCULAR; INTRAVENOUS
Status: DISCONTINUED | OUTPATIENT
Start: 2022-11-02 | End: 2022-11-02 | Stop reason: HOSPADM

## 2022-11-02 RX ORDER — HYDROMORPHONE HYDROCHLORIDE 1 MG/ML
0.4 INJECTION, SOLUTION INTRAMUSCULAR; INTRAVENOUS; SUBCUTANEOUS
Status: DISCONTINUED | OUTPATIENT
Start: 2022-11-02 | End: 2022-11-02 | Stop reason: HOSPADM

## 2022-11-02 RX ORDER — DEXAMETHASONE SODIUM PHOSPHATE 4 MG/ML
INJECTION, SOLUTION INTRA-ARTICULAR; INTRALESIONAL; INTRAMUSCULAR; INTRAVENOUS; SOFT TISSUE PRN
Status: DISCONTINUED | OUTPATIENT
Start: 2022-11-02 | End: 2022-11-02 | Stop reason: SURG

## 2022-11-02 RX ORDER — METOCLOPRAMIDE HYDROCHLORIDE 5 MG/ML
INJECTION INTRAMUSCULAR; INTRAVENOUS PRN
Status: DISCONTINUED | OUTPATIENT
Start: 2022-11-02 | End: 2022-11-02 | Stop reason: SURG

## 2022-11-02 RX ORDER — SODIUM CHLORIDE, SODIUM LACTATE, POTASSIUM CHLORIDE, CALCIUM CHLORIDE 600; 310; 30; 20 MG/100ML; MG/100ML; MG/100ML; MG/100ML
INJECTION, SOLUTION INTRAVENOUS CONTINUOUS
Status: DISCONTINUED | OUTPATIENT
Start: 2022-11-02 | End: 2022-11-02 | Stop reason: HOSPADM

## 2022-11-02 RX ORDER — ROCURONIUM BROMIDE 10 MG/ML
INJECTION, SOLUTION INTRAVENOUS PRN
Status: DISCONTINUED | OUTPATIENT
Start: 2022-11-02 | End: 2022-11-02 | Stop reason: SURG

## 2022-11-02 RX ORDER — CEFAZOLIN SODIUM 1 G/3ML
INJECTION, POWDER, FOR SOLUTION INTRAMUSCULAR; INTRAVENOUS PRN
Status: DISCONTINUED | OUTPATIENT
Start: 2022-11-02 | End: 2022-11-02 | Stop reason: SURG

## 2022-11-02 RX ORDER — ACETAMINOPHEN 500 MG
500 TABLET ORAL EVERY 6 HOURS PRN
Status: DISCONTINUED | OUTPATIENT
Start: 2022-11-02 | End: 2023-01-04

## 2022-11-02 RX ORDER — SODIUM CHLORIDE, SODIUM LACTATE, POTASSIUM CHLORIDE, CALCIUM CHLORIDE 600; 310; 30; 20 MG/100ML; MG/100ML; MG/100ML; MG/100ML
INJECTION, SOLUTION INTRAVENOUS CONTINUOUS
Status: ACTIVE | OUTPATIENT
Start: 2022-11-02 | End: 2022-11-02

## 2022-11-02 RX ORDER — OXYCODONE HYDROCHLORIDE AND ACETAMINOPHEN 5; 325 MG/1; MG/1
2 TABLET ORAL
Status: COMPLETED | OUTPATIENT
Start: 2022-11-02 | End: 2022-11-02

## 2022-11-02 RX ORDER — MIDAZOLAM HYDROCHLORIDE 1 MG/ML
2 INJECTION INTRAMUSCULAR; INTRAVENOUS
Status: DISCONTINUED | OUTPATIENT
Start: 2022-11-02 | End: 2022-11-02 | Stop reason: HOSPADM

## 2022-11-02 RX ORDER — PHENYLEPHRINE HYDROCHLORIDE 10 MG/ML
INJECTION, SOLUTION INTRAMUSCULAR; INTRAVENOUS; SUBCUTANEOUS PRN
Status: DISCONTINUED | OUTPATIENT
Start: 2022-11-02 | End: 2022-11-02 | Stop reason: SURG

## 2022-11-02 RX ORDER — OXYCODONE HYDROCHLORIDE AND ACETAMINOPHEN 5; 325 MG/1; MG/1
1 TABLET ORAL EVERY 4 HOURS PRN
Qty: 15 TABLET | Refills: 0 | Status: SHIPPED | OUTPATIENT
Start: 2022-11-02 | End: 2022-11-07

## 2022-11-02 RX ORDER — HYDROMORPHONE HYDROCHLORIDE 2 MG/ML
INJECTION, SOLUTION INTRAMUSCULAR; INTRAVENOUS; SUBCUTANEOUS PRN
Status: DISCONTINUED | OUTPATIENT
Start: 2022-11-02 | End: 2022-11-02 | Stop reason: SURG

## 2022-11-02 RX ORDER — MIDAZOLAM HYDROCHLORIDE 1 MG/ML
INJECTION INTRAMUSCULAR; INTRAVENOUS
Status: DISCONTINUED
Start: 2022-11-02 | End: 2022-11-02 | Stop reason: HOSPADM

## 2022-11-02 RX ORDER — ONDANSETRON 2 MG/ML
4 INJECTION INTRAMUSCULAR; INTRAVENOUS
Status: DISCONTINUED | OUTPATIENT
Start: 2022-11-02 | End: 2022-11-02 | Stop reason: HOSPADM

## 2022-11-02 RX ORDER — HYDROMORPHONE HYDROCHLORIDE 1 MG/ML
0.2 INJECTION, SOLUTION INTRAMUSCULAR; INTRAVENOUS; SUBCUTANEOUS
Status: DISCONTINUED | OUTPATIENT
Start: 2022-11-02 | End: 2022-11-02 | Stop reason: HOSPADM

## 2022-11-02 RX ORDER — MEPERIDINE HYDROCHLORIDE 25 MG/ML
12.5 INJECTION INTRAMUSCULAR; INTRAVENOUS; SUBCUTANEOUS
Status: DISCONTINUED | OUTPATIENT
Start: 2022-11-02 | End: 2022-11-02 | Stop reason: HOSPADM

## 2022-11-02 RX ADMIN — ONDANSETRON 4 MG: 2 INJECTION INTRAMUSCULAR; INTRAVENOUS at 08:35

## 2022-11-02 RX ADMIN — MIDAZOLAM HYDROCHLORIDE 2 MG: 1 INJECTION, SOLUTION INTRAMUSCULAR; INTRAVENOUS at 07:46

## 2022-11-02 RX ADMIN — FENTANYL CITRATE 50 MCG: 50 INJECTION, SOLUTION INTRAMUSCULAR; INTRAVENOUS at 13:11

## 2022-11-02 RX ADMIN — HYDROMORPHONE HYDROCHLORIDE 1 MG: 2 INJECTION INTRAMUSCULAR; INTRAVENOUS; SUBCUTANEOUS at 09:35

## 2022-11-02 RX ADMIN — FENTANYL CITRATE 100 MCG: 50 INJECTION, SOLUTION INTRAMUSCULAR; INTRAVENOUS at 07:46

## 2022-11-02 RX ADMIN — FLUORESCEIN SODIUM 0.5 ML: 100 INJECTION, SOLUTION OPHTHALMIC at 10:30

## 2022-11-02 RX ADMIN — MIDAZOLAM HYDROCHLORIDE 1 MG: 1 INJECTION, SOLUTION INTRAMUSCULAR; INTRAVENOUS at 07:20

## 2022-11-02 RX ADMIN — SODIUM CHLORIDE, POTASSIUM CHLORIDE, SODIUM LACTATE AND CALCIUM CHLORIDE: 600; 310; 30; 20 INJECTION, SOLUTION INTRAVENOUS at 09:47

## 2022-11-02 RX ADMIN — FENTANYL CITRATE 50 MCG: 50 INJECTION, SOLUTION INTRAMUSCULAR; INTRAVENOUS at 08:11

## 2022-11-02 RX ADMIN — METOCLOPRAMIDE 10 MG: 5 INJECTION, SOLUTION INTRAMUSCULAR; INTRAVENOUS at 07:53

## 2022-11-02 RX ADMIN — ROCURONIUM BROMIDE 30 MG: 10 INJECTION, SOLUTION INTRAVENOUS at 07:57

## 2022-11-02 RX ADMIN — SODIUM CHLORIDE, POTASSIUM CHLORIDE, SODIUM LACTATE AND CALCIUM CHLORIDE: 600; 310; 30; 20 INJECTION, SOLUTION INTRAVENOUS at 06:52

## 2022-11-02 RX ADMIN — PHENYLEPHRINE HYDROCHLORIDE 50 MCG: 10 INJECTION INTRAVENOUS at 08:01

## 2022-11-02 RX ADMIN — ROCURONIUM BROMIDE 10 MG: 10 INJECTION, SOLUTION INTRAVENOUS at 09:16

## 2022-11-02 RX ADMIN — PROPOFOL 150 MG: 10 INJECTION, EMULSION INTRAVENOUS at 07:55

## 2022-11-02 RX ADMIN — FENTANYL CITRATE 100 MCG: 50 INJECTION, SOLUTION INTRAMUSCULAR; INTRAVENOUS at 08:18

## 2022-11-02 RX ADMIN — OXYCODONE AND ACETAMINOPHEN 2 TABLET: 5; 325 TABLET ORAL at 12:03

## 2022-11-02 RX ADMIN — FENTANYL CITRATE 50 MCG: 50 INJECTION, SOLUTION INTRAMUSCULAR; INTRAVENOUS at 12:03

## 2022-11-02 RX ADMIN — DEXAMETHASONE SODIUM PHOSPHATE 8 MG: 4 INJECTION, SOLUTION INTRA-ARTICULAR; INTRALESIONAL; INTRAMUSCULAR; INTRAVENOUS; SOFT TISSUE at 08:02

## 2022-11-02 RX ADMIN — CEFAZOLIN 1 G: 330 INJECTION, POWDER, FOR SOLUTION INTRAMUSCULAR; INTRAVENOUS at 07:53

## 2022-11-02 RX ADMIN — HYDROMORPHONE HYDROCHLORIDE 0.5 MG: 2 INJECTION INTRAMUSCULAR; INTRAVENOUS; SUBCUTANEOUS at 08:37

## 2022-11-02 RX ADMIN — LIDOCAINE HYDROCHLORIDE 50 MG: 20 INJECTION, SOLUTION EPIDURAL; INFILTRATION; INTRACAUDAL at 07:53

## 2022-11-02 ASSESSMENT — PAIN DESCRIPTION - PAIN TYPE
TYPE: SURGICAL PAIN

## 2022-11-02 ASSESSMENT — PAIN SCALES - GENERAL: PAIN_LEVEL: 0

## 2022-11-02 ASSESSMENT — FIBROSIS 4 INDEX: FIB4 SCORE: 2.05

## 2022-11-02 NOTE — ANESTHESIA PREPROCEDURE EVALUATION
Case: [id number] Date/Time: 11/02/22 0715    Procedures:       TOTAL LAPAROSCOPIC HYSTERECTOMY BILATERAL SALPINGO-OOPHERECTOMY, LYSIS OF ENDOMETRIOSIS, POSSIBLE LAPAROTOMY, POSSIBLE CYSTOSCOPY, ALL INDICATED PROCEDURES      SALPINGO-OOPHORECTOMY, BILATERAL, LAPAROSCOPIC (Bilateral)      LYSIS, ENDOMETRIOSIS, LAPAROSCOPIC      MINILAPAROTOMY      CYSTOSCOPY    Pre-op diagnosis: ABNORMAL UTERINE BLEEDING, ENDOMETRIOSIS    Location: [facility] ROOM 25 / SURGERY SAME DAY [facility]    Surgeons: Rhonda Dsouza M.D.          Relevant Problems   NEURO   (positive) Migraine   (positive) Seizure disorder (HCC)      CARDIAC   (positive) Migraine      ENDO   (positive) Hypothyroidism due to acquired atrophy of thyroid      Other   (positive) Anxiety       Physical Exam    Airway   Mallampati: I  TM distance: >3 FB  Neck ROM: full    Comments: 100% FROM  S/p cervical(neck) sx,    Cardiovascular - normal exam  Rhythm: regular  Rate: normal  (-) murmur     Dental - normal exam           Pulmonary - normal exam  Breath sounds clear to auscultation     Abdominal    Neurological - normal exam                 Anesthesia Plan    ASA 2       Plan - general       Airway plan will be ETT          Induction: intravenous    Postoperative Plan: Postoperative administration of opioids is intended.    Pertinent diagnostic labs and testing reviewed    Informed Consent:    Anesthetic plan and risks discussed with patient.    Use of blood products discussed with: patient whom consented to blood products.

## 2022-11-02 NOTE — ANESTHESIA POSTPROCEDURE EVALUATION
Patient: Tamela Davis Wallner    Procedure Summary     Date: 11/02/22 Room / Location: [facility] ROOM 25 / SURGERY SAME DAY [facility]    Anesthesia Start: 0746 Anesthesia Stop: 1106    Procedures:       TOTAL LAPAROSCOPIC HYSTERECTOMY , (Pelvis)      SALPINGO-OOPHORECTOMY, BILATERAL, LAPAROSCOPIC (Bilateral: Pelvis)      LYSIS, ENDOMETRIOSIS, LAPAROSCOPIC (Pelvis)      CYSTOSCOPY (Bladder) Diagnosis: (ABNORMAL UTERINE BLEEDING, ENDOMETRIOSIS)    Surgeons: Rhonda Dsouza M.D. Responsible Provider: Denny Christianson M.D.    Anesthesia Type: general ASA Status: 2          Final Anesthesia Type: general  Last vitals  BP   Blood Pressure: (!) 149/89    Temp   36.4 °C (97.6 °F)    Pulse   83   Resp   16    SpO2   97 %      Anesthesia Post Evaluation    Patient location during evaluation: PACU  Patient participation: complete - patient participated  Level of consciousness: lethargic  Pain score: 0    Airway patency: patent  Anesthetic complications: no  Cardiovascular status: hemodynamically stable  Respiratory status: acceptable  Hydration status: euvolemic    PONV: none          No notable events documented.     Nurse Pain Score: 7 (NPRS)

## 2022-11-02 NOTE — ANESTHESIA TIME REPORT
Anesthesia Start and Stop Event Times     Date Time Event    11/2/2022 0713 Ready for Procedure     0746 Anesthesia Start        Responsible Staff  11/02/22    Name Role Begin End    Denny Christianson M.D. Anesth 0746         Overtime Reason:  no overtime (within assigned shift)    Comments:

## 2022-11-02 NOTE — OR SURGEON
Post OP Note    PreOp Diagnosis: AUB   Pelvic pain.  H/o Endometriosis      PostOp Diagnosis:   AUB   Pelvic pain.   Procedure(s):  TOTAL LAPAROSCOPIC HYSTERECTOMY , - Wound Class: Clean Contaminated  SALPINGO-OOPHORECTOMY, BILATERAL, LAPAROSCOPIC - Wound Class: Clean  LYSIS, ENDOMETRIOSIS, LAPAROSCOPIC - Wound Class: Clean  CYSTOSCOPY - Wound Class: Clean Contaminated    Surgeon(s):  TAI Contreras M.D.    Anesthesiologist/Type of Anesthesia:  Anesthesiologist: Denny Christianson M.D./General    Surgical Staff:  Circulator: Ines Cope R.N.; Hailey Larose R.N.  Relief Scrub: Renetta Whittington  Scrub Person: Maria De Jesus Marin    Specimens removed if any:  ID Type Source Tests Collected by Time Destination   A : CERVIX, UTERUS, BILATERAL FALLOPIAN TUBES, BILATERAL OVARIES  Other Other PATHOLOGY SPECIMEN Rhonda Dsouza M.D. 11/2/2022  9:50 AM        Estimated Blood Loss: 300 ml     Findings: normal uterus both tubes  and ovaries normal.  dense bladder adhesions to anterior uterine wall and high rising bladder.     Complications: none.      11/2/2022 10:52 AM Rhonda Dsouza M.D.

## 2022-11-02 NOTE — OR NURSING
1054  RECEIVED PATIENT FROM OR.  REPORT FROM DR. THOMAS.  NO AIRWAY IN PLACE.  RESPIRATIONS ARE EVEN AND UNLABORED.  3 LAP STABS TO ABDOMEN COVERED WITH GAUZE AND TEGADERM ARE CDI.      1203  MEDICATED WITH IV AND PO PAIN MEDICATION.    1210   REPORT TO RODOLFO WILD.    1240  RECEIVED REPORT FROM RODOLFO WILD.  RESUMED CARE OF PATIENT.       1245  PHASE 2.    1311  MEDICATED WITH IV FENTANYL FOR C/O ABDOMINAL -PAIN 7.    1344  UP TO THE BATHROOM.    1427  DISCHARGED.  DISCHARGE INSTRUCTIONS GIVEN TO PATIENT AND HER .  A VERBAL UNDERSTANDING OF ALL INSTRUCTIONS WAS STATED.  PATIENT TAKING PO, VOIDING AND AMBULATING WITHOUT DIFFICULTY.  PATIENT STATES SHE IS READY TO GO HOME.

## 2022-11-02 NOTE — ANESTHESIA PROCEDURE NOTES
Airway    Date/Time: 11/2/2022 7:57 AM  Performed by: Denny Christianson M.D.  Authorized by: Denny Christianson M.D.     Location:  OR  Urgency:  Elective  Indications for Airway Management:  Anesthesia      Spontaneous Ventilation: absent    Sedation Level:  Deep  Preoxygenated: Yes    Patient Position:  Sniffing  Mask Difficulty Assessment:  1 - vent by mask  Final Airway Type:  Endotracheal airway  Final Endotracheal Airway:  ETT  Cuffed: Yes    Technique Used for Successful ETT Placement:  Direct laryngoscopy    Insertion Site:  Oral  Blade Type:  Leonor  Laryngoscope Blade/Videolaryngoscope Blade Size:  3  ETT Size (mm):  7.0  Measured from:  Teeth  ETT to Teeth (cm):  18  Placement Verified by: auscultation and capnometry    Cormack-Lehane Classification:  Grade I - full view of glottis  Number of Attempts at Approach:  1  Number of Other Approaches Attempted:  0

## 2022-11-02 NOTE — OR NURSING
1210 Handoff from Bev WILD. Pt sleeping, VSS    1230 DC instructions reviewed with . Verbal understanding expressed.    1240 Handoff to Bev WILD

## 2022-11-02 NOTE — DISCHARGE INSTRUCTIONS
HOME CARE INSTRUCTIONS    ACTIVITY: Rest and take it easy for the first 24 hours.  A responsible adult is recommended to remain with you during that time.  It is normal to feel sleepy.  We encourage you to not do anything that requires balance, judgment or coordination.    FOR 24 HOURS DO NOT:  Drive, operate machinery or run household appliances.  Drink beer or alcoholic beverages.  Make important decisions or sign legal documents.    SPECIAL INSTRUCTIONS: SEE HYSTERECTOMY INSTRUCTION SHEET    DIET: To avoid nausea, slowly advance diet as tolerated, avoiding spicy or greasy foods for the first day.  Add more substantial food to your diet according to your physician's instructions.  Babies can be fed formula or breast milk as soon as they are hungry.  INCREASE FLUIDS AND FIBER TO AVOID CONSTIPATION.    SURGICAL DRESSING/BATHING: MAY SHOWER TOMORROW.  NO TUB BATHS, HOT TUBS OR SWIMMING FOR 6 WEEKS    MEDICATIONS: Resume taking daily medication.  Take prescribed pain medication with food.  If no medication is prescribed, you may take non-aspirin pain medication if needed.  PAIN MEDICATION CAN BE VERY CONSTIPATING.  Take a stool softener or laxative such as senokot, pericolace, or milk of magnesia if needed.    Prescription given for PERCOCET.  Last pain medication given: percocet 10mg at 12:03 pm.    A follow-up appointment should be arranged with your doctor in FOLLOW UP WITH DR. KEENAN; call to schedule.    You should CALL YOUR PHYSICIAN if you develop:  Fever greater than 101 degrees F.  Pain not relieved by medication, or persistent nausea or vomiting.  Excessive bleeding (blood soaking through dressing) or unexpected drainage from the wound.  Extreme redness or swelling around the incision site, drainage of pus or foul smelling drainage.  Inability to urinate or empty your bladder within 8 hours.  Problems with breathing or chest pain.    You should call 911 if you develop problems with breathing or chest  pain.  If you are unable to contact your doctor or surgical center, you should go to the nearest emergency room or urgent care center.  Physician's telephone #: DR. KEENAN [phone]    MILD FLU-LIKE SYMPTOMS ARE NORMAL.  YOU MAY EXPERIENCE GENERALIZED MUSCLE ACHES, THROAT IRRITATION, HEADACHE AND/OR SOME NAUSEA.    If any questions arise, call your doctor.  If your doctor is not available, please feel free to call the Surgical Center at [phone].  The Center is open Monday through Friday from 7AM to 7PM.      A registered nurse may call you a few days after your surgery to see how you are doing after your procedure.    You may also receive a survey in the mail within the next two weeks and we ask that you take a few moments to complete the survey and return it to us.  Our goal is to provide you with very good care and we value your comments.     Depression / Suicide Risk    As you are discharged from this [facility] facility, it is important to learn how to keep safe from harming yourself.    Recognize the warning signs:  Abrupt changes in personality, positive or negative- including increase in energy   Giving away possessions  Change in eating patterns- significant weight changes-  positive or negative  Change in sleeping patterns- unable to sleep or sleeping all the time   Unwillingness or inability to communicate  Depression  Unusual sadness, discouragement and loneliness  Talk of wanting to die  Neglect of personal appearance   Rebelliousness- reckless behavior  Withdrawal from people/activities they love  Confusion- inability to concentrate     If you or a loved one observes any of these behaviors or has concerns about self-harm, here's what you can do:  Talk about it- your feelings and reasons for harming yourself  Remove any means that you might use to hurt yourself (examples: pills, rope, extension cords, firearm)  Get professional help from the community (Mental Health, Substance Abuse,  psychological counseling)  Do not be alone:Call your Safe Contact- someone whom you trust who will be there for you.  Call your local CRISIS HOTLINE 083-3371 or 618-694-1786  Call your local Children's Mobile Crisis Response Team [place] (056) 334-1016 or www.[employer]mind  Call the toll free National Suicide Prevention Hotlines   National Suicide Prevention Lifeline 408-589-ODCY (2736)  [employer] Line Network 800-SUICIDE (390-1735)    I acknowledge receipt and understanding of these Home Care instructions.

## 2022-11-02 NOTE — OP REPORT
Post OP Note    PreOp Diagnosis: AUB   Pelvic pain.  H/o Endometriosis        PostOp Diagnosis:   AUB   Pelvic pain.     Procedure(s):  TOTAL LAPAROSCOPIC HYSTERECTOMY , - Wound Class: Clean Contaminated  SALPINGO-OOPHORECTOMY, BILATERAL, LAPAROSCOPIC - Wound Class: Clean  LYSIS, ENDOMETRIOSIS, LAPAROSCOPIC - Wound Class: Clean  CYSTOSCOPY - Wound Class: Clean Contaminated     Surgeon(s):  TAI Contreras M.D.     Anesthesiologist/Type of Anesthesia:  Anesthesiologist: Denny Christianson M.D./General     Surgical Staff:  Circulator: Ines Cope R.N.; Hailey Larose R.N.  Relief Scrub: Renetta Whittington  Scrub Person: Maria De Jesus Marin     Specimens removed if any:  ID Type Source Tests Collected by Time Destination   A : CERVIX, UTERUS, BILATERAL FALLOPIAN TUBES, BILATERAL OVARIES  Other Other PATHOLOGY SPECIMEN Rhonda Dsouza M.D. 11/2/2022  9:50 AM           Estimated Blood Loss: 300 ml      Findings: normal uterus both tubes  and ovaries normal.  dense bladder adhesions to anterior uterine wall and high rising bladder.      Complications: none.      On assessment of the upper abdomen, liver, spleen, omentum, and peritoneal surfaces of the bowel, were all unremarkable in appearance. With adhesive bands noted to anterior abdominal wall to liver .    PROCEDURE:  The patient was taken to the operating room where general anesthesia was induced without difficulty.  The patient was then  placed in the dorsal lithotomy position and examined under anesthesia findings as noted above.  Prepped and draped in a sterile fashion.  The bladder was Myers catheterized. A bivalve speculum was then placed in the patient's vagina and the anterior lip of the cervix grasped with a single-tooth tenaculum.cervix was tagged at the 6 and 12’o clock positions with a 0 Vicryl sutures. The cervix was dilated with Hanks dilators and a VCare apparatus was passed into the uterus for uterine manipulation during the planned  operative procedure,the suture was fixed on the colpotomy ring on Vcare and ligated.  The speculum was removed from the vagina.      Attention was then turned to the patient's abdomen after regloving.Infraumbillical skin infiltrated with marcaine with epi, a 10 mm skin incision was made in the umbilical fold.  The Veress needle was carefully introduced into the   peritoneal cavity at a 45-degree angle while tenting up  the abdominal wall.  Intraperitoneal placement was confirmed by low pressures peritoneal cavity . Pneumoperitoneum was obtained with 4 L of CO2 gas and a10 MM trocar and sleeve were then advanced without difficulty into the abdomen where intra-abdominal placement was confirmed by the laparoscope the abdomen was explored with the laparoscope with the findings as noted above.  No injury or bleeding at the port site of the entry tract noted.  Patient was changed to Trendelenburg position, the remaining ports were placed under direct laparoscopic guidance 5 mm right and left lower quadrants were placed.    Attention was then turned to the laparoscopic hysterectomy.  The direction and location of both ureters were identified from the pelvic brim to the cardinal ligaments and noted to be clear from the operative field.  THe tube was identified lifted up and the IP ligament  was exposed on the right side.  Using the ligasure vessel sealing device the IP ligament  was grasped coagulated and cut the dissection clamping desiccation and transection was carried further along the mesosalpinx parallel to the fallopian tubes reaching the round ligament about 3 cm from the uterine cornu.  Anterior leaf of the broad ligament was lifted up by passing a blade of the vessel sealing device underneath it clamped coagulated and cut in small segments.  The dissection was  advanced towards the bladder reflection . Bladder flap was held ,the bladder pillars were identified was clamped, coagulated and cut . The bladder was  pushed well away from the lower uterine segment and at the upper vaginal cuff.continous manipulation anad reclamping  for exposure and dissecting by sharp and blunt dissection in segments with good visualisation troughtout. . The uterine vessels were skeletonized and the uterine arteries were sealed using the vessel saving device at the level of the colpotomy ring.  Using contralateral retraction of the uterus the cardinal ligament uterosacral complex was well delineated and in the ureters was displaced off the complex was clamped coagulated and cut in small segments with good exposure and hemostasis was obtained.    The same procedure was repeated on the opposite side and all the  pedicles were excised the bladder was further mobilized well away from the lower uterine segment and the vaginal fornices on the opposite side.  The vaginal wall is stented using the manipultor and well delineate the fornice .    An anterior colpotomy was made in the J-hook  and extended laterally in both directions and  join posterorly excising the vaginal cuff.  The uterus tubes ,ovaries and cervix was brought out through the incision andsent for pathology glove with raytec  kept in the vagina to maintain the pneumoperitoneum.  Using laparoscopic needle celaya with a o Vicryl-strata fix  small oozing noted at the angle a second bite was taken and locked hemostatis affirmed and the another running sticth on the cuff and locked the stitch  . The nedle was cut and handed to scrub tech. The end of suture was brought out throught the left port and held as stay suture. Then using another 2-0 stratifix bites were taked on right angle of the cuff locked the end and closed the cuff  in a running fashion anterior posteriorly and reach to the left angle after the bite was taken this was locked in and the suture was cut and the needle was brought out handed over to the surgical tech.   The abdomen was irrigated and fluid was suctioned out,  excellent hemostasis was reaffirmed insufflation pressures were in the right use no evidence of bleeding was seen pneumoperitoneum was released the abdominal fold fascia was closed with 0 Vicryl and the umbilical port and all the remaining ports skin was closed with 4-0 Monocryl the final sponge needle and instrument counts were correct x2 at the completion of the procedure    Cystoscopy  Bladder Myers was deflated Myers was removed cystoscope was introduced bladder wall was distended with the fluid all the walls of the bladder wall was visualized bilaterally retrograde flow was visualized no trigonitis or any other lesions noted. The cystoscope was withdrawn bladder Myers was repositioned patient was extubated and transferred to recovery room in a stable condition .   The final sponge, needle, and instrument counts were correct at the completion of the procedure. The patient was awakened and taken to the post anesthesia care unit in stable condition.          11/2/2022 10:59 AM Rhonda Dsouza M.D.

## 2022-11-09 ENCOUNTER — APPOINTMENT (OUTPATIENT)
Dept: RADIOLOGY | Facility: MEDICAL CENTER | Age: 47
End: 2022-11-09
Attending: EMERGENCY MEDICINE
Payer: COMMERCIAL

## 2022-11-09 ENCOUNTER — HOSPITAL ENCOUNTER (EMERGENCY)
Facility: MEDICAL CENTER | Age: 47
End: 2022-11-09
Attending: EMERGENCY MEDICINE
Payer: COMMERCIAL

## 2022-11-09 VITALS
OXYGEN SATURATION: 98 % | HEART RATE: 63 BPM | BODY MASS INDEX: 24.41 KG/M2 | RESPIRATION RATE: 16 BRPM | SYSTOLIC BLOOD PRESSURE: 146 MMHG | WEIGHT: 125 LBS | TEMPERATURE: 97.7 F | DIASTOLIC BLOOD PRESSURE: 80 MMHG

## 2022-11-09 DIAGNOSIS — R10.9 ABDOMINAL PAIN, UNSPECIFIED ABDOMINAL LOCATION: ICD-10-CM

## 2022-11-09 LAB
ALBUMIN SERPL BCP-MCNC: 4.3 G/DL (ref 3.2–4.9)
ALBUMIN/GLOB SERPL: 2.2 G/DL
ALP SERPL-CCNC: 73 U/L (ref 30–99)
ALT SERPL-CCNC: 20 U/L (ref 2–50)
ANION GAP SERPL CALC-SCNC: 12 MMOL/L (ref 7–16)
APPEARANCE UR: CLEAR
AST SERPL-CCNC: 26 U/L (ref 12–45)
BASOPHILS # BLD AUTO: 0.7 % (ref 0–1.8)
BASOPHILS # BLD: 0.09 K/UL (ref 0–0.12)
BILIRUB SERPL-MCNC: 0.7 MG/DL (ref 0.1–1.5)
BILIRUB UR QL STRIP.AUTO: NEGATIVE
BUN SERPL-MCNC: 8 MG/DL (ref 8–22)
CALCIUM SERPL-MCNC: 8.6 MG/DL (ref 8.5–10.5)
CHLORIDE SERPL-SCNC: 104 MMOL/L (ref 96–112)
CO2 SERPL-SCNC: 20 MMOL/L (ref 20–33)
COLOR UR: YELLOW
CREAT SERPL-MCNC: 0.33 MG/DL (ref 0.5–1.4)
EOSINOPHIL # BLD AUTO: 0.27 K/UL (ref 0–0.51)
EOSINOPHIL NFR BLD: 2.1 % (ref 0–6.9)
ERYTHROCYTE [DISTWIDTH] IN BLOOD BY AUTOMATED COUNT: 46.3 FL (ref 35.9–50)
GFR SERPLBLD CREATININE-BSD FMLA CKD-EPI: 129 ML/MIN/1.73 M 2
GLOBULIN SER CALC-MCNC: 2 G/DL (ref 1.9–3.5)
GLUCOSE SERPL-MCNC: 116 MG/DL (ref 65–99)
GLUCOSE UR STRIP.AUTO-MCNC: NEGATIVE MG/DL
HCT VFR BLD AUTO: 36.3 % (ref 37–47)
HGB BLD-MCNC: 11.9 G/DL (ref 12–16)
IMM GRANULOCYTES # BLD AUTO: 0.1 K/UL (ref 0–0.11)
IMM GRANULOCYTES NFR BLD AUTO: 0.8 % (ref 0–0.9)
KETONES UR STRIP.AUTO-MCNC: ABNORMAL MG/DL
LEUKOCYTE ESTERASE UR QL STRIP.AUTO: NEGATIVE
LIPASE SERPL-CCNC: 35 U/L (ref 11–82)
LYMPHOCYTES # BLD AUTO: 0.81 K/UL (ref 1–4.8)
LYMPHOCYTES NFR BLD: 6.3 % (ref 22–41)
MCH RBC QN AUTO: 30.1 PG (ref 27–33)
MCHC RBC AUTO-ENTMCNC: 32.8 G/DL (ref 33.6–35)
MCV RBC AUTO: 91.7 FL (ref 81.4–97.8)
MICRO URNS: ABNORMAL
MONOCYTES # BLD AUTO: 0.87 K/UL (ref 0–0.85)
MONOCYTES NFR BLD AUTO: 6.7 % (ref 0–13.4)
NEUTROPHILS # BLD AUTO: 10.79 K/UL (ref 2–7.15)
NEUTROPHILS NFR BLD: 83.4 % (ref 44–72)
NITRITE UR QL STRIP.AUTO: NEGATIVE
NRBC # BLD AUTO: 0 K/UL
NRBC BLD-RTO: 0 /100 WBC
PH UR STRIP.AUTO: 8 [PH] (ref 5–8)
PLATELET # BLD AUTO: 249 K/UL (ref 164–446)
PMV BLD AUTO: 11.2 FL (ref 9–12.9)
POTASSIUM SERPL-SCNC: 3.5 MMOL/L (ref 3.6–5.5)
PROT SERPL-MCNC: 6.3 G/DL (ref 6–8.2)
PROT UR QL STRIP: NEGATIVE MG/DL
RBC # BLD AUTO: 3.96 M/UL (ref 4.2–5.4)
RBC UR QL AUTO: NEGATIVE
SODIUM SERPL-SCNC: 136 MMOL/L (ref 135–145)
SP GR UR STRIP.AUTO: 1.04
TROPONIN T SERPL-MCNC: <6 NG/L (ref 6–19)
UROBILINOGEN UR STRIP.AUTO-MCNC: 0.2 MG/DL
WBC # BLD AUTO: 12.9 K/UL (ref 4.8–10.8)

## 2022-11-09 PROCEDURE — 700111 HCHG RX REV CODE 636 W/ 250 OVERRIDE (IP)

## 2022-11-09 PROCEDURE — 74177 CT ABD & PELVIS W/CONTRAST: CPT

## 2022-11-09 PROCEDURE — 81003 URINALYSIS AUTO W/O SCOPE: CPT

## 2022-11-09 PROCEDURE — 84484 ASSAY OF TROPONIN QUANT: CPT

## 2022-11-09 PROCEDURE — 96375 TX/PRO/DX INJ NEW DRUG ADDON: CPT

## 2022-11-09 PROCEDURE — 700117 HCHG RX CONTRAST REV CODE 255: Performed by: EMERGENCY MEDICINE

## 2022-11-09 PROCEDURE — 71045 X-RAY EXAM CHEST 1 VIEW: CPT

## 2022-11-09 PROCEDURE — 36415 COLL VENOUS BLD VENIPUNCTURE: CPT

## 2022-11-09 PROCEDURE — 85025 COMPLETE CBC W/AUTO DIFF WBC: CPT

## 2022-11-09 PROCEDURE — 83690 ASSAY OF LIPASE: CPT

## 2022-11-09 PROCEDURE — 96374 THER/PROPH/DIAG INJ IV PUSH: CPT

## 2022-11-09 PROCEDURE — 700111 HCHG RX REV CODE 636 W/ 250 OVERRIDE (IP): Performed by: EMERGENCY MEDICINE

## 2022-11-09 PROCEDURE — 700105 HCHG RX REV CODE 258: Performed by: EMERGENCY MEDICINE

## 2022-11-09 PROCEDURE — 99285 EMERGENCY DEPT VISIT HI MDM: CPT

## 2022-11-09 PROCEDURE — 80053 COMPREHEN METABOLIC PANEL: CPT

## 2022-11-09 RX ORDER — PROCHLORPERAZINE EDISYLATE 5 MG/ML
10 INJECTION INTRAMUSCULAR; INTRAVENOUS ONCE
Status: COMPLETED | OUTPATIENT
Start: 2022-11-09 | End: 2022-11-09

## 2022-11-09 RX ORDER — HALOPERIDOL 5 MG/ML
5 INJECTION INTRAMUSCULAR ONCE
Status: COMPLETED | OUTPATIENT
Start: 2022-11-09 | End: 2022-11-09

## 2022-11-09 RX ORDER — SODIUM CHLORIDE 9 MG/ML
1000 INJECTION, SOLUTION INTRAVENOUS ONCE
Status: COMPLETED | OUTPATIENT
Start: 2022-11-09 | End: 2022-11-09

## 2022-11-09 RX ORDER — PROCHLORPERAZINE MALEATE 10 MG
10 TABLET ORAL EVERY 6 HOURS PRN
Qty: 30 TABLET | Refills: 3 | Status: SHIPPED | OUTPATIENT
Start: 2022-11-09 | End: 2024-01-24

## 2022-11-09 RX ORDER — PROMETHAZINE HYDROCHLORIDE 25 MG/1
25 SUPPOSITORY RECTAL EVERY 6 HOURS PRN
Qty: 12 SUPPOSITORY | Refills: 0 | Status: SHIPPED | OUTPATIENT
Start: 2022-11-09 | End: 2023-01-23 | Stop reason: SDUPTHER

## 2022-11-09 RX ORDER — ONDANSETRON 2 MG/ML
4 INJECTION INTRAMUSCULAR; INTRAVENOUS
Status: COMPLETED | OUTPATIENT
Start: 2022-11-09 | End: 2022-11-09

## 2022-11-09 RX ORDER — DIPHENHYDRAMINE HYDROCHLORIDE 50 MG/ML
25 INJECTION INTRAMUSCULAR; INTRAVENOUS ONCE
Status: COMPLETED | OUTPATIENT
Start: 2022-11-09 | End: 2022-11-09

## 2022-11-09 RX ADMIN — PROCHLORPERAZINE EDISYLATE 10 MG: 5 INJECTION INTRAMUSCULAR; INTRAVENOUS at 04:40

## 2022-11-09 RX ADMIN — ONDANSETRON 4 MG: 2 INJECTION INTRAMUSCULAR; INTRAVENOUS at 07:51

## 2022-11-09 RX ADMIN — HALOPERIDOL LACTATE 5 MG: 5 INJECTION, SOLUTION INTRAMUSCULAR at 07:34

## 2022-11-09 RX ADMIN — DIPHENHYDRAMINE HYDROCHLORIDE 25 MG: 50 INJECTION INTRAMUSCULAR; INTRAVENOUS at 04:39

## 2022-11-09 RX ADMIN — SODIUM CHLORIDE 1000 ML: 9 INJECTION, SOLUTION INTRAVENOUS at 04:41

## 2022-11-09 RX ADMIN — IOHEXOL 100 ML: 350 INJECTION, SOLUTION INTRAVENOUS at 08:59

## 2022-11-09 ASSESSMENT — PAIN SCALES - WONG BAKER
WONGBAKER_NUMERICALRESPONSE: DOESN'T HURT AT ALL

## 2022-11-09 ASSESSMENT — FIBROSIS 4 INDEX: FIB4 SCORE: 2.05

## 2022-11-09 NOTE — ED PROVIDER NOTES
"ED Provider Note    CHIEF COMPLAINT  Chief Complaint   Patient presents with    Abdominal Pain     The pt reports abd/chest pain with associated nausea and vomiting for the last 4 hours. The pt had a hysterectomy 1 week ago and has had 3 other episodes with similar symptoms. The pt reports taking zofran prior to EMT's. EMT's gave 4 mg zofran, 75 mcg of fentanyl, and IV fluids. Medics got a 22g in the right hand,  pain is 8/10. Stable vitals.        HPI  Tamela Stern is a 46 y.o. female who presents to the emergency room with abdominal pain.  Patient had a hysterectomy with Dr. Banda about a week ago.  She has had 3 separate episodes of nausea vomiting and dry heaving.  Today's episode came on and was quite severe or uncontrollable.  Persistent dry heaving with limited output.  She has had a loose stool.  Not had a fever but has felt hot and flushed.  She has a stinging dysuria.  No flank pain.  Denies chest pain, shortness of breath    REVIEW OF SYSTEMS  As per HPI, otherwise a 10 point review of systems is negative    PAST MEDICAL HISTORY  Past Medical History:   Diagnosis Date    Anemia     Anesthesia 04/19/2022    \"vitals dropped making it hard to come out\" after surgery in 2009    Arthritis 04/19/2022    rheumatoid-ankle    Cancer (HCC)     Pineal tumor; still present; asymptomatic; possible stent placement; no chemotherapy or radiation.    Cervical spondylosis 12/11/2015    Concussion     Constipation     medicated    Major depression 01/20/2015    depression and anxiety    Migraine     Neoplasm of uncertain behavior of skin 05/02/2017    Other specified symptom associated with female genital organs     scar tissue    Pain 04/19/2022    abdomen    PONV (postoperative nausea and vomiting)     Right acoustic neuroma (HCC)     removed 3/3/2105    Seizure disorder (HCC) 1999    not medicated at present - was due to apnea in past, last seizure was 04/12/2022, last EEG was clear    Shingles " "01/13/2016    Sleep apnea     does not use cpap, last sleep study showed she was clear for apnea    Stroke (HCC) 01/2016    TIA-residual weakness and some left sided drooping occasionally    Thyroid activity decreased     medicated - hypothyroid       SOCIAL HISTORY  Social History     Tobacco Use    Smoking status: Heavy Smoker     Packs/day: 0.50     Years: 27.00     Pack years: 13.50     Types: Cigarettes    Smokeless tobacco: Current    Tobacco comments:     2 cigs/day; uses  e cigarettes   Vaping Use    Vaping Use: Every day    Substances: THC, Flavoring    Devices: Pre-filled or refillable cartridge   Substance Use Topics    Alcohol use: No     Alcohol/week: 0.0 oz    Drug use: Yes     Types: Marijuana, Inhaled     Comment: \"medical\" marijuana       SURGICAL HISTORY  Past Surgical History:   Procedure Laterality Date    ME LAP,RMV  ADNEXAL STRUCTURE Bilateral 11/2/2022    Procedure: SALPINGO-OOPHORECTOMY, BILATERAL, LAPAROSCOPIC;  Surgeon: Rhonda Dsouza M.D.;  Location: SURGERY SAME DAY [facility];  Service: Gynecology    ME CYSTOURETHROSCOPY N/A 11/2/2022    Procedure: CYSTOSCOPY;  Surgeon: Rhonda Dsouza M.D.;  Location: SURGERY SAME DAY [facility];  Service: Gynecology    HYSTERECTOMY LAPAROSCOPY N/A 11/2/2022    Procedure: TOTAL LAPAROSCOPIC HYSTERECTOMY ,;  Surgeon: Rhonda Dsouza M.D.;  Location: SURGERY SAME DAY [facility];  Service: Gynecology    LAPAROSCOPIC LYSIS OF ADHESIONS N/A 11/2/2022    Procedure: LYSIS, ENDOMETRIOSIS, LAPAROSCOPIC;  Surgeon: Rhonda Dsouza M.D.;  Location: SURGERY SAME DAY [facility];  Service: Gynecology    ME LAP,DIAGNOSTIC ABDOMEN  4/27/2022    Procedure: LAPAROSCOPY - DIAGNOSTIC;  Surgeon: Jonnie Fernandez M.D.;  Location: SURGERY [facility];  Service: General    LAPAROSCOPIC LYSIS OF ADHESIONS  4/27/2022    Procedure: LYSIS, ADHESIONS, LAPAROSCOPIC;  Surgeon: Jonnie Fernandez M.D.;  Location: SURGERY [facility];  Service: General    ME UPPER GI ENDOSCOPY,DIAGNOSIS " N/A 4/11/2022    Procedure: GASTROSCOPY;  Surgeon: Sandra Reed M.D.;  Location: SURGERY SAME DAY [facility];  Service: Gastroenterology    TN UPPER GI ENDOSCOPY,BIOPSY N/A 4/11/2022    Procedure: GASTROSCOPY, WITH BIOPSY;  Surgeon: Sandra Reed M.D.;  Location: SURGERY SAME DAY [facility];  Service: Gastroenterology    OTHER ORTHOPEDIC SURGERY Right 2019    ankle fusion    OTHER ORTHOPEDIC SURGERY Right 2018    ankle fusion    CERVICAL DISK AND FUSION ANTERIOR  4/27/2016    Procedure: CERVICAL DISK AND FUSION ANTERIOR C5-6;  Surgeon: Jorge Pozo M.D.;  Location: SURGERY [facility];  Service:     MASTOIDECTOMY Right 2015    ARIADNA BY LAPAROSCOPY  8/15/2010    Performed by DANIEL DONOVAN at SURGERY [facility]    LYSIS ADHESIONS GENERAL  8/15/2010    Performed by DANIEL DONOVAN at SURGERY [facility] ORS    GASTROSCOPY-ENDO  8/12/2010    Performed by KHUSHI MURCIA at ENDOSCOPY [facility] ORS    PELVISCOPY  6/16/2009    Performed by ABDULKADIR SMITH at SURGERY SAME DAY [facility] ORS    LYSIS ADHESIONS GYN  6/16/2009    Performed by ABDULKADIR SMITH at SURGERY SAME DAY [facility] ORS    COLONOSCOPY - ENDO  1/22/2009    Performed by ASHLEY TALBOT at SURGERY [facility] ORS    GASTROSCOPY-ENDO  1/20/2009    Performed by BRENDA BENNETT at SURGERY [facility]    OTHER ABDOMINAL SURGERY  2009    ariadna    OTHER ABDOMINAL SURGERY  1997    C - section    APPENDECTOMY  1994    CARPAL TUNNEL RELEASE      RUE - 1998, LUE - 2000    GASTRIC BYPASS LAPAROSCOPIC      GYN SURGERY      TUBE, EAR ULTRASIL         CURRENT MEDICATIONS  Home Medications    **Home medications have not yet been reviewed for this encounter**         ALLERGIES  Allergies   Allergen Reactions    Phentermine Hcl      Swelling short of breath    Morphine Vomiting       PHYSICAL EXAM  VITAL SIGNS: BP (!) 160/99   Pulse 64   Temp 36 °C (96.8 °F) (Temporal)   Resp (!) 24   Wt 56.7 kg (125 lb)   LMP 05/15/2017  Comment: irregular for the last 3 months  SpO2 99%   BMI 24.41 kg/m²    Constitutional: A actively dry heaving.  Appears uncomfortable  HENT: Normal inspection  Eyes: Normal inspection  Neck: Grossly normal range of motion.  Cardiovascular: Normal heart rate, Normal rhythm.  Symmetric peripheral pulses.   Thorax & Lungs: No respiratory distress, No wheezing, No rales, No rhonchi, No chest tenderness.   Abdomen: Bowel sounds normal, soft, non-distended,  mild diffuse tenderness  Skin: No obvious rash.  Back: No tenderness, No CVA tenderness.   Extremities: No clubbing, cyanosis, edema, no Homans or cords.  Neurologic: Grossly normal   Psychiatric: Anxious    RADIOLOGY/PROCEDURES  CT-ABDOMEN-PELVIS WITH   Final Result      1.  Interval hysterectomy   2.  Hemoperitoneum with blood mostly in the pelvis but no CT evidence of ongoing extravasation   3.  Gas in and just deep to the soft tissues of the LEFT lower quadrant anterior abdominal wall, likely related to recent surgery rather than fasciitis   4.  Prior cholecystectomy   5.  Postsurgical changes in the stomach      DX-CHEST-PORTABLE (1 VIEW)   Final Result      1.  No acute cardiac or pulmonary abnormalities are identified.           Imaging is interpreted by radiologist    Labs:  Results for orders placed or performed during the hospital encounter of 11/09/22   CBC WITH DIFFERENTIAL   Result Value Ref Range    WBC 12.9 (H) 4.8 - 10.8 K/uL    RBC 3.96 (L) 4.20 - 5.40 M/uL    Hemoglobin 11.9 (L) 12.0 - 16.0 g/dL    Hematocrit 36.3 (L) 37.0 - 47.0 %    MCV 91.7 81.4 - 97.8 fL    MCH 30.1 27.0 - 33.0 pg    MCHC 32.8 (L) 33.6 - 35.0 g/dL    RDW 46.3 35.9 - 50.0 fL    Platelet Count 249 164 - 446 K/uL    MPV 11.2 9.0 - 12.9 fL    Neutrophils-Polys 83.40 (H) 44.00 - 72.00 %    Lymphocytes 6.30 (L) 22.00 - 41.00 %    Monocytes 6.70 0.00 - 13.40 %    Eosinophils 2.10 0.00 - 6.90 %    Basophils 0.70 0.00 - 1.80 %    Immature Granulocytes 0.80 0.00 - 0.90 %    Nucleated  RBC 0.00 /100 WBC    Neutrophils (Absolute) 10.79 (H) 2.00 - 7.15 K/uL    Lymphs (Absolute) 0.81 (L) 1.00 - 4.80 K/uL    Monos (Absolute) 0.87 (H) 0.00 - 0.85 K/uL    Eos (Absolute) 0.27 0.00 - 0.51 K/uL    Baso (Absolute) 0.09 0.00 - 0.12 K/uL    Immature Granulocytes (abs) 0.10 0.00 - 0.11 K/uL    NRBC (Absolute) 0.00 K/uL   COMP METABOLIC PANEL   Result Value Ref Range    Sodium 136 135 - 145 mmol/L    Potassium 3.5 (L) 3.6 - 5.5 mmol/L    Chloride 104 96 - 112 mmol/L    Co2 20 20 - 33 mmol/L    Anion Gap 12.0 7.0 - 16.0    Glucose 116 (H) 65 - 99 mg/dL    Bun 8 8 - 22 mg/dL    Creatinine 0.33 (L) 0.50 - 1.40 mg/dL    Calcium 8.6 8.5 - 10.5 mg/dL    AST(SGOT) 26 12 - 45 U/L    ALT(SGPT) 20 2 - 50 U/L    Alkaline Phosphatase 73 30 - 99 U/L    Total Bilirubin 0.7 0.1 - 1.5 mg/dL    Albumin 4.3 3.2 - 4.9 g/dL    Total Protein 6.3 6.0 - 8.2 g/dL    Globulin 2.0 1.9 - 3.5 g/dL    A-G Ratio 2.2 g/dL   LIPASE   Result Value Ref Range    Lipase 35 11 - 82 U/L   TROPONIN   Result Value Ref Range    Troponin T <6 6 - 19 ng/L   ESTIMATED GFR   Result Value Ref Range    GFR (CKD-EPI) 129 >60 mL/min/1.73 m 2   EKG interpretation  Sinus rhythm with pause at 64.  First-degree AV block SD interval 229.  Normal QRS and QTc.  Normal axis.  No acute ST changes.    Medications   prochlorperazine (COMPAZINE) injection 10 mg (10 mg Intravenous Given 11/9/22 6026)   diphenhydrAMINE (BENADRYL) injection 25 mg (25 mg Intravenous Given 11/9/22 9277)   ondansetron (ZOFRAN) syringe/vial injection 4 mg (4 mg Intravenous Given 11/9/22 0751)   NS infusion 1,000 mL (0 mL Intravenous Stopped 11/9/22 1024)   haloperidol lactate (HALDOL) injection 5 mg (5 mg Intravenous Given 11/9/22 7041)   iohexol (OMNIPAQUE) 350 mg/mL (IV) (100 mL Intravenous Given 11/9/22 9002)       Measures:  HYDRATION: Based on the patient's presentation of Acute Vomiting the patient was given IV fluids. IV Hydration was used because oral hydration was not adequate alone.  Upon recheck following hydration, the patient was stable.    COURSE & MEDICAL DECISION MAKING  Patient presents with nausea and dry heaving.  Difficulty keeping orals down.  This has been off-and-on issues since her surgery and hysterectomy.  Ordered IV fluids and antiemetics.  Obtain laboratory data.  Order CT scan.    Laboratory data with mild WBC count elevation at 12.9.  CMP does not demonstrate profound dehydration.  Mild hypokalemia.  CT scan without alarming findings-findings are associated with surgery.    Patient required several doses of antiemetics while here.  She had symptomatic improvement.  No longer dry heaving.  Patient appears appropriate for outpatient management.  I have given a prescription for Compazine orally as well as Phenergan suppositories.  Advised small frequent sips and a bland diet.  She should follow-up with her surgeon and she already has an appointment.  I precautioned her to return to the ER for any fevers, abdominal pain, inability to tolerate orals, dehydration or concern.    FINAL IMPRESSION  1.  Nausea and vomiting  2.  Postsurgical abdominal pain      This dictation was created using voice recognition software. The accuracy of the dictation is limited to the abilities of the software.  The nursing notes were reviewed and certain aspects of this information were incorporated into this note.      Electronically signed by: Allan Bradshaw M.D., 11/9/2022 6:12 AM

## 2022-11-09 NOTE — ED NOTES
Reviewed discharge instructions, pt verbalized understanding of instructions and medication. States she will schedule follow-up appointment. Denies further questions at this time. Awaiting  to bring pt clothes to [place].

## 2022-11-09 NOTE — ED TRIAGE NOTES
Chief Complaint   Patient presents with    Abdominal Pain     The pt reports abd/chest pain with associated nausea and vomiting for the last 4 hours. The pt had a hysterectomy 1 week ago and has had 3 other episodes with similar symptoms. The pt reports taking zofran prior to EMT's. EMT's gave 4 mg zofran, 75 mcg of fentanyl, and IV fluids. Medics got a 22g in the right hand,  pain is 8/10. Stable vitals.        BIB EMS to 28, pt on monitor and in gown, labs drawn and sent. Pt consists of: alert and oriented, moaning in pain (lower abd, back, and chest).    Medications given en route:zofran, fentanyl, NS    Wt 56.7 kg (125 lb)   LMP 05/15/2017 Comment: irregular for the last 3 months  BMI 24.41 kg/m²

## 2022-12-12 ENCOUNTER — TELEPHONE (OUTPATIENT)
Dept: SCHEDULING | Facility: IMAGING CENTER | Age: 47
End: 2022-12-12

## 2023-01-04 ENCOUNTER — OFFICE VISIT (OUTPATIENT)
Dept: MEDICAL GROUP | Facility: MEDICAL CENTER | Age: 48
End: 2023-01-04
Payer: COMMERCIAL

## 2023-01-04 VITALS
BODY MASS INDEX: 25.16 KG/M2 | SYSTOLIC BLOOD PRESSURE: 92 MMHG | WEIGHT: 116.62 LBS | DIASTOLIC BLOOD PRESSURE: 56 MMHG | TEMPERATURE: 99 F | OXYGEN SATURATION: 98 % | HEIGHT: 57 IN | HEART RATE: 88 BPM

## 2023-01-04 DIAGNOSIS — F41.9 ANXIETY: ICD-10-CM

## 2023-01-04 DIAGNOSIS — G40.909 SEIZURE DISORDER (HCC): ICD-10-CM

## 2023-01-04 DIAGNOSIS — E53.8 B12 DEFICIENCY: ICD-10-CM

## 2023-01-04 DIAGNOSIS — G43.109 MIGRAINE WITH AURA AND WITHOUT STATUS MIGRAINOSUS, NOT INTRACTABLE: ICD-10-CM

## 2023-01-04 DIAGNOSIS — M54.2 CHRONIC NECK PAIN: ICD-10-CM

## 2023-01-04 DIAGNOSIS — Z12.11 COLON CANCER SCREENING: ICD-10-CM

## 2023-01-04 DIAGNOSIS — E34.8 PINEAL GLAND CYST: ICD-10-CM

## 2023-01-04 DIAGNOSIS — R63.4 WEIGHT LOSS, UNINTENTIONAL: ICD-10-CM

## 2023-01-04 DIAGNOSIS — G89.29 CHRONIC NECK PAIN: ICD-10-CM

## 2023-01-04 DIAGNOSIS — Z78.0 POSTMENOPAUSAL: ICD-10-CM

## 2023-01-04 DIAGNOSIS — Z00.00 ENCOUNTER FOR MEDICAL EXAMINATION TO ESTABLISH CARE: ICD-10-CM

## 2023-01-04 DIAGNOSIS — E03.4 HYPOTHYROIDISM DUE TO ACQUIRED ATROPHY OF THYROID: ICD-10-CM

## 2023-01-04 DIAGNOSIS — Z23 IMMUNIZATION DUE: ICD-10-CM

## 2023-01-04 DIAGNOSIS — M79.7 FIBROMYALGIA: ICD-10-CM

## 2023-01-04 DIAGNOSIS — Z01.84 IMMUNITY STATUS TESTING: ICD-10-CM

## 2023-01-04 DIAGNOSIS — F33.1 MODERATE EPISODE OF RECURRENT MAJOR DEPRESSIVE DISORDER (HCC): ICD-10-CM

## 2023-01-04 PROBLEM — L57.0 KERATOSIS: Status: RESOLVED | Noted: 2017-05-09 | Resolved: 2023-01-04

## 2023-01-04 PROCEDURE — 90471 IMMUNIZATION ADMIN: CPT | Performed by: STUDENT IN AN ORGANIZED HEALTH CARE EDUCATION/TRAINING PROGRAM

## 2023-01-04 PROCEDURE — 90472 IMMUNIZATION ADMIN EACH ADD: CPT | Performed by: STUDENT IN AN ORGANIZED HEALTH CARE EDUCATION/TRAINING PROGRAM

## 2023-01-04 PROCEDURE — 90715 TDAP VACCINE 7 YRS/> IM: CPT | Performed by: STUDENT IN AN ORGANIZED HEALTH CARE EDUCATION/TRAINING PROGRAM

## 2023-01-04 PROCEDURE — 90677 PCV20 VACCINE IM: CPT | Performed by: STUDENT IN AN ORGANIZED HEALTH CARE EDUCATION/TRAINING PROGRAM

## 2023-01-04 PROCEDURE — 99205 OFFICE O/P NEW HI 60 MIN: CPT | Mod: 25 | Performed by: STUDENT IN AN ORGANIZED HEALTH CARE EDUCATION/TRAINING PROGRAM

## 2023-01-04 RX ORDER — CONJUGATED ESTROGENS 0.62 MG/1
0.62 TABLET, FILM COATED ORAL
Status: ON HOLD | COMMUNITY
Start: 2022-12-31 | End: 2024-01-28

## 2023-01-04 RX ORDER — ONDANSETRON 4 MG/1
4 TABLET, FILM COATED ORAL EVERY 8 HOURS PRN
COMMUNITY
Start: 2022-11-10 | End: 2023-01-22

## 2023-01-04 RX ORDER — PREGABALIN 50 MG/1
CAPSULE ORAL
COMMUNITY
Start: 2022-12-28 | End: 2024-01-24

## 2023-01-04 RX ORDER — LORAZEPAM 1 MG/1
0.5 TABLET ORAL
Qty: 10 TABLET | Refills: 0 | Status: SHIPPED | OUTPATIENT
Start: 2023-01-04 | End: 2023-02-03

## 2023-01-04 RX ORDER — OXYCODONE AND ACETAMINOPHEN 10; 325 MG/1; MG/1
TABLET ORAL
COMMUNITY
Start: 2022-11-15 | End: 2023-01-04

## 2023-01-04 ASSESSMENT — PATIENT HEALTH QUESTIONNAIRE - PHQ9
SUM OF ALL RESPONSES TO PHQ QUESTIONS 1-9: 17
5. POOR APPETITE OR OVEREATING: 3 - NEARLY EVERY DAY
CLINICAL INTERPRETATION OF PHQ2 SCORE: 2

## 2023-01-04 ASSESSMENT — FIBROSIS 4 INDEX: FIB4 SCORE: 1.07

## 2023-01-04 NOTE — PROGRESS NOTES
Subjective:     CC:  Diagnoses of Encounter for medical examination to establish care, Seizure disorder (HCC), Pineal gland cyst, Moderate episode of recurrent major depressive disorder (HCC), Hypothyroidism due to acquired atrophy of thyroid, Anxiety, Postmenopausal, Chronic neck pain, Fibromyalgia, Migraine with aura and without status migrainosus, not intractable, Colon cancer screening, B12 deficiency, Immunity status testing, Immunization due, and Weight loss, unintentional were pertinent to this visit.    HISTORY OF THE PRESENT ILLNESS: Patient is a 47 y.o. female. This pleasant patient is here today to establish care and discuss the following.    Problem   Pineal Gland Cyst    Per patient she has a pineal gland cyst and is supposed to be following up with neurology for this     Weight Loss, Unintentional    Patient does have a history of gastric bypass, but states that without trying she has lost about 100 pounds in the last year.  She has had multiple surgeries including an ex lap to explore reasons for chronic abdominal pain and weight loss.  She is up-to-date with her mammogram.  She just got a hysterectomy so does not need cervical cancer screening.  She has not completed colon cancer screening, but is okay with me sending a referral.     Postmenopausal    This is a chronic condition, recently exacerbated by total hysterectomy and oophorectomy.  She follows with OB/GYN.  She takes Premarin 0.625mg daily.     Chronic Neck Pain    This is a chronic, uncontrolled condition.  She follows with [facility].  She is currently on Lyrica and occasionally smokes marijuana to help with pain.     Seizure Disorder (Hcc)    Is a chronic, worsening problem.  She currently takes Keppra 502,000 mg twice daily and has Ativan 0.5 mg that she takes daily as needed for breakthrough seizures.  She states that her seizures have been more frequent since moving back to the [facility].  Typically her Ativan will last  her about 90 days for a 10 tablet supply.     Moderate episode of recurrent major depressive disorder (HCC)    This is a chronic, worsening condition.  Currently taking Cymbalta 60 mg daily.  She is interested in talk therapy.  He denies any active suicidal ideation and states that she would never end her life because she values her family too much, but does state that she does have some passive thoughts of thinking life would be easier if she were not around.        1/4/2023 4/5/2016 6/19/2015 4/1/2015   PHQ-9 Screening   Little interest or pleasure in doing things  Not at all  Not at all   Little interest or pleasure in doing things 1 - several days      Feeling down, depressed, or hopeless  Not at all  Not at all   Feeling down, depressed, or hopeless 1 - several days      Trouble falling or staying asleep, or sleeping too much 1 - several days      Feeling tired or having little energy 3 - nearly every day      Poor appetite or overeating 3 - nearly every day      Feeling bad about yourself - or that you are a failure or have let yourself or your family down 2 - more than half the days      Trouble concentrating on things, such as reading the newspaper or watching television 3 - nearly every day      Moving or speaking so slowly that other people could have noticed. Or the opposite - being so fidgety or restless that you have been moving around a lot more than usual 2 - more than half the days      Thoughts that you would be better off dead, or of hurting yourself in some way 1 - several days      PHQ-2 Total Score  0 0 0   PHQ-2 Total Score 2      PHQ-9 Total Score 17          Multiple values from one day are sorted in reverse-chronological order       Interpretation of PHQ-9 Total Score   Score Severity   1-4 No Depression   5-9 Mild Depression   10-14 Moderate Depression   15-19 Moderately Severe Depression   20-27 Severe Depression       Hypothyroidism Due to Acquired Atrophy of Thyroid    This is a chronic,  "controlled condition.  She takes levothyroxine 50 mcg daily.     Fibromyalgia    Recently diagnosed by [facility].  She is on Cymbalta and Lyrica.  She has been offered injections but is not interested.  She does smoke marijuana occasionally     Migraine    Chronic, controlled, takes sumatriptan as needed     Anxiety    This is a chronic, uncontrolled condition.  She currently takes BuSpar 10 mg daily and Cymbalta 60 mg daily.  She states her anxiety is getting worse due to her health issues.  She is interested in talk therapy.     Keratosis (Resolved)   Cervical Spondylosis (Resolved)   Cervical Radiculopathy (Resolved)   Ddd (Degenerative Disc Disease), Cervical (Resolved)   Obesity (BMI 30-39.9)- s/p gastric bypass (Resolved)     ROS:   ROS      Objective:     Exam: BP 92/56 (BP Location: Left arm, Patient Position: Sitting, BP Cuff Size: Adult)   Pulse 88   Temp 37.2 °C (99 °F) (Temporal)   Ht 1.448 m (4' 9\")   Wt 52.9 kg (116 lb 10 oz)   SpO2 98%  Body mass index is 25.24 kg/m².    Physical Exam  Vitals reviewed.   Constitutional:       General: She is not in acute distress.     Appearance: She is normal weight. She is not toxic-appearing.   HENT:      Head: Normocephalic and atraumatic.      Right Ear: External ear normal.      Left Ear: External ear normal.      Nose: Nose normal. No congestion.      Mouth/Throat:      Mouth: Mucous membranes are moist.      Pharynx: Oropharynx is clear. No oropharyngeal exudate.   Eyes:      General:         Right eye: No discharge.         Left eye: No discharge.      Extraocular Movements: Extraocular movements intact.      Conjunctiva/sclera: Conjunctivae normal.   Cardiovascular:      Rate and Rhythm: Normal rate and regular rhythm.      Pulses: Normal pulses.      Heart sounds: Normal heart sounds. No murmur heard.  Pulmonary:      Effort: Pulmonary effort is normal. No respiratory distress.      Breath sounds: Normal breath sounds.   Abdominal:     "  General: Abdomen is flat. Bowel sounds are normal. There is no distension.      Palpations: Abdomen is soft.      Tenderness: There is no abdominal tenderness.   Musculoskeletal:         General: Normal range of motion.   Skin:     General: Skin is warm and dry.      Capillary Refill: Capillary refill takes less than 2 seconds.   Neurological:      Mental Status: She is alert.   Psychiatric:         Mood and Affect: Mood normal.         Behavior: Behavior normal.         Thought Content: Thought content normal.         Judgment: Judgment normal.         Assessment & Plan:   47 y.o. female with the following -    1. Encounter for medical examination to establish care  History, problem list, medications and allergies reviewed.  Records requested from previous provider if applicable.    2. Seizure disorder (HCC)  Lorazepam refilled, PDMP reviewed and she only has 10 tablets about once every 3 to 4 months, controlled substance agreement signed, referral to neurology sent due to increasing frequency of seizures  - Controlled Substance Treatment Agreement  - Referral to Neurology  - LORazepam (ATIVAN) 1 MG Tab; Take 0.5 Tablets by mouth 1 time a day as needed (seizure) for up to 30 days.  Dispense: 10 Tablet; Refill: 0    3. Pineal gland cyst  - Referral to Neurology    4. Moderate episode of recurrent major depressive disorder (HCC)  Referral to behavioral health, continue Cymbalta, denies active suicidal ideation  - Referral to Behavioral Health    5. Hypothyroidism due to acquired atrophy of thyroid  Continue current dosing of levothyroxine, TSH pending  - TSH WITH REFLEX TO FT4; Future    6. Anxiety  Continue BuSpar, continue Cymbalta, referral to behavioral health sent    7. Postmenopausal  Continue estrogen supplement, continue to follow with OB/GYN    8. Chronic neck pain  9. Fibromyalgia  Continue to follow with pain clinic, labs as below to rule out any underlying autoimmune disease, continue Lyrica and  Cymbalta.  She has had multiple surgeries including C-spine surgery and multiple abdominal surgeries to look for sources of pain.  These have largely not improved her symptoms.   - RHEUMATOID ARTHRITIS FACTOR; Future  - CCP ANTIBODY; Future  - CBC WITH DIFFERENTIAL; Future  - Comp Metabolic Panel; Future  - Sed Rate; Future  - CRP QUANTITIVE (NON-CARDIAC); Future  - ANTI-NUCLEAR ANTIBODY SERUM; Future  - URIC ACID; Future    10. Migraine with aura and without status migrainosus, not intractable  Continue as needed sumatriptan    11. Colon cancer screening  - Referral to GI for Colonoscopy    12. B12 deficiency  - VITAMIN B12; Future    13. Immunity status testing  - HEP B SURFACE AB; Future    14. Immunization due  - Tdap =>8yo IM  - Pneumococcal Conjugate Vaccine 20-Valent (19 yrs+)    15. Weight loss, unintentional  Patient is up-to-date with all recommended age-appropriate cancer screenings with the exception of colon cancer screening which has been ordered.  She does have a history of gastric bypass but has been losing weight very quickly and unintentionally.     65 minutes spent on the of the visit, the majority of the time with the patient discussing her chronic medical issues, previous work-ups, previous surgeries of which the list is extensive, and trying to pinpoint a source of her chronic pain and unintentional weight loss.  We also discussed her mental health and how it relates to her chronic symptoms.  We went over possible treatment plans as well as possible work-ups to try to find the source of her debilitating problems.    No follow-ups on file.    Please note that this dictation was created using voice recognition software. I have made every reasonable attempt to correct obvious errors, but I expect that there are errors of grammar and possibly content that I did not discover before finalizing the note.

## 2023-01-14 ENCOUNTER — HOSPITAL ENCOUNTER (OUTPATIENT)
Dept: LAB | Facility: MEDICAL CENTER | Age: 48
End: 2023-01-14
Attending: STUDENT IN AN ORGANIZED HEALTH CARE EDUCATION/TRAINING PROGRAM
Payer: COMMERCIAL

## 2023-01-14 DIAGNOSIS — E03.4 HYPOTHYROIDISM DUE TO ACQUIRED ATROPHY OF THYROID: ICD-10-CM

## 2023-01-14 DIAGNOSIS — M79.7 FIBROMYALGIA: ICD-10-CM

## 2023-01-14 DIAGNOSIS — E53.8 B12 DEFICIENCY: ICD-10-CM

## 2023-01-14 DIAGNOSIS — Z01.84 IMMUNITY STATUS TESTING: ICD-10-CM

## 2023-01-14 LAB
ALBUMIN SERPL BCP-MCNC: 4.9 G/DL (ref 3.2–4.9)
ALBUMIN/GLOB SERPL: 2 G/DL
ALP SERPL-CCNC: 79 U/L (ref 30–99)
ALT SERPL-CCNC: 23 U/L (ref 2–50)
ANION GAP SERPL CALC-SCNC: 12 MMOL/L (ref 7–16)
AST SERPL-CCNC: 29 U/L (ref 12–45)
BASOPHILS # BLD AUTO: 1.6 % (ref 0–1.8)
BASOPHILS # BLD: 0.09 K/UL (ref 0–0.12)
BILIRUB SERPL-MCNC: 0.3 MG/DL (ref 0.1–1.5)
BUN SERPL-MCNC: 9 MG/DL (ref 8–22)
CALCIUM ALBUM COR SERPL-MCNC: 9.3 MG/DL (ref 8.5–10.5)
CALCIUM SERPL-MCNC: 10 MG/DL (ref 8.5–10.5)
CHLORIDE SERPL-SCNC: 101 MMOL/L (ref 96–112)
CO2 SERPL-SCNC: 23 MMOL/L (ref 20–33)
CREAT SERPL-MCNC: 0.57 MG/DL (ref 0.5–1.4)
CRP SERPL HS-MCNC: <0.3 MG/DL (ref 0–0.75)
EOSINOPHIL # BLD AUTO: 0.38 K/UL (ref 0–0.51)
EOSINOPHIL NFR BLD: 6.7 % (ref 0–6.9)
ERYTHROCYTE [DISTWIDTH] IN BLOOD BY AUTOMATED COUNT: 46.1 FL (ref 35.9–50)
ERYTHROCYTE [SEDIMENTATION RATE] IN BLOOD BY WESTERGREN METHOD: 3 MM/HOUR (ref 0–25)
GFR SERPLBLD CREATININE-BSD FMLA CKD-EPI: 113 ML/MIN/1.73 M 2
GLOBULIN SER CALC-MCNC: 2.4 G/DL (ref 1.9–3.5)
GLUCOSE SERPL-MCNC: 73 MG/DL (ref 65–99)
HBV SURFACE AB SERPL IA-ACNC: <3.5 MIU/ML (ref 0–10)
HCT VFR BLD AUTO: 50.4 % (ref 37–47)
HGB BLD-MCNC: 16.2 G/DL (ref 12–16)
IMM GRANULOCYTES # BLD AUTO: 0.02 K/UL (ref 0–0.11)
IMM GRANULOCYTES NFR BLD AUTO: 0.4 % (ref 0–0.9)
LYMPHOCYTES # BLD AUTO: 1.58 K/UL (ref 1–4.8)
LYMPHOCYTES NFR BLD: 27.7 % (ref 22–41)
MCH RBC QN AUTO: 29.1 PG (ref 27–33)
MCHC RBC AUTO-ENTMCNC: 32.1 G/DL (ref 33.6–35)
MCV RBC AUTO: 90.6 FL (ref 81.4–97.8)
MONOCYTES # BLD AUTO: 0.47 K/UL (ref 0–0.85)
MONOCYTES NFR BLD AUTO: 8.2 % (ref 0–13.4)
NEUTROPHILS # BLD AUTO: 3.17 K/UL (ref 2–7.15)
NEUTROPHILS NFR BLD: 55.4 % (ref 44–72)
NRBC # BLD AUTO: 0 K/UL
NRBC BLD-RTO: 0 /100 WBC
PLATELET # BLD AUTO: 195 K/UL (ref 164–446)
PMV BLD AUTO: 12.1 FL (ref 9–12.9)
POTASSIUM SERPL-SCNC: 4.3 MMOL/L (ref 3.6–5.5)
PROT SERPL-MCNC: 7.3 G/DL (ref 6–8.2)
RBC # BLD AUTO: 5.56 M/UL (ref 4.2–5.4)
RHEUMATOID FACT SER IA-ACNC: 10 IU/ML (ref 0–14)
SODIUM SERPL-SCNC: 136 MMOL/L (ref 135–145)
TSH SERPL DL<=0.005 MIU/L-ACNC: 1.94 UIU/ML (ref 0.38–5.33)
URATE SERPL-MCNC: 5.4 MG/DL (ref 1.9–8.2)
VIT B12 SERPL-MCNC: 465 PG/ML (ref 211–911)
WBC # BLD AUTO: 5.7 K/UL (ref 4.8–10.8)

## 2023-01-14 PROCEDURE — 86140 C-REACTIVE PROTEIN: CPT

## 2023-01-14 PROCEDURE — 85652 RBC SED RATE AUTOMATED: CPT

## 2023-01-14 PROCEDURE — 82607 VITAMIN B-12: CPT

## 2023-01-14 PROCEDURE — 86706 HEP B SURFACE ANTIBODY: CPT

## 2023-01-14 PROCEDURE — 80053 COMPREHEN METABOLIC PANEL: CPT

## 2023-01-14 PROCEDURE — 84443 ASSAY THYROID STIM HORMONE: CPT

## 2023-01-14 PROCEDURE — 84550 ASSAY OF BLOOD/URIC ACID: CPT

## 2023-01-14 PROCEDURE — 86431 RHEUMATOID FACTOR QUANT: CPT

## 2023-01-14 PROCEDURE — 86200 CCP ANTIBODY: CPT

## 2023-01-14 PROCEDURE — 36415 COLL VENOUS BLD VENIPUNCTURE: CPT

## 2023-01-14 PROCEDURE — 85025 COMPLETE CBC W/AUTO DIFF WBC: CPT

## 2023-01-14 PROCEDURE — 86038 ANTINUCLEAR ANTIBODIES: CPT

## 2023-01-17 LAB
CCP IGG SERPL-ACNC: 4 UNITS (ref 0–19)
NUCLEAR IGG SER QL IA: NORMAL

## 2023-01-18 ENCOUNTER — TELEMEDICINE (OUTPATIENT)
Dept: MEDICAL GROUP | Facility: MEDICAL CENTER | Age: 48
End: 2023-01-18
Payer: COMMERCIAL

## 2023-01-18 VITALS — BODY MASS INDEX: 24.35 KG/M2 | HEIGHT: 58 IN | WEIGHT: 116 LBS

## 2023-01-18 DIAGNOSIS — J01.01 ACUTE RECURRENT MAXILLARY SINUSITIS: ICD-10-CM

## 2023-01-18 DIAGNOSIS — G89.29 OTHER CHRONIC PAIN: ICD-10-CM

## 2023-01-18 PROCEDURE — 99214 OFFICE O/P EST MOD 30 MIN: CPT | Mod: 95 | Performed by: STUDENT IN AN ORGANIZED HEALTH CARE EDUCATION/TRAINING PROGRAM

## 2023-01-18 RX ORDER — LEVOTHYROXINE SODIUM 0.1 MG/1
TABLET ORAL
COMMUNITY
End: 2023-01-23

## 2023-01-18 RX ORDER — PROMETHAZINE HYDROCHLORIDE 12.5 MG/1
SUPPOSITORY RECTAL
COMMUNITY
End: 2023-05-10

## 2023-01-18 RX ORDER — SUMATRIPTAN 100 MG/1
TABLET, FILM COATED ORAL
COMMUNITY
End: 2023-05-10

## 2023-01-18 RX ORDER — DOXYCYCLINE HYCLATE 100 MG
100 TABLET ORAL 2 TIMES DAILY
Qty: 14 TABLET | Refills: 0 | Status: SHIPPED | OUTPATIENT
Start: 2023-01-18 | End: 2023-01-25

## 2023-01-18 RX ORDER — FLUOXETINE 10 MG/1
CAPSULE ORAL
COMMUNITY
End: 2023-01-23

## 2023-01-18 ASSESSMENT — FIBROSIS 4 INDEX: FIB4 SCORE: 1.43

## 2023-01-22 RX ORDER — ONDANSETRON 4 MG/1
TABLET, FILM COATED ORAL
Qty: 30 TABLET | Refills: 0 | Status: SHIPPED | OUTPATIENT
Start: 2023-01-22 | End: 2023-03-06

## 2023-01-23 DIAGNOSIS — R10.9 ABDOMINAL PAIN, UNSPECIFIED ABDOMINAL LOCATION: ICD-10-CM

## 2023-01-23 DIAGNOSIS — E03.9 HYPOTHYROIDISM, UNSPECIFIED TYPE: ICD-10-CM

## 2023-01-23 RX ORDER — LEVOTHYROXINE SODIUM 0.05 MG/1
50 TABLET ORAL
Qty: 90 TABLET | Refills: 1 | Status: SHIPPED | OUTPATIENT
Start: 2023-01-23 | End: 2023-07-12

## 2023-01-23 RX ORDER — SUMATRIPTAN 100 MG/1
100 TABLET, FILM COATED ORAL
Qty: 10 TABLET | Refills: 3 | Status: SHIPPED | OUTPATIENT
Start: 2023-01-23 | End: 2023-05-10 | Stop reason: DRUGHIGH

## 2023-01-23 RX ORDER — CONJUGATED ESTROGENS 0.62 MG/1
0.62 TABLET, FILM COATED ORAL
Qty: 30 TABLET | Refills: 0 | Status: CANCELLED | OUTPATIENT
Start: 2023-01-23

## 2023-01-23 RX ORDER — DULOXETIN HYDROCHLORIDE 60 MG/1
60 CAPSULE, DELAYED RELEASE ORAL DAILY
Qty: 90 CAPSULE | Refills: 1 | Status: SHIPPED | OUTPATIENT
Start: 2023-01-23 | End: 2023-08-10 | Stop reason: SDUPTHER

## 2023-01-23 RX ORDER — LEVETIRACETAM 100 MG/ML
500-1000 SOLUTION ORAL 2 TIMES DAILY
Qty: 946 ML | Refills: 2 | Status: SHIPPED | OUTPATIENT
Start: 2023-01-23 | End: 2023-05-10 | Stop reason: SDUPTHER

## 2023-01-23 RX ORDER — PROMETHAZINE HYDROCHLORIDE 25 MG/1
25 SUPPOSITORY RECTAL EVERY 6 HOURS PRN
Qty: 12 SUPPOSITORY | Refills: 0 | Status: SHIPPED | OUTPATIENT
Start: 2023-01-23

## 2023-01-23 RX ORDER — BUSPIRONE HYDROCHLORIDE 10 MG/1
10 TABLET ORAL 3 TIMES DAILY
Qty: 270 TABLET | Refills: 1 | Status: SHIPPED | OUTPATIENT
Start: 2023-01-23 | End: 2024-03-20 | Stop reason: SDUPTHER

## 2023-01-23 RX ORDER — PREGABALIN 50 MG/1
CAPSULE ORAL
Qty: 90 CAPSULE | Status: CANCELLED | OUTPATIENT
Start: 2023-01-23

## 2023-01-23 RX ORDER — LEVOTHYROXINE SODIUM 0.1 MG/1
TABLET ORAL
Qty: 90 TABLET | Refills: 2 | Status: CANCELLED | OUTPATIENT
Start: 2023-01-23

## 2023-01-23 RX ORDER — CYANOCOBALAMIN 1000 UG/ML
INJECTION, SOLUTION INTRAMUSCULAR; SUBCUTANEOUS
Qty: 3 ML | Refills: 1 | Status: SHIPPED | OUTPATIENT
Start: 2023-01-23 | End: 2023-07-10

## 2023-01-23 NOTE — TELEPHONE ENCOUNTER
Received request via: Patient    Was the patient seen in the last year in this department? Yes    Does the patient have an active prescription (recently filled or refills available) for medication(s) requested? No    Does the patient have halfway Plus and need 100 day supply (blood pressure, diabetes and cholesterol meds only)? Patient does not have SCP

## 2023-02-19 ENCOUNTER — PATIENT MESSAGE (OUTPATIENT)
Dept: MEDICAL GROUP | Facility: MEDICAL CENTER | Age: 48
End: 2023-02-19
Payer: COMMERCIAL

## 2023-02-21 ENCOUNTER — PATIENT MESSAGE (OUTPATIENT)
Dept: MEDICAL GROUP | Facility: MEDICAL CENTER | Age: 48
End: 2023-02-21
Payer: COMMERCIAL

## 2023-03-17 ENCOUNTER — TELEPHONE (OUTPATIENT)
Dept: HEALTH INFORMATION MANAGEMENT | Facility: OTHER | Age: 48
End: 2023-03-17
Payer: COMMERCIAL

## 2023-03-22 ENCOUNTER — TELEPHONE (OUTPATIENT)
Dept: MEDICAL GROUP | Facility: MEDICAL CENTER | Age: 48
End: 2023-03-22
Payer: COMMERCIAL

## 2023-03-22 RX ORDER — DOXYCYCLINE HYCLATE 100 MG
100 TABLET ORAL 2 TIMES DAILY
Qty: 14 TABLET | Refills: 0 | Status: SHIPPED | OUTPATIENT
Start: 2023-03-22 | End: 2023-07-21

## 2023-03-22 NOTE — TELEPHONE ENCOUNTER
Patient called due to needing a refill of her doxycycline.  She is having worsening ear and sinus symptoms.  She is having an emergency situation with her family and needs to leave town and hideously.  She is hoping to have a refill of this prior to leaving town.  We did discuss on the phone that this will be the only time I will give her a refill of antibiotics without being seen in clinic, but due to life stressors she is not able to be seen at this time.  She understands.

## 2023-05-08 ENCOUNTER — TELEPHONE (OUTPATIENT)
Dept: NEUROLOGY | Facility: MEDICAL CENTER | Age: 48
End: 2023-05-08
Payer: COMMERCIAL

## 2023-05-08 NOTE — TELEPHONE ENCOUNTER
NEUROLOGY PATIENT PRE-VISIT PLANNING     Patient was NOT contacted to complete PVP.    Patient Appointment is scheduled as: New Patient     Is visit type and length scheduled correctly? Yes    [facility] is [facility] in Patient Demographics? Yes    3.   Is referral attached to visit? Yes    4. Were records received from referring provider? Yes, referring provider is a [facility] provider so records are in Epic.     4. Patient was NOT contacted to have someone accompany them to visit.     5. Is this appointment scheduled as a Hospital Follow-Up?  No    6. Does the patient require any pre procedure or post procedure follow up? No    7. If any orders were placed at last visit or intended to be done for this visit do we have Results/Consult Notes? Yes  Labs -  Recent labs are in [facility].   Imaging - Imaging was not ordered at last office visit.  Referrals - No referrals were ordered at last office visit.    8. If patient appointment is for Botox - is order pended for provider? N/A

## 2023-05-10 ENCOUNTER — OFFICE VISIT (OUTPATIENT)
Dept: NEUROLOGY | Facility: MEDICAL CENTER | Age: 48
End: 2023-05-10
Attending: PSYCHIATRY & NEUROLOGY
Payer: COMMERCIAL

## 2023-05-10 VITALS
BODY MASS INDEX: 25.53 KG/M2 | DIASTOLIC BLOOD PRESSURE: 64 MMHG | RESPIRATION RATE: 15 BRPM | WEIGHT: 122.14 LBS | SYSTOLIC BLOOD PRESSURE: 100 MMHG | OXYGEN SATURATION: 100 % | HEART RATE: 72 BPM | TEMPERATURE: 98 F

## 2023-05-10 DIAGNOSIS — G40.409 SIMPLE PARTIAL SEIZURES EVOLVING TO COMPLEX PARTIAL SEIZURES, THEN TO GENERALIZED TONIC-CLONIC SEIZURES (HCC): ICD-10-CM

## 2023-05-10 DIAGNOSIS — G43.109 MIGRAINE WITH AURA AND WITHOUT STATUS MIGRAINOSUS, NOT INTRACTABLE: ICD-10-CM

## 2023-05-10 PROCEDURE — G2212 PROLONG OUTPT/OFFICE VIS: HCPCS | Performed by: PSYCHIATRY & NEUROLOGY

## 2023-05-10 PROCEDURE — 99212 OFFICE O/P EST SF 10 MIN: CPT | Performed by: PSYCHIATRY & NEUROLOGY

## 2023-05-10 PROCEDURE — 99205 OFFICE O/P NEW HI 60 MIN: CPT | Performed by: PSYCHIATRY & NEUROLOGY

## 2023-05-10 RX ORDER — LEVETIRACETAM 100 MG/ML
SOLUTION ORAL
Qty: 450 ML | Refills: 11 | Status: SHIPPED | OUTPATIENT
Start: 2023-05-10

## 2023-05-10 RX ORDER — SUMATRIPTAN 100 MG/1
TABLET, FILM COATED ORAL
Qty: 9 TABLET | Refills: 6 | Status: SHIPPED | OUTPATIENT
Start: 2023-05-10 | End: 2023-05-10 | Stop reason: SDUPTHER

## 2023-05-10 RX ORDER — METOCLOPRAMIDE 10 MG/1
TABLET ORAL
Qty: 45 TABLET | Refills: 1 | Status: SHIPPED | OUTPATIENT
Start: 2023-05-10 | End: 2023-07-16

## 2023-05-10 RX ORDER — SUMATRIPTAN 100 MG/1
TABLET, FILM COATED ORAL
Qty: 9 TABLET | Refills: 6 | Status: SHIPPED | OUTPATIENT
Start: 2023-05-10

## 2023-05-10 ASSESSMENT — FIBROSIS 4 INDEX: FIB4 SCORE: 1.43

## 2023-05-10 ASSESSMENT — ENCOUNTER SYMPTOMS
FOCAL WEAKNESS: 1
TINGLING: 1
FALLS: 0
DIZZINESS: 1
SEIZURES: 1
WEIGHT LOSS: 1
HEADACHES: 1
LOSS OF CONSCIOUSNESS: 1
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
## 2023-05-15 ENCOUNTER — NON-PROVIDER VISIT (OUTPATIENT)
Dept: NEUROLOGY | Facility: MEDICAL CENTER | Age: 48
End: 2023-05-15
Attending: PSYCHIATRY & NEUROLOGY
Payer: COMMERCIAL

## 2023-05-15 DIAGNOSIS — G40.409 SIMPLE PARTIAL SEIZURES EVOLVING TO COMPLEX PARTIAL SEIZURES, THEN TO GENERALIZED TONIC-CLONIC SEIZURES (HCC): ICD-10-CM

## 2023-05-15 PROCEDURE — 95819 EEG AWAKE AND ASLEEP: CPT | Mod: 26 | Performed by: PSYCHIATRY & NEUROLOGY

## 2023-05-15 PROCEDURE — 95819 EEG AWAKE AND ASLEEP: CPT | Performed by: PSYCHIATRY & NEUROLOGY

## 2023-05-15 NOTE — PROCEDURES
VIDEO ELECTROENCEPHALOGRAM REPORT      Referring provider: Dr. Morales    DOS: 05/15/23 (total recording of 32 minutes).     INDICATION:  Tamela Stern 47 y.o. female presenting with seizure     CURRENT ANTIEPILEPTIC REGIMEN: LEV    TECHNIQUE: 30 channel video electroencephalogram (EEG) was performed in accordance with the international 10-20 system. The study was reviewed in bipolar and referential montages. The recording examined the patient during   awake, drowsy and sleep states    DESCRIPTION OF THE RECORD:  During the wakefulness, the background showed a symmetrical 11-12 Hz alpha activity posteriorly with amplitude of 70 mV.  There was reactivity to eye closure/opening.  A normal anterior-posterior gradient was noted with faster beta frequencies seen anteriorly.  During drowsiness, increased theta/beta frequencies were seen.    During the brief sleep state, symmetrical POSTS, sleep spindles and vertex sharps were seen in the leads over the central regions. No slow wave stage seen.     ACTIVATION PROCEDURES:     Hyperventilation was performed by the patient for a total of 3 minutes. The technician performing the test noted good effort. No physiological build up seen.     Intermittent Photic stimulation was performed in a stepwise fashion from 1 to 30 Hz and elicited a normal response (photic driving), most noticeable in the posterior leads.    ICTAL AND/OR INTERICTAL FINDINGS:   No focal or generalized epileptiform activity noted. No regional slowing was seen during this routine study.  No clinical events or seizures were reported or recorded during the study.     EKG: sampling of the EKG recording demonstrated sinus rhythm.     EVENTS: none     INTERPRETATION:    This is a\ normal video EEG recording in the awake, drowsy and sleep states.     Note: A normal EEG does not rule out epilepsy.  If the clinical suspicion remains high for seizures, a prolonged recording to capture clinical or  subclinical events may be helpful.      Connor Reid MD  Diplomate in Neurology&Epilepsy  Office: [phone]  Fax: [phone]

## 2023-07-03 ENCOUNTER — OFFICE VISIT (OUTPATIENT)
Dept: MEDICAL GROUP | Facility: MEDICAL CENTER | Age: 48
End: 2023-07-03
Payer: COMMERCIAL

## 2023-07-03 VITALS
BODY MASS INDEX: 25.92 KG/M2 | OXYGEN SATURATION: 98 % | RESPIRATION RATE: 16 BRPM | HEIGHT: 58 IN | TEMPERATURE: 98.2 F | HEART RATE: 91 BPM | SYSTOLIC BLOOD PRESSURE: 100 MMHG | DIASTOLIC BLOOD PRESSURE: 58 MMHG | WEIGHT: 123.46 LBS

## 2023-07-03 DIAGNOSIS — J01.81 OTHER ACUTE RECURRENT SINUSITIS: ICD-10-CM

## 2023-07-03 PROCEDURE — 3074F SYST BP LT 130 MM HG: CPT | Performed by: STUDENT IN AN ORGANIZED HEALTH CARE EDUCATION/TRAINING PROGRAM

## 2023-07-03 PROCEDURE — 3078F DIAST BP <80 MM HG: CPT | Performed by: STUDENT IN AN ORGANIZED HEALTH CARE EDUCATION/TRAINING PROGRAM

## 2023-07-03 PROCEDURE — 99214 OFFICE O/P EST MOD 30 MIN: CPT | Performed by: STUDENT IN AN ORGANIZED HEALTH CARE EDUCATION/TRAINING PROGRAM

## 2023-07-03 RX ORDER — METHYLPREDNISOLONE 4 MG/1
TABLET ORAL
Qty: 21 TABLET | Refills: 0 | Status: SHIPPED | OUTPATIENT
Start: 2023-07-03 | End: 2023-07-21 | Stop reason: SDUPTHER

## 2023-07-03 RX ORDER — AMOXICILLIN AND CLAVULANATE POTASSIUM 875; 125 MG/1; MG/1
1 TABLET, FILM COATED ORAL 2 TIMES DAILY
Qty: 14 TABLET | Refills: 0 | Status: SHIPPED | OUTPATIENT
Start: 2023-07-03 | End: 2023-07-10

## 2023-07-03 ASSESSMENT — FIBROSIS 4 INDEX: FIB4 SCORE: 1.43

## 2023-07-03 NOTE — PROGRESS NOTES
"Subjective:     CC: Sinus pressure, ear pain, postnasal drip    HPI:   Tamela presents today with sinus pressure, ear pain, postnasal drip.  This has been going on on and off since January.  He is on multiple rounds of doxycycline which seemed to help her symptoms a little bit but then symptoms returned.  She does have a history of severe mastoiditis that required hospitalization.  She is not having any fever right now.  She does have pain around the ear and at the sinuses.  She does have drainage on the back of her throat.    ROS:  ROS    Objective:     Exam:  /58   Pulse 91   Temp 36.8 °C (98.2 °F) (Temporal)   Resp 16   Ht 1.471 m (4' 9.9\")   Wt 56 kg (123 lb 7.3 oz)   LMP 05/15/2017 Comment: irregular for the last 3 months  SpO2 98%   BMI 25.89 kg/m²  Body mass index is 25.89 kg/m².    Physical Exam  Vitals reviewed.   Constitutional:       General: She is not in acute distress.     Appearance: She is not toxic-appearing.   HENT:      Head: Normocephalic and atraumatic.      Right Ear: Tympanic membrane, ear canal and external ear normal. There is no impacted cerumen.      Left Ear: Tympanic membrane, ear canal and external ear normal. There is no impacted cerumen.      Nose: Nose normal. No congestion.   Eyes:      General:         Right eye: No discharge.         Left eye: No discharge.      Extraocular Movements: Extraocular movements intact.      Conjunctiva/sclera: Conjunctivae normal.   Pulmonary:      Effort: Pulmonary effort is normal. No respiratory distress.   Skin:     General: Skin is warm and dry.   Neurological:      Mental Status: She is alert.   Psychiatric:         Mood and Affect: Mood normal.         Behavior: Behavior normal.         Thought Content: Thought content normal.         Judgment: Judgment normal.           Assessment & Plan:     47 y.o. female with the following -     1. Other acute recurrent sinusitis  Discussed using a different antibiotic as doxycycline has not " helped her too much in the past, Augmentin sent to pharmacy, Medrol Dosepak for pain and inflammation.  We discussed the importance of reestablishing with ENT.  She will reach out to her old ENT and let me know if she does need a referral.  - amoxicillin-clavulanate (AUGMENTIN) 875-125 MG Tab; Take 1 Tablet by mouth 2 times a day for 7 days.  Dispense: 14 Tablet; Refill: 0  - methylPREDNISolone (MEDROL DOSEPAK) 4 MG Tablet Therapy Pack; As directed on the packaging label.  Dispense: 21 Tablet; Refill: 0      No follow-ups on file.    Please note that this dictation was created using voice recognition software. I have made every reasonable attempt to correct obvious errors, but I expect that there are errors of grammar and possibly content that I did not discover before finalizing the note.

## 2023-07-10 RX ORDER — CYANOCOBALAMIN 1000 UG/ML
INJECTION, SOLUTION INTRAMUSCULAR; SUBCUTANEOUS
Qty: 3 ML | Refills: 1 | Status: SHIPPED | OUTPATIENT
Start: 2023-07-10 | End: 2024-02-14

## 2023-07-12 DIAGNOSIS — E03.9 HYPOTHYROIDISM, UNSPECIFIED TYPE: ICD-10-CM

## 2023-07-12 RX ORDER — LEVOTHYROXINE SODIUM 0.05 MG/1
TABLET ORAL
Qty: 90 TABLET | Refills: 1 | Status: SHIPPED | OUTPATIENT
Start: 2023-07-12

## 2023-07-14 DIAGNOSIS — G43.109 MIGRAINE WITH AURA AND WITHOUT STATUS MIGRAINOSUS, NOT INTRACTABLE: ICD-10-CM

## 2023-07-14 NOTE — TELEPHONE ENCOUNTER
Received request via: Pharmacy    Was the patient seen in the last year in this department? Yes    Does the patient have an active prescription (recently filled or refills available) for medication(s) requested? Yes.   Does the patient have USP Plus and need 100 day supply (blood pressure, diabetes and cholesterol meds only)? Patient does not have SCP

## 2023-07-16 RX ORDER — METOCLOPRAMIDE 10 MG/1
TABLET ORAL
Qty: 45 TABLET | Refills: 1 | Status: SHIPPED | OUTPATIENT
Start: 2023-07-16 | End: 2023-08-17 | Stop reason: SDUPTHER

## 2023-07-21 ENCOUNTER — TELEPHONE (OUTPATIENT)
Dept: MEDICAL GROUP | Facility: MEDICAL CENTER | Age: 48
End: 2023-07-21
Payer: COMMERCIAL

## 2023-07-21 DIAGNOSIS — J01.81 OTHER ACUTE RECURRENT SINUSITIS: ICD-10-CM

## 2023-07-21 RX ORDER — METHYLPREDNISOLONE 4 MG/1
TABLET ORAL
Qty: 21 TABLET | Refills: 0 | Status: SHIPPED | OUTPATIENT
Start: 2023-07-21 | End: 2024-01-24

## 2023-07-21 RX ORDER — DOXYCYCLINE HYCLATE 100 MG
100 TABLET ORAL 2 TIMES DAILY
Qty: 14 TABLET | Refills: 0 | Status: SHIPPED | OUTPATIENT
Start: 2023-07-21 | End: 2023-07-28

## 2023-07-21 NOTE — TELEPHONE ENCOUNTER
Spoke with patient regarding her symptoms.  Her sinusitis symptoms have been coming back in full force.  She recently had Augmentin and a Medrol Dosepak which she does feel like helped for a few days.  In the past she has used doxycycline and noticed more relief with that.  I am happy to redo the course of antibiotics with doxycycline and send her another Medrol Dosepak but if this is not relieving her symptoms she would need to be seen which patient understands.

## 2023-08-10 RX ORDER — DULOXETIN HYDROCHLORIDE 60 MG/1
60 CAPSULE, DELAYED RELEASE ORAL DAILY
Qty: 90 CAPSULE | Refills: 1 | Status: SHIPPED | OUTPATIENT
Start: 2023-08-10 | End: 2024-02-07 | Stop reason: SDUPTHER

## 2023-08-17 ENCOUNTER — TELEPHONE (OUTPATIENT)
Dept: NEUROLOGY | Facility: MEDICAL CENTER | Age: 48
End: 2023-08-17
Payer: COMMERCIAL

## 2023-08-17 DIAGNOSIS — G43.109 MIGRAINE WITH AURA AND WITHOUT STATUS MIGRAINOSUS, NOT INTRACTABLE: ICD-10-CM

## 2023-08-17 RX ORDER — METOCLOPRAMIDE 10 MG/1
TABLET ORAL
Qty: 45 TABLET | Refills: 5 | Status: SHIPPED | OUTPATIENT
Start: 2023-08-17 | End: 2023-10-10

## 2023-08-17 NOTE — TELEPHONE ENCOUNTER
Received request via: Patient    Was the patient seen in the last year in this department? Yes    Does the patient have an active prescription (recently filled or refills available) for medication(s) requested? No    Does the patient have alf Plus and need 100 day supply (blood pressure, diabetes and cholesterol meds only)? Patient does not have SCP      Pt called and asked for a refill of her Metoclopramide 10 mg tablets, please advise.

## 2023-11-01 ENCOUNTER — HOSPITAL ENCOUNTER (OUTPATIENT)
Dept: RADIOLOGY | Facility: MEDICAL CENTER | Age: 48
End: 2023-11-01
Attending: OTOLARYNGOLOGY
Payer: COMMERCIAL

## 2023-11-01 DIAGNOSIS — H90.0 CONDUCTIVE HEARING LOSS, BILATERAL: ICD-10-CM

## 2023-11-01 PROCEDURE — A9579 GAD-BASE MR CONTRAST NOS,1ML: HCPCS | Performed by: OTOLARYNGOLOGY

## 2023-11-01 PROCEDURE — 70553 MRI BRAIN STEM W/O & W/DYE: CPT

## 2023-11-01 PROCEDURE — 700117 HCHG RX CONTRAST REV CODE 255: Performed by: OTOLARYNGOLOGY

## 2023-11-01 RX ADMIN — GADOTERIDOL 10 ML: 279.3 INJECTION, SOLUTION INTRAVENOUS at 16:51

## 2023-11-30 RX ORDER — ONDANSETRON 4 MG/1
TABLET, FILM COATED ORAL
Qty: 30 TABLET | Refills: 0 | Status: SHIPPED | OUTPATIENT
Start: 2023-11-30 | End: 2023-12-22

## 2023-12-22 RX ORDER — ONDANSETRON 4 MG/1
TABLET, FILM COATED ORAL
Qty: 90 TABLET | Refills: 0 | Status: ON HOLD | OUTPATIENT
Start: 2023-12-22 | End: 2024-01-28

## 2024-01-24 ENCOUNTER — APPOINTMENT (OUTPATIENT)
Dept: RADIOLOGY | Facility: MEDICAL CENTER | Age: 49
DRG: 439 | End: 2024-01-24
Attending: EMERGENCY MEDICINE
Payer: COMMERCIAL

## 2024-01-24 ENCOUNTER — HOSPITAL ENCOUNTER (INPATIENT)
Facility: MEDICAL CENTER | Age: 49
LOS: 4 days | DRG: 439 | End: 2024-01-28
Attending: EMERGENCY MEDICINE | Admitting: STUDENT IN AN ORGANIZED HEALTH CARE EDUCATION/TRAINING PROGRAM
Payer: COMMERCIAL

## 2024-01-24 DIAGNOSIS — K85.10 ACUTE BILIARY PANCREATITIS WITHOUT INFECTION OR NECROSIS: ICD-10-CM

## 2024-01-24 DIAGNOSIS — K85.90 ACUTE PANCREATITIS, UNSPECIFIED COMPLICATION STATUS, UNSPECIFIED PANCREATITIS TYPE: ICD-10-CM

## 2024-01-24 PROBLEM — Z72.0 TOBACCO USE: Status: ACTIVE | Noted: 2024-01-24

## 2024-01-24 LAB
ALBUMIN SERPL BCP-MCNC: 4.2 G/DL (ref 3.2–4.9)
ALBUMIN/GLOB SERPL: 2 G/DL
ALP SERPL-CCNC: 89 U/L (ref 30–99)
ALT SERPL-CCNC: 13 U/L (ref 2–50)
ANION GAP SERPL CALC-SCNC: 11 MMOL/L (ref 7–16)
APPEARANCE UR: CLEAR
AST SERPL-CCNC: 20 U/L (ref 12–45)
BASOPHILS # BLD AUTO: 1.2 % (ref 0–1.8)
BASOPHILS # BLD: 0.07 K/UL (ref 0–0.12)
BILIRUB SERPL-MCNC: 0.2 MG/DL (ref 0.1–1.5)
BILIRUB UR QL STRIP.AUTO: NEGATIVE
BUN SERPL-MCNC: 10 MG/DL (ref 8–22)
CALCIUM ALBUM COR SERPL-MCNC: 8.7 MG/DL (ref 8.5–10.5)
CALCIUM SERPL-MCNC: 8.9 MG/DL (ref 8.5–10.5)
CHLORIDE SERPL-SCNC: 103 MMOL/L (ref 96–112)
CO2 SERPL-SCNC: 19 MMOL/L (ref 20–33)
COLOR UR: YELLOW
CREAT SERPL-MCNC: 0.59 MG/DL (ref 0.5–1.4)
EOSINOPHIL # BLD AUTO: 0.23 K/UL (ref 0–0.51)
EOSINOPHIL NFR BLD: 3.9 % (ref 0–6.9)
ERYTHROCYTE [DISTWIDTH] IN BLOOD BY AUTOMATED COUNT: 41.8 FL (ref 35.9–50)
GFR SERPLBLD CREATININE-BSD FMLA CKD-EPI: 112 ML/MIN/1.73 M 2
GLOBULIN SER CALC-MCNC: 2.1 G/DL (ref 1.9–3.5)
GLUCOSE SERPL-MCNC: 152 MG/DL (ref 65–99)
GLUCOSE UR STRIP.AUTO-MCNC: NEGATIVE MG/DL
HCT VFR BLD AUTO: 47 % (ref 37–47)
HGB BLD-MCNC: 15.5 G/DL (ref 12–16)
IMM GRANULOCYTES # BLD AUTO: 0.02 K/UL (ref 0–0.11)
IMM GRANULOCYTES NFR BLD AUTO: 0.3 % (ref 0–0.9)
KETONES UR STRIP.AUTO-MCNC: NEGATIVE MG/DL
LEUKOCYTE ESTERASE UR QL STRIP.AUTO: NEGATIVE
LIPASE SERPL-CCNC: 1141 U/L (ref 11–82)
LYMPHOCYTES # BLD AUTO: 1.4 K/UL (ref 1–4.8)
LYMPHOCYTES NFR BLD: 23.6 % (ref 22–41)
MAGNESIUM SERPL-MCNC: 2 MG/DL (ref 1.5–2.5)
MCH RBC QN AUTO: 29.6 PG (ref 27–33)
MCHC RBC AUTO-ENTMCNC: 33 G/DL (ref 32.2–35.5)
MCV RBC AUTO: 89.9 FL (ref 81.4–97.8)
MICRO URNS: NORMAL
MONOCYTES # BLD AUTO: 0.53 K/UL (ref 0–0.85)
MONOCYTES NFR BLD AUTO: 8.9 % (ref 0–13.4)
NEUTROPHILS # BLD AUTO: 3.69 K/UL (ref 1.82–7.42)
NEUTROPHILS NFR BLD: 62.1 % (ref 44–72)
NITRITE UR QL STRIP.AUTO: NEGATIVE
NRBC # BLD AUTO: 0 K/UL
NRBC BLD-RTO: 0 /100 WBC (ref 0–0.2)
PH UR STRIP.AUTO: 5 [PH] (ref 5–8)
PLATELET # BLD AUTO: 176 K/UL (ref 164–446)
PMV BLD AUTO: 11.6 FL (ref 9–12.9)
POTASSIUM SERPL-SCNC: 4 MMOL/L (ref 3.6–5.5)
PROT SERPL-MCNC: 6.3 G/DL (ref 6–8.2)
PROT UR QL STRIP: NEGATIVE MG/DL
RBC # BLD AUTO: 5.23 M/UL (ref 4.2–5.4)
RBC UR QL AUTO: NEGATIVE
SODIUM SERPL-SCNC: 133 MMOL/L (ref 135–145)
SP GR UR STRIP.AUTO: 1.02
UROBILINOGEN UR STRIP.AUTO-MCNC: 0.2 MG/DL
WBC # BLD AUTO: 5.9 K/UL (ref 4.8–10.8)

## 2024-01-24 PROCEDURE — 99223 1ST HOSP IP/OBS HIGH 75: CPT | Mod: GC | Performed by: STUDENT IN AN ORGANIZED HEALTH CARE EDUCATION/TRAINING PROGRAM

## 2024-01-24 PROCEDURE — 700111 HCHG RX REV CODE 636 W/ 250 OVERRIDE (IP): Performed by: EMERGENCY MEDICINE

## 2024-01-24 PROCEDURE — A9270 NON-COVERED ITEM OR SERVICE: HCPCS | Performed by: STUDENT IN AN ORGANIZED HEALTH CARE EDUCATION/TRAINING PROGRAM

## 2024-01-24 PROCEDURE — 700102 HCHG RX REV CODE 250 W/ 637 OVERRIDE(OP): Performed by: STUDENT IN AN ORGANIZED HEALTH CARE EDUCATION/TRAINING PROGRAM

## 2024-01-24 PROCEDURE — 700111 HCHG RX REV CODE 636 W/ 250 OVERRIDE (IP)

## 2024-01-24 PROCEDURE — 700117 HCHG RX CONTRAST REV CODE 255: Performed by: EMERGENCY MEDICINE

## 2024-01-24 PROCEDURE — 96374 THER/PROPH/DIAG INJ IV PUSH: CPT

## 2024-01-24 PROCEDURE — 76705 ECHO EXAM OF ABDOMEN: CPT

## 2024-01-24 PROCEDURE — A9270 NON-COVERED ITEM OR SERVICE: HCPCS | Performed by: EMERGENCY MEDICINE

## 2024-01-24 PROCEDURE — 80053 COMPREHEN METABOLIC PANEL: CPT

## 2024-01-24 PROCEDURE — 770001 HCHG ROOM/CARE - MED/SURG/GYN PRIV*

## 2024-01-24 PROCEDURE — 96375 TX/PRO/DX INJ NEW DRUG ADDON: CPT

## 2024-01-24 PROCEDURE — 85025 COMPLETE CBC W/AUTO DIFF WBC: CPT

## 2024-01-24 PROCEDURE — 700105 HCHG RX REV CODE 258

## 2024-01-24 PROCEDURE — 700102 HCHG RX REV CODE 250 W/ 637 OVERRIDE(OP): Performed by: EMERGENCY MEDICINE

## 2024-01-24 PROCEDURE — 36415 COLL VENOUS BLD VENIPUNCTURE: CPT

## 2024-01-24 PROCEDURE — 74177 CT ABD & PELVIS W/CONTRAST: CPT

## 2024-01-24 PROCEDURE — 83735 ASSAY OF MAGNESIUM: CPT

## 2024-01-24 PROCEDURE — 99285 EMERGENCY DEPT VISIT HI MDM: CPT

## 2024-01-24 PROCEDURE — 83690 ASSAY OF LIPASE: CPT

## 2024-01-24 PROCEDURE — 96376 TX/PRO/DX INJ SAME DRUG ADON: CPT

## 2024-01-24 PROCEDURE — 700105 HCHG RX REV CODE 258: Performed by: STUDENT IN AN ORGANIZED HEALTH CARE EDUCATION/TRAINING PROGRAM

## 2024-01-24 PROCEDURE — 81003 URINALYSIS AUTO W/O SCOPE: CPT

## 2024-01-24 RX ORDER — FLUTICASONE PROPIONATE 50 MCG
1 SPRAY, SUSPENSION (ML) NASAL DAILY
Status: DISCONTINUED | OUTPATIENT
Start: 2024-01-25 | End: 2024-01-24

## 2024-01-24 RX ORDER — OXYCODONE HYDROCHLORIDE AND ACETAMINOPHEN 5; 325 MG/1; MG/1
2 TABLET ORAL ONCE
Status: DISCONTINUED | OUTPATIENT
Start: 2024-01-24 | End: 2024-01-25

## 2024-01-24 RX ORDER — LEVETIRACETAM 500 MG/1
500 TABLET ORAL EVERY MORNING
Status: DISCONTINUED | OUTPATIENT
Start: 2024-01-25 | End: 2024-01-28 | Stop reason: HOSPADM

## 2024-01-24 RX ORDER — HYDROMORPHONE HYDROCHLORIDE 1 MG/ML
1 INJECTION, SOLUTION INTRAMUSCULAR; INTRAVENOUS; SUBCUTANEOUS ONCE
Status: COMPLETED | OUTPATIENT
Start: 2024-01-24 | End: 2024-01-24

## 2024-01-24 RX ORDER — OXYCODONE HYDROCHLORIDE 5 MG/1
2.5-5 TABLET ORAL EVERY 4 HOURS PRN
Status: DISCONTINUED | OUTPATIENT
Start: 2024-01-24 | End: 2024-01-26

## 2024-01-24 RX ORDER — BUSPIRONE HYDROCHLORIDE 10 MG/1
10 TABLET ORAL ONCE
Status: COMPLETED | OUTPATIENT
Start: 2024-01-24 | End: 2024-01-24

## 2024-01-24 RX ORDER — POLYETHYLENE GLYCOL 3350 17 G/17G
1 POWDER, FOR SOLUTION ORAL
Status: DISCONTINUED | OUTPATIENT
Start: 2024-01-24 | End: 2024-01-28 | Stop reason: HOSPADM

## 2024-01-24 RX ORDER — SODIUM CHLORIDE 9 MG/ML
1000 INJECTION, SOLUTION INTRAVENOUS ONCE
Status: COMPLETED | OUTPATIENT
Start: 2024-01-24 | End: 2024-01-24

## 2024-01-24 RX ORDER — AMOXICILLIN 250 MG
2 CAPSULE ORAL 2 TIMES DAILY
Status: DISCONTINUED | OUTPATIENT
Start: 2024-01-24 | End: 2024-01-28 | Stop reason: HOSPADM

## 2024-01-24 RX ORDER — ONDANSETRON 4 MG/1
4 TABLET, ORALLY DISINTEGRATING ORAL EVERY 4 HOURS PRN
Status: DISCONTINUED | OUTPATIENT
Start: 2024-01-24 | End: 2024-01-26

## 2024-01-24 RX ORDER — HYDROMORPHONE HYDROCHLORIDE 1 MG/ML
1 INJECTION, SOLUTION INTRAMUSCULAR; INTRAVENOUS; SUBCUTANEOUS EVERY 4 HOURS PRN
Status: DISCONTINUED | OUTPATIENT
Start: 2024-01-24 | End: 2024-01-28 | Stop reason: HOSPADM

## 2024-01-24 RX ORDER — LEVETIRACETAM 500 MG/1
1000 TABLET ORAL EVERY EVENING
Status: DISCONTINUED | OUTPATIENT
Start: 2024-01-24 | End: 2024-01-28 | Stop reason: HOSPADM

## 2024-01-24 RX ORDER — DULOXETIN HYDROCHLORIDE 60 MG/1
60 CAPSULE, DELAYED RELEASE ORAL DAILY
Status: DISCONTINUED | OUTPATIENT
Start: 2024-01-25 | End: 2024-01-28 | Stop reason: HOSPADM

## 2024-01-24 RX ORDER — BUSPIRONE HYDROCHLORIDE 10 MG/1
10 TABLET ORAL 3 TIMES DAILY
Status: DISCONTINUED | OUTPATIENT
Start: 2024-01-25 | End: 2024-01-28 | Stop reason: HOSPADM

## 2024-01-24 RX ORDER — ACETAMINOPHEN 500 MG
1000 TABLET ORAL 3 TIMES DAILY
Status: DISCONTINUED | OUTPATIENT
Start: 2024-01-24 | End: 2024-01-26

## 2024-01-24 RX ORDER — LEVOTHYROXINE SODIUM 0.05 MG/1
50 TABLET ORAL
Status: DISCONTINUED | OUTPATIENT
Start: 2024-01-25 | End: 2024-01-28 | Stop reason: HOSPADM

## 2024-01-24 RX ORDER — LEVETIRACETAM 100 MG/ML
500 SOLUTION ORAL EVERY 12 HOURS
Status: DISCONTINUED | OUTPATIENT
Start: 2024-01-24 | End: 2024-01-24

## 2024-01-24 RX ORDER — ENOXAPARIN SODIUM 100 MG/ML
40 INJECTION SUBCUTANEOUS DAILY
Status: DISCONTINUED | OUTPATIENT
Start: 2024-01-25 | End: 2024-01-28 | Stop reason: HOSPADM

## 2024-01-24 RX ORDER — SODIUM CHLORIDE, SODIUM LACTATE, POTASSIUM CHLORIDE, CALCIUM CHLORIDE 600; 310; 30; 20 MG/100ML; MG/100ML; MG/100ML; MG/100ML
INJECTION, SOLUTION INTRAVENOUS CONTINUOUS
Status: ACTIVE | OUTPATIENT
Start: 2024-01-24 | End: 2024-01-25

## 2024-01-24 RX ORDER — BISACODYL 10 MG
10 SUPPOSITORY, RECTAL RECTAL
Status: DISCONTINUED | OUTPATIENT
Start: 2024-01-24 | End: 2024-01-28 | Stop reason: HOSPADM

## 2024-01-24 RX ORDER — ONDANSETRON 2 MG/ML
4 INJECTION INTRAMUSCULAR; INTRAVENOUS ONCE
Status: COMPLETED | OUTPATIENT
Start: 2024-01-24 | End: 2024-01-24

## 2024-01-24 RX ADMIN — DOCUSATE SODIUM 50 MG AND SENNOSIDES 8.6 MG 2 TABLET: 8.6; 5 TABLET, FILM COATED ORAL at 22:17

## 2024-01-24 RX ADMIN — ACETAMINOPHEN 1000 MG: 500 TABLET ORAL at 22:17

## 2024-01-24 RX ADMIN — LEVETIRACETAM 1000 MG: 500 TABLET, FILM COATED ORAL at 22:18

## 2024-01-24 RX ADMIN — HYDROMORPHONE HYDROCHLORIDE 1 MG: 1 INJECTION, SOLUTION INTRAMUSCULAR; INTRAVENOUS; SUBCUTANEOUS at 17:13

## 2024-01-24 RX ADMIN — OXYCODONE 5 MG: 5 TABLET ORAL at 22:17

## 2024-01-24 RX ADMIN — HYDROMORPHONE HYDROCHLORIDE 1 MG: 1 INJECTION, SOLUTION INTRAMUSCULAR; INTRAVENOUS; SUBCUTANEOUS at 15:02

## 2024-01-24 RX ADMIN — IOHEXOL 80 ML: 350 INJECTION, SOLUTION INTRAVENOUS at 19:45

## 2024-01-24 RX ADMIN — ONDANSETRON 4 MG: 2 INJECTION INTRAMUSCULAR; INTRAVENOUS at 14:57

## 2024-01-24 RX ADMIN — SODIUM CHLORIDE, POTASSIUM CHLORIDE, SODIUM LACTATE AND CALCIUM CHLORIDE: 600; 310; 30; 20 INJECTION, SOLUTION INTRAVENOUS at 22:23

## 2024-01-24 RX ADMIN — BUSPIRONE HYDROCHLORIDE 10 MG: 10 TABLET ORAL at 17:15

## 2024-01-24 RX ADMIN — SODIUM CHLORIDE 1000 ML: 9 INJECTION, SOLUTION INTRAVENOUS at 14:58

## 2024-01-24 RX ADMIN — HYDROMORPHONE HYDROCHLORIDE 1 MG: 1 INJECTION, SOLUTION INTRAMUSCULAR; INTRAVENOUS; SUBCUTANEOUS at 20:34

## 2024-01-24 ASSESSMENT — ENCOUNTER SYMPTOMS
WEIGHT LOSS: 0
ABDOMINAL PAIN: 1
EYE PAIN: 0
CHILLS: 1
DEPRESSION: 0
DIARRHEA: 0
NECK PAIN: 1
WHEEZING: 0
CONSTIPATION: 0
FALLS: 0
WEAKNESS: 0
PALPITATIONS: 0
FEVER: 0
SHORTNESS OF BREATH: 0
NAUSEA: 1
VOMITING: 0
MYALGIAS: 1
FLANK PAIN: 1
HEADACHES: 0
EYE DISCHARGE: 0
COUGH: 0
DIZZINESS: 0

## 2024-01-24 ASSESSMENT — PATIENT HEALTH QUESTIONNAIRE - PHQ9
SUM OF ALL RESPONSES TO PHQ9 QUESTIONS 1 AND 2: 0
1. LITTLE INTEREST OR PLEASURE IN DOING THINGS: NOT AT ALL
2. FEELING DOWN, DEPRESSED, IRRITABLE, OR HOPELESS: NOT AT ALL

## 2024-01-24 ASSESSMENT — PAIN DESCRIPTION - PAIN TYPE
TYPE: ACUTE PAIN
TYPE: ACUTE PAIN;CHRONIC PAIN
TYPE: ACUTE PAIN

## 2024-01-24 ASSESSMENT — LIFESTYLE VARIABLES
HAVE PEOPLE ANNOYED YOU BY CRITICIZING YOUR DRINKING: NO
TOTAL SCORE: 0
HAVE YOU EVER FELT YOU SHOULD CUT DOWN ON YOUR DRINKING: NO
TOTAL SCORE: 0
ALCOHOL_USE: NO
EVER FELT BAD OR GUILTY ABOUT YOUR DRINKING: NO
DOES PATIENT WANT TO STOP DRINKING: NO
TOTAL SCORE: 0
CONSUMPTION TOTAL: INCOMPLETE
EVER HAD A DRINK FIRST THING IN THE MORNING TO STEADY YOUR NERVES TO GET RID OF A HANGOVER: NO

## 2024-01-24 ASSESSMENT — FIBROSIS 4 INDEX
FIB4 SCORE: 1.46
FIB4 SCORE: 1.48

## 2024-01-24 NOTE — ED TRIAGE NOTES
.  Chief Complaint   Patient presents with    Abdominal Pain     4 days    Flank Pain     Radiates to abdomen and groin     Painful Urination    Nausea     Ambulated to triage. Above cc. Pt aslo add urine odor and change to darkness in color.

## 2024-01-24 NOTE — ED PROVIDER NOTES
"  CHIEF COMPLAINT  Chief Complaint   Patient presents with    Abdominal Pain     4 days    Flank Pain     Radiates to abdomen and groin     Painful Urination    Nausea       LIMITATION TO HISTORY   None    HPI    Tamela Stern is a 48 y.o. female who states she stopped drinking 4 years ago.  Has mother had a history of pancreatic cancer.  Comes in today with abdominal pain.    4 days  Started in the back area moved to the right lower upper quadrant.  It has been constant.  Associated nausea vomiting decreased appetite.  States her urine is now \"foul-smelling\".  She has no dysuria urgency or frequency.  Concerned about kidney stone or kidney infection    Patient initially was seen by me lab work was already done which showed elevated lipase    Patient seen in the feeling well this before for last 4 days before that she been fine.    OUTSIDE HISTORIAN(S):  See above    EXTERNAL RECORDS REVIEWED  Reviewed recent visits.  No recent abdominal pain    REVIEW OF SYSTEMS  No fevers no chills    PAST MEDICAL HISTORY  Past Medical History:   Diagnosis Date    Anemia     Anesthesia 04/19/2022    \"vitals dropped making it hard to come out\" after surgery in 2009    Arthritis 04/19/2022    rheumatoid-ankle    Cancer (HCC)     Pineal tumor; still present; asymptomatic; possible stent placement; no chemotherapy or radiation.    Cervical spondylosis 12/11/2015    Concussion     Constipation     medicated    Major depression 01/20/2015    depression and anxiety    Migraine     Neoplasm of uncertain behavior of skin 05/02/2017    Other specified symptom associated with female genital organs     scar tissue    Pain 04/19/2022    abdomen    PONV (postoperative nausea and vomiting)     Right acoustic neuroma (HCC)     removed 3/3/2105    Seizure disorder (HCC) 1999    not medicated at present - was due to apnea in past, last seizure was 04/12/2022, last EEG was clear    Shingles 01/13/2016    Sleep apnea     does not " "use cpap, last sleep study showed she was clear for apnea    Stroke (HCC) 01/2016    TIA-residual weakness and some left sided drooping occasionally    Thyroid activity decreased     medicated - hypothyroid       FAMILY HISTORY  Family History   Problem Relation Age of Onset    Cancer Mother     Alcohol/Drug Mother         ETOH    Diabetes Mother     Breast Cancer Paternal Aunt     Cancer Paternal Aunt         breast    Diabetes Maternal Grandfather     Ovarian Cancer Paternal Grandmother     Cancer Paternal Grandmother         cervical     Heart Disease Paternal Grandfather         MI    Cancer Paternal Grandfather         lung    Hypertension Paternal Grandfather     Hyperlipidemia Paternal Grandfather     Tubal Cancer Neg Hx     Peritoneal Cancer Neg Hx     Colorectal Cancer Neg Hx        SOCIAL HISTORY  Social History     Tobacco Use    Smoking status: Heavy Smoker     Current packs/day: 0.25     Types: Cigarettes    Smokeless tobacco: Former    Tobacco comments:     2 cigs/day;   Vaping Use    Vaping Use: Former    Substances: THC, Flavoring    Devices: Pre-filled or refillable cartridge   Substance Use Topics    Alcohol use: No     Alcohol/week: 0.0 oz    Drug use: Yes     Types: Marijuana, Oral     Comment: \"medical\" marijuana     Social History     Substance and Sexual Activity   Drug Use Yes    Types: Marijuana, Oral    Comment: \"medical\" marijuana       SURGICAL HISTORY  Past Surgical History:   Procedure Laterality Date    DE LAP,RMV  ADNEXAL STRUCTURE Bilateral 11/2/2022    Procedure: SALPINGO-OOPHORECTOMY, BILATERAL, LAPAROSCOPIC;  Surgeon: Rhonda Dsouza M.D.;  Location: SURGERY SAME DAY [facility];  Service: Gynecology    DE CYSTOURETHROSCOPY N/A 11/2/2022    Procedure: CYSTOSCOPY;  Surgeon: Rhonda Dsouza M.D.;  Location: SURGERY SAME DAY [facility];  Service: Gynecology    HYSTERECTOMY LAPAROSCOPY N/A 11/2/2022    Procedure: TOTAL LAPAROSCOPIC HYSTERECTOMY ,;  Surgeon: Rhonda Dsouza M.D.;  " Location: SURGERY SAME DAY [facility];  Service: Gynecology    LAPAROSCOPIC LYSIS OF ADHESIONS N/A 11/2/2022    Procedure: LYSIS, ENDOMETRIOSIS, LAPAROSCOPIC;  Surgeon: Rhonda Dsouza M.D.;  Location: SURGERY SAME DAY [facility];  Service: Gynecology    ID LAP,DIAGNOSTIC ABDOMEN  4/27/2022    Procedure: LAPAROSCOPY - DIAGNOSTIC;  Surgeon: Jonnie Donvoan M.D.;  Location: SURGERY [facility];  Service: General    LAPAROSCOPIC LYSIS OF ADHESIONS  4/27/2022    Procedure: LYSIS, ADHESIONS, LAPAROSCOPIC;  Surgeon: Jonnie Donovan M.D.;  Location: SURGERY [facility];  Service: General    ID UPPER GI ENDOSCOPY,DIAGNOSIS N/A 4/11/2022    Procedure: GASTROSCOPY;  Surgeon: Sandra Reed M.D.;  Location: SURGERY SAME DAY [facility];  Service: Gastroenterology    ID UPPER GI ENDOSCOPY,BIOPSY N/A 4/11/2022    Procedure: GASTROSCOPY, WITH BIOPSY;  Surgeon: Sandra Reed M.D.;  Location: SURGERY SAME DAY [facility];  Service: Gastroenterology    OTHER ORTHOPEDIC SURGERY Right 2019    ankle fusion    OTHER ORTHOPEDIC SURGERY Right 2018    ankle fusion    CERVICAL DISK AND FUSION ANTERIOR  4/27/2016    Procedure: CERVICAL DISK AND FUSION ANTERIOR C5-6;  Surgeon: Jorge Pozo M.D.;  Location: [facility];  Service:     MASTOIDECTOMY Right 2015    ARIADNA BY LAPAROSCOPY  8/15/2010    Performed by JONNIE DONOVAN at SURGERY [facility] ORS    LYSIS ADHESIONS GENERAL  8/15/2010    Performed by JONNIE DONOVAN at SURGERY [facility] ORS    GASTROSCOPY-ENDO  8/12/2010    Performed by KHUSHI MURCIA at ENDOSCOPY [facility] ORS    PELVISCOPY  6/16/2009    Performed by ABDULKADIR SMITH at SURGERY SAME DAY [facility] ORS    LYSIS ADHESIONS GYN  6/16/2009    Performed by ABDULKADIR SMITH at SURGERY SAME DAY [facility] ORS    COLONOSCOPY - ENDO  1/22/2009    Performed by ASHLEY TALBOT at [facility] ORS    GASTROSCOPY-ENDO  1/20/2009    Performed by BRENDA BENNETT at [facility]    OTHER  ABDOMINAL SURGERY  2009    lizz    OTHER ABDOMINAL SURGERY  1997    C - section    APPENDECTOMY  1994    CARPAL TUNNEL RELEASE      RUE - 1998, LUE - 2000    GASTRIC BYPASS LAPAROSCOPIC      GYN SURGERY      TUBE, EAR ULTRASIL         CURRENT MEDICATIONS  No current facility-administered medications for this encounter.    Current Outpatient Medications:     ondansetron (ZOFRAN) 4 MG Tab tablet, TAKE 1 TABLET BY MOUTH EVERY 8 HOURS AS NEEDED FOR NAUSEA AND VOMITING, Disp: 90 Tablet, Rfl: 0    metoclopramide (REGLAN) 10 MG Tab, TAKE 1-3 TABLETS BY MOUTH AT ONSET OF HEADACHE/NAUSEA, REPEAT IN 4 TO 6 HOURS AS NEEDED, Disp: 45 Tablet, Rfl: 4    DULoxetine (CYMBALTA) 60 MG Cap DR Particles delayed-release capsule, Take 1 Capsule by mouth every day., Disp: 90 Capsule, Rfl: 1    methylPREDNISolone (MEDROL DOSEPAK) 4 MG Tablet Therapy Pack, As directed on the packaging label., Disp: 21 Tablet, Rfl: 0    levothyroxine (SYNTHROID) 50 MCG Tab, TAKE 1 TABLET BY MOUTH EVERY DAY IN THE MORNING ON AN EMPTY STOMACH, Disp: 90 Tablet, Rfl: 1    cyanocobalamin (VITAMIN B-12) 1000 MCG/ML Solution, INJECT 1 ML INTRAMUSCULARLY ONCE EVERY 30 DAYS STRENGTH: 1,000 MCG/ML, Disp: 3 mL, Rfl: 1    levETIRAcetam (KEPPRA) 100 MG/ML Solution, 5 ml in AM and 10 ml in pm, Disp: 450 mL, Rfl: 11    sumatriptan (IMITREX) 100 MG tablet, 1 tab at headache onset; repeat in 1 hour prn, Disp: 9 Tablet, Rfl: 6    busPIRone (BUSPAR) 10 MG Tab tablet, Take 1 Tablet by mouth 3 times a day., Disp: 270 Tablet, Rfl: 1    promethazine (PHENERGAN) 25 MG Suppos, Insert 1 Suppository into the rectum every 6 hours as needed for Nausea/Vomiting., Disp: 12 Suppository, Rfl: 0    PREMARIN 0.625 MG Tab, Take 0.625 mg by mouth every day., Disp: , Rfl:     pregabalin (LYRICA) 50 MG capsule, PLEASE SEE ATTACHED FOR DETAILED DIRECTIONS, Disp: , Rfl:     prochlorperazine (COMPAZINE) 10 MG Tab, Take 1 Tablet by mouth every 6 hours as needed for Nausea/Vomiting., Disp: 30 Tablet,  Rfl: 3    fluticasone (FLONASE) 50 MCG/ACT nasal spray, Spray 1 Spray in nose every day., Disp: 16 g, Rfl: 0    ALLERGIES  Allergies   Allergen Reactions    Phentermine Hcl      Swelling short of breath    Morphine Vomiting    2-Phenylbenzimidazole-5-Sulfonic Acid [Phenyl Benzimidazole Sulfonic Acid]      Other reaction(s): Reaction:throat swelling       PHYSICAL EXAM  VITAL SIGNS: /67   Pulse (!) 107   Temp 36.8 °C (98.3 °F) (Temporal)   Resp 16   Wt 55.6 kg (122 lb 9.2 oz)   LMP 05/15/2017 Comment: irregular for the last 3 months  SpO2 97%   BMI 25.71 kg/m²   Reviewed and noted.  Constitutional: Well developed, Well nourished, uncomfortable appearing  HENT: Normocephalic, atraumatic, bilateral external ears normal, No intraoral erythema, edema, exudate  Eyes: PERRLA, conjunctiva pink, no scleral icterus.   Cardiovascular: Regular rate and rhythm. No murmurs, rubs or gallops.  No dependent edema or calf tenderness  Respiratory: Lungs clear to auscultation bilaterally. No wheezes, rales, or rhonchi.  Abdominal:  Abdomen soft, tenderness in the epigastric area.  Slight guarding.  Skin: No erythema, no rash. No wounds or bruising.  Genitourinary: No costovertebral angle tenderness.   Musculoskeletal: no deformities.   Neurologic: Alert, no facial droop noted. All extra ocular muscles intact. Moves all extremities with out weakness noted  Psychiatric: Affect normal, Judgment normal, Mood normal.         MEDICAL DECISION MAKING:  PROBLEMS EVALUATED THIS VISIT:  Abdominal pain.  Patient has ready lab work is elevated lipase suspect pancreatitis gallbladder/gallstones versus alcohol versus other lung differential.  Patient is slightly tachycardic no leg pain not hypotensive not febrile does not exhibit signs of sepsis.  In addition abdominal exam is not constant with a surgical abdomen.         PLAN:  IV placement.  CBC  Metabolic panel  Lipase  Urinalysis  CT scan  Ultrasound    RISK:  Patient is at high  risk patient will need admission to the hospital for further pain management.      RESULTS    LABS Ordered and Reviewed by Me:  Results for orders placed or performed during the hospital encounter of 01/24/24   CBC with Differential   Result Value Ref Range    WBC 5.9 4.8 - 10.8 K/uL    RBC 5.23 4.20 - 5.40 M/uL    Hemoglobin 15.5 12.0 - 16.0 g/dL    Hematocrit 47.0 37.0 - 47.0 %    MCV 89.9 81.4 - 97.8 fL    MCH 29.6 27.0 - 33.0 pg    MCHC 33.0 32.2 - 35.5 g/dL    RDW 41.8 35.9 - 50.0 fL    Platelet Count 176 164 - 446 K/uL    MPV 11.6 9.0 - 12.9 fL    Neutrophils-Polys 62.10 44.00 - 72.00 %    Lymphocytes 23.60 22.00 - 41.00 %    Monocytes 8.90 0.00 - 13.40 %    Eosinophils 3.90 0.00 - 6.90 %    Basophils 1.20 0.00 - 1.80 %    Immature Granulocytes 0.30 0.00 - 0.90 %    Nucleated RBC 0.00 0.00 - 0.20 /100 WBC    Neutrophils (Absolute) 3.69 1.82 - 7.42 K/uL    Lymphs (Absolute) 1.40 1.00 - 4.80 K/uL    Monos (Absolute) 0.53 0.00 - 0.85 K/uL    Eos (Absolute) 0.23 0.00 - 0.51 K/uL    Baso (Absolute) 0.07 0.00 - 0.12 K/uL    Immature Granulocytes (abs) 0.02 0.00 - 0.11 K/uL    NRBC (Absolute) 0.00 K/uL   Complete Metabolic Panel   Result Value Ref Range    Sodium 133 (L) 135 - 145 mmol/L    Potassium 4.0 3.6 - 5.5 mmol/L    Chloride 103 96 - 112 mmol/L    Co2 19 (L) 20 - 33 mmol/L    Anion Gap 11.0 7.0 - 16.0    Glucose 152 (H) 65 - 99 mg/dL    Bun 10 8 - 22 mg/dL    Creatinine 0.59 0.50 - 1.40 mg/dL    Calcium 8.9 8.5 - 10.5 mg/dL    Correct Calcium 8.7 8.5 - 10.5 mg/dL    AST(SGOT) 20 12 - 45 U/L    ALT(SGPT) 13 2 - 50 U/L    Alkaline Phosphatase 89 30 - 99 U/L    Total Bilirubin 0.2 0.1 - 1.5 mg/dL    Albumin 4.2 3.2 - 4.9 g/dL    Total Protein 6.3 6.0 - 8.2 g/dL    Globulin 2.1 1.9 - 3.5 g/dL    A-G Ratio 2.0 g/dL   Lipase   Result Value Ref Range    Lipase 1141 (H) 11 - 82 U/L   Urinalysis    Specimen: Urine   Result Value Ref Range    Color Yellow     Character Clear     Specific Gravity 1.017 <1.035    Ph 5.0  5.0 - 8.0    Glucose Negative Negative mg/dL    Ketones Negative Negative mg/dL    Protein Negative Negative mg/dL    Bilirubin Negative Negative    Urobilinogen, Urine 0.2 Negative    Nitrite Negative Negative    Leukocyte Esterase Negative Negative    Occult Blood Negative Negative    Micro Urine Req see below    ESTIMATED GFR   Result Value Ref Range    GFR (CKD-EPI) 112 >60 mL/min/1.73 m 2         RADIOLOGY        Radiologist interpretation:   CT-ABDOMEN-PELVIS WITH   Final Result      1. Absent gallbladder, with interval increase in size of the common bile duct, central intrahepatic bile ducts and the pancreatic duct. Consider further evaluation with MRCP.   2. Postsurgical changes of gastric bypass.   3. The remainder of the examination is within normal limits.      US-RUQ   Final Result      1.  Dilatation of the common duct could be related to postcholecystectomy status however correlate with biliary labs. If there is concern for obstruction, MRCP is suggested.            ED COURSE:    ED Observation Status? No   No noted need for observation for developing issue    INTERVENTIONS BY ME:  Medications   oxyCODONE-acetaminophen (Percocet) 5-325 MG per tablet 2 Tablet (2 Tablets Oral Refused 1/24/24 1945)   NS (Bolus) 0.9 % infusion 1,000 mL (0 mL Intravenous Stopped 1/24/24 1958)   HYDROmorphone (Dilaudid) injection 1 mg (1 mg Intravenous Given 1/24/24 1502)   ondansetron (Zofran) syringe/vial injection 4 mg (4 mg Intravenous Given 1/24/24 1457)   HYDROmorphone (Dilaudid) injection 1 mg (1 mg Intravenous Given 1/24/24 1713)   busPIRone (Buspar) tablet 10 mg (10 mg Oral Given 1/24/24 1715)   iohexol (OMNIPAQUE) 350 mg/mL (IV) (80 mL Intravenous Given 1/24/24 1945)       Response on recheck:  Peripheral IV was attempted by the nurse and failed  Came to the bedside with ultrasound.  Able to find a patent vein. 3 Times were done.  This was done under sterile conditions with chlorhexidine.  Flush was noted catheter  was advanced  IV secured  .    CONSULTANTS/OTHER GROUPS CONTACTED    Hospitalist will be called    FINAL DISPO PLAN   [place] to the hospital in fair this patient reports several doses of narcotic pain medicine pain management  CT scan reveals no mass.  Lab work shows elevated lipase of unknown etiology.  Patient continues to have pain requiring IV fluids pain management.  Admitted to the hospital for further condition management.      CONDITION: Guarded.     FINAL IMPRESSION  1. Acute pancreatitis, unspecified complication status, unspecified pancreatitis type

## 2024-01-25 ENCOUNTER — APPOINTMENT (OUTPATIENT)
Dept: RADIOLOGY | Facility: MEDICAL CENTER | Age: 49
DRG: 439 | End: 2024-01-25
Attending: STUDENT IN AN ORGANIZED HEALTH CARE EDUCATION/TRAINING PROGRAM
Payer: COMMERCIAL

## 2024-01-25 LAB
ANION GAP SERPL CALC-SCNC: 7 MMOL/L (ref 7–16)
BASOPHILS # BLD AUTO: 1.2 % (ref 0–1.8)
BASOPHILS # BLD: 0.06 K/UL (ref 0–0.12)
BUN SERPL-MCNC: 8 MG/DL (ref 8–22)
CALCIUM SERPL-MCNC: 8.7 MG/DL (ref 8.5–10.5)
CHLORIDE SERPL-SCNC: 106 MMOL/L (ref 96–112)
CHOLEST SERPL-MCNC: 171 MG/DL (ref 100–199)
CO2 SERPL-SCNC: 25 MMOL/L (ref 20–33)
CREAT SERPL-MCNC: 0.51 MG/DL (ref 0.5–1.4)
EOSINOPHIL # BLD AUTO: 0.36 K/UL (ref 0–0.51)
EOSINOPHIL NFR BLD: 7 % (ref 0–6.9)
ERYTHROCYTE [DISTWIDTH] IN BLOOD BY AUTOMATED COUNT: 41.1 FL (ref 35.9–50)
GFR SERPLBLD CREATININE-BSD FMLA CKD-EPI: 116 ML/MIN/1.73 M 2
GLUCOSE SERPL-MCNC: 79 MG/DL (ref 65–99)
HCT VFR BLD AUTO: 40.4 % (ref 37–47)
HDLC SERPL-MCNC: 62 MG/DL
HGB BLD-MCNC: 13.4 G/DL (ref 12–16)
IMM GRANULOCYTES # BLD AUTO: 0.01 K/UL (ref 0–0.11)
IMM GRANULOCYTES NFR BLD AUTO: 0.2 % (ref 0–0.9)
LDLC SERPL CALC-MCNC: 96 MG/DL
LYMPHOCYTES # BLD AUTO: 2.02 K/UL (ref 1–4.8)
LYMPHOCYTES NFR BLD: 39.1 % (ref 22–41)
MCH RBC QN AUTO: 29.4 PG (ref 27–33)
MCHC RBC AUTO-ENTMCNC: 33.2 G/DL (ref 32.2–35.5)
MCV RBC AUTO: 88.6 FL (ref 81.4–97.8)
MONOCYTES # BLD AUTO: 0.52 K/UL (ref 0–0.85)
MONOCYTES NFR BLD AUTO: 10.1 % (ref 0–13.4)
NEUTROPHILS # BLD AUTO: 2.19 K/UL (ref 1.82–7.42)
NEUTROPHILS NFR BLD: 42.4 % (ref 44–72)
NRBC # BLD AUTO: 0 K/UL
NRBC BLD-RTO: 0 /100 WBC (ref 0–0.2)
PLATELET # BLD AUTO: 157 K/UL (ref 164–446)
PMV BLD AUTO: 11.6 FL (ref 9–12.9)
POTASSIUM SERPL-SCNC: 4.3 MMOL/L (ref 3.6–5.5)
RBC # BLD AUTO: 4.56 M/UL (ref 4.2–5.4)
SODIUM SERPL-SCNC: 138 MMOL/L (ref 135–145)
TRIGL SERPL-MCNC: 63 MG/DL (ref 0–149)
WBC # BLD AUTO: 5.2 K/UL (ref 4.8–10.8)

## 2024-01-25 PROCEDURE — 700111 HCHG RX REV CODE 636 W/ 250 OVERRIDE (IP): Performed by: INTERNAL MEDICINE

## 2024-01-25 PROCEDURE — 80048 BASIC METABOLIC PNL TOTAL CA: CPT

## 2024-01-25 PROCEDURE — 85025 COMPLETE CBC W/AUTO DIFF WBC: CPT

## 2024-01-25 PROCEDURE — A9270 NON-COVERED ITEM OR SERVICE: HCPCS | Performed by: STUDENT IN AN ORGANIZED HEALTH CARE EDUCATION/TRAINING PROGRAM

## 2024-01-25 PROCEDURE — 74181 MRI ABDOMEN W/O CONTRAST: CPT

## 2024-01-25 PROCEDURE — 36415 COLL VENOUS BLD VENIPUNCTURE: CPT

## 2024-01-25 PROCEDURE — 770001 HCHG ROOM/CARE - MED/SURG/GYN PRIV*

## 2024-01-25 PROCEDURE — 80061 LIPID PANEL: CPT

## 2024-01-25 PROCEDURE — 700102 HCHG RX REV CODE 250 W/ 637 OVERRIDE(OP): Performed by: STUDENT IN AN ORGANIZED HEALTH CARE EDUCATION/TRAINING PROGRAM

## 2024-01-25 PROCEDURE — 700111 HCHG RX REV CODE 636 W/ 250 OVERRIDE (IP): Mod: JZ | Performed by: STUDENT IN AN ORGANIZED HEALTH CARE EDUCATION/TRAINING PROGRAM

## 2024-01-25 PROCEDURE — 700105 HCHG RX REV CODE 258: Performed by: STUDENT IN AN ORGANIZED HEALTH CARE EDUCATION/TRAINING PROGRAM

## 2024-01-25 PROCEDURE — 99233 SBSQ HOSP IP/OBS HIGH 50: CPT | Performed by: INTERNAL MEDICINE

## 2024-01-25 RX ORDER — LORAZEPAM 2 MG/ML
0.5 INJECTION INTRAMUSCULAR
Status: DISCONTINUED | OUTPATIENT
Start: 2024-01-25 | End: 2024-01-26

## 2024-01-25 RX ORDER — DOXYCYCLINE HYCLATE 100 MG/1
100 CAPSULE ORAL 2 TIMES DAILY
Status: ON HOLD | COMMUNITY
Start: 2023-12-22 | End: 2024-01-28

## 2024-01-25 RX ADMIN — OXYCODONE 5 MG: 5 TABLET ORAL at 02:31

## 2024-01-25 RX ADMIN — HYDROMORPHONE HYDROCHLORIDE 1 MG: 1 INJECTION, SOLUTION INTRAMUSCULAR; INTRAVENOUS; SUBCUTANEOUS at 15:05

## 2024-01-25 RX ADMIN — HYDROMORPHONE HYDROCHLORIDE 1 MG: 1 INJECTION, SOLUTION INTRAMUSCULAR; INTRAVENOUS; SUBCUTANEOUS at 10:46

## 2024-01-25 RX ADMIN — ACETAMINOPHEN 1000 MG: 500 TABLET ORAL at 10:23

## 2024-01-25 RX ADMIN — OXYCODONE 5 MG: 5 TABLET ORAL at 13:19

## 2024-01-25 RX ADMIN — LEVETIRACETAM 500 MG: 500 TABLET, FILM COATED ORAL at 05:23

## 2024-01-25 RX ADMIN — ENOXAPARIN SODIUM 40 MG: 100 INJECTION SUBCUTANEOUS at 17:41

## 2024-01-25 RX ADMIN — ACETAMINOPHEN 1000 MG: 500 TABLET ORAL at 15:04

## 2024-01-25 RX ADMIN — SODIUM CHLORIDE, POTASSIUM CHLORIDE, SODIUM LACTATE AND CALCIUM CHLORIDE: 600; 310; 30; 20 INJECTION, SOLUTION INTRAVENOUS at 03:44

## 2024-01-25 RX ADMIN — OXYCODONE 5 MG: 5 TABLET ORAL at 08:18

## 2024-01-25 RX ADMIN — HYDROMORPHONE HYDROCHLORIDE 1 MG: 1 INJECTION, SOLUTION INTRAMUSCULAR; INTRAVENOUS; SUBCUTANEOUS at 19:51

## 2024-01-25 RX ADMIN — DOCUSATE SODIUM 50 MG AND SENNOSIDES 8.6 MG 2 TABLET: 8.6; 5 TABLET, FILM COATED ORAL at 17:38

## 2024-01-25 RX ADMIN — BUSPIRONE HYDROCHLORIDE 10 MG: 10 TABLET ORAL at 05:24

## 2024-01-25 RX ADMIN — HYDROMORPHONE HYDROCHLORIDE 1 MG: 1 INJECTION, SOLUTION INTRAMUSCULAR; INTRAVENOUS; SUBCUTANEOUS at 04:35

## 2024-01-25 RX ADMIN — BUSPIRONE HYDROCHLORIDE 10 MG: 10 TABLET ORAL at 11:41

## 2024-01-25 RX ADMIN — LEVOTHYROXINE SODIUM 50 MCG: 0.05 TABLET ORAL at 05:24

## 2024-01-25 RX ADMIN — ACETAMINOPHEN 1000 MG: 500 TABLET ORAL at 19:51

## 2024-01-25 RX ADMIN — OXYCODONE 5 MG: 5 TABLET ORAL at 17:39

## 2024-01-25 RX ADMIN — BUSPIRONE HYDROCHLORIDE 10 MG: 10 TABLET ORAL at 17:38

## 2024-01-25 RX ADMIN — DOCUSATE SODIUM 50 MG AND SENNOSIDES 8.6 MG 2 TABLET: 8.6; 5 TABLET, FILM COATED ORAL at 04:36

## 2024-01-25 RX ADMIN — LEVETIRACETAM 1000 MG: 500 TABLET, FILM COATED ORAL at 17:39

## 2024-01-25 RX ADMIN — HYDROMORPHONE HYDROCHLORIDE 1 MG: 1 INJECTION, SOLUTION INTRAMUSCULAR; INTRAVENOUS; SUBCUTANEOUS at 00:17

## 2024-01-25 RX ADMIN — LORAZEPAM 0.5 MG: 2 INJECTION, SOLUTION INTRAMUSCULAR; INTRAVENOUS at 08:19

## 2024-01-25 RX ADMIN — DULOXETINE HYDROCHLORIDE 60 MG: 60 CAPSULE, DELAYED RELEASE ORAL at 05:24

## 2024-01-25 ASSESSMENT — PAIN DESCRIPTION - PAIN TYPE
TYPE: ACUTE PAIN

## 2024-01-25 ASSESSMENT — ENCOUNTER SYMPTOMS
SHORTNESS OF BREATH: 0
NAUSEA: 1
VOMITING: 0
FLANK PAIN: 1
DIARRHEA: 1
ABDOMINAL PAIN: 1
MYALGIAS: 1
NERVOUS/ANXIOUS: 1
WEAKNESS: 0

## 2024-01-25 NOTE — ASSESSMENT & PLAN NOTE
Lipase greater than 1000  Denies any alcohol use.  S/p cholecystectomy.  Calcium and lipid panel are within normal limits.  She does take Keppra but it would be very rare if this is causing her pancreatitis, she has been taking this chronically.  MRCP was negative for biliary stone, showed possible ampulla narrowing.    I consulted GI, did not recommend any intervention at this time, supportive management for acute pancreatitis  Did discuss the case with Dr. Fernandez since the patient can follow-up with him outpatient for him to review the MRCP and reassess the ampulla narrowing  Elevated AST and ALT, decreasing.  Continue to trend daily  Advancing to full liquid diet and continue IV fluids  as needed pain medicine.  Scheduled Zofran

## 2024-01-25 NOTE — H&P
[facility] Internal Medicine History & Physical Note    Date of Service  1/24/2024    [facility] Team: KATHY   Attending: Rell White M.d.  Resident: Dr. Pisano  Contact Number: [phone]    Primary Care Physician  Maria De Jesus Sadler M.D.    Consultants  None      Code Status  Full Code    Chief Complaint  Chief Complaint   Patient presents with    Abdominal Pain     4 days    Flank Pain     Radiates to abdomen and groin     Painful Urination    Nausea       History of Presenting Illness (HPI):   Tamela Stern is a 48 y.o. female who presented 1/24/2024 with bilateral flank and abdominal pain x4 days. Past medical history is significant for seizure disorder, migraines, hypothyroidism, MDD/anxiety, fibromyalgia, history of alcohol use. Patient states that around 4 days ago she noted sudden onset bilateral flank pain with radiation to the abdomen and to the groin, constant, 7-8/10, stabbing in character, worsened by positional changes and deep inspiration, some relief with smoking marijuana. She tried hot baths, heating pad, and tiger balm without relief. Associated with nausea, no vomiting, chills, fatigue, weakness, and poor oral intake. Patient also noted burning with urination with strong smell but not malodorous.  Patient denies any alcohol intake for the last 5 years, but states she used to drink heavily.  States that she has not had an episode of pancreatitis in the past.  No personal or familial history of hypercholesterolemia.  Does have history of cholecystectomy and reports mother passed from pancreatic cancer in relation to previous alcohol drinking.  In ED, patient afebrile, tachycardic upon presentation but has since normalized, otherwise hemodynamically stable and saturating well on ambient air.  Labs significant for sodium 133, glucose 152, lipase 1141.  CT abdomen pelvis with contrast showed s/p cholecystectomy with increased size of CBD, central intrahepatic bile ducts and pancreatic  duct, postsurgical changes of gastric bypass. Right upper quadrant ultrasound dilation of CBD and normal pancreas. UA negative.   She was given IV NS bolus, Zofran, and pain medication the ED.  Patient will be admitted for further management of acute pancreatitis.    I discussed the plan of care with patient and attending physician, Dr White .    Review of Systems  Review of Systems   Constitutional:  Positive for chills and malaise/fatigue. Negative for fever and weight loss.   HENT:  Positive for congestion.    Eyes:  Negative for pain and discharge.   Respiratory:  Negative for cough, shortness of breath and wheezing.    Cardiovascular:  Negative for chest pain, palpitations and leg swelling.   Gastrointestinal:  Positive for abdominal pain and nausea. Negative for constipation, diarrhea and vomiting.   Genitourinary:  Positive for dysuria and flank pain. Negative for frequency and urgency.   Musculoskeletal:  Positive for myalgias and neck pain (unchanged from prior). Negative for falls.   Neurological:  Negative for dizziness, weakness and headaches.   Psychiatric/Behavioral:  Negative for depression.        Past Medical History   has a past medical history of Anemia, Anesthesia (04/19/2022), Arthritis (04/19/2022), Cancer ([facility]), Cervical spondylosis (12/11/2015), Concussion, Constipation, Major depression (01/20/2015), Migraine, Neoplasm of uncertain behavior of skin (05/02/2017), Other specified symptom associated with female genital organs, Pain (04/19/2022), PONV (postoperative nausea and vomiting), Right acoustic neuroma ([facility]), Seizure disorder ([facility]) (1999), Shingles (01/13/2016), Sleep apnea, Stroke ([facility]) (01/2016), and Thyroid activity decreased.    Surgical History   has a past surgical history that includes gastric bypass laparoscopic; hchg tube, ear ultrasil; gastroscopy-endo (1/20/2009); colonoscopy - endo (1/22/2009); gyn surgery; pelviscopy (6/16/2009); lysis adhesions gyn (6/16/2009);  "gastroscopy-endo (8/12/2010); lizz by laparoscopy (8/15/2010); lysis adhesions general (8/15/2010); appendectomy (1994); other abdominal surgery (1997); other abdominal surgery (2009); cervical disk and fusion anterior (4/27/2016); pr upper gi endoscopy,diagnosis (N/A, 4/11/2022); pr upper gi endoscopy,biopsy (N/A, 4/11/2022); carpal tunnel release; other orthopedic surgery (Right, 2018); other orthopedic surgery (Right, 2019); mastoidectomy (Right, 2015); pr lap,diagnostic abdomen (4/27/2022); laparoscopic lysis of adhesions (4/27/2022); pr lap,rmv  adnexal structure (Bilateral, 11/2/2022); pr cystourethroscopy (N/A, 11/2/2022); hysterectomy laparoscopy (N/A, 11/2/2022); and laparoscopic lysis of adhesions (N/A, 11/2/2022).     Family History  family history includes Alcohol/Drug in her mother; Breast Cancer in her paternal aunt; Cancer in her mother, paternal aunt, paternal grandfather, and paternal grandmother; Diabetes in her maternal grandfather and mother; Heart Disease in her paternal grandfather; Hyperlipidemia in her paternal grandfather; Hypertension in her paternal grandfather; Ovarian Cancer in her paternal grandmother.   Family history reviewed with patient.     Social History  Tobacco: Current smoker, around 1/4 ppd for around 30 years  Alcohol: Previous \"heavy\" alcohol drinking, quit 5 years ago  Recreational drugs (illegal or prescription): Smokes marijuana daily  Employment: None  Living Situation: In Dev with   Recent Travel: Denies  Primary Care Provider: Reviewed  Other (stressors, spirituality, exposures): Denies    Allergies  Allergies   Allergen Reactions    Phentermine Hcl      Swelling short of breath    Morphine Vomiting    2-Phenylbenzimidazole-5-Sulfonic Acid [Phenyl Benzimidazole Sulfonic Acid]      Other reaction(s): Reaction:throat swelling       Medications  Prior to Admission Medications   Prescriptions Last Dose Informant Patient Reported? Taking?   DULoxetine (CYMBALTA) 60 " MG Cap DR Particles delayed-release capsule 2024 at AM Patient, Rx Bottle (For Med Information) No No   Sig: Take 1 Capsule by mouth every day.   PREMARIN 0.625 MG Tab 2024 at AM Patient, Rx Bottle (For Med Information) Yes No   Sig: Take 0.625 mg by mouth every day.   busPIRone (BUSPAR) 10 MG Tab tablet 2024 at AFTERNOON Patient, Rx Bottle (For Med Information) No No   Sig: Take 1 Tablet by mouth 3 times a day.   cyanocobalamin (VITAMIN B-12) 1000 MCG/ML Solution UNK at UNK Patient No No   Sig: INJECT 1 ML INTRAMUSCULARLY ONCE EVERY 30 DAYS STRENGTH: 1,000 MCG/ML   fluticasone (FLONASE) 50 MCG/ACT nasal spray 2024 at AM Patient No No   Sig: Spray 1 Spray in nose every day.   levETIRAcetam (KEPPRA) 100 MG/ML Solution 2024 at AM Patient, Rx Bottle (For Med Information) No No   Si ml in AM and 10 ml in pm   levothyroxine (SYNTHROID) 50 MCG Tab 2024 at AM Patient, Rx Bottle (For Med Information) No No   Sig: TAKE 1 TABLET BY MOUTH EVERY DAY IN THE MORNING ON AN EMPTY STOMACH   metoclopramide (REGLAN) 10 MG Tab 2024 at AM Patient, Rx Bottle (For Med Information) No No   Sig: TAKE 1-3 TABLETS BY MOUTH AT ONSET OF HEADACHE/NAUSEA, REPEAT IN 4 TO 6 HOURS AS NEEDED   ondansetron (ZOFRAN) 4 MG Tab tablet 2024 at AM Patient, Rx Bottle (For Med Information) No No   Sig: TAKE 1 TABLET BY MOUTH EVERY 8 HOURS AS NEEDED FOR NAUSEA AND VOMITING   promethazine (PHENERGAN) 25 MG Suppos PRN at PRN Patient No No   Sig: Insert 1 Suppository into the rectum every 6 hours as needed for Nausea/Vomiting.   sumatriptan (IMITREX) 100 MG tablet PRN at PRN Patient No No   Si tab at headache onset; repeat in 1 hour prn      Facility-Administered Medications: None       Physical Exam  Temp:  [35.6 °C (96 °F)-36.8 °C (98.3 °F)] 35.6 °C (96 °F)  Pulse:  [] 55  Resp:  [16-19] 16  BP: (100-118)/(52-73) 118/73  SpO2:  [93 %-97 %] 97 %  Blood Pressure: 118/73   Temperature: 36.8 °C (98.3 °F)    Pulse: 67   Respiration: 16   Pulse Oximetry: 97 %       Physical Exam  Constitutional:       General: She is not in acute distress.     Appearance: She is not ill-appearing, toxic-appearing or diaphoretic.   HENT:      Head: Normocephalic and atraumatic.      Right Ear: External ear normal.      Left Ear: External ear normal.      Nose: Nose normal. No rhinorrhea.      Mouth/Throat:      Mouth: Mucous membranes are dry.      Pharynx: Oropharynx is clear.   Eyes:      General: No scleral icterus.        Right eye: No discharge.         Left eye: No discharge.      Extraocular Movements: Extraocular movements intact.      Conjunctiva/sclera: Conjunctivae normal.   Cardiovascular:      Rate and Rhythm: Normal rate and regular rhythm.      Pulses: Normal pulses.      Heart sounds: Normal heart sounds. No murmur heard.  Pulmonary:      Effort: Pulmonary effort is normal. No respiratory distress.      Breath sounds: Normal breath sounds. No wheezing or rhonchi.   Abdominal:      General: Bowel sounds are normal. There is no distension.      Palpations: Abdomen is soft.      Tenderness: There is abdominal tenderness (generalized, to light palpation). There is no guarding or rebound.   Musculoskeletal:         General: No swelling or tenderness. Normal range of motion.      Cervical back: Normal range of motion.   Skin:     General: Skin is warm and dry.   Neurological:      General: No focal deficit present.      Mental Status: She is alert and oriented to person, place, and time. Mental status is at baseline.   Psychiatric:         Mood and Affect: Mood normal.         Behavior: Behavior normal.         Thought Content: Thought content normal.         Judgment: Judgment normal.         Laboratory:  Recent Labs     01/24/24  1315   WBC 5.9   RBC 5.23   HEMOGLOBIN 15.5   HEMATOCRIT 47.0   MCV 89.9   MCH 29.6   MCHC 33.0   RDW 41.8   PLATELETCT 176   MPV 11.6     Recent Labs     01/24/24  1315   SODIUM 133*   POTASSIUM  "4.0   CHLORIDE 103   CO2 19*   GLUCOSE 152*   BUN 10   CREATININE 0.59   CALCIUM 8.9     Recent Labs     01/24/24  1315   ALTSGPT 13   ASTSGOT 20   ALKPHOSPHAT 89   TBILIRUBIN 0.2   LIPASE 1141*   GLUCOSE 152*         No results for input(s): \"NTPROBNP\" in the last 72 hours.      No results for input(s): \"TROPONINT\" in the last 72 hours.    Imaging:  CT-ABDOMEN-PELVIS WITH   Final Result      1. Absent gallbladder, with interval increase in size of the common bile duct, central intrahepatic bile ducts and the pancreatic duct. Consider further evaluation with MRCP.   2. Postsurgical changes of gastric bypass.   3. The remainder of the examination is within normal limits.      US-RUQ   Final Result      1.  Dilatation of the common duct could be related to postcholecystectomy status however correlate with biliary labs. If there is concern for obstruction, MRCP is suggested.          CT abdomen pelvis with contrast and right upper quadrant ultrasound reviewed.    Assessment/Plan:  Problem Representation:   I anticipate this patient will require at least two midnights for appropriate medical management, necessitating inpatient admission because requiring IV fluids and pain management of acute pancreatitis    Patient will need a Med/Surg bed on MEDICAL service . The need is secondary to acute pancreatitis.    * Acute pancreatitis without infection or necrosis- (present on admission)  Assessment & Plan  Bilateral flank and generalized abdominal pain with radiation to the groin x 4 days  Constant 7-8 out of 10, stabbing in character, worsened by positional changes and deep inspiration. Some relief with smoking marijuana. Associated with nausea, chills, fatigue, weakness, and poor oral intake  Reports previous heavy alcohol drinking, quit around 5 years ago.  History of cholecystectomy.  No personal or familial history of hypercholesterolemia  Lipase 1141  Consider drug-induced from smoking marijuana (Class " 1a)  -Admit  -NPO, IV fluids.  Advance diet as tolerated  -Pain regimen on board  -Zofran as needed for nausea  -Lipid panel ordered  -Labs not consistent with biliary obstruction, however, can consider MRCP in AM as appropriate    Tobacco use  Assessment & Plan  -Smoking cessation counseling as appropriate    Hypothyroidism due to acquired atrophy of thyroid- (present on admission)  Assessment & Plan  -Continue home levothyroxine    Simple partial seizures evolving to complex partial seizures, then to generalized tonic-clonic seizures (HCC)- (present on admission)  Assessment & Plan  -Continue home Keppra    Migraine with aura and without status migrainosus, not intractable- (present on admission)  Assessment & Plan  -May restart home Imitrex as appropriate    Anxiety- (present on admission)  Assessment & Plan  Mood stable currently  -Continue home Cymbalta and BuSpar      VTE prophylaxis: SCDs/TEDs and enoxaparin ppx

## 2024-01-25 NOTE — ED NOTES
Medicated per MAR for pain and anxiety. PIV infiltrated, unsuccessful attempt x 1, assist RN at bedside with US.

## 2024-01-25 NOTE — PROGRESS NOTES
4 Eyes Skin Assessment Completed by TORRI Dias and TORRI Zhu.    Head WDL  Ears WDL Tattoo on RT  Nose WDL  Mouth WDL  Neck WDL  Breast/Chest WDL  Shoulder Blades WDL  Spine WDL  (R) Arm/Elbow/Hand WDL  (L) Arm/Elbow/Hand WDL  Abdomen WDL  Groin WDL  Scrotum/Coccyx/Buttocks WDL  (R) Leg WDL  (L) Leg WDL  (R) Heel/Foot/Toe WDL  (L) Heel/Foot/Toe WDL          Devices In Places Blood Pressure Cuff, Pulse Ox, and SCD's      Interventions In Place Low Air Loss Mattress    Possible Skin Injury No    Pictures Uploaded Into Epic N/A  Wound Consult Placed N/A  RN Wound Prevention Protocol Ordered No

## 2024-01-25 NOTE — PROGRESS NOTES
0655 Patient's in bed. Bedside report received from SEGUN Leiva RN at the beginning of the shift.    0740 Voalted Dr Rodriguez that patient (claustrophobic) needs medication for MRI.    0741 New order received and acknowledged from Dr Rodriguez (see MAR).    0818 Medicated with Oxycodone (see MAR) for c/o's bilateral flank, abdomen and pelvic pain, rates pain 8/10 and Ativan (see MAR) prior to MRI as per order.    0826 Patient left for MRI via gurney accompanied by one male transport.    1005 Patient came back from MRI via gurney accompanied by one female Transport.    1010 Patient's sitting up in bed. Educated on the importance/use of IS at least 10x every hour while awake, able to reach 1500. Fall protocol in effect. Call light within reach. Reminded patient to call for assist. Assessment completed. No distress noted. Plan of care reviewed with the patient and spouse. Verbalized understanding.    1046 Medicated with Dilaudid (see MAR) for c/o's bilateral flank, abdominal and pelvic pain, rates pain 8/10.    1140 Patient's in bed. No distress noted.    1319 Medicated with Oxycodone (see MAR) for c/o's abdominal, pelvis and flank pain, rates pain 8/10.     1320 New order received and acknowledged from Dr Rodriguez - labs.    1505 Medicated with Dilaudid (see MAR) for c/o's abdominal, pelvis and flank pain, rates pain 8/10.    1739 Medicated with Oxycodone (see MAR) for c/o's abdominal, flank and pelvic pain, rates pain 8/10.    1850 Patient's in bed. No changes in status. Bedside report given to Juan Carlos CAST RN (Audra).

## 2024-01-25 NOTE — PROGRESS NOTES
Hospital Medicine Daily Progress Note    Date of Service  1/25/2024    Chief Complaint  Tamela Stern is a 48 y.o. female admitted 1/24/2024 with abd pain    Hospital Course  47 yo woman history of seizure, migraine, depression, fibromyalgia, gastric bypass, hypothyroidism who presented with abdominal and bilateral flank pain with dysuria.  She has a history of alcohol use but has been sober for 5 years.  CTAP showed no gallbladder but interval increase in CBD duct, central intrahepatic bile ducts and pancreatic duct dilation, confirmed by US RUQ.  Lipase was elevated to 1141, LFTs were normal.  UA was normal.  She was admitted for acute pancreatitis.    Interval Problem Update   Lipid panel is normal, calcium 8.9.  ERCP negative for biliary stone, did show possible ampulla narrowing.  LFTs are normal.  She continues to have diffuse abdominal and flank pain, eating a little bit but food does make it worse.    I have discussed this patient's plan of care and discharge plan at IDT rounds today with Case Management, Nursing, Nursing leadership, and other members of the IDT team.    Consultants/Specialty  GI    Code Status  Full Code    Disposition  The patient is not medically cleared for discharge to home or a post-acute facility.  Anticipate discharge to: home with close outpatient follow-up    I have placed the appropriate orders for post-discharge needs.    Review of Systems  Review of Systems   Constitutional:  Positive for malaise/fatigue.   Respiratory:  Negative for shortness of breath.    Cardiovascular:  Negative for chest pain.   Gastrointestinal:  Positive for abdominal pain, diarrhea and nausea. Negative for vomiting.   Genitourinary:  Positive for dysuria and flank pain.   Musculoskeletal:  Positive for myalgias.   Neurological:  Negative for weakness.   Psychiatric/Behavioral:  The patient is nervous/anxious.         Physical Exam  Temp:  [35.6 °C (96 °F)-35.8 °C (96.5 °F)] 35.8 °C  (96.5 °F)  Pulse:  [49-67] 54  Resp:  [16-19] 16  BP: (100-119)/(52-79) 119/79  SpO2:  [93 %-97 %] 96 %    Physical Exam  Vitals and nursing note reviewed.   Constitutional:       Appearance: She is ill-appearing.   HENT:      Head: Normocephalic.      Mouth/Throat:      Mouth: Mucous membranes are moist.   Eyes:      General:         Right eye: No discharge.         Left eye: No discharge.   Cardiovascular:      Rate and Rhythm: Normal rate and regular rhythm.   Pulmonary:      Effort: Pulmonary effort is normal. No respiratory distress.      Breath sounds: No wheezing or rales.   Abdominal:      Palpations: Abdomen is soft.      Tenderness: There is abdominal tenderness (Diffuse). There is right CVA tenderness, left CVA tenderness and guarding. There is no rebound.   Musculoskeletal:         General: No swelling.      Cervical back: Neck supple.   Skin:     General: Skin is warm and dry.   Neurological:      Mental Status: She is alert and oriented to person, place, and time.         Fluids  No intake or output data in the 24 hours ending 01/25/24 1319    Laboratory  Recent Labs     01/24/24  1315 01/25/24  0526   WBC 5.9 5.2   RBC 5.23 4.56   HEMOGLOBIN 15.5 13.4   HEMATOCRIT 47.0 40.4   MCV 89.9 88.6   MCH 29.6 29.4   MCHC 33.0 33.2   RDW 41.8 41.1   PLATELETCT 176 157*   MPV 11.6 11.6     Recent Labs     01/24/24  1315 01/25/24  0526   SODIUM 133* 138   POTASSIUM 4.0 4.3   CHLORIDE 103 106   CO2 19* 25   GLUCOSE 152* 79   BUN 10 8   CREATININE 0.59 0.51   CALCIUM 8.9 8.7             Recent Labs     01/25/24  0526   TRIGLYCERIDE 63   HDL 62   LDL 96       Imaging  [id number]   Final Result      1.  Surgically absent gallbladder with mildly dilated common bile duct tapering distally without obstructive mass or choledocholithiasis. This is probably physiologic with a normal bilirubin level.   2.  Mildly dilated pancreatic duct measuring up to 6 mm with no obstructing mass or stone.   3.  No MRI evidence of  acute pancreatitis.   4.  There may be some degree of ampullary stenosis with dilated common bile duct and pancreatic duct.         CT-ABDOMEN-PELVIS WITH   Final Result      1. Absent gallbladder, with interval increase in size of the common bile duct, central intrahepatic bile ducts and the pancreatic duct. Consider further evaluation with MRCP.   2. Postsurgical changes of gastric bypass.   3. The remainder of the examination is within normal limits.      US-RUQ   Final Result      1.  Dilatation of the common duct could be related to postcholecystectomy status however correlate with biliary labs. If there is concern for obstruction, MRCP is suggested.           Assessment/Plan  * Acute pancreatitis without infection or necrosis- (present on admission)  Assessment & Plan  Lipase greater than 1000  Denies any alcohol use.  S/p cholecystectomy.  Calcium and lipid panel are within normal limits.  She does take Keppra but it would be very rare if this is causing her pancreatitis, she has been taking this chronically.  MRCP was negative for biliary stone, showed possible ampulla narrowing.    I consulted GI, did not recommend any intervention at this time, supportive management for acute pancreatitis  Did discuss the case with Dr. Fernandez since the patient can follow-up with him outpatient for him to review the MRCP and reassess the ampulla narrowing  Clear liquid diet  IV fluids  Pain management    Tobacco use  Assessment & Plan  -Smoking cessation counseling as appropriate    Hypothyroidism due to acquired atrophy of thyroid- (present on admission)  Assessment & Plan  -Continue home levothyroxine    Simple partial seizures evolving to complex partial seizures, then to generalized tonic-clonic seizures (HCC)- (present on admission)  Assessment & Plan  -Continue home Keppra  Followed by Dr. Hoffman    Migraine with aura and without status migrainosus, not intractable- (present on admission)  Assessment & Plan  -May  restart home Imitrex as appropriate    Anxiety- (present on admission)  Assessment & Plan  Mood stable currently  -Continue home Cymbalta and BuSpar         VTE prophylaxis:    enoxaparin ppx      I have performed a physical exam and reviewed and updated ROS and Plan today (1/25/2024). In review of yesterday's note (1/24/2024), there are no changes except as documented above.

## 2024-01-25 NOTE — PROGRESS NOTES
Med rec is complete per [facility] [phone].  Per [facility] pt picked up 7 day course of Doxycycline on 12/22/23.

## 2024-01-25 NOTE — DISCHARGE PLANNING
Patient discussed in rounds.  Patient is not medically cleared.  Per the report in rounds the patient is getting a MRI.   CM will f/u for discharge needs.

## 2024-01-25 NOTE — PROGRESS NOTES
Also requested by Ty Rodriguez MD    Reason for Consultation: Abnormal CT and MRI findings, abdominal pain    History of Present Illness:     This is a 48-year-old female with a past medical history including Tristan-en-Y gastric bypass (2007 with Dr. Fernandez), s/p cholecystectomy (2010), seizure disorder, fibromyalgia, multiple endoscopic evaluations who presented to [facility] on 1/24/2024 with abdominal pain.  She reports 4-day history of abdominal pain radiating to her back, constant.  Associated with nausea, vomiting, decrease in appetite.   In the emergency department, no leukocytosis, hemoglobin normal.  AST 20, ALT 13, alk phos 89, total bilirubin 0.2, lipase 1141.  CT abdomen showed dilation of the common bile duct with mild central and hepatic biliary dilation.  Further evaluation with MRCP was obtained which showed common bile duct measuring up to 10 mm although it tapers distally, there is no obstructing mass or stone.  Minimal central intrahepatic biliary dilation.  Pancreatic duct mildly dilated measuring up to 6 mm in the head and neck of pancreas.  No obstructing mass or stone was identified.  No significant peripancreatic fluid or pancreatic parenchymal edema.    Last EGD: Last EGD 4/11/2022 by Dr. Reed.  Mild disclamation of the distal esophagus.double barrel appearing gastric pouch without ulceration.    Normal-appearing jejunum.  Biopsies obtained.      A. Jejunum biopsy:          Small bowel mucosa with no diagnostic alteration   B. Gastric biopsy:          Focal superficial erosion and associated hemorrhage, negative           for significant gastritis, H. pylori, dysplasia and malignancy   C. Esophagus biopsy:          Mild esophagitis, negative for increased eosinophils, intestinal           metaplasia, dysplasia and malignancy         Impressions:  Acute pancreatitis  Abnormal CT and MRI findings  S/p Tristan-en-Y gastric bypass      MDM:  This is a 48-year-old female with a  past medical history as listed above who presented to [facility] 1/24/2024 with acute pancreatitis.  LFTs and total bilirubin normal.  No leukocytosis.  Lipase elevated at 1141.  CT imaging with, bile duct dilation.  MRCP was obtained for further evaluation showing CBD dilation up to 10 mm with distal tapering but no obstructive process such as mass or stones.  Furthermore, there was mild dilation of the pancreatic duct at 6 mm again with no obstructing mass or stone.  Patient's LFTs and total bilirubin are normal, Reflecting no obstruction.  Although with abnormal findings, due to anatomical changes with Tristan-en-Y gastric bypass surgery, it would be very difficult, if even possible, to reach patient's ambulate endoscopically.  Given that she already has pancreatitis, ERCP would exacerbate/worsen her already existing pancreatitis. Furthermore, she would not benefit from ERCP as she does not have an obstructive process.  Would recommend supportive therapy/medical management of acute pancreatitis.     Recommendations:  GDMT of acute pancreatitis per primary team    Please reconsult GI team for any further questions or concerns          This note was generated using voice recognition software which has a small chance of producing errors of grammar and possibly content. I have made every reasonable attempt to find and correct any obvious errors, but expect that some may not be found prior to finalization of this note.

## 2024-01-25 NOTE — CARE PLAN
The patient is Stable - Low risk of patient condition declining or worsening    Shift Goals  Clinical Goals: pain control, safety  Patient Goals: pain control, comfort  Family Goals: pain control, comfort    Progress made toward(s) clinical / shift goals:    Problem: Knowledge Deficit - Standard  Goal: Patient and family/care givers will demonstrate understanding of plan of care, disease process/condition, diagnostic tests and medications  Outcome: Progressing   POC discussed with the patient. Test - MRI. Questions answered. Verbalized understanding.    Problem: Pain - Standard  Goal: Alleviation of pain or a reduction in pain to the patient’s comfort goal  Outcome: Progressing   Educated on pain scale. Encouraged to verbalized understanding.    Patient is not progressing towards the following goals:

## 2024-01-25 NOTE — CARE PLAN
Problem: Pain - Standard  Goal: Alleviation of pain or a reduction in pain to the patient’s comfort goal  Description: Target End Date:  Prior to discharge or change in level of care    Document on Vitals flowsheet    1.  Document pain using the appropriate pain scale per order or unit policy  2.  Educate and implement non-pharmacologic comfort measures (i.e. relaxation, distraction, massage, cold/heat therapy, etc.)  3.  Pain management medications as ordered  4.  Reassess pain after pain med administration per policy  5.  If opiods administered assess patient's response to pain medication is appropriate per POSS sedation scale  6.  Follow pain management plan developed in collaboration with patient and interdisciplinary team (including palliative care or pain specialists if applicable)  Outcome: Progressing  Flowsheets (Taken 1/25/2024 0231)  Pain Rating Scale (NPRS): 7   The patient is Stable - Low risk of patient condition declining or worsening    Shift Goals  Clinical Goals: NPO sips with meds, pain control, IV fluids  Patient Goals: rest, comfort, pain control  Family Goals: N/A    Progress made toward(s) clinical / shift goals:     Pt. Rated pain as 7-8 from 1-10, 10 being the most painful. Pain medications given as ordered.   Pt. Verbalized understanding on the care provided.

## 2024-01-25 NOTE — ED NOTES
Med Rec complete per pt with RX bottles at bedside (returned to pt)   Allergies reviewed  Antibiotics in the past 30 days:yes  Anticoagulant in past 14 days:no  Pharmacy patient utilizes:[facility]  on [address]    Pt states her Dr prescribed a 14 day course BID of antibiotics around [place] but unable to verify name of medication. Pt states she completed course    Unable to verify antibiotic at this time due to pharmacy being closed

## 2024-01-25 NOTE — EEG PROGRESS NOTE
Spoke with Dr. White, pt. Is allowed to have 1 cup of jello and  a tea before she goes to bed. Verified and heard by Charge RN.     RN also asked MD  if patient is not allowed for oral meds since she is on strict NPO. MD  mentioned that he is going to discuss with  the Resident

## 2024-01-26 LAB
ALBUMIN SERPL BCP-MCNC: 3.9 G/DL (ref 3.2–4.9)
ALBUMIN/GLOB SERPL: 2.1 G/DL
ALP SERPL-CCNC: 106 U/L (ref 30–99)
ALT SERPL-CCNC: 82 U/L (ref 2–50)
ANION GAP SERPL CALC-SCNC: 10 MMOL/L (ref 7–16)
AST SERPL-CCNC: 96 U/L (ref 12–45)
BASOPHILS # BLD AUTO: 1.5 % (ref 0–1.8)
BASOPHILS # BLD: 0.06 K/UL (ref 0–0.12)
BILIRUB SERPL-MCNC: 0.4 MG/DL (ref 0.1–1.5)
BUN SERPL-MCNC: 6 MG/DL (ref 8–22)
CALCIUM ALBUM COR SERPL-MCNC: 8.8 MG/DL (ref 8.5–10.5)
CALCIUM SERPL-MCNC: 8.7 MG/DL (ref 8.5–10.5)
CHLORIDE SERPL-SCNC: 106 MMOL/L (ref 96–112)
CO2 SERPL-SCNC: 25 MMOL/L (ref 20–33)
CREAT SERPL-MCNC: 0.45 MG/DL (ref 0.5–1.4)
EOSINOPHIL # BLD AUTO: 0.34 K/UL (ref 0–0.51)
EOSINOPHIL NFR BLD: 8.4 % (ref 0–6.9)
ERYTHROCYTE [DISTWIDTH] IN BLOOD BY AUTOMATED COUNT: 40.1 FL (ref 35.9–50)
GFR SERPLBLD CREATININE-BSD FMLA CKD-EPI: 119 ML/MIN/1.73 M 2
GLOBULIN SER CALC-MCNC: 1.9 G/DL (ref 1.9–3.5)
GLUCOSE SERPL-MCNC: 77 MG/DL (ref 65–99)
HCT VFR BLD AUTO: 42.7 % (ref 37–47)
HGB BLD-MCNC: 14.3 G/DL (ref 12–16)
IMM GRANULOCYTES # BLD AUTO: 0 K/UL (ref 0–0.11)
IMM GRANULOCYTES NFR BLD AUTO: 0 % (ref 0–0.9)
LYMPHOCYTES # BLD AUTO: 1.37 K/UL (ref 1–4.8)
LYMPHOCYTES NFR BLD: 33.7 % (ref 22–41)
MCH RBC QN AUTO: 29.2 PG (ref 27–33)
MCHC RBC AUTO-ENTMCNC: 33.5 G/DL (ref 32.2–35.5)
MCV RBC AUTO: 87.3 FL (ref 81.4–97.8)
MONOCYTES # BLD AUTO: 0.43 K/UL (ref 0–0.85)
MONOCYTES NFR BLD AUTO: 10.6 % (ref 0–13.4)
NEUTROPHILS # BLD AUTO: 1.87 K/UL (ref 1.82–7.42)
NEUTROPHILS NFR BLD: 45.8 % (ref 44–72)
NRBC # BLD AUTO: 0 K/UL
NRBC BLD-RTO: 0 /100 WBC (ref 0–0.2)
PLATELET # BLD AUTO: 157 K/UL (ref 164–446)
PMV BLD AUTO: 11.6 FL (ref 9–12.9)
POTASSIUM SERPL-SCNC: 4.3 MMOL/L (ref 3.6–5.5)
PROT SERPL-MCNC: 5.8 G/DL (ref 6–8.2)
RBC # BLD AUTO: 4.89 M/UL (ref 4.2–5.4)
SODIUM SERPL-SCNC: 141 MMOL/L (ref 135–145)
WBC # BLD AUTO: 4.1 K/UL (ref 4.8–10.8)

## 2024-01-26 PROCEDURE — 700111 HCHG RX REV CODE 636 W/ 250 OVERRIDE (IP): Mod: JZ | Performed by: STUDENT IN AN ORGANIZED HEALTH CARE EDUCATION/TRAINING PROGRAM

## 2024-01-26 PROCEDURE — 80053 COMPREHEN METABOLIC PANEL: CPT

## 2024-01-26 PROCEDURE — 700102 HCHG RX REV CODE 250 W/ 637 OVERRIDE(OP): Performed by: STUDENT IN AN ORGANIZED HEALTH CARE EDUCATION/TRAINING PROGRAM

## 2024-01-26 PROCEDURE — 700111 HCHG RX REV CODE 636 W/ 250 OVERRIDE (IP): Mod: JZ | Performed by: INTERNAL MEDICINE

## 2024-01-26 PROCEDURE — 85025 COMPLETE CBC W/AUTO DIFF WBC: CPT

## 2024-01-26 PROCEDURE — A9270 NON-COVERED ITEM OR SERVICE: HCPCS | Performed by: STUDENT IN AN ORGANIZED HEALTH CARE EDUCATION/TRAINING PROGRAM

## 2024-01-26 PROCEDURE — 770001 HCHG ROOM/CARE - MED/SURG/GYN PRIV*

## 2024-01-26 PROCEDURE — 36415 COLL VENOUS BLD VENIPUNCTURE: CPT

## 2024-01-26 PROCEDURE — 700102 HCHG RX REV CODE 250 W/ 637 OVERRIDE(OP): Performed by: INTERNAL MEDICINE

## 2024-01-26 PROCEDURE — A9270 NON-COVERED ITEM OR SERVICE: HCPCS | Performed by: INTERNAL MEDICINE

## 2024-01-26 PROCEDURE — 99232 SBSQ HOSP IP/OBS MODERATE 35: CPT | Performed by: INTERNAL MEDICINE

## 2024-01-26 RX ORDER — ONDANSETRON 2 MG/ML
4 INJECTION INTRAMUSCULAR; INTRAVENOUS
Status: DISCONTINUED | OUTPATIENT
Start: 2024-01-26 | End: 2024-01-28 | Stop reason: HOSPADM

## 2024-01-26 RX ORDER — OXYCODONE HYDROCHLORIDE 5 MG/1
5-10 TABLET ORAL EVERY 4 HOURS PRN
Status: DISCONTINUED | OUTPATIENT
Start: 2024-01-26 | End: 2024-01-28 | Stop reason: HOSPADM

## 2024-01-26 RX ORDER — ACETAMINOPHEN 325 MG/1
650 TABLET ORAL EVERY 6 HOURS PRN
Status: DISCONTINUED | OUTPATIENT
Start: 2024-01-26 | End: 2024-01-28 | Stop reason: HOSPADM

## 2024-01-26 RX ORDER — PROCHLORPERAZINE EDISYLATE 5 MG/ML
10 INJECTION INTRAMUSCULAR; INTRAVENOUS EVERY 6 HOURS PRN
Status: DISCONTINUED | OUTPATIENT
Start: 2024-01-26 | End: 2024-01-28 | Stop reason: HOSPADM

## 2024-01-26 RX ADMIN — LEVETIRACETAM 1000 MG: 500 TABLET, FILM COATED ORAL at 16:36

## 2024-01-26 RX ADMIN — OXYCODONE 5 MG: 5 TABLET ORAL at 08:25

## 2024-01-26 RX ADMIN — LEVOTHYROXINE SODIUM 50 MCG: 0.05 TABLET ORAL at 04:26

## 2024-01-26 RX ADMIN — DOCUSATE SODIUM 50 MG AND SENNOSIDES 8.6 MG 2 TABLET: 8.6; 5 TABLET, FILM COATED ORAL at 04:26

## 2024-01-26 RX ADMIN — ENOXAPARIN SODIUM 40 MG: 100 INJECTION SUBCUTANEOUS at 16:38

## 2024-01-26 RX ADMIN — ACETAMINOPHEN 1000 MG: 500 TABLET ORAL at 08:25

## 2024-01-26 RX ADMIN — OXYCODONE 5 MG: 5 TABLET ORAL at 04:25

## 2024-01-26 RX ADMIN — HYDROMORPHONE HYDROCHLORIDE 1 MG: 1 INJECTION, SOLUTION INTRAMUSCULAR; INTRAVENOUS; SUBCUTANEOUS at 05:34

## 2024-01-26 RX ADMIN — OXYCODONE 10 MG: 5 TABLET ORAL at 18:30

## 2024-01-26 RX ADMIN — HYDROMORPHONE HYDROCHLORIDE 1 MG: 1 INJECTION, SOLUTION INTRAMUSCULAR; INTRAVENOUS; SUBCUTANEOUS at 21:14

## 2024-01-26 RX ADMIN — LEVETIRACETAM 500 MG: 500 TABLET, FILM COATED ORAL at 04:26

## 2024-01-26 RX ADMIN — DULOXETINE HYDROCHLORIDE 60 MG: 60 CAPSULE, DELAYED RELEASE ORAL at 04:26

## 2024-01-26 RX ADMIN — OXYCODONE 5 MG: 5 TABLET ORAL at 00:22

## 2024-01-26 RX ADMIN — ONDANSETRON 4 MG: 2 INJECTION INTRAMUSCULAR; INTRAVENOUS at 18:32

## 2024-01-26 RX ADMIN — BUSPIRONE HYDROCHLORIDE 10 MG: 10 TABLET ORAL at 04:26

## 2024-01-26 RX ADMIN — BUSPIRONE HYDROCHLORIDE 10 MG: 10 TABLET ORAL at 16:36

## 2024-01-26 RX ADMIN — HYDROMORPHONE HYDROCHLORIDE 1 MG: 1 INJECTION, SOLUTION INTRAMUSCULAR; INTRAVENOUS; SUBCUTANEOUS at 16:10

## 2024-01-26 RX ADMIN — ONDANSETRON 4 MG: 2 INJECTION INTRAMUSCULAR; INTRAVENOUS at 13:27

## 2024-01-26 RX ADMIN — OXYCODONE 10 MG: 5 TABLET ORAL at 13:19

## 2024-01-26 RX ADMIN — BUSPIRONE HYDROCHLORIDE 10 MG: 10 TABLET ORAL at 11:18

## 2024-01-26 RX ADMIN — HYDROMORPHONE HYDROCHLORIDE 1 MG: 1 INJECTION, SOLUTION INTRAMUSCULAR; INTRAVENOUS; SUBCUTANEOUS at 10:13

## 2024-01-26 ASSESSMENT — ENCOUNTER SYMPTOMS
SHORTNESS OF BREATH: 0
DIARRHEA: 0
NERVOUS/ANXIOUS: 1
MYALGIAS: 1
NAUSEA: 1
ABDOMINAL PAIN: 1
WEAKNESS: 0
FLANK PAIN: 1
VOMITING: 0

## 2024-01-26 ASSESSMENT — PAIN DESCRIPTION - PAIN TYPE
TYPE: ACUTE PAIN

## 2024-01-26 NOTE — PROGRESS NOTES
0650 Patient's in bed. Bedside report received from SEGUN Zhu RN at the beginning of the shift.    0825 Patient's sitting up in bed. Educated on the importance/use of IS at least 10x every hour while awake, able to reach 1750. Medicated with Oxycodone (see MAR) for c/o's abdominal, bilateral flank and pelvic pain, rates pain 8/10.Fall protocol in effect. Call light within reach. Reminded patient to call for assist. Assessment completed. No distress noted. Plan of care reviewed with the patient and spouse. Verbalized understanding.    0835 Dr Rodriguez visited. POC discussed with the patient.    0847 New order received and acknowledged from Dr Rodriguez - see MAR.    0915 Patient ambulates in the hallway accompanied by one female friend. No distress noted.    1013 Medicated with Dilaudid (see MAR) for c/o's abdominal, pelvic and bilateral flank pain, rates pain 8/10. Heat packs given.    1120 Patient's in bed. No distress noted.     1319 Medicated with Oxycodone (see MAR) for c/o's abdominal, pelvic and bilateral flank pain, rates pain 8/10.    1535 Patient's in bed. No distress noted.    1610 Medicated with Dilaudid (see MAR) for c/o's abdominal, pelvic and bilateral flank pain, rates pain 8/10.     1735 Patient's in bed. Spouse visiting. No distress noted.     1830 Medicated with Oxycodone (see MAR) for c/o's abdominal, pelvic and bilateral flank pain, rates pain 8/140. Heat packs given.    1850 Patient's sitting up in bed. No changes in status. Bedside report given to Juan Carlos CAST RN (Audra).

## 2024-01-26 NOTE — CARE PLAN
The patient is Stable - Low risk of patient condition declining or worsening    Shift Goals  Clinical Goals: pain control, mobility, safety  Patient Goals: pain control, comfort  Family Goals: pain control, comfort    Progress made toward(s) clinical / shift goals:    Problem: Pain - Standard  Goal: Alleviation of pain or a reduction in pain to the patient’s comfort goal  Outcome: Progressing   Educated on pain scale. Encouraged to verbalize pain. Will medicate as per MAR.    Problem: Knowledge Deficit - Standard  Goal: Patient and family/care givers will demonstrate understanding of plan of care, disease process/condition, diagnostic tests and medications  Outcome: Progressing   POC discussed with the patient. Questions answered. Verbalized understanding.    Patient is not progressing towards the following goals:

## 2024-01-26 NOTE — PROGRESS NOTES
Hospital Medicine Daily Progress Note    Date of Service  1/26/2024    Chief Complaint  Tamela Stern is a 48 y.o. female admitted 1/24/2024 with abd pain    Hospital Course  49 yo woman history of seizure, migraine, depression, fibromyalgia, gastric bypass, hypothyroidism who presented with abdominal and bilateral flank pain with dysuria.  She has a history of alcohol use but has been sober for 5 years.  CTAP showed no gallbladder but interval increase in CBD duct, central intrahepatic bile ducts and pancreatic duct dilation, confirmed by US RUQ.  Lipase was elevated to 1141, LFTs were normal.  UA was normal.  She was admitted for acute pancreatitis.    Interval Problem Update  1/25 -  Lipid panel is normal, calcium 8.9.  ERCP negative for biliary stone, did show possible ampulla narrowing.  LFTs are normal.  She continues to have diffuse abdominal and flank pain, eating a little bit but food does make it worse.    1/26 -LFTs are increased, T. bili 0.4 she is having abdominal pain with her clear liquid diet.  Denies any vomiting.  Does have nausea.  Has not had a BM, no diarrhea.    I have discussed this patient's plan of care and discharge plan at IDT rounds today with Case Management, Nursing, Nursing leadership, and other members of the IDT team.    Consultants/Specialty  GI    Code Status  Full Code    Disposition  The patient is not medically cleared for discharge to home or a post-acute facility.  Anticipate discharge to: home with close outpatient follow-up    I have placed the appropriate orders for post-discharge needs.    Review of Systems  Review of Systems   Constitutional:  Positive for malaise/fatigue.   Respiratory:  Negative for shortness of breath.    Cardiovascular:  Negative for chest pain.   Gastrointestinal:  Positive for abdominal pain and nausea. Negative for diarrhea and vomiting.   Genitourinary:  Positive for dysuria and flank pain.   Musculoskeletal:  Positive for  myalgias.   Neurological:  Negative for weakness.   Psychiatric/Behavioral:  The patient is nervous/anxious.         Physical Exam  Temp:  [36.1 °C (97 °F)-36.6 °C (97.9 °F)] 36.1 °C (97 °F)  Pulse:  [50-60] 58  Resp:  [16-17] 16  BP: (116-142)/(77-83) 116/77  SpO2:  [94 %-99 %] 98 %    Physical Exam  Vitals and nursing note reviewed.   Constitutional:       Appearance: She is ill-appearing.   HENT:      Head: Normocephalic.      Mouth/Throat:      Mouth: Mucous membranes are moist.   Eyes:      General:         Right eye: No discharge.         Left eye: No discharge.   Cardiovascular:      Rate and Rhythm: Normal rate and regular rhythm.   Pulmonary:      Effort: Pulmonary effort is normal. No respiratory distress.      Breath sounds: No wheezing or rales.   Abdominal:      Palpations: Abdomen is soft.      Tenderness: There is abdominal tenderness (Diffuse). There is right CVA tenderness, left CVA tenderness and guarding. There is no rebound.   Musculoskeletal:         General: No swelling.      Cervical back: Neck supple.   Skin:     General: Skin is warm and dry.   Neurological:      Mental Status: She is alert and oriented to person, place, and time.         Fluids    Intake/Output Summary (Last 24 hours) at 1/26/2024 1222  Last data filed at 1/26/2024 1000  Gross per 24 hour   Intake 720 ml   Output --   Net 720 ml       Laboratory  Recent Labs     01/24/24  1315 01/25/24  0526 01/26/24  0356   WBC 5.9 5.2 4.1*   RBC 5.23 4.56 4.89   HEMOGLOBIN 15.5 13.4 14.3   HEMATOCRIT 47.0 40.4 42.7   MCV 89.9 88.6 87.3   MCH 29.6 29.4 29.2   MCHC 33.0 33.2 33.5   RDW 41.8 41.1 40.1   PLATELETCT 176 157* 157*   MPV 11.6 11.6 11.6     Recent Labs     01/24/24  1315 01/25/24  0526 01/26/24  0356   SODIUM 133* 138 141   POTASSIUM 4.0 4.3 4.3   CHLORIDE 103 106 106   CO2 19* 25 25   GLUCOSE 152* 79 77   BUN 10 8 6*   CREATININE 0.59 0.51 0.45*   CALCIUM 8.9 8.7 8.7             Recent Labs     01/25/24  0526   TRIGLYCERIDE 63    HDL 62   LDL 96       Imaging  [id number]   Final Result      1.  Surgically absent gallbladder with mildly dilated common bile duct tapering distally without obstructive mass or choledocholithiasis. This is probably physiologic with a normal bilirubin level.   2.  Mildly dilated pancreatic duct measuring up to 6 mm with no obstructing mass or stone.   3.  No MRI evidence of acute pancreatitis.   4.  There may be some degree of ampullary stenosis with dilated common bile duct and pancreatic duct.         CT-ABDOMEN-PELVIS WITH   Final Result      1. Absent gallbladder, with interval increase in size of the common bile duct, central intrahepatic bile ducts and the pancreatic duct. Consider further evaluation with MRCP.   2. Postsurgical changes of gastric bypass.   3. The remainder of the examination is within normal limits.      US-RUQ   Final Result      1.  Dilatation of the common duct could be related to postcholecystectomy status however correlate with biliary labs. If there is concern for obstruction, MRCP is suggested.           Assessment/Plan  * Acute pancreatitis without infection or necrosis- (present on admission)  Assessment & Plan  Lipase greater than 1000  Denies any alcohol use.  S/p cholecystectomy.  Calcium and lipid panel are within normal limits.  She does take Keppra but it would be very rare if this is causing her pancreatitis, she has been taking this chronically.  MRCP was negative for biliary stone, showed possible ampulla narrowing.    I consulted GI, did not recommend any intervention at this time, supportive management for acute pancreatitis  Did discuss the case with Dr. Fernandez since the patient can follow-up with him outpatient for him to review the MRCP and reassess the ampulla narrowing  AST and ALT are rising, continue to trend daily  Continue clear liquid diet and IV fluids  Increase as needed pain medicine.  Scheduled Zofran    Tobacco use  Assessment & Plan  -Smoking  cessation counseling as appropriate    Hypothyroidism due to acquired atrophy of thyroid- (present on admission)  Assessment & Plan  -Continue home levothyroxine    Simple partial seizures evolving to complex partial seizures, then to generalized tonic-clonic seizures (HCC)- (present on admission)  Assessment & Plan  -Continue home Keppra  Followed by Dr. Hoffman    Migraine with aura and without status migrainosus, not intractable- (present on admission)  Assessment & Plan  -May restart home Imitrex as appropriate    Anxiety- (present on admission)  Assessment & Plan  Mood stable currently  -Continue home Cymbalta and BuSpar         VTE prophylaxis:    enoxaparin ppx      I have performed a physical exam and reviewed and updated ROS and Plan today (1/26/2024). In review of yesterday's note (1/25/2024), there are no changes except as documented above.

## 2024-01-26 NOTE — CARE PLAN
The patient is Stable - Low risk of patient condition declining or worsening    Shift Goals  Clinical Goals: Pain control, safety  Patient Goals: Pain control, comfort, rest  Family Goals: N/A    Progress made toward(s) clinical / shift goals:    Problem: Pain - Standard  Goal: Alleviation of pain or a reduction in pain to the patient’s comfort goal  Outcome: Progressing     Problem: Knowledge Deficit - Standard  Goal: Patient and family/care givers will demonstrate understanding of plan of care, disease process/condition, diagnostic tests and medications  Outcome: Progressing       Patient is not progressing towards the following goals:

## 2024-01-27 ENCOUNTER — APPOINTMENT (OUTPATIENT)
Dept: RADIOLOGY | Facility: MEDICAL CENTER | Age: 49
DRG: 439 | End: 2024-01-27
Attending: INTERNAL MEDICINE
Payer: COMMERCIAL

## 2024-01-27 LAB
ALBUMIN SERPL BCP-MCNC: 4.1 G/DL (ref 3.2–4.9)
ALBUMIN/GLOB SERPL: 2 G/DL
ALP SERPL-CCNC: 108 U/L (ref 30–99)
ALT SERPL-CCNC: 62 U/L (ref 2–50)
ANION GAP SERPL CALC-SCNC: 11 MMOL/L (ref 7–16)
AST SERPL-CCNC: 52 U/L (ref 12–45)
BASOPHILS # BLD AUTO: 1.4 % (ref 0–1.8)
BASOPHILS # BLD: 0.07 K/UL (ref 0–0.12)
BILIRUB SERPL-MCNC: 0.4 MG/DL (ref 0.1–1.5)
BUN SERPL-MCNC: 4 MG/DL (ref 8–22)
CALCIUM ALBUM COR SERPL-MCNC: 9.1 MG/DL (ref 8.5–10.5)
CALCIUM SERPL-MCNC: 9.2 MG/DL (ref 8.5–10.5)
CHLORIDE SERPL-SCNC: 106 MMOL/L (ref 96–112)
CO2 SERPL-SCNC: 23 MMOL/L (ref 20–33)
CREAT SERPL-MCNC: 0.46 MG/DL (ref 0.5–1.4)
EOSINOPHIL # BLD AUTO: 0.17 K/UL (ref 0–0.51)
EOSINOPHIL NFR BLD: 3.5 % (ref 0–6.9)
ERYTHROCYTE [DISTWIDTH] IN BLOOD BY AUTOMATED COUNT: 39.5 FL (ref 35.9–50)
GFR SERPLBLD CREATININE-BSD FMLA CKD-EPI: 119 ML/MIN/1.73 M 2
GLOBULIN SER CALC-MCNC: 2.1 G/DL (ref 1.9–3.5)
GLUCOSE SERPL-MCNC: 101 MG/DL (ref 65–99)
HCT VFR BLD AUTO: 47.5 % (ref 37–47)
HGB BLD-MCNC: 15.9 G/DL (ref 12–16)
IMM GRANULOCYTES # BLD AUTO: 0.01 K/UL (ref 0–0.11)
IMM GRANULOCYTES NFR BLD AUTO: 0.2 % (ref 0–0.9)
LYMPHOCYTES # BLD AUTO: 1.22 K/UL (ref 1–4.8)
LYMPHOCYTES NFR BLD: 25.2 % (ref 22–41)
MCH RBC QN AUTO: 29.4 PG (ref 27–33)
MCHC RBC AUTO-ENTMCNC: 33.5 G/DL (ref 32.2–35.5)
MCV RBC AUTO: 87.8 FL (ref 81.4–97.8)
MONOCYTES # BLD AUTO: 0.38 K/UL (ref 0–0.85)
MONOCYTES NFR BLD AUTO: 7.9 % (ref 0–13.4)
NEUTROPHILS # BLD AUTO: 2.99 K/UL (ref 1.82–7.42)
NEUTROPHILS NFR BLD: 61.8 % (ref 44–72)
NRBC # BLD AUTO: 0 K/UL
NRBC BLD-RTO: 0 /100 WBC (ref 0–0.2)
PLATELET # BLD AUTO: 170 K/UL (ref 164–446)
PMV BLD AUTO: 11.7 FL (ref 9–12.9)
POTASSIUM SERPL-SCNC: 4.5 MMOL/L (ref 3.6–5.5)
PROT SERPL-MCNC: 6.2 G/DL (ref 6–8.2)
RBC # BLD AUTO: 5.41 M/UL (ref 4.2–5.4)
SODIUM SERPL-SCNC: 140 MMOL/L (ref 135–145)
WBC # BLD AUTO: 4.8 K/UL (ref 4.8–10.8)

## 2024-01-27 PROCEDURE — 700102 HCHG RX REV CODE 250 W/ 637 OVERRIDE(OP): Performed by: STUDENT IN AN ORGANIZED HEALTH CARE EDUCATION/TRAINING PROGRAM

## 2024-01-27 PROCEDURE — 99232 SBSQ HOSP IP/OBS MODERATE 35: CPT | Performed by: INTERNAL MEDICINE

## 2024-01-27 PROCEDURE — A9270 NON-COVERED ITEM OR SERVICE: HCPCS | Performed by: STUDENT IN AN ORGANIZED HEALTH CARE EDUCATION/TRAINING PROGRAM

## 2024-01-27 PROCEDURE — 36415 COLL VENOUS BLD VENIPUNCTURE: CPT

## 2024-01-27 PROCEDURE — 85025 COMPLETE CBC W/AUTO DIFF WBC: CPT

## 2024-01-27 PROCEDURE — 80053 COMPREHEN METABOLIC PANEL: CPT

## 2024-01-27 PROCEDURE — 770001 HCHG ROOM/CARE - MED/SURG/GYN PRIV*

## 2024-01-27 PROCEDURE — 700111 HCHG RX REV CODE 636 W/ 250 OVERRIDE (IP): Mod: JZ | Performed by: STUDENT IN AN ORGANIZED HEALTH CARE EDUCATION/TRAINING PROGRAM

## 2024-01-27 PROCEDURE — 700111 HCHG RX REV CODE 636 W/ 250 OVERRIDE (IP): Mod: JZ | Performed by: INTERNAL MEDICINE

## 2024-01-27 PROCEDURE — 700105 HCHG RX REV CODE 258: Performed by: INTERNAL MEDICINE

## 2024-01-27 PROCEDURE — A9270 NON-COVERED ITEM OR SERVICE: HCPCS | Performed by: INTERNAL MEDICINE

## 2024-01-27 PROCEDURE — 700102 HCHG RX REV CODE 250 W/ 637 OVERRIDE(OP): Performed by: INTERNAL MEDICINE

## 2024-01-27 RX ORDER — SODIUM CHLORIDE 9 MG/ML
INJECTION, SOLUTION INTRAVENOUS CONTINUOUS
Status: DISCONTINUED | OUTPATIENT
Start: 2024-01-27 | End: 2024-01-28

## 2024-01-27 RX ADMIN — LEVETIRACETAM 1000 MG: 500 TABLET, FILM COATED ORAL at 17:09

## 2024-01-27 RX ADMIN — BUSPIRONE HYDROCHLORIDE 10 MG: 10 TABLET ORAL at 13:53

## 2024-01-27 RX ADMIN — SODIUM CHLORIDE: 9 INJECTION, SOLUTION INTRAVENOUS at 09:42

## 2024-01-27 RX ADMIN — OXYCODONE 10 MG: 5 TABLET ORAL at 23:04

## 2024-01-27 RX ADMIN — LEVETIRACETAM 500 MG: 500 TABLET, FILM COATED ORAL at 05:11

## 2024-01-27 RX ADMIN — HYDROMORPHONE HYDROCHLORIDE 1 MG: 1 INJECTION, SOLUTION INTRAMUSCULAR; INTRAVENOUS; SUBCUTANEOUS at 09:52

## 2024-01-27 RX ADMIN — BUSPIRONE HYDROCHLORIDE 10 MG: 10 TABLET ORAL at 05:11

## 2024-01-27 RX ADMIN — HYDROMORPHONE HYDROCHLORIDE 1 MG: 1 INJECTION, SOLUTION INTRAMUSCULAR; INTRAVENOUS; SUBCUTANEOUS at 18:12

## 2024-01-27 RX ADMIN — LEVOTHYROXINE SODIUM 50 MCG: 0.05 TABLET ORAL at 05:11

## 2024-01-27 RX ADMIN — OXYCODONE 10 MG: 5 TABLET ORAL at 05:05

## 2024-01-27 RX ADMIN — DULOXETINE HYDROCHLORIDE 60 MG: 60 CAPSULE, DELAYED RELEASE ORAL at 06:00

## 2024-01-27 RX ADMIN — OXYCODONE 10 MG: 5 TABLET ORAL at 08:36

## 2024-01-27 RX ADMIN — PROCHLORPERAZINE EDISYLATE 10 MG: 5 INJECTION INTRAMUSCULAR; INTRAVENOUS at 00:10

## 2024-01-27 RX ADMIN — SODIUM CHLORIDE: 9 INJECTION, SOLUTION INTRAVENOUS at 18:20

## 2024-01-27 RX ADMIN — PROCHLORPERAZINE EDISYLATE 10 MG: 5 INJECTION INTRAMUSCULAR; INTRAVENOUS at 05:06

## 2024-01-27 RX ADMIN — ENOXAPARIN SODIUM 40 MG: 100 INJECTION SUBCUTANEOUS at 17:09

## 2024-01-27 RX ADMIN — ONDANSETRON 4 MG: 2 INJECTION INTRAMUSCULAR; INTRAVENOUS at 13:55

## 2024-01-27 RX ADMIN — ONDANSETRON 4 MG: 2 INJECTION INTRAMUSCULAR; INTRAVENOUS at 17:09

## 2024-01-27 RX ADMIN — ONDANSETRON 4 MG: 2 INJECTION INTRAMUSCULAR; INTRAVENOUS at 09:50

## 2024-01-27 RX ADMIN — OXYCODONE 10 MG: 5 TABLET ORAL at 13:53

## 2024-01-27 RX ADMIN — BUSPIRONE HYDROCHLORIDE 10 MG: 10 TABLET ORAL at 17:09

## 2024-01-27 ASSESSMENT — PAIN DESCRIPTION - PAIN TYPE
TYPE: ACUTE PAIN

## 2024-01-27 ASSESSMENT — ENCOUNTER SYMPTOMS
FLANK PAIN: 1
DIARRHEA: 0
CONSTIPATION: 0
NAUSEA: 1
WEAKNESS: 0
ABDOMINAL PAIN: 1
VOMITING: 0
SHORTNESS OF BREATH: 0
MYALGIAS: 1

## 2024-01-27 NOTE — PROGRESS NOTES
0650 Patient's in bed. Bedside report received from SEGUN Zhu RN at the beginning of the shift.    0652 New order received and acknowledged from Dr Rodriguez - see MAR.     0836 Patient's sitting up in bed. Educated on the importance/use of IS at least 10x every hour while awake, able to reach 1750. Medicated with Oxycodone (see MAR) for c/o's abdominal, bilateral flank and pelvic pain, rates pain 8/10. Removed piv, noted infiltrated with tip intact. Fall protocol in effect. Call light within reach. Reminded patient to call for assist. Assessment completed. No distress noted. Plan of care reviewed with the patient and spouse. Verbalized understanding.    0918 Dr Rodriguez visited. POC discussed New order received and acknowledged from Dr Rodriguez - diet order.    0952 Medicated with dilaudid (see MAR) for c/o's  abdominal, bilateral flank and pelvic pain, rates pain 8/10.    1110 Patient ambulates in the hallway accompanied by spouse.    1353 Medicated with Oxycodone (see MAR) for c/o's abdominal, bilateral flank and pelvic pain, rates pain 8/10.     1540 Patient's in bed. No distress noted.    1813 Medicated with Dilaudid (see MAR) for c/o's abdominal, pelvic and bilateral flank pain, rates pain 9/10.     1850 Patient's in bed. NO changes in status. Bedside report given to allen CAST RN (Abbie).

## 2024-01-27 NOTE — CARE PLAN
The patient is Stable - Low risk of patient condition declining or worsening    Shift Goals  Clinical Goals: Pain control, safety, mobility  Patient Goals: Pain control, comfort  Family Goals: N/A    Progress made toward(s) clinical / shift goals:    Problem: Pain - Standard  Goal: Alleviation of pain or a reduction in pain to the patient’s comfort goal  Outcome: Progressing     Problem: Knowledge Deficit - Standard  Goal: Patient and family/care givers will demonstrate understanding of plan of care, disease process/condition, diagnostic tests and medications  Outcome: Progressing       Patient is not progressing towards the following goals:

## 2024-01-27 NOTE — CARE PLAN
The patient is Stable - Low risk of patient condition declining or worsening    Shift Goals  Clinical Goals: pain control, safety, monitor labs  Patient Goals: pain control, comfort  Family Goals: pain control, comfort    Progress made toward(s) clinical / shift goals:    Problem: Pain - Standard  Goal: Alleviation of pain or a reduction in pain to the patient’s comfort goal  Outcome: Progressing   Educated on pain scale. Encouraged to verbalize pain. Will medicate as per MAR.    Problem: Knowledge Deficit - Standard  Goal: Patient and family/care givers will demonstrate understanding of plan of care, disease process/condition, diagnostic tests and medications  Outcome: Progressing   POC discussed with the patient and spouse. Questions answered. Verbalized understanding.     Patient is not progressing towards the following goals:

## 2024-01-27 NOTE — PROGRESS NOTES
Hospital Medicine Daily Progress Note    Date of Service  1/27/2024    Chief Complaint  Tamela Stern is a 48 y.o. female admitted 1/24/2024 with abd pain    Hospital Course  47 yo woman history of seizure, migraine, depression, fibromyalgia, gastric bypass, hypothyroidism who presented with abdominal and bilateral flank pain with dysuria.  She has a history of alcohol use but has been sober for 5 years.  CTAP showed no gallbladder but interval increase in CBD duct, central intrahepatic bile ducts and pancreatic duct dilation, confirmed by US RUQ.  Lipase was elevated to 1141, LFTs were normal.  UA was normal.  She was admitted for acute pancreatitis.    Interval Problem Update  1/25 -  Lipid panel is normal, calcium 8.9.  ERCP negative for biliary stone, did show possible ampulla narrowing.  LFTs are normal.  She continues to have diffuse abdominal and flank pain, eating a little bit but food does make it worse.    1/26 -LFTs are increased, T. bili 0.4 she is having abdominal pain with her clear liquid diet.  Denies any vomiting.  Does have nausea.  Has not had a BM, no diarrhea.    1/27 -AST and ALT decreased from yesterday.  She still has a lot of pain but not any worse from her clear liquid diet, wants to try more food.  Did have a normal BM.  Denies any dysuria.    I have discussed this patient's plan of care and discharge plan at IDT rounds today with Case Management, Nursing, Nursing leadership, and other members of the IDT team.    Consultants/Specialty  GI    Code Status  Full Code    Disposition  The patient is not medically cleared for discharge to home or a post-acute facility.  Anticipate discharge to: home with close outpatient follow-up    I have placed the appropriate orders for post-discharge needs.    Review of Systems  Review of Systems   Constitutional:  Positive for malaise/fatigue.   Respiratory:  Negative for shortness of breath.    Cardiovascular:  Negative for chest pain.    Gastrointestinal:  Positive for abdominal pain and nausea. Negative for constipation, diarrhea and vomiting.   Genitourinary:  Positive for flank pain. Negative for dysuria.   Musculoskeletal:  Positive for myalgias.   Neurological:  Negative for weakness.        Physical Exam  Temp:  [36.4 °C (97.5 °F)-36.7 °C (98.1 °F)] 36.6 °C (97.9 °F)  Pulse:  [51-64] 64  Resp:  [15-18] 16  BP: (107-127)/(67-83) 107/67  SpO2:  [92 %-99 %] 93 %    Physical Exam  Vitals and nursing note reviewed.   Constitutional:       Appearance: She is ill-appearing.   HENT:      Head: Normocephalic.      Mouth/Throat:      Mouth: Mucous membranes are moist.   Eyes:      General:         Right eye: No discharge.         Left eye: No discharge.   Cardiovascular:      Rate and Rhythm: Normal rate and regular rhythm.   Pulmonary:      Effort: Pulmonary effort is normal. No respiratory distress.      Breath sounds: No wheezing or rales.   Abdominal:      Palpations: Abdomen is soft.      Tenderness: There is abdominal tenderness (Diffuse). There is right CVA tenderness and left CVA tenderness. There is no guarding or rebound.   Musculoskeletal:         General: No swelling.      Cervical back: Neck supple.   Skin:     General: Skin is warm and dry.   Neurological:      Mental Status: She is alert and oriented to person, place, and time.         Fluids    Intake/Output Summary (Last 24 hours) at 1/27/2024 1309  Last data filed at 1/26/2024 1445  Gross per 24 hour   Intake 360 ml   Output --   Net 360 ml       Laboratory  Recent Labs     01/25/24  0526 01/26/24  0356 01/27/24  0444   WBC 5.2 4.1* 4.8   RBC 4.56 4.89 5.41*   HEMOGLOBIN 13.4 14.3 15.9   HEMATOCRIT 40.4 42.7 47.5*   MCV 88.6 87.3 87.8   MCH 29.4 29.2 29.4   MCHC 33.2 33.5 33.5   RDW 41.1 40.1 39.5   PLATELETCT 157* 157* 170   MPV 11.6 11.6 11.7     Recent Labs     01/25/24  0526 01/26/24  0356 01/27/24  0444   SODIUM 138 141 140   POTASSIUM 4.3 4.3 4.5   CHLORIDE 106 106 106   CO2 25  25 23   GLUCOSE 79 77 101*   BUN 8 6* 4*   CREATININE 0.51 0.45* 0.46*   CALCIUM 8.7 8.7 9.2             Recent Labs     01/25/24  0526   TRIGLYCERIDE 63   HDL 62   LDL 96       Imaging  IR-US GUIDED PIV   Final Result    Ultrasound-guided PERIPHERAL IV INSERTION performed by    qualified nursing staff as above.      [id number]   Final Result      1.  Surgically absent gallbladder with mildly dilated common bile duct tapering distally without obstructive mass or choledocholithiasis. This is probably physiologic with a normal bilirubin level.   2.  Mildly dilated pancreatic duct measuring up to 6 mm with no obstructing mass or stone.   3.  No MRI evidence of acute pancreatitis.   4.  There may be some degree of ampullary stenosis with dilated common bile duct and pancreatic duct.         CT-ABDOMEN-PELVIS WITH   Final Result      1. Absent gallbladder, with interval increase in size of the common bile duct, central intrahepatic bile ducts and the pancreatic duct. Consider further evaluation with MRCP.   2. Postsurgical changes of gastric bypass.   3. The remainder of the examination is within normal limits.      US-RUQ   Final Result      1.  Dilatation of the common duct could be related to postcholecystectomy status however correlate with biliary labs. If there is concern for obstruction, MRCP is suggested.           Assessment/Plan  * Acute pancreatitis without infection or necrosis- (present on admission)  Assessment & Plan  Lipase greater than 1000  Denies any alcohol use.  S/p cholecystectomy.  Calcium and lipid panel are within normal limits.  She does take Keppra but it would be very rare if this is causing her pancreatitis, she has been taking this chronically.  MRCP was negative for biliary stone, showed possible ampulla narrowing.    I consulted GI, did not recommend any intervention at this time, supportive management for acute pancreatitis  Did discuss the case with Dr. Fernandez since the patient can  follow-up with him outpatient for him to review the MRCP and reassess the ampulla narrowing  Elevated AST and ALT, decreasing.  Continue to trend daily  Advancing to full liquid diet and continue IV fluids  as needed pain medicine.  Scheduled Zofran    Tobacco use  Assessment & Plan  -Smoking cessation counseling as appropriate    Hypothyroidism due to acquired atrophy of thyroid- (present on admission)  Assessment & Plan  -Continue home levothyroxine    Simple partial seizures evolving to complex partial seizures, then to generalized tonic-clonic seizures (HCC)- (present on admission)  Assessment & Plan  -Continue home Keppra  Followed by Dr. Hoffman    Migraine with aura and without status migrainosus, not intractable- (present on admission)  Assessment & Plan  -May restart home Imitrex as appropriate    Anxiety- (present on admission)  Assessment & Plan  Mood stable currently  -Continue home Cymbalta and BuSpar         VTE prophylaxis:    enoxaparin ppx      I have performed a physical exam and reviewed and updated ROS and Plan today (1/27/2024). In review of yesterday's note (1/26/2024), there are no changes except as documented above.

## 2024-01-28 ENCOUNTER — PHARMACY VISIT (OUTPATIENT)
Dept: PHARMACY | Facility: MEDICAL CENTER | Age: 49
End: 2024-01-28
Payer: COMMERCIAL

## 2024-01-28 VITALS
HEIGHT: 59 IN | SYSTOLIC BLOOD PRESSURE: 119 MMHG | DIASTOLIC BLOOD PRESSURE: 74 MMHG | WEIGHT: 130.51 LBS | TEMPERATURE: 98.2 F | BODY MASS INDEX: 26.31 KG/M2 | OXYGEN SATURATION: 95 % | HEART RATE: 61 BPM | RESPIRATION RATE: 16 BRPM

## 2024-01-28 LAB
ALBUMIN SERPL BCP-MCNC: 3.8 G/DL (ref 3.2–4.9)
ALBUMIN/GLOB SERPL: 1.9 G/DL
ALP SERPL-CCNC: 92 U/L (ref 30–99)
ALT SERPL-CCNC: 44 U/L (ref 2–50)
ANION GAP SERPL CALC-SCNC: 8 MMOL/L (ref 7–16)
AST SERPL-CCNC: 31 U/L (ref 12–45)
BASOPHILS # BLD AUTO: 1.3 % (ref 0–1.8)
BASOPHILS # BLD: 0.06 K/UL (ref 0–0.12)
BILIRUB SERPL-MCNC: 0.4 MG/DL (ref 0.1–1.5)
BUN SERPL-MCNC: 6 MG/DL (ref 8–22)
CALCIUM ALBUM COR SERPL-MCNC: 9 MG/DL (ref 8.5–10.5)
CALCIUM SERPL-MCNC: 8.8 MG/DL (ref 8.5–10.5)
CHLORIDE SERPL-SCNC: 107 MMOL/L (ref 96–112)
CO2 SERPL-SCNC: 24 MMOL/L (ref 20–33)
CREAT SERPL-MCNC: 0.52 MG/DL (ref 0.5–1.4)
EOSINOPHIL # BLD AUTO: 0.24 K/UL (ref 0–0.51)
EOSINOPHIL NFR BLD: 5.4 % (ref 0–6.9)
ERYTHROCYTE [DISTWIDTH] IN BLOOD BY AUTOMATED COUNT: 39.6 FL (ref 35.9–50)
GFR SERPLBLD CREATININE-BSD FMLA CKD-EPI: 115 ML/MIN/1.73 M 2
GLOBULIN SER CALC-MCNC: 2 G/DL (ref 1.9–3.5)
GLUCOSE SERPL-MCNC: 88 MG/DL (ref 65–99)
HCT VFR BLD AUTO: 43.6 % (ref 37–47)
HGB BLD-MCNC: 14.5 G/DL (ref 12–16)
IMM GRANULOCYTES # BLD AUTO: 0.01 K/UL (ref 0–0.11)
IMM GRANULOCYTES NFR BLD AUTO: 0.2 % (ref 0–0.9)
LIPASE SERPL-CCNC: 26 U/L (ref 11–82)
LYMPHOCYTES # BLD AUTO: 1.67 K/UL (ref 1–4.8)
LYMPHOCYTES NFR BLD: 37.4 % (ref 22–41)
MCH RBC QN AUTO: 29.2 PG (ref 27–33)
MCHC RBC AUTO-ENTMCNC: 33.3 G/DL (ref 32.2–35.5)
MCV RBC AUTO: 87.7 FL (ref 81.4–97.8)
MONOCYTES # BLD AUTO: 0.44 K/UL (ref 0–0.85)
MONOCYTES NFR BLD AUTO: 9.8 % (ref 0–13.4)
NEUTROPHILS # BLD AUTO: 2.05 K/UL (ref 1.82–7.42)
NEUTROPHILS NFR BLD: 45.9 % (ref 44–72)
NRBC # BLD AUTO: 0 K/UL
NRBC BLD-RTO: 0 /100 WBC (ref 0–0.2)
PLATELET # BLD AUTO: 142 K/UL (ref 164–446)
PMV BLD AUTO: 11.7 FL (ref 9–12.9)
POTASSIUM SERPL-SCNC: 4.5 MMOL/L (ref 3.6–5.5)
PROT SERPL-MCNC: 5.8 G/DL (ref 6–8.2)
RBC # BLD AUTO: 4.97 M/UL (ref 4.2–5.4)
SODIUM SERPL-SCNC: 139 MMOL/L (ref 135–145)
WBC # BLD AUTO: 4.5 K/UL (ref 4.8–10.8)

## 2024-01-28 PROCEDURE — 700111 HCHG RX REV CODE 636 W/ 250 OVERRIDE (IP): Mod: JZ | Performed by: INTERNAL MEDICINE

## 2024-01-28 PROCEDURE — 700105 HCHG RX REV CODE 258: Performed by: INTERNAL MEDICINE

## 2024-01-28 PROCEDURE — 80053 COMPREHEN METABOLIC PANEL: CPT

## 2024-01-28 PROCEDURE — 700111 HCHG RX REV CODE 636 W/ 250 OVERRIDE (IP): Performed by: STUDENT IN AN ORGANIZED HEALTH CARE EDUCATION/TRAINING PROGRAM

## 2024-01-28 PROCEDURE — A9270 NON-COVERED ITEM OR SERVICE: HCPCS | Performed by: INTERNAL MEDICINE

## 2024-01-28 PROCEDURE — 36415 COLL VENOUS BLD VENIPUNCTURE: CPT

## 2024-01-28 PROCEDURE — A9270 NON-COVERED ITEM OR SERVICE: HCPCS | Performed by: STUDENT IN AN ORGANIZED HEALTH CARE EDUCATION/TRAINING PROGRAM

## 2024-01-28 PROCEDURE — RXMED WILLOW AMBULATORY MEDICATION CHARGE: Performed by: INTERNAL MEDICINE

## 2024-01-28 PROCEDURE — 85025 COMPLETE CBC W/AUTO DIFF WBC: CPT

## 2024-01-28 PROCEDURE — 700102 HCHG RX REV CODE 250 W/ 637 OVERRIDE(OP): Performed by: INTERNAL MEDICINE

## 2024-01-28 PROCEDURE — 83690 ASSAY OF LIPASE: CPT

## 2024-01-28 PROCEDURE — 700102 HCHG RX REV CODE 250 W/ 637 OVERRIDE(OP): Performed by: STUDENT IN AN ORGANIZED HEALTH CARE EDUCATION/TRAINING PROGRAM

## 2024-01-28 PROCEDURE — 99239 HOSP IP/OBS DSCHRG MGMT >30: CPT | Performed by: INTERNAL MEDICINE

## 2024-01-28 RX ORDER — OXYCODONE HYDROCHLORIDE 5 MG/1
5-10 TABLET ORAL EVERY 4 HOURS PRN
Qty: 30 TABLET | Refills: 0 | Status: SHIPPED | OUTPATIENT
Start: 2024-01-28 | End: 2024-02-04

## 2024-01-28 RX ORDER — ONDANSETRON 4 MG/1
4 TABLET, ORALLY DISINTEGRATING ORAL EVERY 6 HOURS PRN
Qty: 10 TABLET | Refills: 1 | Status: SHIPPED | OUTPATIENT
Start: 2024-01-28

## 2024-01-28 RX ORDER — AMOXICILLIN 250 MG
2 CAPSULE ORAL 2 TIMES DAILY
Qty: 56 TABLET | Refills: 0 | Status: SHIPPED | OUTPATIENT
Start: 2024-01-28 | End: 2024-02-11

## 2024-01-28 RX ADMIN — ONDANSETRON 4 MG: 2 INJECTION INTRAMUSCULAR; INTRAVENOUS at 09:09

## 2024-01-28 RX ADMIN — DULOXETINE HYDROCHLORIDE 60 MG: 60 CAPSULE, DELAYED RELEASE ORAL at 04:32

## 2024-01-28 RX ADMIN — ONDANSETRON 4 MG: 2 INJECTION INTRAMUSCULAR; INTRAVENOUS at 12:26

## 2024-01-28 RX ADMIN — LEVOTHYROXINE SODIUM 50 MCG: 0.05 TABLET ORAL at 04:37

## 2024-01-28 RX ADMIN — OXYCODONE 10 MG: 5 TABLET ORAL at 04:31

## 2024-01-28 RX ADMIN — BUSPIRONE HYDROCHLORIDE 10 MG: 10 TABLET ORAL at 12:26

## 2024-01-28 RX ADMIN — BUSPIRONE HYDROCHLORIDE 10 MG: 10 TABLET ORAL at 04:31

## 2024-01-28 RX ADMIN — SODIUM CHLORIDE: 9 INJECTION, SOLUTION INTRAVENOUS at 04:39

## 2024-01-28 RX ADMIN — OXYCODONE 10 MG: 5 TABLET ORAL at 09:09

## 2024-01-28 RX ADMIN — OXYCODONE 10 MG: 5 TABLET ORAL at 12:32

## 2024-01-28 RX ADMIN — LEVETIRACETAM 500 MG: 500 TABLET, FILM COATED ORAL at 04:32

## 2024-01-28 RX ADMIN — HYDROMORPHONE HYDROCHLORIDE 1 MG: 1 INJECTION, SOLUTION INTRAMUSCULAR; INTRAVENOUS; SUBCUTANEOUS at 06:12

## 2024-01-28 ASSESSMENT — PAIN DESCRIPTION - PAIN TYPE
TYPE: ACUTE PAIN

## 2024-01-28 NOTE — DISCHARGE SUMMARY
Discharge Summary    CHIEF COMPLAINT ON ADMISSION  Chief Complaint   Patient presents with    Abdominal Pain     4 days    Flank Pain     Radiates to abdomen and groin     Painful Urination    Nausea       Reason for Admission  Abd Pain/Back Pain     Admission Date  1/24/2024    CODE STATUS  Full Code    HPI & HOSPITAL COURSE  47 yo woman history of seizure, migraine, depression, fibromyalgia, gastric bypass, hypothyroidism who presented with abdominal and bilateral flank pain with dysuria. She has a history of alcohol use but has been sober for 5 years. CTAP showed no gallbladder but interval increase in CBD duct, central intrahepatic bile ducts and pancreatic duct dilation, confirmed by US RUQ. Lipase was elevated to 1141, UA was normal. She was admitted for acute pancreatitis.  She had no related alcohol use and lipid panel and calcium levels were within normal limits.  MRCP showed mildly dilated pancreatic duct and some ampullary stenosis of common bile duct and pancreatic duct, no obstruction was seen.  Case was discussed with gastroenterology who recommended medical management of acute pancreatitis and did not recommend ERCP or EGD because of this along with her anatomy from the prior gastric bypass.  While hospitalized her AST and ALT briefly increased before normalizing again.  Her pain slowly improved and she tolerated advancement to GI soft diet.  I did discuss the case with Dr. Fernandez as requested by the patient concerning the possible ampullary narrowing, he is kind enough to follow-up with the patient in clinic.  Patient is aware to follow-up with Dr. Fernandez on discharge.    Therefore, she is discharged in good and stable condition to home with close outpatient follow-up.    The patient met 2-midnight criteria for an inpatient stay at the time of discharge.    Discharge Date  1/28/24    FOLLOW UP ITEMS POST DISCHARGE  Acute pancreatitis with possible narrowing of ampulla of common bile duct on MRCP,  follow-up with Dr. Fernandez    DISCHARGE DIAGNOSES  Principal Problem:    Acute pancreatitis without infection or necrosis (POA: Yes)  Active Problems:    Anxiety (POA: Yes)      Overview: This is a chronic, uncontrolled condition.  She currently takes BuSpar 10       mg daily and Cymbalta 60 mg daily.  She states her anxiety is getting       worse due to her health issues.  She is interested in talk therapy.    Migraine with aura and without status migrainosus, not intractable (POA: Yes)      Overview: Chronic, controlled, takes sumatriptan as needed    Simple partial seizures evolving to complex partial seizures, then to generalized tonic-clonic seizures (HCC) (POA: Yes)    Hypothyroidism due to acquired atrophy of thyroid (POA: Yes)      Overview: This is a chronic, controlled condition.  She takes levothyroxine 50 mcg       daily.    Tobacco use (POA: Unknown)  Resolved Problems:    * No resolved hospital problems. *      FOLLOW UP  Jonnie Fernandez M.D.  [address]  [facility] [id number] [phone]    Schedule an appointment as soon as possible for a visit in 1 week(s)        MEDICATIONS ON DISCHARGE     Medication List        START taking these medications        Instructions   ondansetron 4 MG Tbdp  Commonly known as: Zofran ODT   Dissolve 1 Tablet by mouth every 6 hours as needed for Nausea/Vomiting.  Dose: 4 mg     oxyCODONE immediate-release 5 MG Tabs  Commonly known as: Roxicodone   Take 1-2 Tablets by mouth every four hours as needed (moderate to severe pain) for up to 7 days.  Dose: 5-10 mg     senna-docusate 8.6-50 MG Tabs  Commonly known as: Pericolace Or Senokot S   Take 2 Tablets by mouth 2 times a day for 14 days.  Dose: 2 Tablet            CONTINUE taking these medications        Instructions   busPIRone 10 MG Tabs tablet  Commonly known as: Buspar   Take 1 Tablet by mouth 3 times a day.  Dose: 10 mg     cyanocobalamin 1000 MCG/ML Soln  Commonly known as: Vitamin B-12   INJECT 1 ML  INTRAMUSCULARLY ONCE EVERY 30 DAYS STRENGTH: 1,000 MCG/ML     DULoxetine 60 MG Cpep delayed-release capsule  Commonly known as: Cymbalta   Take 1 Capsule by mouth every day.  Dose: 60 mg     fluticasone 50 MCG/ACT nasal spray  Commonly known as: Flonase   Spray 1 Spray in nose every day.  Dose: 1 Spray     levETIRAcetam 100 MG/ML Soln  Commonly known as: Keppra   5 ml in AM and 10 ml in pm     levothyroxine 50 MCG Tabs  Commonly known as: Synthroid   TAKE 1 TABLET BY MOUTH EVERY DAY IN THE MORNING ON AN EMPTY STOMACH     promethazine 25 MG Supp  Commonly known as: Phenergan   Insert 1 Suppository into the rectum every 6 hours as needed for Nausea/Vomiting.  Dose: 25 mg     sumatriptan 100 MG tablet  Commonly known as: Imitrex   1 tab at headache onset; repeat in 1 hour prn            STOP taking these medications      doxycycline 100 MG Caps  Commonly known as: Vibramycin     metoclopramide 10 MG Tabs  Commonly known as: Reglan     ondansetron 4 MG Tabs tablet  Commonly known as: Zofran     Premarin 0.625 MG Tabs  Generic drug: estrogens, conjugated              Allergies  Allergies   Allergen Reactions    Phentermine Hcl      Swelling short of breath    Morphine Vomiting    2-Phenylbenzimidazole-5-Sulfonic Acid [Phenyl Benzimidazole Sulfonic Acid]      Other reaction(s): Reaction:throat swelling       DIET  Orders Placed This Encounter   Procedures    Diet Order Diet: Low Fiber(GI Soft)     Standing Status:   Standing     Number of Occurrences:   1     Order Specific Question:   Diet:     Answer:   Low Fiber(GI Soft) [2]       ACTIVITY  As tolerated.    CONSULTATIONS  GI    PROCEDURES  IR-US GUIDED PIV   Final Result    Ultrasound-guided PERIPHERAL IV INSERTION performed by    qualified nursing staff as above.      [id number]   Final Result      1.  Surgically absent gallbladder with mildly dilated common bile duct tapering distally without obstructive mass or choledocholithiasis. This is probably physiologic  with a normal bilirubin level.   2.  Mildly dilated pancreatic duct measuring up to 6 mm with no obstructing mass or stone.   3.  No MRI evidence of acute pancreatitis.   4.  There may be some degree of ampullary stenosis with dilated common bile duct and pancreatic duct.         CT-ABDOMEN-PELVIS WITH   Final Result      1. Absent gallbladder, with interval increase in size of the common bile duct, central intrahepatic bile ducts and the pancreatic duct. Consider further evaluation with MRCP.   2. Postsurgical changes of gastric bypass.   3. The remainder of the examination is within normal limits.      US-RUQ   Final Result      1.  Dilatation of the common duct could be related to postcholecystectomy status however correlate with biliary labs. If there is concern for obstruction, MRCP is suggested.            LABORATORY  Lab Results   Component Value Date    SODIUM 139 01/28/2024    POTASSIUM 4.5 01/28/2024    CHLORIDE 107 01/28/2024    CO2 24 01/28/2024    GLUCOSE 88 01/28/2024    BUN 6 (L) 01/28/2024    CREATININE 0.52 01/28/2024    CREATININE 0.8 03/06/2009        Lab Results   Component Value Date    WBC 4.5 (L) 01/28/2024    HEMOGLOBIN 14.5 01/28/2024    HEMATOCRIT 43.6 01/28/2024    PLATELETCT 142 (L) 01/28/2024        Total time of the discharge process exceeds 35 minutes.

## 2024-01-28 NOTE — CARE PLAN
The patient is Stable - Low risk of patient condition declining or worsening    Shift Goals  Clinical Goals: pain control, safety  Patient Goals: pain control, comfort  Family Goals: no family present    Progress made toward(s) clinical / shift goals:    Problem: Pain - Standard  Goal: Alleviation of pain or a reduction in pain to the patient’s comfort goal  Outcome: Progressing   Educated on pain scale. Encouraged to verbalize pain. Will medicate as per MAR.    Problem: Knowledge Deficit - Standard  Goal: Patient and family/care givers will demonstrate understanding of plan of care, disease process/condition, diagnostic tests and medications  Outcome: Progressing   POC discussed with the patient. Discharge instructions given. Questions answered. Verbalized understanding.    Problem: Fall Risk  Goal: Patient will remain free from falls  Outcome: Progressing   Fall protocol in effect. Non skid socks in use. Call light within reach. Reminded patient to call for assist. Kept room clutter free. Hourly rounds in effect. Verbalized understanding.    Patient is not progressing towards the following goals:

## 2024-01-28 NOTE — PROGRESS NOTES
0655 Patient's in bed. Bedside report received from SEGUN Leiva RN at the beginning of the shift.    0740 Patient's sitting up in bed. Educated on the importance/use of IS at least 10x every hour while awake, able to reach 1750. Medicated with Oxycodone (see MAR) for c/o's abdominal, bilateral flank and pelvic pain, rates pain 8/10. Removed piv, noted infiltrated with tip intact. Fall protocol in effect. Call light within reach. Reminded patient to call for assist. Assessment completed. No distress noted. Plan of care reviewed with the patient and spouse. Verbalized understanding.    0852 New order received and acknowledged from Dr Rodriguez - gael order and for the patient to be discharged -  see comments.    0909 Medicated with Oxycodone (see MAR) for c/o's abdominal, pelvic, bilateral flank pain, rates pain 7/10.    1035 Patient ambulates in the hallway accompanied by spouse. No distress noted.    1231 New order received and acknowledged from Dr Rodriguez - see nursing communication.    1232 Medicated with Oxycodone (see MAR) for c/o's abdominal, pelvic, bilateral flank pain, rates pain 7/10. Heat packs given.    1336 Discharge instructions given to the patient. Questions answered. Verbalized understanding. Patient was discharged with patient's belongings belongings, meds to bed prescriptions via w/c accompanied by one female act and spouse via Private car.

## 2024-01-28 NOTE — CARE PLAN
The patient is Stable - Low risk of patient condition declining or worsening    Shift Goals  Clinical Goals: Pain control, safety  Patient Goals: Pain control, rest, comfort  Family Goals: Update with POC    Progress made toward(s) clinical / shift goals:      Pt. Rated pain as 8 from 1-10, 10 being the most painful. Pain medications given as ordered.   Pt. Verbalized understanding on the care provided.

## 2024-01-28 NOTE — DISCHARGE INSTRUCTIONS
Diet    Low Fiber Diet    A Low Fiber Diet consists of eating  less than 10 grams of fiber a day. Always check food labels to see the dietary fiber content of packaged foods. In general, a low-fiber food will have fewer than 2 grams of fiber per serving.  Try to avoid whole grains, raw fruits and vegetables, dried fruit, tough cuts of meat, nuts, and seeds.          Discharge Instructions per Ty Rodriguez M.D.    DIAGNOSIS: You were treated for acute pancreatitis. The cause of it is unclear. Your MRI did show possible narrowing of your biliary duct which was discussed with gastroenterology and Dr. Fernandez. Please follow up with Dr. Fernandez in the next week for a checkup.    Return to ER if worsening abdominal pain, fever, vomiting, diarrhea

## 2024-02-06 ENCOUNTER — OFFICE VISIT (OUTPATIENT)
Dept: MEDICAL GROUP | Facility: MEDICAL CENTER | Age: 49
End: 2024-02-06
Payer: COMMERCIAL

## 2024-02-06 VITALS
HEIGHT: 59 IN | SYSTOLIC BLOOD PRESSURE: 110 MMHG | WEIGHT: 122 LBS | BODY MASS INDEX: 24.6 KG/M2 | HEART RATE: 63 BPM | OXYGEN SATURATION: 98 % | TEMPERATURE: 97.3 F | DIASTOLIC BLOOD PRESSURE: 62 MMHG

## 2024-02-06 DIAGNOSIS — Z12.11 COLON CANCER SCREENING: ICD-10-CM

## 2024-02-06 DIAGNOSIS — R10.84 GENERALIZED ABDOMINAL PAIN: ICD-10-CM

## 2024-02-06 DIAGNOSIS — K86.89 DILATED PANCREATIC DUCT: ICD-10-CM

## 2024-02-06 DIAGNOSIS — Z23 IMMUNIZATION DUE: ICD-10-CM

## 2024-02-06 PROBLEM — K85.90 ACUTE PANCREATITIS WITHOUT INFECTION OR NECROSIS: Status: RESOLVED | Noted: 2024-01-24 | Resolved: 2024-02-06

## 2024-02-06 PROCEDURE — 90471 IMMUNIZATION ADMIN: CPT | Performed by: STUDENT IN AN ORGANIZED HEALTH CARE EDUCATION/TRAINING PROGRAM

## 2024-02-06 PROCEDURE — 99214 OFFICE O/P EST MOD 30 MIN: CPT | Mod: 25 | Performed by: STUDENT IN AN ORGANIZED HEALTH CARE EDUCATION/TRAINING PROGRAM

## 2024-02-06 PROCEDURE — 90686 IIV4 VACC NO PRSV 0.5 ML IM: CPT | Performed by: STUDENT IN AN ORGANIZED HEALTH CARE EDUCATION/TRAINING PROGRAM

## 2024-02-06 PROCEDURE — 3074F SYST BP LT 130 MM HG: CPT | Performed by: STUDENT IN AN ORGANIZED HEALTH CARE EDUCATION/TRAINING PROGRAM

## 2024-02-06 PROCEDURE — 3078F DIAST BP <80 MM HG: CPT | Performed by: STUDENT IN AN ORGANIZED HEALTH CARE EDUCATION/TRAINING PROGRAM

## 2024-02-06 RX ORDER — GABAPENTIN 100 MG/1
100 CAPSULE ORAL 3 TIMES DAILY
Qty: 90 CAPSULE | Refills: 0 | Status: SHIPPED | OUTPATIENT
Start: 2024-02-06 | End: 2024-03-01

## 2024-02-06 ASSESSMENT — FIBROSIS 4 INDEX: FIB4 SCORE: 1.55

## 2024-02-06 NOTE — LETTER
[facility]  [address]  [facility] [id number]     February 6, 2024    Patient: Tamela Stern   YOB: 1975   Date of Visit: 2/6/2024       To Whom It May Concern:    Tamela Stern was seen and treated in our department on 2/6/2024. She has multiple chronic medication conditions including anxiety, depression, fibromyalgia, chronic abdominal pain, history of gastric bypass, malnutrition, and seizure disorder. Due to these medical conditions she is unable to work. She follows with various specialists and is taking medication to address the above.    Sincerely,     Maria De Jesus Sadler M.D.

## 2024-02-06 NOTE — PROGRESS NOTES
"Subjective:     CC: Dilated pancreatic duct, generalized abdominal pain    HPI:   Tamela presents today with    Problem   Dilated Pancreatic Duct    This was seen on recent imaging.  She does not have a gallbladder so not secondary to gallstones     Generalized Abdominal Pain    This is a chronic condition, worsening.  She has generalized abdominal pain and was seen in the emergency department.  She uses marijuana for pain control as well as Tylenol.  She cannot use NSAIDs since she has a history of gastric bypass.     Acute Pancreatitis Without Infection Or Necrosis (Resolved)     ROS:  ROS    Objective:     Exam:  /62   Pulse 63   Temp 36.3 °C (97.3 °F)   Ht 1.499 m (4' 11\")   Wt 55.3 kg (122 lb)   LMP 05/15/2017 Comment: irregular for the last 3 months  SpO2 98%   BMI 24.64 kg/m²  Body mass index is 24.64 kg/m².    Physical Exam  Vitals reviewed.   Constitutional:       General: She is not in acute distress.     Appearance: She is not toxic-appearing.   HENT:      Head: Normocephalic and atraumatic.      Right Ear: External ear normal.      Left Ear: External ear normal.   Eyes:      General:         Right eye: No discharge.         Left eye: No discharge.      Extraocular Movements: Extraocular movements intact.      Conjunctiva/sclera: Conjunctivae normal.   Pulmonary:      Effort: Pulmonary effort is normal. No respiratory distress.   Skin:     General: Skin is warm and dry.   Neurological:      Mental Status: She is alert.   Psychiatric:         Mood and Affect: Mood normal.         Behavior: Behavior normal.         Thought Content: Thought content normal.         Judgment: Judgment normal.           Assessment & Plan:     48 y.o. female with the following -     1. Dilated pancreatic duct  We discussed the importance of following up with GI due to a dilated pancreatic duct.  - Referral to Gastroenterology    2. Generalized abdominal pain  We discussed following with GI and adding gabapentin at a " low dose to see if this helps with her pain.  She cannot take NSAIDs.  She can take Tylenol as needed  - gabapentin (NEURONTIN) 100 MG Cap; Take 1 Capsule by mouth 3 times a day.  Dispense: 90 Capsule; Refill: 0  - Referral to Gastroenterology    3. Colon cancer screening  - Referral to GI for Colonoscopy    4. Immunization due  - Influenza Vaccine Quad Injection (PF)      No follow-ups on file.    Please note that this dictation was created using voice recognition software. I have made every reasonable attempt to correct obvious errors, but I expect that there are errors of grammar and possibly content that I did not discover before finalizing the note.

## 2024-02-07 RX ORDER — DULOXETIN HYDROCHLORIDE 60 MG/1
60 CAPSULE, DELAYED RELEASE ORAL DAILY
Qty: 90 CAPSULE | Refills: 3 | Status: SHIPPED | OUTPATIENT
Start: 2024-02-07

## 2024-02-14 ENCOUNTER — APPOINTMENT (OUTPATIENT)
Dept: RADIOLOGY | Facility: MEDICAL CENTER | Age: 49
DRG: 439 | End: 2024-02-14
Attending: EMERGENCY MEDICINE
Payer: COMMERCIAL

## 2024-02-14 ENCOUNTER — HOSPITAL ENCOUNTER (INPATIENT)
Facility: MEDICAL CENTER | Age: 49
LOS: 2 days | DRG: 439 | End: 2024-02-16
Attending: EMERGENCY MEDICINE | Admitting: HOSPITALIST
Payer: COMMERCIAL

## 2024-02-14 DIAGNOSIS — R10.9 ABDOMINAL PAIN, UNSPECIFIED ABDOMINAL LOCATION: ICD-10-CM

## 2024-02-14 DIAGNOSIS — K85.90 PANCREATITIS, UNSPECIFIED PANCREATITIS TYPE: ICD-10-CM

## 2024-02-14 DIAGNOSIS — K85.90 ACUTE PANCREATITIS, UNSPECIFIED COMPLICATION STATUS, UNSPECIFIED PANCREATITIS TYPE: ICD-10-CM

## 2024-02-14 LAB
ALBUMIN SERPL BCP-MCNC: 3.3 G/DL (ref 3.2–4.9)
ALBUMIN/GLOB SERPL: 1.1 G/DL
ALP SERPL-CCNC: 73 U/L (ref 30–99)
ALT SERPL-CCNC: 20 U/L (ref 2–50)
ANION GAP SERPL CALC-SCNC: 12 MMOL/L (ref 7–16)
APPEARANCE UR: CLEAR
AST SERPL-CCNC: 23 U/L (ref 12–45)
BACTERIA #/AREA URNS HPF: NEGATIVE /HPF
BASOPHILS # BLD AUTO: 1.8 % (ref 0–1.8)
BASOPHILS # BLD: 0.13 K/UL (ref 0–0.12)
BILIRUB SERPL-MCNC: 0.2 MG/DL (ref 0.1–1.5)
BILIRUB UR QL STRIP.AUTO: NEGATIVE
BUN SERPL-MCNC: 23 MG/DL (ref 8–22)
CALCIUM ALBUM COR SERPL-MCNC: 9.6 MG/DL (ref 8.5–10.5)
CALCIUM SERPL-MCNC: 9 MG/DL (ref 8.5–10.5)
CHLORIDE SERPL-SCNC: 104 MMOL/L (ref 96–112)
CO2 SERPL-SCNC: 23 MMOL/L (ref 20–33)
COLOR UR: YELLOW
CREAT SERPL-MCNC: 0.44 MG/DL (ref 0.5–1.4)
EOSINOPHIL # BLD AUTO: 0.25 K/UL (ref 0–0.51)
EOSINOPHIL NFR BLD: 3.4 % (ref 0–6.9)
EPI CELLS #/AREA URNS HPF: NEGATIVE /HPF
ERYTHROCYTE [DISTWIDTH] IN BLOOD BY AUTOMATED COUNT: 40.9 FL (ref 35.9–50)
ETHANOL BLD-MCNC: <10.1 MG/DL
GFR SERPLBLD CREATININE-BSD FMLA CKD-EPI: 120 ML/MIN/1.73 M 2
GLOBULIN SER CALC-MCNC: 3.1 G/DL (ref 1.9–3.5)
GLUCOSE SERPL-MCNC: 99 MG/DL (ref 65–99)
GLUCOSE UR STRIP.AUTO-MCNC: NEGATIVE MG/DL
HCT VFR BLD AUTO: 44.6 % (ref 37–47)
HGB BLD-MCNC: 14.8 G/DL (ref 12–16)
HYALINE CASTS #/AREA URNS LPF: NORMAL /LPF
IMM GRANULOCYTES # BLD AUTO: 0.01 K/UL (ref 0–0.11)
IMM GRANULOCYTES NFR BLD AUTO: 0.1 % (ref 0–0.9)
KETONES UR STRIP.AUTO-MCNC: NEGATIVE MG/DL
LEUKOCYTE ESTERASE UR QL STRIP.AUTO: ABNORMAL
LIPASE SERPL-CCNC: 1039 U/L (ref 11–82)
LYMPHOCYTES # BLD AUTO: 2 K/UL (ref 1–4.8)
LYMPHOCYTES NFR BLD: 27.4 % (ref 22–41)
MCH RBC QN AUTO: 29.5 PG (ref 27–33)
MCHC RBC AUTO-ENTMCNC: 33.2 G/DL (ref 32.2–35.5)
MCV RBC AUTO: 89 FL (ref 81.4–97.8)
MICRO URNS: ABNORMAL
MONOCYTES # BLD AUTO: 0.77 K/UL (ref 0–0.85)
MONOCYTES NFR BLD AUTO: 10.6 % (ref 0–13.4)
NEUTROPHILS # BLD AUTO: 4.13 K/UL (ref 1.82–7.42)
NEUTROPHILS NFR BLD: 56.7 % (ref 44–72)
NITRITE UR QL STRIP.AUTO: NEGATIVE
NRBC # BLD AUTO: 0 K/UL
NRBC BLD-RTO: 0 /100 WBC (ref 0–0.2)
PH UR STRIP.AUTO: 5 [PH] (ref 5–8)
PLATELET # BLD AUTO: 239 K/UL (ref 164–446)
PMV BLD AUTO: 11.4 FL (ref 9–12.9)
POTASSIUM SERPL-SCNC: 4.7 MMOL/L (ref 3.6–5.5)
PROT SERPL-MCNC: 6.4 G/DL (ref 6–8.2)
PROT UR QL STRIP: NEGATIVE MG/DL
RBC # BLD AUTO: 5.01 M/UL (ref 4.2–5.4)
RBC # URNS HPF: NORMAL /HPF
RBC UR QL AUTO: NEGATIVE
SODIUM SERPL-SCNC: 139 MMOL/L (ref 135–145)
SP GR UR STRIP.AUTO: 1.02
UROBILINOGEN UR STRIP.AUTO-MCNC: 0.2 MG/DL
WBC # BLD AUTO: 7.3 K/UL (ref 4.8–10.8)
WBC #/AREA URNS HPF: NORMAL /HPF

## 2024-02-14 PROCEDURE — 85025 COMPLETE CBC W/AUTO DIFF WBC: CPT

## 2024-02-14 PROCEDURE — A9270 NON-COVERED ITEM OR SERVICE: HCPCS | Performed by: HOSPITALIST

## 2024-02-14 PROCEDURE — 700102 HCHG RX REV CODE 250 W/ 637 OVERRIDE(OP): Performed by: HOSPITALIST

## 2024-02-14 PROCEDURE — 770006 HCHG ROOM/CARE - MED/SURG/GYN SEMI*

## 2024-02-14 PROCEDURE — 99223 1ST HOSP IP/OBS HIGH 75: CPT | Performed by: NURSE PRACTITIONER

## 2024-02-14 PROCEDURE — 82077 ASSAY SPEC XCP UR&BREATH IA: CPT

## 2024-02-14 PROCEDURE — 74170 CT ABD WO CNTRST FLWD CNTRST: CPT

## 2024-02-14 PROCEDURE — 99285 EMERGENCY DEPT VISIT HI MDM: CPT

## 2024-02-14 PROCEDURE — 700117 HCHG RX CONTRAST REV CODE 255: Performed by: EMERGENCY MEDICINE

## 2024-02-14 PROCEDURE — 81001 URINALYSIS AUTO W/SCOPE: CPT

## 2024-02-14 PROCEDURE — 700111 HCHG RX REV CODE 636 W/ 250 OVERRIDE (IP): Mod: JZ | Performed by: EMERGENCY MEDICINE

## 2024-02-14 PROCEDURE — 83690 ASSAY OF LIPASE: CPT

## 2024-02-14 PROCEDURE — 96374 THER/PROPH/DIAG INJ IV PUSH: CPT

## 2024-02-14 PROCEDURE — 80053 COMPREHEN METABOLIC PANEL: CPT

## 2024-02-14 PROCEDURE — 99223 1ST HOSP IP/OBS HIGH 75: CPT | Performed by: HOSPITALIST

## 2024-02-14 PROCEDURE — 700111 HCHG RX REV CODE 636 W/ 250 OVERRIDE (IP): Mod: JZ | Performed by: HOSPITALIST

## 2024-02-14 PROCEDURE — 700105 HCHG RX REV CODE 258: Performed by: HOSPITALIST

## 2024-02-14 PROCEDURE — 36415 COLL VENOUS BLD VENIPUNCTURE: CPT

## 2024-02-14 PROCEDURE — 96375 TX/PRO/DX INJ NEW DRUG ADDON: CPT

## 2024-02-14 RX ORDER — SODIUM CHLORIDE, SODIUM LACTATE, POTASSIUM CHLORIDE, CALCIUM CHLORIDE 600; 310; 30; 20 MG/100ML; MG/100ML; MG/100ML; MG/100ML
INJECTION, SOLUTION INTRAVENOUS CONTINUOUS
Status: DISCONTINUED | OUTPATIENT
Start: 2024-02-14 | End: 2024-02-16 | Stop reason: HOSPADM

## 2024-02-14 RX ORDER — HYDROMORPHONE HYDROCHLORIDE 1 MG/ML
0.5 INJECTION, SOLUTION INTRAMUSCULAR; INTRAVENOUS; SUBCUTANEOUS
Status: DISCONTINUED | OUTPATIENT
Start: 2024-02-14 | End: 2024-02-14

## 2024-02-14 RX ORDER — LEVETIRACETAM 100 MG/ML
1000 SOLUTION ORAL EVERY EVENING
Status: DISCONTINUED | OUTPATIENT
Start: 2024-02-15 | End: 2024-02-16 | Stop reason: HOSPADM

## 2024-02-14 RX ORDER — HYDROMORPHONE HYDROCHLORIDE 1 MG/ML
1 INJECTION, SOLUTION INTRAMUSCULAR; INTRAVENOUS; SUBCUTANEOUS
Status: DISCONTINUED | OUTPATIENT
Start: 2024-02-14 | End: 2024-02-16

## 2024-02-14 RX ORDER — GABAPENTIN 100 MG/1
100 CAPSULE ORAL 3 TIMES DAILY
Status: DISCONTINUED | OUTPATIENT
Start: 2024-02-14 | End: 2024-02-16 | Stop reason: HOSPADM

## 2024-02-14 RX ORDER — LEVETIRACETAM 100 MG/ML
500-1000 SOLUTION ORAL 2 TIMES DAILY
Status: DISCONTINUED | OUTPATIENT
Start: 2024-02-14 | End: 2024-02-14

## 2024-02-14 RX ORDER — METOCLOPRAMIDE 10 MG/1
10-30 TABLET ORAL
COMMUNITY
End: 2024-02-26

## 2024-02-14 RX ORDER — AMOXICILLIN 250 MG
2 CAPSULE ORAL 2 TIMES DAILY PRN
COMMUNITY

## 2024-02-14 RX ORDER — ACETAMINOPHEN 325 MG/1
650 TABLET ORAL EVERY 6 HOURS PRN
Status: DISCONTINUED | OUTPATIENT
Start: 2024-02-14 | End: 2024-02-16 | Stop reason: HOSPADM

## 2024-02-14 RX ORDER — LEVETIRACETAM 100 MG/ML
500 SOLUTION ORAL EVERY MORNING
Status: DISCONTINUED | OUTPATIENT
Start: 2024-02-15 | End: 2024-02-16 | Stop reason: HOSPADM

## 2024-02-14 RX ORDER — METOCLOPRAMIDE 10 MG/1
10 TABLET ORAL
Status: DISCONTINUED | OUTPATIENT
Start: 2024-02-14 | End: 2024-02-16 | Stop reason: HOSPADM

## 2024-02-14 RX ORDER — DULOXETIN HYDROCHLORIDE 60 MG/1
60 CAPSULE, DELAYED RELEASE ORAL DAILY
Status: DISCONTINUED | OUTPATIENT
Start: 2024-02-15 | End: 2024-02-16 | Stop reason: HOSPADM

## 2024-02-14 RX ORDER — OXYCODONE HYDROCHLORIDE 5 MG/1
5 TABLET ORAL
Status: DISCONTINUED | OUTPATIENT
Start: 2024-02-14 | End: 2024-02-16 | Stop reason: HOSPADM

## 2024-02-14 RX ORDER — ONDANSETRON 2 MG/ML
4 INJECTION INTRAMUSCULAR; INTRAVENOUS EVERY 4 HOURS PRN
Status: DISCONTINUED | OUTPATIENT
Start: 2024-02-14 | End: 2024-02-16 | Stop reason: HOSPADM

## 2024-02-14 RX ORDER — OXYCODONE HYDROCHLORIDE 5 MG/1
5 TABLET ORAL
Status: DISCONTINUED | OUTPATIENT
Start: 2024-02-14 | End: 2024-02-14

## 2024-02-14 RX ORDER — ONDANSETRON 4 MG/1
4 TABLET, ORALLY DISINTEGRATING ORAL EVERY 4 HOURS PRN
Status: DISCONTINUED | OUTPATIENT
Start: 2024-02-14 | End: 2024-02-16 | Stop reason: HOSPADM

## 2024-02-14 RX ORDER — BUSPIRONE HYDROCHLORIDE 10 MG/1
10 TABLET ORAL 3 TIMES DAILY
Status: DISCONTINUED | OUTPATIENT
Start: 2024-02-14 | End: 2024-02-16 | Stop reason: HOSPADM

## 2024-02-14 RX ORDER — PROMETHAZINE HYDROCHLORIDE 25 MG/1
12.5-25 SUPPOSITORY RECTAL EVERY 4 HOURS PRN
Status: DISCONTINUED | OUTPATIENT
Start: 2024-02-14 | End: 2024-02-16 | Stop reason: HOSPADM

## 2024-02-14 RX ORDER — PROCHLORPERAZINE EDISYLATE 5 MG/ML
5-10 INJECTION INTRAMUSCULAR; INTRAVENOUS EVERY 4 HOURS PRN
Status: DISCONTINUED | OUTPATIENT
Start: 2024-02-14 | End: 2024-02-16 | Stop reason: HOSPADM

## 2024-02-14 RX ORDER — OXYCODONE HYDROCHLORIDE 5 MG/1
10 TABLET ORAL
Status: DISCONTINUED | OUTPATIENT
Start: 2024-02-14 | End: 2024-02-16 | Stop reason: HOSPADM

## 2024-02-14 RX ORDER — HYDROMORPHONE HYDROCHLORIDE 1 MG/ML
0.5 INJECTION, SOLUTION INTRAMUSCULAR; INTRAVENOUS; SUBCUTANEOUS ONCE
Status: COMPLETED | OUTPATIENT
Start: 2024-02-14 | End: 2024-02-14

## 2024-02-14 RX ORDER — ONDANSETRON 2 MG/ML
4 INJECTION INTRAMUSCULAR; INTRAVENOUS ONCE
Status: COMPLETED | OUTPATIENT
Start: 2024-02-14 | End: 2024-02-14

## 2024-02-14 RX ORDER — OXYCODONE HYDROCHLORIDE 5 MG/1
10 TABLET ORAL
Status: DISCONTINUED | OUTPATIENT
Start: 2024-02-14 | End: 2024-02-14

## 2024-02-14 RX ORDER — LEVOTHYROXINE SODIUM 0.05 MG/1
50 TABLET ORAL
Status: DISCONTINUED | OUTPATIENT
Start: 2024-02-14 | End: 2024-02-16 | Stop reason: HOSPADM

## 2024-02-14 RX ORDER — ENOXAPARIN SODIUM 100 MG/ML
40 INJECTION SUBCUTANEOUS DAILY
Status: DISCONTINUED | OUTPATIENT
Start: 2024-02-14 | End: 2024-02-16 | Stop reason: HOSPADM

## 2024-02-14 RX ORDER — PROMETHAZINE HYDROCHLORIDE 25 MG/1
12.5-25 TABLET ORAL EVERY 4 HOURS PRN
Status: DISCONTINUED | OUTPATIENT
Start: 2024-02-14 | End: 2024-02-16 | Stop reason: HOSPADM

## 2024-02-14 RX ORDER — LABETALOL HYDROCHLORIDE 5 MG/ML
10 INJECTION, SOLUTION INTRAVENOUS EVERY 4 HOURS PRN
Status: DISCONTINUED | OUTPATIENT
Start: 2024-02-14 | End: 2024-02-16 | Stop reason: HOSPADM

## 2024-02-14 RX ADMIN — ONDANSETRON 4 MG: 4 TABLET, ORALLY DISINTEGRATING ORAL at 15:38

## 2024-02-14 RX ADMIN — ENOXAPARIN SODIUM 40 MG: 100 INJECTION SUBCUTANEOUS at 17:36

## 2024-02-14 RX ADMIN — OXYCODONE 10 MG: 5 TABLET ORAL at 18:36

## 2024-02-14 RX ADMIN — ONDANSETRON 4 MG: 2 INJECTION INTRAMUSCULAR; INTRAVENOUS at 13:22

## 2024-02-14 RX ADMIN — HYDROMORPHONE HYDROCHLORIDE 0.5 MG: 1 INJECTION, SOLUTION INTRAMUSCULAR; INTRAVENOUS; SUBCUTANEOUS at 23:47

## 2024-02-14 RX ADMIN — IOHEXOL 85 ML: 350 INJECTION, SOLUTION INTRAVENOUS at 15:30

## 2024-02-14 RX ADMIN — GABAPENTIN 100 MG: 100 CAPSULE ORAL at 15:38

## 2024-02-14 RX ADMIN — OXYCODONE 10 MG: 5 TABLET ORAL at 15:35

## 2024-02-14 RX ADMIN — HYDROMORPHONE HYDROCHLORIDE 0.5 MG: 1 INJECTION, SOLUTION INTRAMUSCULAR; INTRAVENOUS; SUBCUTANEOUS at 19:38

## 2024-02-14 RX ADMIN — OXYCODONE 10 MG: 5 TABLET ORAL at 22:46

## 2024-02-14 RX ADMIN — GABAPENTIN 100 MG: 100 CAPSULE ORAL at 22:46

## 2024-02-14 RX ADMIN — SODIUM CHLORIDE, POTASSIUM CHLORIDE, SODIUM LACTATE AND CALCIUM CHLORIDE: 600; 310; 30; 20 INJECTION, SOLUTION INTRAVENOUS at 16:32

## 2024-02-14 RX ADMIN — HYDROMORPHONE HYDROCHLORIDE 0.5 MG: 1 INJECTION, SOLUTION INTRAMUSCULAR; INTRAVENOUS; SUBCUTANEOUS at 13:21

## 2024-02-14 RX ADMIN — BUSPIRONE HYDROCHLORIDE 10 MG: 10 TABLET ORAL at 22:46

## 2024-02-14 RX ADMIN — SODIUM CHLORIDE, POTASSIUM CHLORIDE, SODIUM LACTATE AND CALCIUM CHLORIDE: 600; 310; 30; 20 INJECTION, SOLUTION INTRAVENOUS at 22:48

## 2024-02-14 ASSESSMENT — COGNITIVE AND FUNCTIONAL STATUS - GENERAL
DAILY ACTIVITIY SCORE: 24
SUGGESTED CMS G CODE MODIFIER MOBILITY: CH
MOBILITY SCORE: 24
SUGGESTED CMS G CODE MODIFIER DAILY ACTIVITY: CH

## 2024-02-14 ASSESSMENT — ENCOUNTER SYMPTOMS
HEADACHES: 0
ABDOMINAL PAIN: 1
DIARRHEA: 0
NECK PAIN: 0
VOMITING: 0
NERVOUS/ANXIOUS: 0
NAUSEA: 1
BACK PAIN: 0
SHORTNESS OF BREATH: 0
FEVER: 0

## 2024-02-14 ASSESSMENT — FIBROSIS 4 INDEX: FIB4 SCORE: 1.55

## 2024-02-14 ASSESSMENT — PATIENT HEALTH QUESTIONNAIRE - PHQ9
SUM OF ALL RESPONSES TO PHQ9 QUESTIONS 1 AND 2: 4
1. LITTLE INTEREST OR PLEASURE IN DOING THINGS: MORE THAN HALF THE DAYS
SUM OF ALL RESPONSES TO PHQ QUESTIONS 1-9: 23
6. FEELING BAD ABOUT YOURSELF - OR THAT YOU ARE A FAILURE OR HAVE LET YOURSELF OR YOUR FAMILY DOWN: NEARLY EVERY DAY
4. FEELING TIRED OR HAVING LITTLE ENERGY: NEARLY EVERY DAY
8. MOVING OR SPEAKING SO SLOWLY THAT OTHER PEOPLE COULD HAVE NOTICED. OR THE OPPOSITE, BEING SO FIGETY OR RESTLESS THAT YOU HAVE BEEN MOVING AROUND A LOT MORE THAN USUAL: NEARLY EVERY DAY
5. POOR APPETITE OR OVEREATING: NEARLY EVERY DAY
9. THOUGHTS THAT YOU WOULD BE BETTER OFF DEAD, OR OF HURTING YOURSELF: MORE THAN HALF THE DAYS
3. TROUBLE FALLING OR STAYING ASLEEP OR SLEEPING TOO MUCH: MORE THAN HALF THE DAYS
2. FEELING DOWN, DEPRESSED, IRRITABLE, OR HOPELESS: MORE THAN HALF THE DAYS
7. TROUBLE CONCENTRATING ON THINGS, SUCH AS READING THE NEWSPAPER OR WATCHING TELEVISION: NEARLY EVERY DAY

## 2024-02-14 ASSESSMENT — LIFESTYLE VARIABLES
TOTAL SCORE: 0
HAVE PEOPLE ANNOYED YOU BY CRITICIZING YOUR DRINKING: NO
EVER FELT BAD OR GUILTY ABOUT YOUR DRINKING: NO
HOW MANY TIMES IN THE PAST YEAR HAVE YOU HAD 5 OR MORE DRINKS IN A DAY: 0
EVER HAD A DRINK FIRST THING IN THE MORNING TO STEADY YOUR NERVES TO GET RID OF A HANGOVER: NO
ON A TYPICAL DAY WHEN YOU DRINK ALCOHOL HOW MANY DRINKS DO YOU HAVE: 0
DOES PATIENT WANT TO STOP DRINKING: NO
TOTAL SCORE: 0
CONSUMPTION TOTAL: NEGATIVE
HAVE YOU EVER FELT YOU SHOULD CUT DOWN ON YOUR DRINKING: NO
AVERAGE NUMBER OF DAYS PER WEEK YOU HAVE A DRINK CONTAINING ALCOHOL: 0
TOTAL SCORE: 0
ALCOHOL_USE: NO

## 2024-02-14 ASSESSMENT — PAIN DESCRIPTION - PAIN TYPE
TYPE: ACUTE PAIN

## 2024-02-14 NOTE — ED NOTES
Pt ambulated back to [address] from [facility]. Pt able to transfer self to bed, in gown, on monitor, family at bedside, call light in reach. Chart up for ERP.

## 2024-02-14 NOTE — ED PROVIDER NOTES
ED Provider Note    CHIEF COMPLAINT  Chief Complaint   Patient presents with    Abdominal Pain     Patient c/o lower abd pain radiating around bilateral flank x 2 days.        EXTERNAL RECORDS REVIEWED  Inpatient Notes recently admitted for acute pancreatitis.  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    HPI/ROS  LIMITATION TO HISTORY     OUTSIDE HISTORIAN(S):      Tamela Stern is a 48 y.o. female who presents to the emergency department chief complaint of abdominal pain.  Patient history of recurrent pancreatitis she denies any alcohol abuse she is postcholecystectomy post Tristan-en-Y.  She was admitted to the hospital 2 weeks prior at which time she had an MRCP that showed possible ampullary stenosis without identified obstruction.  Throughout time in the hospital her pancreatic enzymes decreased her pain resolved.  She states that she has been doing better at home until the last few days when the pain has been gradually creeping up on her to the point where it got excruciating today.  Moderate nausea no vomiting no diarrhea no fevers or chills no cough congestion chest pain or shortness of breath no other acute symptom change or concern.    PAST MEDICAL HISTORY   has a past medical history of Anemia, Anesthesia (04/19/2022), Arthritis (04/19/2022), Cancer ([facility]), Cervical spondylosis (12/11/2015), Concussion, Constipation, Major depression (01/20/2015), Migraine, Neoplasm of uncertain behavior of skin (05/02/2017), Other specified symptom associated with female genital organs, Pain (04/19/2022), PONV (postoperative nausea and vomiting), Right acoustic neuroma ([facility]), Seizure disorder ([facility]) (1999), Shingles (01/13/2016), Sleep apnea, Stroke ([facility]) (01/2016), and Thyroid activity decreased.    SURGICAL HISTORY   has a past surgical history that includes gastric bypass laparoscopic; hchg tube, ear ultrasil;  "gastroscopy-endo (1/20/2009); colonoscopy - endo (1/22/2009); gyn surgery; pelviscopy (6/16/2009); lysis adhesions gyn (6/16/2009); gastroscopy-endo (8/12/2010); lizz by laparoscopy (8/15/2010); lysis adhesions general (8/15/2010); appendectomy (1994); other abdominal surgery (1997); other abdominal surgery (2009); cervical disk and fusion anterior (4/27/2016); upper gi endoscopy,diagnosis (N/A, 4/11/2022); upper gi endoscopy,biopsy (N/A, 4/11/2022); carpal tunnel release; other orthopedic surgery (Right, 2018); other orthopedic surgery (Right, 2019); mastoidectomy (Right, 2015); lap,diagnostic abdomen (4/27/2022); laparoscopic lysis of adhesions (4/27/2022); lap,rmv  adnexal structure (Bilateral, 11/2/2022); cystourethroscopy (N/A, 11/2/2022); hysterectomy laparoscopy (N/A, 11/2/2022); and laparoscopic lysis of adhesions (N/A, 11/2/2022).    FAMILY HISTORY  Family History   Problem Relation Age of Onset    Cancer Mother     Alcohol/Drug Mother         ETOH    Diabetes Mother     Breast Cancer Paternal Aunt     Cancer Paternal Aunt         breast    Diabetes Maternal Grandfather     Ovarian Cancer Paternal Grandmother     Cancer Paternal Grandmother         cervical     Heart Disease Paternal Grandfather         MI    Cancer Paternal Grandfather         lung    Hypertension Paternal Grandfather     Hyperlipidemia Paternal Grandfather     Tubal Cancer Neg Hx     Peritoneal Cancer Neg Hx     Colorectal Cancer Neg Hx        SOCIAL HISTORY  Social History     Tobacco Use    Smoking status: Heavy Smoker     Current packs/day: 0.25     Types: Cigarettes    Smokeless tobacco: Former    Tobacco comments:     2 cigs/day;   Vaping Use    Vaping Use: Former    Substances: THC, Flavoring    Devices: Pre-filled or refillable cartridge   Substance and Sexual Activity    Alcohol use: No     Alcohol/week: 0.0 oz    Drug use: Yes     Types: Marijuana, Oral     Comment: \"medical\" marijuana    Sexual activity: Yes     Partners: Male "       CURRENT MEDICATIONS  Home Medications       Reviewed by Kay Coates R.N. (Registered Nurse) on 02/14/24 at 1114  Med List Status: Partial     Medication Last Dose Status   busPIRone (BUSPAR) 10 MG Tab tablet  Active   cyanocobalamin (VITAMIN B-12) 1000 MCG/ML Solution  Active   DULoxetine (CYMBALTA) 60 MG Cap DR Particles delayed-release capsule  Active   fluticasone (FLONASE) 50 MCG/ACT nasal spray  Active   gabapentin (NEURONTIN) 100 MG Cap  Active   levETIRAcetam (KEPPRA) 100 MG/ML Solution  Active   levothyroxine (SYNTHROID) 50 MCG Tab  Active   ondansetron (ZOFRAN ODT) 4 MG TABLET DISPERSIBLE  Active   promethazine (PHENERGAN) 25 MG Suppos  Active   sumatriptan (IMITREX) 100 MG tablet  Active                    ALLERGIES  Allergies   Allergen Reactions    Phentermine Hcl      Swelling short of breath    Morphine Vomiting    2-Phenylbenzimidazole-5-Sulfonic Acid [Phenyl Benzimidazole Sulfonic Acid]      Other reaction(s): Reaction:throat swelling       PHYSICAL EXAM  VITAL SIGNS: BP 91/59   Pulse 86   Temp 36.6 °C (97.8 °F)   Resp 18   Wt 56.8 kg (125 lb 3.5 oz)   LMP 05/15/2017 Comment: irregular for the last 3 months  SpO2 95%   BMI 25.29 kg/m²    Pulse ox interpretation: I interpret this pulse ox as normal.  Constitutional: Alert and oriented x 3, minimal distress  HEENT: Atraumatic normocephalic, pupils are equal round reactive to light extraocular movements are intact. The nares is clear, external ears are normal, mouth shows moist mucous membranes normal dentition for age  Neck: Supple, no JVD no tracheal deviation  Cardiovascular: Regular rate and rhythm no murmur rub or gallop 2+ pulses peripherally x4  Thorax & Lungs: No respiratory distress, no wheezes rales or rhonchi, No chest tenderness.   GI: Tender diffusely with localization to the epigastrium no rebound or guarding positive bowel sounds nondistended  Skin: Warm dry no acute rash or lesion  Musculoskeletal: Moving all  extremities with full range and 5 of 5 strength no acute  deformity  Neurologic: Cranial nerves III through XII are grossly intact no sensory deficit no cerebellar dysfunction   Psychiatric: Appropriate affect for situation at this time          DIAGNOSTIC STUDIES / PROCEDURES  Results for orders placed or performed during the hospital encounter of 02/14/24   CBC with Differential   Result Value Ref Range    WBC 7.3 4.8 - 10.8 K/uL    RBC 5.01 4.20 - 5.40 M/uL    Hemoglobin 14.8 12.0 - 16.0 g/dL    Hematocrit 44.6 37.0 - 47.0 %    MCV 89.0 81.4 - 97.8 fL    MCH 29.5 27.0 - 33.0 pg    MCHC 33.2 32.2 - 35.5 g/dL    RDW 40.9 35.9 - 50.0 fL    Platelet Count 239 164 - 446 K/uL    MPV 11.4 9.0 - 12.9 fL    Neutrophils-Polys 56.70 44.00 - 72.00 %    Lymphocytes 27.40 22.00 - 41.00 %    Monocytes 10.60 0.00 - 13.40 %    Eosinophils 3.40 0.00 - 6.90 %    Basophils 1.80 0.00 - 1.80 %    Immature Granulocytes 0.10 0.00 - 0.90 %    Nucleated RBC 0.00 0.00 - 0.20 /100 WBC    Neutrophils (Absolute) 4.13 1.82 - 7.42 K/uL    Lymphs (Absolute) 2.00 1.00 - 4.80 K/uL    Monos (Absolute) 0.77 0.00 - 0.85 K/uL    Eos (Absolute) 0.25 0.00 - 0.51 K/uL    Baso (Absolute) 0.13 (H) 0.00 - 0.12 K/uL    Immature Granulocytes (abs) 0.01 0.00 - 0.11 K/uL    NRBC (Absolute) 0.00 K/uL   Complete Metabolic Panel   Result Value Ref Range    Sodium 139 135 - 145 mmol/L    Potassium 4.7 3.6 - 5.5 mmol/L    Chloride 104 96 - 112 mmol/L    Co2 23 20 - 33 mmol/L    Anion Gap 12.0 7.0 - 16.0    Glucose 99 65 - 99 mg/dL    Bun 23 (H) 8 - 22 mg/dL    Creatinine 0.44 (L) 0.50 - 1.40 mg/dL    Calcium 9.0 8.5 - 10.5 mg/dL    Correct Calcium 9.6 8.5 - 10.5 mg/dL    AST(SGOT) 23 12 - 45 U/L    ALT(SGPT) 20 2 - 50 U/L    Alkaline Phosphatase 73 30 - 99 U/L    Total Bilirubin 0.2 0.1 - 1.5 mg/dL    Albumin 3.3 3.2 - 4.9 g/dL    Total Protein 6.4 6.0 - 8.2 g/dL    Globulin 3.1 1.9 - 3.5 g/dL    A-G Ratio 1.1 g/dL   Lipase   Result Value Ref Range    Lipase 1039  (H) 11 - 82 U/L   Urinalysis    Specimen: Urine   Result Value Ref Range    Color Yellow     Character Clear     Specific Gravity 1.018 <1.035    Ph 5.0 5.0 - 8.0    Glucose Negative Negative mg/dL    Ketones Negative Negative mg/dL    Protein Negative Negative mg/dL    Bilirubin Negative Negative    Urobilinogen, Urine 0.2 Negative    Nitrite Negative Negative    Leukocyte Esterase Small (A) Negative    Occult Blood Negative Negative    Micro Urine Req Microscopic    URINE MICROSCOPIC (W/UA)   Result Value Ref Range    WBC 0-2 /hpf    RBC 0-2 /hpf    Bacteria Negative None /hpf    Epithelial Cells Negative /hpf    Hyaline Cast 0-2 /lpf   ESTIMATED GFR   Result Value Ref Range    GFR (CKD-EPI) 120 >60 mL/min/1.73 m 2        RADIOLOGY  I have independently interpreted the diagnostic imaging associated with this visit and am waiting the final reading from the radiologist.   My preliminary interpretation is as follows:     Radiologist interpretation:   CT-PANCREAS AND ABDOMEN WITH & W/O    (Results Pending)         COURSE & MEDICAL DECISION MAKING    ED Observation Status? No; Patient does not meet criteria for ED Observation.     ASSESSMENT, COURSE AND PLAN    Care Narrative: 48-year-old female history of recurrent pancreatitis presents with epigastric abdominal pain and found to have a lipase of over 1000.  Discussed with gastroenterology especially in light of her recent MRCP which showed some borderline dilatation and pancreatic and CBD.  They requested CT pancreas protocol this has been ordered and have discussed the case up to this point with hospitalist Dr. Tai.  Patient be admitted to the hospital service for ongoing evaluation and treatment admitted in guarded condition.        FINAL DIAGNOSIS  1. Abdominal pain, unspecified abdominal location Active   2. Acute pancreatitis, unspecified complication status, unspecified pancreatitis type Active          Electronically signed by: Lee Lennon M.D.

## 2024-02-14 NOTE — ED TRIAGE NOTES
.Tamela Stern  .  Chief Complaint   Patient presents with    Abdominal Pain     Patient c/o lower abd pain radiating around bilateral flank x 2 days.      Patient ambulatory to triage with above complaint. Patient recently admitted to [facility] for pancreatitis, and followed-up with PCP, awaiting return call from GI for consult.      Patient given urine specimen supplies.   Patient to lobby and instructed to inform staff of any needs.

## 2024-02-14 NOTE — PROGRESS NOTES
Received pt from transport via gurney. Pt ambulated from hallway to bedside. Currently pt laying down on bed.

## 2024-02-14 NOTE — CONSULTS
..GASTROENTEROLOGY CONSULTATION    PATIENT NAME: Tamela Stern  : 1975  CSN: [id number]  MRN:  [id number]     CONSULTATION DATE:  2024    PRIMARY CARE PROVIDER:  Maria De Jesus Sadler M.D.      REASON FOR CONSULT:  Pancreatitis, abdominal pain    Requesting provider: Dr. Lee Lennon    HISTORY OF PRESENT ILLNESS:  Tamela Stern is a 48 y.o. female with past medical history of Tristan-en-Y procedure, cholecystectomy, hypothyroidism, rheumatoid arthritis not on medication, migraines, and seizure disorder on Keppra. Patient presents with second episode of pancreatitis within one month.    Patient interviewed with her  at bedside.  She endorses a 4 year history of abdominal pain which she describes as a bandlike burning across her lower abdomen with random stabbing that comes and goes.  Initially, she was worked up in [facility] and was thought to have a cyst on her uterine wall which was causing her pain and therefore underwent a total hysterectomy in November of last year.  Unfortunately, her abdominal pain has not resolved and occasionally is worse.  She underwent Tristan-en-Y bypass in  with Dr. Roberson.  She had a cholecystectomy in  due to disease gallbladder secondary to rapid weight loss.  She underwent exploratory surgery to Dr. Roberson in  to try to determine etiology of her continued abdominal pain. Since , she has lost a total of 120 pounds.    This is her second episode of pancreatitis.  She stopped drinking 4 years ago however did drink 1 bottle of wine a day for 5 to 6 years.  She currently smokes half a pack of cigarettes and has been since she was 13.  She does also use marijuana.  She describes unrelenting abdominal pain with continued nausea.  She reports that she eats what she can tolerate. She was having normal soft bowel movements but the last few days has become more constipated.  She denies heartburn, dysphagia, odynophagia,  "or any signs of bleeding. There is no family history of pancreatitis or liver issues.     On admission to the ED, labs grossly unremarkable except for lipase of 1039.  CT pancreas protocol completed and consistent with grossly stable mild pancreatic ductal dilatation with no CT evidence for acute pancreatitis.  Also with mild biliary dilatation. MRCP completed 1/24 with impression of some degree of ampullary stenosis with dilated common bile duct and pancreatic duct.    GI history:  04/22: EGD with Dr. Michelle johnston revealed mild desquamation of the distal esophagus, double barrel appearance to gastric pouch without ulceration and normal-appearing jejunum.  Pathology negative.    01/24: Seen by [facility] for similar findings as above.  ERCP was deferred at that time given she did not have an obstructive process.    08/2010: EGD with Dr. Barry grossly unremarkable    01/2009: Colonoscopy with Dr. Slaughter grossly unremarkable    01/2009: EGD with Dr. Wiseman revealed very small nonbleeding marginal ulcerations at the gastrojejunostomy site    PAST MEDICAL HISTORY:  Past Medical History:   Diagnosis Date    Anemia     Anesthesia 04/19/2022    \"vitals dropped making it hard to come out\" after surgery in 2009    Arthritis 04/19/2022    rheumatoid-ankle    Cancer (HCC)     Pineal tumor; still present; asymptomatic; possible stent placement; no chemotherapy or radiation.    Cervical spondylosis 12/11/2015    Concussion     Constipation     medicated    Major depression 01/20/2015    depression and anxiety    Migraine     Neoplasm of uncertain behavior of skin 05/02/2017    Other specified symptom associated with female genital organs     scar tissue    Pain 04/19/2022    abdomen    PONV (postoperative nausea and vomiting)     Right acoustic neuroma (HCC)     removed 3/3/2105    Seizure disorder (HCC) 1999    not medicated at present - was due to apnea in past, last seizure was 04/12/2022, last EEG was clear    Shingles " 01/13/2016    Sleep apnea     does not use cpap, last sleep study showed she was clear for apnea    Stroke (HCC) 01/2016    TIA-residual weakness and some left sided drooping occasionally    Thyroid activity decreased     medicated - hypothyroid       PAST SURGICAL HISTORY:  Past Surgical History:   Procedure Laterality Date    SD LAP,RMV  ADNEXAL STRUCTURE Bilateral 11/2/2022    Procedure: SALPINGO-OOPHORECTOMY, BILATERAL, LAPAROSCOPIC;  Surgeon: Rhonda Dsouza M.D.;  Location: SURGERY SAME DAY [facility];  Service: Gynecology    SD CYSTOURETHROSCOPY N/A 11/2/2022    Procedure: CYSTOSCOPY;  Surgeon: Rhonda Dsouza M.D.;  Location: SURGERY SAME DAY [facility];  Service: Gynecology    HYSTERECTOMY LAPAROSCOPY N/A 11/2/2022    Procedure: TOTAL LAPAROSCOPIC HYSTERECTOMY ,;  Surgeon: Rhonda Dsouza M.D.;  Location: SURGERY SAME DAY [facility];  Service: Gynecology    LAPAROSCOPIC LYSIS OF ADHESIONS N/A 11/2/2022    Procedure: LYSIS, ENDOMETRIOSIS, LAPAROSCOPIC;  Surgeon: Rhonda Dsouza M.D.;  Location: SURGERY SAME DAY [facility];  Service: Gynecology    SD LAP,DIAGNOSTIC ABDOMEN  4/27/2022    Procedure: LAPAROSCOPY - DIAGNOSTIC;  Surgeon: Jonnie Fernandez M.D.;  Location: SURGERY [facility];  Service: General    LAPAROSCOPIC LYSIS OF ADHESIONS  4/27/2022    Procedure: LYSIS, ADHESIONS, LAPAROSCOPIC;  Surgeon: Jonnie Fernandez M.D.;  Location: [facility];  Service: General    SD UPPER GI ENDOSCOPY,DIAGNOSIS N/A 4/11/2022    Procedure: GASTROSCOPY;  Surgeon: Sandra Reed M.D.;  Location: SURGERY SAME DAY [facility];  Service: Gastroenterology    SD UPPER GI ENDOSCOPY,BIOPSY N/A 4/11/2022    Procedure: GASTROSCOPY, WITH BIOPSY;  Surgeon: Sandra Reed M.D.;  Location: SURGERY SAME DAY [facility];  Service: Gastroenterology    OTHER ORTHOPEDIC SURGERY Right 2019    ankle fusion    OTHER ORTHOPEDIC SURGERY Right 2018    ankle fusion    CERVICAL DISK AND FUSION ANTERIOR  4/27/2016    Procedure: CERVICAL  DISK AND FUSION ANTERIOR C5-6;  Surgeon: Jorge Pozo M.D.;  Location: SURGERY [facility];  Service:     MASTOIDECTOMY Right 2015    ARIADNA BY LAPAROSCOPY  8/15/2010    Performed by DANIEL DONOVAN at SURGERY [facility]    LYSIS ADHESIONS GENERAL  8/15/2010    Performed by DANIEL DONOVAN at SURGERY [facility]S    GASTROSCOPY-ENDO  8/12/2010    Performed by KHUSHI MURCIA at ENDOSCOPY [facility] ORS    PELVISCOPY  6/16/2009    Performed by ABDULKADIR SMITH at SURGERY SAME DAY [facility] ORS    LYSIS ADHESIONS GYN  6/16/2009    Performed by ABDULKADIR SMITH at SURGERY SAME DAY [facility]    COLONOSCOPY - ENDO  1/22/2009    Performed by ASHLEY TALBOT at SURGERY [facility]    GASTROSCOPY-ENDO  1/20/2009    Performed by BRENDA BENNETT at SURGERY [facility]    OTHER ABDOMINAL SURGERY  2009    ariadna    OTHER ABDOMINAL SURGERY  1997    C - section    APPENDECTOMY  1994    CARPAL TUNNEL RELEASE      RUE - 1998, LUE - 2000    GASTRIC BYPASS LAPAROSCOPIC      GYN SURGERY      TUBE, EAR ULTRASIL          CURRENT MEDS:  Current Facility-Administered Medications   Medication Dose Route Frequency Provider Last Rate Last Admin    busPIRone (Buspar) tablet 10 mg  10 mg Oral TID Madi Tai D.O.        DULoxetine (Cymbalta) capsule 60 mg  60 mg Oral DAILY Madi Tai D.O.        gabapentin (Neurontin) capsule 100 mg  100 mg Oral TID Madi Tai D.O.        levETIRAcetam (Keppra) 100 MG/ML solution 500-1,000 mg  500-1,000 mg Oral BID Madi Tai D.O.        levothyroxine (Synthroid) tablet 50 mcg  50 mcg Oral AM ES Madi Tai D.O.        metoclopramide (Reglan) tablet 10-30 mg  10-30 mg Oral QDAY PRN JOANNA ReganOAdonay        lactated ringers infusion   Intravenous Continuous Madi Tai D.O.        enoxaparin (Lovenox) inj 40 mg  40 mg Subcutaneous DAILY AT 1800 Madi Tai D.O.        acetaminophen (Tylenol) tablet 650 mg  650 mg Oral Q6HRS PRN Madi RUEDA  CLIFF Tai.        Pharmacy Consult Request ...Pain Management Review 1 Each  1 Each Other PHARMACY TO DOSE Madi Tai D.O.        oxyCODONE immediate-release (Roxicodone) tablet 5 mg  5 mg Oral Q3HRS PRN Madi Tai D.O.        Or    oxyCODONE immediate-release (Roxicodone) tablet 10 mg  10 mg Oral Q3HRS PRN Madi Tai D.O.        Or    HYDROmorphone (Dilaudid) injection 0.5 mg  0.5 mg Intravenous Q3HRS PRN Madi Tai D.O.        labetalol (Normodyne/Trandate) injection 10 mg  10 mg Intravenous Q4HRS PRN Madi Tai D.O.        ondansetron (Zofran) syringe/vial injection 4 mg  4 mg Intravenous Q4HRS PRN Madi Tai D.O.        ondansetron (Zofran ODT) dispertab 4 mg  4 mg Oral Q4HRS PRN Madi Tai D.O.        promethazine (Phenergan) tablet 12.5-25 mg  12.5-25 mg Oral Q4HRS PRN Madi Tai D.O.        promethazine (Phenergan) suppository 12.5-25 mg  12.5-25 mg Rectal Q4HRS PRN Madi Tai D.O.        prochlorperazine (Compazine) injection 5-10 mg  5-10 mg Intravenous Q4HRS PRN Madi Tai D.O.        [COMPLETED] iohexol (OMNIPAQUE) 350 mg/mL (IV)  85 mL Intravenous Once Lee Lennon M.D.   85 mL at 02/14/24 1530     Current Outpatient Medications   Medication Sig Dispense Refill    senna-docusate (SENNA PLUS) 8.6-50 MG Tab Take 2 Tablets by mouth 2 times a day as needed for Constipation.      metoclopramide (REGLAN) 10 MG Tab Take 10-30 mg by mouth 1 time a day as needed. Indications: Migraine Headache, Nausea and Vomiting      DULoxetine (CYMBALTA) 60 MG Cap DR Particles delayed-release capsule Take 1 capsule by mouth once daily 90 Capsule 3    gabapentin (NEURONTIN) 100 MG Cap Take 1 Capsule by mouth 3 times a day. 90 Capsule 0    ondansetron (ZOFRAN ODT) 4 MG TABLET DISPERSIBLE Dissolve 1 Tablet by mouth every 6 hours as needed for Nausea/Vomiting. 10 Tablet 1    levothyroxine (SYNTHROID) 50 MCG Tab TAKE 1 TABLET BY MOUTH EVERY DAY IN THE MORNING ON AN EMPTY  "STOMACH (Patient taking differently: Take 50 mcg by mouth every morning on an empty stomach.) 90 Tablet 1    levETIRAcetam (KEPPRA) 100 MG/ML Solution 5 ml in AM and 10 ml in pm (Patient taking differently: Take 500-1,000 mg by mouth 2 times a day. 5 ml in AM and 10 ml in pm) 450 mL 11    busPIRone (BUSPAR) 10 MG Tab tablet Take 1 Tablet by mouth 3 times a day. 270 Tablet 1    sumatriptan (IMITREX) 100 MG tablet 1 tab at headache onset; repeat in 1 hour prn 9 Tablet 6    promethazine (PHENERGAN) 25 MG Suppos Insert 1 Suppository into the rectum every 6 hours as needed for Nausea/Vomiting. 12 Suppository 0    fluticasone (FLONASE) 50 MCG/ACT nasal spray Spray 1 Spray in nose every day. 16 g 0        ALLERGIES:  Allergies   Allergen Reactions    Phentermine Hcl      Swelling short of breath    Morphine Vomiting    2-Phenylbenzimidazole-5-Sulfonic Acid [Phenyl Benzimidazole Sulfonic Acid]      Other reaction(s): Reaction:throat swelling       SOCIAL HISTORY:  Social History     Socioeconomic History    Marital status:      Spouse name: Not on file    Number of children: Not on file    Years of education: Not on file    Highest education level: Not on file   Occupational History    Not on file   Tobacco Use    Smoking status: Heavy Smoker     Current packs/day: 0.25     Types: Cigarettes    Smokeless tobacco: Former    Tobacco comments:     2 cigs/day;   Vaping Use    Vaping Use: Former    Substances: THC, Flavoring    Devices: Pre-filled or refillable cartridge   Substance and Sexual Activity    Alcohol use: No     Alcohol/week: 0.0 oz    Drug use: Yes     Types: Marijuana, Oral     Comment: \"medical\" marijuana    Sexual activity: Yes     Partners: Male   Other Topics Concern    Not on file   Social History Narrative    Not on file     Social Determinants of Health     Financial Resource Strain: Not on file   Food Insecurity: Not on file   Transportation Needs: Not on file   Physical Activity: Not on file "   Stress: Not on file   Social Connections: Not on file   Intimate Partner Violence: Not on file   Housing Stability: Not on file       FAMILY HISTORY:  Family History   Problem Relation Age of Onset    Cancer Mother     Alcohol/Drug Mother         ETOH    Diabetes Mother     Breast Cancer Paternal Aunt     Cancer Paternal Aunt         breast    Diabetes Maternal Grandfather     Ovarian Cancer Paternal Grandmother     Cancer Paternal Grandmother         cervical     Heart Disease Paternal Grandfather         MI    Cancer Paternal Grandfather         lung    Hypertension Paternal Grandfather     Hyperlipidemia Paternal Grandfather     Tubal Cancer Neg Hx     Peritoneal Cancer Neg Hx     Colorectal Cancer Neg Hx         REVIEW OF SYSTEMS:  General ROS: Positive for weight loss and fatigue HEENT ROS: Negative  Respiratory ROS: Negative for - cough or shortness of breath.  Cardiovascular ROS:  Negative for - chest pain or palpitations.  Gastrointestinal ROS: As per the history of present illness.  Genito-Urinary ROS: Negative  Musculoskeletal ROS: Negative.  Neurological ROS: Negative  Skin ROS: negative  Hematology ROS: negative  Endocrinology ROS: Negative        PHYSICAL EXAM:  VITALS: /71   Pulse 65   Temp 36.6 °C (97.8 °F)   Resp 17   Wt 56.8 kg (125 lb 3.5 oz)   LMP 05/15/2017 Comment: irregular for the last 3 months  SpO2 93%   BMI 25.29 kg/m²   GEN:  Tamela tSern is a 48 y.o. female in no acute distress.  HEENT: Mucous membranes pink and moist.  Sclera anicteric.    NECK:    Neck supple without lymphadenopathy or thyromegaly.  LUNGS: Clear to auscultation posteriorly.  HEART: Regular rate and rhythm. S1 and S2 normal. No murmurs, gallops  ABD:  Diffusely tender, positive bowel sounds  RECTAL: Not done at this time.  EXT:  Without cyanosis, deformity or pitting edema.  SKIN:  Pink, warm, dry.  NEURO: Grossly intact, A/OR.    LABS:  Recent Labs     02/14/24  1122   WBC 7.3   MCV  "89.0     Recent Labs     02/14/24  1122   GLUCOSE 99   BUN 23*   CO2 23     Lab Results   Component Value Date    INR 0.99 03/02/2016    INR 0.96 08/15/2014    INR 1.01 03/14/2010     No components found for: \"ALT\", \"AST\", \"GGT\", \"ALKPHOS\"  No results found for: \"BILINEO\"      @LASTIMGCAT(DL6208)@     @LASTIMGCAT(SK7916)@       IMPRESSION/PLAN:  Acute pancreatitis with elevated lipase  Pancreatic and CBD dilatation with questionable ampullary stenosis on MRCP  Abdominal pain with nausea, significant history for years  History of Tristan-en-Y bypass in 2007  Seizure disorder on Keppra  Total hysterectomy in November 2022  Cholecystectomy in 2010  Exploratory laparoscopy with Dr. Fernandez in 2022 which per his operative report was normal, he remarked that she had pain syndrome without anatomic identifiable etiology  Weight loss  Migraines  Hypothyroidism  Rheumatoid arthritis not on medication    Plan:  Ordered fecal elastase, amylase, IGG4, CHARMAINE, vitamin B and D, ferritin  CHARMAINE previously negative in January 2023  After review of the evidence, Keppra can contribute to acute pancreatitis  Patient sees Dr. KRYSTYNA Hoffman neurology.  GI team will plan to discuss with him tomorrow if there is an alternative medication.  Further evidence-based supportive care of acute pancreatitis per primary team with fluids, pain management, and diet as tolerated  Patient previously not candidate for ERCP, will rediscuss with advanced endoscopist if there is a role for intervention     GI team will follow.    Discussed with patient and her , Dr. Tai, Dr. Vivienne Caldwell, BELIA,  APRN  Gastroenterology      "

## 2024-02-15 LAB
25(OH)D3 SERPL-MCNC: 26 NG/ML (ref 30–100)
AMYLASE SERPL-CCNC: 292 U/L (ref 20–103)
ANION GAP SERPL CALC-SCNC: 10 MMOL/L (ref 7–16)
BUN SERPL-MCNC: 12 MG/DL (ref 8–22)
CALCIUM SERPL-MCNC: 8.9 MG/DL (ref 8.5–10.5)
CHLORIDE SERPL-SCNC: 105 MMOL/L (ref 96–112)
CO2 SERPL-SCNC: 25 MMOL/L (ref 20–33)
CREAT SERPL-MCNC: 0.46 MG/DL (ref 0.5–1.4)
ERYTHROCYTE [DISTWIDTH] IN BLOOD BY AUTOMATED COUNT: 41.4 FL (ref 35.9–50)
FERRITIN SERPL-MCNC: 183 NG/ML (ref 10–291)
GFR SERPLBLD CREATININE-BSD FMLA CKD-EPI: 119 ML/MIN/1.73 M 2
GLUCOSE SERPL-MCNC: 85 MG/DL (ref 65–99)
HCT VFR BLD AUTO: 38.6 % (ref 37–47)
HGB BLD-MCNC: 13.1 G/DL (ref 12–16)
MCH RBC QN AUTO: 30.2 PG (ref 27–33)
MCHC RBC AUTO-ENTMCNC: 33.9 G/DL (ref 32.2–35.5)
MCV RBC AUTO: 88.9 FL (ref 81.4–97.8)
PLATELET # BLD AUTO: 177 K/UL (ref 164–446)
PMV BLD AUTO: 11.4 FL (ref 9–12.9)
POTASSIUM SERPL-SCNC: 4.5 MMOL/L (ref 3.6–5.5)
RBC # BLD AUTO: 4.34 M/UL (ref 4.2–5.4)
SODIUM SERPL-SCNC: 140 MMOL/L (ref 135–145)
TSH SERPL DL<=0.005 MIU/L-ACNC: 4.31 UIU/ML (ref 0.38–5.33)
VIT B12 SERPL-MCNC: 515 PG/ML (ref 211–911)
WBC # BLD AUTO: 5.5 K/UL (ref 4.8–10.8)

## 2024-02-15 PROCEDURE — 82150 ASSAY OF AMYLASE: CPT

## 2024-02-15 PROCEDURE — 700105 HCHG RX REV CODE 258: Performed by: HOSPITALIST

## 2024-02-15 PROCEDURE — 700102 HCHG RX REV CODE 250 W/ 637 OVERRIDE(OP): Performed by: HOSPITALIST

## 2024-02-15 PROCEDURE — A9270 NON-COVERED ITEM OR SERVICE: HCPCS | Performed by: NURSE PRACTITIONER

## 2024-02-15 PROCEDURE — 82306 VITAMIN D 25 HYDROXY: CPT

## 2024-02-15 PROCEDURE — 36415 COLL VENOUS BLD VENIPUNCTURE: CPT

## 2024-02-15 PROCEDURE — 99233 SBSQ HOSP IP/OBS HIGH 50: CPT | Performed by: INTERNAL MEDICINE

## 2024-02-15 PROCEDURE — 700111 HCHG RX REV CODE 636 W/ 250 OVERRIDE (IP): Mod: JZ | Performed by: HOSPITALIST

## 2024-02-15 PROCEDURE — 84443 ASSAY THYROID STIM HORMONE: CPT

## 2024-02-15 PROCEDURE — 82787 IGG 1 2 3 OR 4 EACH: CPT | Mod: 91

## 2024-02-15 PROCEDURE — 700102 HCHG RX REV CODE 250 W/ 637 OVERRIDE(OP): Performed by: NURSE PRACTITIONER

## 2024-02-15 PROCEDURE — 86364 TISS TRNSGLTMNASE EA IG CLAS: CPT

## 2024-02-15 PROCEDURE — 99232 SBSQ HOSP IP/OBS MODERATE 35: CPT | Performed by: NURSE PRACTITIONER

## 2024-02-15 PROCEDURE — A9270 NON-COVERED ITEM OR SERVICE: HCPCS | Performed by: HOSPITALIST

## 2024-02-15 PROCEDURE — 770006 HCHG ROOM/CARE - MED/SURG/GYN SEMI*

## 2024-02-15 PROCEDURE — 700111 HCHG RX REV CODE 636 W/ 250 OVERRIDE (IP)

## 2024-02-15 PROCEDURE — 82728 ASSAY OF FERRITIN: CPT

## 2024-02-15 PROCEDURE — A9270 NON-COVERED ITEM OR SERVICE: HCPCS

## 2024-02-15 PROCEDURE — 80048 BASIC METABOLIC PNL TOTAL CA: CPT

## 2024-02-15 PROCEDURE — 82607 VITAMIN B-12: CPT

## 2024-02-15 PROCEDURE — 700102 HCHG RX REV CODE 250 W/ 637 OVERRIDE(OP)

## 2024-02-15 PROCEDURE — 86038 ANTINUCLEAR ANTIBODIES: CPT

## 2024-02-15 PROCEDURE — 85027 COMPLETE CBC AUTOMATED: CPT

## 2024-02-15 RX ORDER — VITAMIN B COMPLEX
1000 TABLET ORAL DAILY
Status: DISCONTINUED | OUTPATIENT
Start: 2024-02-15 | End: 2024-02-16 | Stop reason: HOSPADM

## 2024-02-15 RX ADMIN — PROCHLORPERAZINE EDISYLATE 10 MG: 5 INJECTION INTRAMUSCULAR; INTRAVENOUS at 05:05

## 2024-02-15 RX ADMIN — ONDANSETRON 4 MG: 4 TABLET, ORALLY DISINTEGRATING ORAL at 13:55

## 2024-02-15 RX ADMIN — GABAPENTIN 100 MG: 100 CAPSULE ORAL at 06:31

## 2024-02-15 RX ADMIN — LEVETIRACETAM 500 MG: 100 SOLUTION ORAL at 05:02

## 2024-02-15 RX ADMIN — Medication 1000 UNITS: at 07:58

## 2024-02-15 RX ADMIN — SODIUM CHLORIDE, POTASSIUM CHLORIDE, SODIUM LACTATE AND CALCIUM CHLORIDE: 600; 310; 30; 20 INJECTION, SOLUTION INTRAVENOUS at 23:45

## 2024-02-15 RX ADMIN — OXYCODONE 10 MG: 5 TABLET ORAL at 22:23

## 2024-02-15 RX ADMIN — ONDANSETRON 4 MG: 4 TABLET, ORALLY DISINTEGRATING ORAL at 09:15

## 2024-02-15 RX ADMIN — OXYCODONE 10 MG: 5 TABLET ORAL at 13:54

## 2024-02-15 RX ADMIN — HYDROMORPHONE HYDROCHLORIDE 1 MG: 1 INJECTION, SOLUTION INTRAMUSCULAR; INTRAVENOUS; SUBCUTANEOUS at 07:58

## 2024-02-15 RX ADMIN — BUSPIRONE HYDROCHLORIDE 10 MG: 10 TABLET ORAL at 14:41

## 2024-02-15 RX ADMIN — OXYCODONE 10 MG: 5 TABLET ORAL at 10:52

## 2024-02-15 RX ADMIN — LEVOTHYROXINE SODIUM 50 MCG: 0.05 TABLET ORAL at 05:02

## 2024-02-15 RX ADMIN — GABAPENTIN 100 MG: 100 CAPSULE ORAL at 14:41

## 2024-02-15 RX ADMIN — HYDROMORPHONE HYDROCHLORIDE 1 MG: 1 INJECTION, SOLUTION INTRAMUSCULAR; INTRAVENOUS; SUBCUTANEOUS at 02:47

## 2024-02-15 RX ADMIN — HYDROMORPHONE HYDROCHLORIDE 1 MG: 1 INJECTION, SOLUTION INTRAMUSCULAR; INTRAVENOUS; SUBCUTANEOUS at 20:31

## 2024-02-15 RX ADMIN — OXYCODONE 10 MG: 5 TABLET ORAL at 18:45

## 2024-02-15 RX ADMIN — SODIUM CHLORIDE, POTASSIUM CHLORIDE, SODIUM LACTATE AND CALCIUM CHLORIDE: 600; 310; 30; 20 INJECTION, SOLUTION INTRAVENOUS at 07:58

## 2024-02-15 RX ADMIN — LEVETIRACETAM 1000 MG: 100 SOLUTION ORAL at 16:53

## 2024-02-15 RX ADMIN — BUSPIRONE HYDROCHLORIDE 10 MG: 10 TABLET ORAL at 06:31

## 2024-02-15 RX ADMIN — OXYCODONE 10 MG: 5 TABLET ORAL at 01:32

## 2024-02-15 RX ADMIN — HYDROMORPHONE HYDROCHLORIDE 1 MG: 1 INJECTION, SOLUTION INTRAMUSCULAR; INTRAVENOUS; SUBCUTANEOUS at 12:05

## 2024-02-15 RX ADMIN — DULOXETINE HYDROCHLORIDE 60 MG: 60 CAPSULE, DELAYED RELEASE ORAL at 05:02

## 2024-02-15 RX ADMIN — HYDROMORPHONE HYDROCHLORIDE 1 MG: 1 INJECTION, SOLUTION INTRAMUSCULAR; INTRAVENOUS; SUBCUTANEOUS at 23:43

## 2024-02-15 RX ADMIN — HYDROMORPHONE HYDROCHLORIDE 1 MG: 1 INJECTION, SOLUTION INTRAMUSCULAR; INTRAVENOUS; SUBCUTANEOUS at 15:10

## 2024-02-15 RX ADMIN — GABAPENTIN 100 MG: 100 CAPSULE ORAL at 22:23

## 2024-02-15 RX ADMIN — OXYCODONE 10 MG: 5 TABLET ORAL at 06:31

## 2024-02-15 RX ADMIN — ENOXAPARIN SODIUM 40 MG: 100 INJECTION SUBCUTANEOUS at 16:53

## 2024-02-15 RX ADMIN — BUSPIRONE HYDROCHLORIDE 10 MG: 10 TABLET ORAL at 22:23

## 2024-02-15 ASSESSMENT — ENCOUNTER SYMPTOMS
CHILLS: 0
BACK PAIN: 0
BLOOD IN STOOL: 0
DIARRHEA: 0
FEVER: 0
VOMITING: 0
VOMITING: 1
ABDOMINAL PAIN: 1
COUGH: 0
NAUSEA: 1
DIZZINESS: 0
DEPRESSION: 0
BLURRED VISION: 0
SHORTNESS OF BREATH: 0
NAUSEA: 0
WEAKNESS: 0
CONSTIPATION: 0
HEARTBURN: 0

## 2024-02-15 ASSESSMENT — PAIN DESCRIPTION - PAIN TYPE
TYPE: ACUTE PAIN

## 2024-02-15 NOTE — PROGRESS NOTES
Pt seen on CPOT. O2 saturation drops to low 80% on room air while asleep. Placed pt on o2 support 1lpm via nasal cannula. O2 sat went up to >90%. Pt tolerated o2 support well.

## 2024-02-15 NOTE — ASSESSMENT & PLAN NOTE
CT abd with focus on pancreas has been ordered  IV fluids 150cc/hr  IV pain meds  Clear liquids as tolerates  GI consulting

## 2024-02-15 NOTE — PROGRESS NOTES
..Gastroenterology Progress Note               Author:  Gina Caldwell, DNP,  APRN Date & Time Created: 2/15/2024 7:15 AM       Patient ID:  Name:             Tamela Rojas  YOB: 1975  Age:                 48 y.o.  female  MRN:               [id number]        Medical Decision Making, by Problem:  Active Hospital Problems    Diagnosis     Pancreatitis, unspecified pancreatitis type [K85.90]     S/P gastric bypass [Z98.84]     Moderate episode of recurrent major depressive disorder (HCC) [F33.1]     Hypothyroidism due to acquired atrophy of thyroid [E03.4]        Presenting Chief Complaint:  Pancreatitis, abdominal pain    Interval History:  2/15/2024:  Patient seen.  Reviewed labs        Hospital Medications:  Current Facility-Administered Medications   Medication Dose Frequency Provider Last Rate Last Admin    busPIRone (Buspar) tablet 10 mg  10 mg TID JOANNA ReganOAdonay   10 mg at 02/15/24 0631    DULoxetine (Cymbalta) capsule 60 mg  60 mg DAILY JOANNA ReganOAdonay   60 mg at 02/15/24 0502    gabapentin (Neurontin) capsule 100 mg  100 mg TID JOANNA ReganOAdonay   100 mg at 02/15/24 0631    levothyroxine (Synthroid) tablet 50 mcg  50 mcg AM ES Madi Tai D.O.   50 mcg at 02/15/24 0502    metoclopramide (Reglan) tablet 10 mg  10 mg QDAY PRN Madi Tai D.O.        lactated ringers infusion   Continuous Madi Tai D.O. 150 mL/hr at 02/14/24 2248 New Bag at 02/14/24 2248    enoxaparin (Lovenox) inj 40 mg  40 mg DAILY AT 1800 JOANNA ReganOAdonay   40 mg at 02/14/24 1736    acetaminophen (Tylenol) tablet 650 mg  650 mg Q6HRS PRN Madi Tai D.O.        Pharmacy Consult Request ...Pain Management Review 1 Each  1 Each PHARMACY TO DOSE Madi Tai D.O.        labetalol (Normodyne/Trandate) injection 10 mg  10 mg Q4HRS PRN Madi Tai D.O.        ondansetron (Zofran) syringe/vial injection 4 mg  4 mg Q4HRS PRN Madi Tai D.O.        ondansetron  (Zofran ODT) dispertab 4 mg  4 mg Q4HRS PRN JOANNA ReganOAdonay   4 mg at 02/14/24 1538    promethazine (Phenergan) tablet 12.5-25 mg  12.5-25 mg Q4HRS PRN Madi Tai D.O.        promethazine (Phenergan) suppository 12.5-25 mg  12.5-25 mg Q4HRS PRN Madi Tai D.O.        prochlorperazine (Compazine) injection 5-10 mg  5-10 mg Q4HRS PRN JOANNA ReganOAdonay   10 mg at 02/15/24 0505    levETIRAcetam (Keppra) 100 MG/ML solution 500 mg  500 mg QAM JOANNA ReganOAdonay   500 mg at 02/15/24 0502    And    levETIRAcetam (Keppra) 100 MG/ML solution 1,000 mg  1,000 mg Q EVENING Madi Tai D.O.        oxyCODONE immediate-release (Roxicodone) tablet 5 mg  5 mg Q3HRS PRN JOIE Johnson.N.P.        Or    oxyCODONE immediate-release (Roxicodone) tablet 10 mg  10 mg Q3HRS PRN JOIE Johnson.N.P.   10 mg at 02/15/24 0631    Or    HYDROmorphone (Dilaudid) injection 1 mg  1 mg Q3HRS PRN JOIE Johnson.N.P.   1 mg at 02/15/24 0247   Last reviewed on 2/14/2024  2:06 PM by Carlie Lr, [facility]       Review of Systems:  Review of Systems   Constitutional:  Negative for chills, fever and malaise/fatigue.   HENT:  Negative for hearing loss.    Eyes:  Negative for blurred vision.   Respiratory:  Negative for cough and shortness of breath.    Cardiovascular:  Negative for chest pain and leg swelling.   Gastrointestinal:  Positive for abdominal pain. Negative for blood in stool, constipation, diarrhea, heartburn, melena, nausea and vomiting.   Genitourinary:  Negative for dysuria.   Musculoskeletal:  Negative for back pain.   Skin:  Negative for rash.   Neurological:  Negative for dizziness and weakness.   Psychiatric/Behavioral:  Negative for depression.    All other systems reviewed and are negative.        Vital signs:  Weight/BMI: Body mass index is 25.29 kg/m².  BP 95/73   Pulse 63   Temp 36.3 °C (97.3 °F) (Temporal)   Resp 16   Wt 56.8 kg (125 lb 3.5 oz)   SpO2 95%   Vitals:    02/14/24 1535 02/14/24 1600 02/14/24  1935 02/15/24 0347   BP:  103/66 101/67 95/73   Pulse:  62 64 63   Resp: 18 16 16 16   Temp:  36.9 °C (98.5 °F) 36.1 °C (97 °F) 36.3 °C (97.3 °F)   TempSrc:  Temporal Temporal Temporal   SpO2:  91% 96% 95%   Weight:         Oxygen Therapy:  Pulse Oximetry: 95 %, O2 (LPM): 0, O2 Delivery Device: None - Room Air    Intake/Output Summary (Last 24 hours) at 2/15/2024 0715  Last data filed at 2/14/2024 2100  Gross per 24 hour   Intake 360 ml   Output --   Net 360 ml         Physical Exam:  Physical Exam  Vitals and nursing note reviewed.   Constitutional:       General: She is not in acute distress.     Appearance: Normal appearance.   HENT:      Head: Normocephalic and atraumatic.      Right Ear: External ear normal.      Left Ear: External ear normal.      Nose: Nose normal.      Mouth/Throat:      Mouth: Mucous membranes are moist.      Pharynx: Oropharynx is clear.   Eyes:      General: No scleral icterus.  Cardiovascular:      Rate and Rhythm: Normal rate and regular rhythm.      Pulses: Normal pulses.      Heart sounds: Normal heart sounds.   Pulmonary:      Effort: Pulmonary effort is normal. No respiratory distress.      Breath sounds: Normal breath sounds.   Abdominal:      General: Abdomen is flat. Bowel sounds are normal. There is distension.      Palpations: Abdomen is soft.      Tenderness: There is abdominal tenderness.   Musculoskeletal:         General: Normal range of motion.      Cervical back: Normal range of motion.   Skin:     General: Skin is warm and dry.      Capillary Refill: Capillary refill takes less than 2 seconds.   Neurological:      Mental Status: She is alert and oriented to person, place, and time.   Psychiatric:         Mood and Affect: Mood normal.         Behavior: Behavior normal.         Labs:  Recent Labs     02/14/24  1122 02/15/24  0220   SODIUM 139 140   POTASSIUM 4.7 4.5   CHLORIDE 104 105   CO2 23 25   BUN 23* 12   CREATININE 0.44* 0.46*   CALCIUM 9.0 8.9     Recent Labs      02/14/24  1122 02/15/24  0220   ALTSGPT 20  --    ASTSGOT 23  --    ALKPHOSPHAT 73  --    TBILIRUBIN 0.2  --    LIPASE 1039*  --    GLUCOSE 99 85     Recent Labs     02/14/24  1122 02/15/24  0220   WBC 7.3 5.5   NEUTSPOLYS 56.70  --    LYMPHOCYTES 27.40  --    MONOCYTES 10.60  --    EOSINOPHILS 3.40  --    BASOPHILS 1.80  --    ASTSGOT 23  --    ALTSGPT 20  --    ALKPHOSPHAT 73  --    TBILIRUBIN 0.2  --      Recent Labs     02/14/24  1122 02/15/24  0220   RBC 5.01 4.34   HEMOGLOBIN 14.8 13.1   HEMATOCRIT 44.6 38.6   PLATELETCT 239 177   FERRITIN  --  183.0     Recent Results (from the past 24 hour(s))   CBC with Differential    Collection Time: 02/14/24 11:22 AM   Result Value Ref Range    WBC 7.3 4.8 - 10.8 K/uL    RBC 5.01 4.20 - 5.40 M/uL    Hemoglobin 14.8 12.0 - 16.0 g/dL    Hematocrit 44.6 37.0 - 47.0 %    MCV 89.0 81.4 - 97.8 fL    MCH 29.5 27.0 - 33.0 pg    MCHC 33.2 32.2 - 35.5 g/dL    RDW 40.9 35.9 - 50.0 fL    Platelet Count 239 164 - 446 K/uL    MPV 11.4 9.0 - 12.9 fL    Neutrophils-Polys 56.70 44.00 - 72.00 %    Lymphocytes 27.40 22.00 - 41.00 %    Monocytes 10.60 0.00 - 13.40 %    Eosinophils 3.40 0.00 - 6.90 %    Basophils 1.80 0.00 - 1.80 %    Immature Granulocytes 0.10 0.00 - 0.90 %    Nucleated RBC 0.00 0.00 - 0.20 /100 WBC    Neutrophils (Absolute) 4.13 1.82 - 7.42 K/uL    Lymphs (Absolute) 2.00 1.00 - 4.80 K/uL    Monos (Absolute) 0.77 0.00 - 0.85 K/uL    Eos (Absolute) 0.25 0.00 - 0.51 K/uL    Baso (Absolute) 0.13 (H) 0.00 - 0.12 K/uL    Immature Granulocytes (abs) 0.01 0.00 - 0.11 K/uL    NRBC (Absolute) 0.00 K/uL   Complete Metabolic Panel    Collection Time: 02/14/24 11:22 AM   Result Value Ref Range    Sodium 139 135 - 145 mmol/L    Potassium 4.7 3.6 - 5.5 mmol/L    Chloride 104 96 - 112 mmol/L    Co2 23 20 - 33 mmol/L    Anion Gap 12.0 7.0 - 16.0    Glucose 99 65 - 99 mg/dL    Bun 23 (H) 8 - 22 mg/dL    Creatinine 0.44 (L) 0.50 - 1.40 mg/dL    Calcium 9.0 8.5 - 10.5 mg/dL    Correct Calcium  9.6 8.5 - 10.5 mg/dL    AST(SGOT) 23 12 - 45 U/L    ALT(SGPT) 20 2 - 50 U/L    Alkaline Phosphatase 73 30 - 99 U/L    Total Bilirubin 0.2 0.1 - 1.5 mg/dL    Albumin 3.3 3.2 - 4.9 g/dL    Total Protein 6.4 6.0 - 8.2 g/dL    Globulin 3.1 1.9 - 3.5 g/dL    A-G Ratio 1.1 g/dL   Lipase    Collection Time: 02/14/24 11:22 AM   Result Value Ref Range    Lipase 1039 (H) 11 - 82 U/L   ESTIMATED GFR    Collection Time: 02/14/24 11:22 AM   Result Value Ref Range    GFR (CKD-EPI) 120 >60 mL/min/1.73 m 2   DIAGNOSTIC ALCOHOL    Collection Time: 02/14/24 11:22 AM   Result Value Ref Range    Diagnostic Alcohol <10.1 <10.1 mg/dL   Urinalysis    Collection Time: 02/14/24 11:45 AM    Specimen: Urine   Result Value Ref Range    Color Yellow     Character Clear     Specific Gravity 1.018 <1.035    Ph 5.0 5.0 - 8.0    Glucose Negative Negative mg/dL    Ketones Negative Negative mg/dL    Protein Negative Negative mg/dL    Bilirubin Negative Negative    Urobilinogen, Urine 0.2 Negative    Nitrite Negative Negative    Leukocyte Esterase Small (A) Negative    Occult Blood Negative Negative    Micro Urine Req Microscopic    URINE MICROSCOPIC (W/UA)    Collection Time: 02/14/24 11:45 AM   Result Value Ref Range    WBC 0-2 /hpf    RBC 0-2 /hpf    Bacteria Negative None /hpf    Epithelial Cells Negative /hpf    Hyaline Cast 0-2 /lpf   VITAMIN D,25 HYDROXY (DEFICIENCY)    Collection Time: 02/15/24  2:20 AM   Result Value Ref Range    25-Hydroxy   Vitamin D 25 26 (L) 30 - 100 ng/mL   VITAMIN B12    Collection Time: 02/15/24  2:20 AM   Result Value Ref Range    Vitamin B12 -True Cobalamin 515 211 - 911 pg/mL   FERRITIN    Collection Time: 02/15/24  2:20 AM   Result Value Ref Range    Ferritin 183.0 10.0 - 291.0 ng/mL   CBC without Differential    Collection Time: 02/15/24  2:20 AM   Result Value Ref Range    WBC 5.5 4.8 - 10.8 K/uL    RBC 4.34 4.20 - 5.40 M/uL    Hemoglobin 13.1 12.0 - 16.0 g/dL    Hematocrit 38.6 37.0 - 47.0 %    MCV 88.9 81.4 -  97.8 fL    MCH 30.2 27.0 - 33.0 pg    MCHC 33.9 32.2 - 35.5 g/dL    RDW 41.4 35.9 - 50.0 fL    Platelet Count 177 164 - 446 K/uL    MPV 11.4 9.0 - 12.9 fL   Basic Metabolic Panel (BMP)    Collection Time: 02/15/24  2:20 AM   Result Value Ref Range    Sodium 140 135 - 145 mmol/L    Potassium 4.5 3.6 - 5.5 mmol/L    Chloride 105 96 - 112 mmol/L    Co2 25 20 - 33 mmol/L    Glucose 85 65 - 99 mg/dL    Bun 12 8 - 22 mg/dL    Creatinine 0.46 (L) 0.50 - 1.40 mg/dL    Calcium 8.9 8.5 - 10.5 mg/dL    Anion Gap 10.0 7.0 - 16.0   ESTIMATED GFR    Collection Time: 02/15/24  2:20 AM   Result Value Ref Range    GFR (CKD-EPI) 119 >60 mL/min/1.73 m 2       Radiology Review:  CT-PANCREAS AND ABDOMEN WITH & W/O   Final Result      1.  Grossly stable mild pancreatic ductal dilatation. There is no CT evidence for acute pancreatitis. No focal fluid collection.   2.  Mild biliary dilatation in this postcholecystectomy patient is again noted.   3.  No interval new abnormality.                  MDM (Data Review):   -Records reviewed and summarized in current documentation  -I personally reviewed and interpreted the laboratory results  -I personally reviewed the radiology images    Assessment/Recommendations:  Recurrent acute pancreatitis with elevated lipase and amylase  Lipids WNL January 2024, calcium not elevated, TSH within normal range in 2023  Pancreatic and CBD dilatation with questionable ampullary stenosis on MRCP  Abdominal pain with nausea, significant history for years  History of Tristan-en-Y bypass in 2007  Seizure disorder on Keppra  Total hysterectomy in November 2022  Cholecystectomy in 2010  Exploratory laparoscopy with Dr. Fernandez in 2022 which per his operative report was normal, he remarked that she had pain syndrome without anatomic identifiable etiology  Weight loss  Migraines  Hypothyroidism  Rheumatoid arthritis not on medication  Vitamin D deficiency     Causes of acute pancreatitis:  Gallstones/Obstruction  - No  gallstones noted on MRCP or CT pancreas  - Double duct sign with ampullary stricture, mild and stable  - No reported trauma    Toxic/Metabolic  - Patient has been sober from alcohol for 4 years  - Lipid panel, TSH, calcium, renal function within normal limits  - Continues to smoke cigarettes   - On Keppra which evidence shows can contribute to acute pancreatitis    Autoimmune  No family history  - CHARMAINE, TTG, IGG4 pending    Plan:  - Fecal elastase and neural fat pending, patient has not had a bowel movement  - Start Vitamin D supplementation  - Awaiting call back from Dr. KRYSTYNA Hoffman neurology.  GI team planning to discuss with him if there is an alternative to Keppra  - Further evidence-based supportive care of acute pancreatitis per primary team with fluids, pain management, and diet as tolerated  - Patient needs to be evaluated at a center capable of doing an EDGE procedure with ERCP/EUS such as [facility]  - Notified [facility], awaiting return call   - Due to her Tristan-en-Y anatomy, she requires advanced procedures to evaluate her ampulla not available here at [facility]    GI team will follow.     Discussed with patient and her , Dr. Tai, Dr. Sharmila Caldwell, DNP,  APRN  Gastroenterology    Core Quality Measures   Reviewed items::  Labs, Medications and Radiology reports reviewed

## 2024-02-15 NOTE — H&P
Hospital Medicine History & Physical Note    Date of Service  2/14/2024    Primary Care Physician  Maria De Jesus Sadler M.D.    Consultants  GI    Specialist Names: CLIFF Parikh, Dr Palafox    Code Status  Full Code    Chief Complaint  Chief Complaint   Patient presents with    Abdominal Pain     Patient c/o lower abd pain radiating around bilateral flank x 2 days.        History of Presenting Illness  Tamela Stern is a 48 y.o. female with prior Tristan-en-Y, hypothyroid, RA, seizures disorder on keppra.  She presented 2/14/2024 with abdominal pain  that started 2 days ago with worsening of pain and sought care.  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  During that admit she had resolve of her pain.  She returns with an elevated lipase >1000.  She denies any recently illness nor any alcohol use.     I discussed the plan of care with patient.    Review of Systems  Review of Systems   Constitutional:  Negative for fever and malaise/fatigue.   HENT:  Negative for congestion.    Respiratory:  Negative for shortness of breath.    Cardiovascular:  Negative for chest pain and leg swelling.   Gastrointestinal:  Positive for abdominal pain and nausea. Negative for diarrhea and vomiting.   Genitourinary:  Negative for dysuria.   Musculoskeletal:  Negative for back pain and neck pain.   Neurological:  Negative for headaches.   Psychiatric/Behavioral:  The patient is not nervous/anxious.        Past Medical History   has a past medical history of Anemia, Anesthesia (04/19/2022), Arthritis (04/19/2022), Cancer (HCC), Cervical spondylosis (12/11/2015), Concussion, Constipation, Major depression (01/20/2015), Migraine, Neoplasm of uncertain behavior of skin (05/02/2017), Other specified symptom associated with female genital organs, Pain (04/19/2022), PONV (postoperative nausea and vomiting), Right acoustic neuroma (HCC),  Seizure disorder ([facility]) (1999), Shingles (01/13/2016), Sleep apnea, Stroke ([facility]) (01/2016), and Thyroid activity decreased.    Surgical History   has a past surgical history that includes gastric bypass laparoscopic; hchg tube, ear ultrasil; gastroscopy-endo (1/20/2009); colonoscopy - endo (1/22/2009); gyn surgery; pelviscopy (6/16/2009); lysis adhesions gyn (6/16/2009); gastroscopy-endo (8/12/2010); lizz by laparoscopy (8/15/2010); lysis adhesions general (8/15/2010); appendectomy (1994); other abdominal surgery (1997); other abdominal surgery (2009); cervical disk and fusion anterior (4/27/2016); pr upper gi endoscopy,diagnosis (N/A, 4/11/2022); pr upper gi endoscopy,biopsy (N/A, 4/11/2022); carpal tunnel release; other orthopedic surgery (Right, 2018); other orthopedic surgery (Right, 2019); mastoidectomy (Right, 2015); pr lap,diagnostic abdomen (4/27/2022); laparoscopic lysis of adhesions (4/27/2022); pr lap,rmv  adnexal structure (Bilateral, 11/2/2022); pr cystourethroscopy (N/A, 11/2/2022); hysterectomy laparoscopy (N/A, 11/2/2022); and laparoscopic lysis of adhesions (N/A, 11/2/2022).     Family History  family history includes Alcohol/Drug in her mother; Breast Cancer in her paternal aunt; Cancer in her mother, paternal aunt, paternal grandfather, and paternal grandmother; Diabetes in her maternal grandfather and mother; Heart Disease in her paternal grandfather; Hyperlipidemia in her paternal grandfather; Hypertension in her paternal grandfather; Ovarian Cancer in her paternal grandmother.   Family history reviewed with patient. There is no family history that is pertinent to the chief complaint.     Social History   reports that she has been smoking cigarettes. She has quit using smokeless tobacco. She reports current drug use. Drugs: Marijuana and Oral. She reports that she does not drink alcohol.    Allergies  Allergies   Allergen Reactions    Phentermine Hcl      Swelling short of breath    Morphine  Vomiting    2-Phenylbenzimidazole-5-Sulfonic Acid [Phenyl Benzimidazole Sulfonic Acid]      Other reaction(s): Reaction:throat swelling       Medications  Prior to Admission Medications   Prescriptions Last Dose Informant Patient Reported? Taking?   DULoxetine (CYMBALTA) 60 MG Cap DR Particles delayed-release capsule 2024 at 0600  No Yes   Sig: Take 1 capsule by mouth once daily   busPIRone (BUSPAR) 10 MG Tab tablet 2024 at 0600 Patient, Rx Bottle (For Med Information) No Yes   Sig: Take 1 Tablet by mouth 3 times a day.   fluticasone (FLONASE) 50 MCG/ACT nasal spray unk at unk Patient No No   Sig: Spray 1 Spray in nose every day.   gabapentin (NEURONTIN) 100 MG Cap 2024 at 0600  No Yes   Sig: Take 1 Capsule by mouth 3 times a day.   levETIRAcetam (KEPPRA) 100 MG/ML Solution 2024 at 0600 Patient, Rx Bottle (For Med Information) No Yes   Si ml in AM and 10 ml in pm   Patient taking differently: Take 500-1,000 mg by mouth 2 times a day. 5 ml in AM and 10 ml in pm   levothyroxine (SYNTHROID) 50 MCG Tab 2024 at 0600 Patient, Rx Bottle (For Med Information) No Yes   Sig: TAKE 1 TABLET BY MOUTH EVERY DAY IN THE MORNING ON AN EMPTY STOMACH   Patient taking differently: Take 50 mcg by mouth every morning on an empty stomach.   metoclopramide (REGLAN) 10 MG Tab unk at unk  Yes Yes   Sig: Take 10-30 mg by mouth 1 time a day as needed. Indications: Migraine Headache, Nausea and Vomiting   ondansetron (ZOFRAN ODT) 4 MG TABLET DISPERSIBLE 2024 at unk  No Yes   Sig: Dissolve 1 Tablet by mouth every 6 hours as needed for Nausea/Vomiting.   promethazine (PHENERGAN) 25 MG Suppos unk at unk Patient No No   Sig: Insert 1 Suppository into the rectum every 6 hours as needed for Nausea/Vomiting.   senna-docusate (SENNA PLUS) 8.6-50 MG Tab unk at unk  Yes Yes   Sig: Take 2 Tablets by mouth 2 times a day as needed for Constipation.   sumatriptan (IMITREX) 100 MG tablet unk at unk Patient No No   Si tab  "at headache onset; repeat in 1 hour prn      Facility-Administered Medications: None       Physical Exam  Temp:  [36.1 °C (97 °F)-36.9 °C (98.5 °F)] 36.1 °C (97 °F)  Pulse:  [62-86] 64  Resp:  [15-18] 16  BP: ()/(59-71) 101/67  SpO2:  [91 %-96 %] 96 %  Blood Pressure: 101/67   Temperature: 36.1 °C (97 °F)   Pulse: 64   Respiration: 16   Pulse Oximetry: 96 %       Physical Exam  Vitals reviewed.   Constitutional:       Appearance: Normal appearance. She is not ill-appearing.   HENT:      Head: Atraumatic.      Nose: Nose normal.      Mouth/Throat:      Mouth: Mucous membranes are moist.   Eyes:      Conjunctiva/sclera: Conjunctivae normal.   Cardiovascular:      Rate and Rhythm: Normal rate and regular rhythm.      Heart sounds: No murmur heard.  Pulmonary:      Effort: No respiratory distress.      Breath sounds: Normal breath sounds.   Abdominal:      General: Bowel sounds are normal. There is no distension.      Palpations: Abdomen is soft.      Tenderness: There is abdominal tenderness. There is no guarding.   Musculoskeletal:      Cervical back: Neck supple.      Right lower leg: No edema.      Left lower leg: No edema.   Neurological:      General: No focal deficit present.      Mental Status: She is alert and oriented to person, place, and time.   Psychiatric:         Mood and Affect: Mood normal.         Thought Content: Thought content normal.         Laboratory:  Recent Labs     02/14/24  1122   WBC 7.3   RBC 5.01   HEMOGLOBIN 14.8   HEMATOCRIT 44.6   MCV 89.0   MCH 29.5   MCHC 33.2   RDW 40.9   PLATELETCT 239   MPV 11.4     Recent Labs     02/14/24  1122   SODIUM 139   POTASSIUM 4.7   CHLORIDE 104   CO2 23   GLUCOSE 99   BUN 23*   CREATININE 0.44*   CALCIUM 9.0     Recent Labs     02/14/24  1122   ALTSGPT 20   ASTSGOT 23   ALKPHOSPHAT 73   TBILIRUBIN 0.2   LIPASE 1039*   GLUCOSE 99         No results for input(s): \"NTPROBNP\" in the last 72 hours.      No results for input(s): \"TROPONINT\" in the last " 72 hours.    Imaging:  CT-PANCREAS AND ABDOMEN WITH & W/O   Final Result      1.  Grossly stable mild pancreatic ductal dilatation. There is no CT evidence for acute pancreatitis. No focal fluid collection.   2.  Mild biliary dilatation in this postcholecystectomy patient is again noted.   3.  No interval new abnormality.                  Assessment/Plan:  Justification for Admission Status  I anticipate this patient will require at least two midnights for appropriate medical management, necessitating inpatient admission because acute pancreatitis and needing IV fluids and pain management along with GI consultation for possible ERCP.    Patient will need a Med/Surg bed on MEDICAL service .  The need is secondary to IV fluids and IV pain meds.    * Pancreatitis, unspecified pancreatitis type- (present on admission)  Assessment & Plan  CT abd with focus on pancreas has been ordered  IV fluids 150cc/hr  IV pain meds  Clear liquids as tolerates  GI consulting    S/P gastric bypass- (present on admission)  Assessment & Plan  Hx of larissa-en-y    Moderate episode of recurrent major depressive disorder (HCC)- (present on admission)  Assessment & Plan  Duloxetine 60mg qd, and buspar 10mg tid        VTE prophylaxis: SCDs/TEDs

## 2024-02-15 NOTE — PROGRESS NOTES
Hospital Medicine Daily Progress Note    Date of Service  2/15/2024    Chief Complaint  Tamela Stern is a 48 y.o. female admitted 2/14/2024 with abdominal pain    Hospital Course  8 y.o. female with prior Tristan-en-Y, hypothyroid, RA, seizures disorder on keppra.  She presented 2/14/2024 with abdominal pain  that started 2 days ago with worsening of pain and sought care.  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  During that admit she had resolve of her pain.  She returns with an elevated lipase >1000.  She denies any recently illness nor any alcohol use.     Interval Problem Update  Still reports abdominal pain and some nausea. Tolerating clear liquid diet. Her 1st episode of pancreatitis started 3 weeks ago. She does not drink alcohol, states her last drink was 4 years ago. She has been on keppra for several years. Follows with Dr Morales. States she hasn't had a seizure in over a year.     I have discussed this patient's plan of care and discharge plan at IDT rounds today with Case Management, Nursing, Nursing leadership, and other members of the IDT team.    Consultants/Specialty  GI    Code Status  Full Code    Disposition  The patient is not medically cleared for discharge to home or a post-acute facility.    I have placed the appropriate orders for post-discharge needs.    Review of Systems  Review of Systems   Gastrointestinal:  Positive for abdominal pain, nausea and vomiting.        Physical Exam  Temp:  [35.9 °C (96.7 °F)-36.9 °C (98.5 °F)] 35.9 °C (96.7 °F)  Pulse:  [62-74] 64  Resp:  [15-18] 18  BP: ()/(64-78) 115/78  SpO2:  [91 %-98 %] 98 %    Physical Exam  Vitals and nursing note reviewed.   Constitutional:       General: She is not in acute distress.  HENT:      Head: Normocephalic.      Mouth/Throat:      Mouth: Mucous membranes are moist.   Eyes:      Pupils: Pupils are equal, round, and  reactive to light.   Cardiovascular:      Rate and Rhythm: Normal rate and regular rhythm.      Pulses: Normal pulses.      Heart sounds: Normal heart sounds.   Pulmonary:      Effort: Pulmonary effort is normal.      Breath sounds: Normal breath sounds.   Abdominal:      Palpations: Abdomen is soft.      Tenderness: There is abdominal tenderness.   Musculoskeletal:         General: No swelling.      Cervical back: Neck supple.   Skin:     General: Skin is warm.      Coloration: Skin is not jaundiced.   Neurological:      General: No focal deficit present.      Mental Status: She is alert and oriented to person, place, and time.   Psychiatric:         Mood and Affect: Mood normal.         Behavior: Behavior normal.       Fluids    Intake/Output Summary (Last 24 hours) at 2/15/2024 1136  Last data filed at 2/14/2024 2100  Gross per 24 hour   Intake 360 ml   Output --   Net 360 ml       Laboratory  Recent Labs     02/14/24  1122 02/15/24  0220   WBC 7.3 5.5   RBC 5.01 4.34   HEMOGLOBIN 14.8 13.1   HEMATOCRIT 44.6 38.6   MCV 89.0 88.9   MCH 29.5 30.2   MCHC 33.2 33.9   RDW 40.9 41.4   PLATELETCT 239 177   MPV 11.4 11.4     Recent Labs     02/14/24  1122 02/15/24  0220   SODIUM 139 140   POTASSIUM 4.7 4.5   CHLORIDE 104 105   CO2 23 25   GLUCOSE 99 85   BUN 23* 12   CREATININE 0.44* 0.46*   CALCIUM 9.0 8.9                   Imaging  CT-PANCREAS AND ABDOMEN WITH & W/O   Final Result      1.  Grossly stable mild pancreatic ductal dilatation. There is no CT evidence for acute pancreatitis. No focal fluid collection.   2.  Mild biliary dilatation in this postcholecystectomy patient is again noted.   3.  No interval new abnormality.                 Assessment/Plan  * Pancreatitis, unspecified pancreatitis type- (present on admission)  Assessment & Plan  CT abd with focus on pancreas has been ordered  IV fluids 150cc/hr  IV pain meds  Clear liquids as tolerates  GI consulting    S/P gastric bypass- (present on  admission)  Assessment & Plan  Hx of larissa-en-y    Hypothyroidism due to acquired atrophy of thyroid- (present on admission)  Assessment & Plan  Continue synthroid    Moderate episode of recurrent major depressive disorder (HCC)- (present on admission)  Assessment & Plan  Duloxetine 60mg qd, and buspar 10mg tid         VTE prophylaxis: lovenox    I have performed a physical exam and reviewed and updated ROS and Plan today (2/15/2024). In review of yesterday's note (2/14/2024), there are no changes except as documented above.    I spent 55 minutes, reviewing the chart, obtaining and/or reviewing separately obtained history. Performing a medically appropriate examination and evaluation.  Counseling and educating the patient. Ordering and reviewing medications, tests, or procedures.  Discussing the case with GI.  Documenting clinical information in EPIC. Independently interpreting results and communicating results to patient. Discussing future disposition of care with patient, RN and case management.

## 2024-02-15 NOTE — CARE PLAN
The patient is Stable - Low risk of patient condition declining or worsening    Shift Goals  Clinical Goals: Pain control, tolerate PO intake  Patient Goals: Pain control, rest  Family Goals: Not present    Progress made toward(s) clinical / shift goals:      Problem: Knowledge Deficit - Standard  Goal: Patient and family/care givers will demonstrate understanding of plan of care, disease process/condition, diagnostic tests and medications  Outcome: Progressing     Problem: Provide Safe Environment  Goal: Suicide environmental safety, protocols, policies, and practices will be implemented  Outcome: Progressing     Problem: Gastrointestinal Irritability  Goal: Nausea and vomiting will be absent or improve  Outcome: Progressing  Note: Pt c/o of nausea. PRN medication given as ordered. Pt states some relief and medication helps with nausea.       Patient is not progressing towards the following goals:      Problem: Pain - Standard  Goal: Alleviation of pain or a reduction in pain to the patient’s comfort goal  Outcome: Not Progressing  Flowsheets (Taken 2/15/2024 1205)  Pain Rating Scale (NPRS): 8  Note: Pt with 7-8/10 pain score. PRN medication given as ordererd. After 1 hr pt pain score remains the same.         Axo4. Able to make needs known. VSS WNL. Afebrile. Ambulates and transfers by self. Due medications given as ordered. Remained free from injury or fall. Placed call light and personal belongings within reach. Needs met at this time.

## 2024-02-15 NOTE — CARE PLAN
The patient is Stable - Low risk of patient condition declining or worsening    Shift Goals  Clinical Goals: Pain control, tolerate PO intake  Patient Goals: Pain control, rest  Family Goals: Not present    Progress made toward(s) clinical / shift goals:  pain control addressed, education provided on available medications     Problem: Pain - Standard  Goal: Alleviation of pain or a reduction in pain to the patient’s comfort goal  Outcome: Progressing  Note: This shift, pain will be controlled at level comfortable to patient using medication as per MAR.      Problem: Knowledge Deficit - Standard  Goal: Patient and family/care givers will demonstrate understanding of plan of care, disease process/condition, diagnostic tests and medications  Outcome: Progressing  Note: This shift, patient will verbalize understanding of provided education.      Problem: Gastrointestinal Irritability  Goal: Nausea and vomiting will be absent or improve  Outcome: Progressing  Note: Patient will have minimal to no nausea this shift.      Patient is not progressing towards the following goals:

## 2024-02-15 NOTE — PROGRESS NOTES
Assumed care of patient. Assessment performed. 2 RN skin check completed. Medicated as per MAR for abdominal pain. Patient is unhappy with current pain regiment. No report of numbness, tingling, nausea, or vomiting. Call light within reach. All needs met at this time.

## 2024-02-15 NOTE — CARE PLAN
The patient is Stable - Low risk of patient condition declining or worsening    Shift Goals  Clinical Goals: safety, pain will be <5 by end of shift  Patient Goals: rest and comfort  Family Goals: wife to be well and go home with no pain    Progress made toward(s) clinical / shift goals:      Problem: Pain - Standard  Goal: Alleviation of pain or a reduction in pain to the patient’s comfort goal  Outcome: Progressing     Problem: Knowledge Deficit - Standard  Goal: Patient and family/care givers will demonstrate understanding of plan of care, disease process/condition, diagnostic tests and medications  2/14/2024 1753 by Antoinette Armas R.N.  Outcome: Progressing  2/14/2024 1753 by Antoinette Armas R.N.  Outcome: Progressing     Problem: Provide Safe Environment  Goal: Suicide environmental safety, protocols, policies, and practices will be implemented  2/14/2024 1753 by Antoinette Armas R.N.  Outcome: Progressing  2/14/2024 1753 by LALA UrbanoN.  Outcome: Progressing     Problem: Psychosocial  Goal: Patient's ability to identify and develop effective coping behaviors will improve  2/14/2024 1753 by Antoinette Armas RAdonayN.  Outcome: Progressing  2/14/2024 1753 by LALA UrbanoN.  Outcome: Progressing  Goal: Patient's ability to identify and utilize available support systems will improve  Outcome: Progressing     Axo4. Able to ambulate by self. VSS WDL. Remain free from injury or fall. Due medications given as ordered.  Placed call light and personal belongings within reach. Needs met at this time.

## 2024-02-15 NOTE — PROGRESS NOTES
4 Eyes Skin Assessment Completed by TORRI Artis and TORRI Silvestre.    Head WDL  Ears WDL  Nose WDL  Mouth WDL  Neck WDL  Breast/Chest WDL  Shoulder Blades WDL  Spine WDL  (R) Arm/Elbow/Hand Bruising  (L) Arm/Elbow/Hand Bruising  Abdomen WDL  Groin WDL  Scrotum/Coccyx/Buttocks WDL  (R) Leg WDL  (L) Leg WDL  (R) Heel/Foot/Toe WDL  (L) Heel/Foot/Toe WDL          Interventions In Place N/A    Possible Skin Injury No    Pictures Uploaded Into Epic Yes  Wound Consult Placed N/A  RN Wound Prevention Protocol Ordered No

## 2024-02-16 ENCOUNTER — PHARMACY VISIT (OUTPATIENT)
Dept: PHARMACY | Facility: MEDICAL CENTER | Age: 49
End: 2024-02-16
Payer: COMMERCIAL

## 2024-02-16 VITALS
OXYGEN SATURATION: 98 % | HEART RATE: 68 BPM | DIASTOLIC BLOOD PRESSURE: 83 MMHG | TEMPERATURE: 97.3 F | WEIGHT: 125.22 LBS | RESPIRATION RATE: 17 BRPM | SYSTOLIC BLOOD PRESSURE: 124 MMHG | BODY MASS INDEX: 25.29 KG/M2

## 2024-02-16 PROBLEM — K85.90 PANCREATITIS, UNSPECIFIED PANCREATITIS TYPE: Status: RESOLVED | Noted: 2024-02-14 | Resolved: 2024-02-16

## 2024-02-16 LAB
ANION GAP SERPL CALC-SCNC: 9 MMOL/L (ref 7–16)
BUN SERPL-MCNC: 10 MG/DL (ref 8–22)
CALCIUM SERPL-MCNC: 9.1 MG/DL (ref 8.5–10.5)
CHLORIDE SERPL-SCNC: 105 MMOL/L (ref 96–112)
CO2 SERPL-SCNC: 29 MMOL/L (ref 20–33)
CREAT SERPL-MCNC: 0.48 MG/DL (ref 0.5–1.4)
ERYTHROCYTE [DISTWIDTH] IN BLOOD BY AUTOMATED COUNT: 40.2 FL (ref 35.9–50)
GFR SERPLBLD CREATININE-BSD FMLA CKD-EPI: 117 ML/MIN/1.73 M 2
GLUCOSE SERPL-MCNC: 83 MG/DL (ref 65–99)
HCT VFR BLD AUTO: 40.2 % (ref 37–47)
HGB BLD-MCNC: 13.4 G/DL (ref 12–16)
IGG1 SER-MCNC: 376 MG/DL (ref 240–1118)
IGG2 SER-MCNC: <2 MG/DL (ref 124–549)
IGG3 SER-MCNC: 28 MG/DL (ref 21–134)
IGG4 SER-MCNC: 2 MG/DL (ref 1–123)
LIPASE SERPL-CCNC: 75 U/L (ref 11–82)
MCH RBC QN AUTO: 29.3 PG (ref 27–33)
MCHC RBC AUTO-ENTMCNC: 33.3 G/DL (ref 32.2–35.5)
MCV RBC AUTO: 88 FL (ref 81.4–97.8)
NUCLEAR IGG SER QL IA: NORMAL
PLATELET # BLD AUTO: 174 K/UL (ref 164–446)
PMV BLD AUTO: 11.2 FL (ref 9–12.9)
POTASSIUM SERPL-SCNC: 4.7 MMOL/L (ref 3.6–5.5)
RBC # BLD AUTO: 4.57 M/UL (ref 4.2–5.4)
SODIUM SERPL-SCNC: 143 MMOL/L (ref 135–145)
TTG IGA SER IA-ACNC: <1.02 FLU (ref 0–4.99)
WBC # BLD AUTO: 5.2 K/UL (ref 4.8–10.8)

## 2024-02-16 PROCEDURE — 700105 HCHG RX REV CODE 258: Performed by: HOSPITALIST

## 2024-02-16 PROCEDURE — RXMED WILLOW AMBULATORY MEDICATION CHARGE: Performed by: INTERNAL MEDICINE

## 2024-02-16 PROCEDURE — 85027 COMPLETE CBC AUTOMATED: CPT

## 2024-02-16 PROCEDURE — 700111 HCHG RX REV CODE 636 W/ 250 OVERRIDE (IP)

## 2024-02-16 PROCEDURE — 83690 ASSAY OF LIPASE: CPT

## 2024-02-16 PROCEDURE — 700102 HCHG RX REV CODE 250 W/ 637 OVERRIDE(OP)

## 2024-02-16 PROCEDURE — 99239 HOSP IP/OBS DSCHRG MGMT >30: CPT | Performed by: INTERNAL MEDICINE

## 2024-02-16 PROCEDURE — A9270 NON-COVERED ITEM OR SERVICE: HCPCS

## 2024-02-16 PROCEDURE — A9270 NON-COVERED ITEM OR SERVICE: HCPCS | Performed by: NURSE PRACTITIONER

## 2024-02-16 PROCEDURE — 80048 BASIC METABOLIC PNL TOTAL CA: CPT

## 2024-02-16 PROCEDURE — 700102 HCHG RX REV CODE 250 W/ 637 OVERRIDE(OP): Performed by: HOSPITALIST

## 2024-02-16 PROCEDURE — 700102 HCHG RX REV CODE 250 W/ 637 OVERRIDE(OP): Performed by: NURSE PRACTITIONER

## 2024-02-16 PROCEDURE — A9270 NON-COVERED ITEM OR SERVICE: HCPCS | Performed by: HOSPITALIST

## 2024-02-16 RX ORDER — AMOXICILLIN 250 MG
2 CAPSULE ORAL EVERY EVENING
Status: DISCONTINUED | OUTPATIENT
Start: 2024-02-16 | End: 2024-02-16 | Stop reason: HOSPADM

## 2024-02-16 RX ORDER — OXYCODONE HYDROCHLORIDE 10 MG/1
10 TABLET ORAL EVERY 6 HOURS PRN
Qty: 20 TABLET | Refills: 0 | Status: SHIPPED | OUTPATIENT
Start: 2024-02-16 | End: 2024-02-23

## 2024-02-16 RX ORDER — POLYETHYLENE GLYCOL 3350 17 G/17G
1 POWDER, FOR SOLUTION ORAL
Status: DISCONTINUED | OUTPATIENT
Start: 2024-02-16 | End: 2024-02-16 | Stop reason: HOSPADM

## 2024-02-16 RX ORDER — ONDANSETRON 4 MG/1
4 TABLET, ORALLY DISINTEGRATING ORAL EVERY 6 HOURS PRN
Qty: 10 TABLET | Refills: 0 | Status: SHIPPED | OUTPATIENT
Start: 2024-02-16

## 2024-02-16 RX ADMIN — GABAPENTIN 100 MG: 100 CAPSULE ORAL at 08:19

## 2024-02-16 RX ADMIN — OXYCODONE 10 MG: 5 TABLET ORAL at 03:53

## 2024-02-16 RX ADMIN — DOCUSATE SODIUM 50 MG AND SENNOSIDES 8.6 MG 2 TABLET: 8.6; 5 TABLET, FILM COATED ORAL at 06:15

## 2024-02-16 RX ADMIN — Medication 1000 UNITS: at 04:38

## 2024-02-16 RX ADMIN — LEVETIRACETAM 500 MG: 100 SOLUTION ORAL at 04:40

## 2024-02-16 RX ADMIN — BUSPIRONE HYDROCHLORIDE 10 MG: 10 TABLET ORAL at 08:19

## 2024-02-16 RX ADMIN — LEVOTHYROXINE SODIUM 50 MCG: 0.05 TABLET ORAL at 04:37

## 2024-02-16 RX ADMIN — OXYCODONE 10 MG: 5 TABLET ORAL at 08:19

## 2024-02-16 RX ADMIN — DULOXETINE HYDROCHLORIDE 60 MG: 60 CAPSULE, DELAYED RELEASE ORAL at 04:37

## 2024-02-16 RX ADMIN — SODIUM CHLORIDE, POTASSIUM CHLORIDE, SODIUM LACTATE AND CALCIUM CHLORIDE: 600; 310; 30; 20 INJECTION, SOLUTION INTRAVENOUS at 06:48

## 2024-02-16 RX ADMIN — HYDROMORPHONE HYDROCHLORIDE 1 MG: 1 INJECTION, SOLUTION INTRAMUSCULAR; INTRAVENOUS; SUBCUTANEOUS at 05:27

## 2024-02-16 ASSESSMENT — PAIN DESCRIPTION - PAIN TYPE
TYPE: ACUTE PAIN

## 2024-02-16 NOTE — DISCHARGE SUMMARY
Discharge Summary    CHIEF COMPLAINT ON ADMISSION  Chief Complaint   Patient presents with    Abdominal Pain     Patient c/o lower abd pain radiating around bilateral flank x 2 days.        Reason for Admission  Abdominal Pain     Admission Date  2/14/2024    CODE STATUS  Full Code    HPI & HOSPITAL COURSE  48 y.o. female with prior Tristan-en-Y, hypothyroid, RA, seizures disorder on keppra who presented 2/14/2024 with abdominal pain that started 2 days ago.  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  During that admit she had resolution of her pain.  She returns with an elevated lipase >1000.  She denies any recently illness nor any alcohol use.  Patient is status postcholecystectomy in 2010.  Patient states her last drink was 4 years ago.  She denies any fevers or chills.  Patient was admitted to the hospital and treated with IV fluid hydration and pain control.  GI was consulted.  Fecal elastase, amylase, IgG4, CHARMAINE, vitamin B and D and ferritin were ordered.  CHARMAINE was previously negative in January 2023.  Of note Keppra can contribute to acute pancreatitis.  Patient was instructed to follow-up with her neurologist to discuss potential complications of Keppra which may be causing pancreatitis and possible alternatives.  The next day her lipase normalized and her pain improved.  She was able to tolerate a diet.  At this point she will be discharged home with close outpatient follow-up with her PCP, GI and neurologist.    Therefore, she is discharged in good and stable condition to home with close outpatient follow-up.    The patient met 2-midnight criteria for an inpatient stay at the time of discharge.    Discharge Date  2/16/24    FOLLOW UP ITEMS POST DISCHARGE  PCP, GI, Neurology    DISCHARGE DIAGNOSES  Principal Problem (Resolved):    Pancreatitis, unspecified pancreatitis type (POA: Yes)  Active Problems:    Moderate episode of  recurrent major depressive disorder (HCC) (POA: Yes)      Overview: This is a chronic, worsening condition.  Currently taking Cymbalta 60 mg       daily.  She is interested in talk therapy.  He denies any active suicidal       ideation and states that she would never end her life because she values       her family too much, but does state that she does have some passive       thoughts of thinking life would be easier if she were not around.                    1/4/2023 4/5/2016 6/19/2015 4/1/2015       PHQ-9 Screening       Little interest or pleasure in doing things  Not at all  Not at all       Little interest or pleasure in doing things 1 - several days          Feeling down, depressed, or hopeless  Not at all  Not at all       Feeling down, depressed, or hopeless 1 - several days          Trouble falling or staying asleep, or sleeping too much 1 - several days                Feeling tired or having little energy 3 - nearly every day          Poor appetite or overeating 3 - nearly every day          Feeling bad about yourself - or that you are a failure or have let       yourself or your family down 2 - more than half the days          Trouble concentrating on things, such as reading the newspaper or watching       television 3 - nearly every day          Moving or speaking so slowly that other people could have noticed. Or the       opposite - being so fidgety or restless that you have been moving around a       lot more than usual 2 - more than half the days          Thoughts that you would be better off dead, or of hurting yourself in some       way 1 - several days          PHQ-2 Total Score  0 0 0       PHQ-2 Total Score 2          PHQ-9 Total Score 17                  Multiple values from one day are sorted in reverse-chronological order                   Interpretation of PHQ-9 Total Score       Score Severity       1-4 No Depression       5-9 Mild Depression       10-14 Moderate Depression       15-19  Moderately Severe Depression       20-27 Severe Depression          Hypothyroidism due to acquired atrophy of thyroid (POA: Yes)      Overview: This is a chronic, controlled condition.  She takes levothyroxine 50 mcg       daily.    S/P gastric bypass (POA: Yes)      FOLLOW UP  No future appointments.  No follow-up provider specified.    MEDICATIONS ON DISCHARGE     Medication List        START taking these medications        Instructions   oxyCODONE immediate release 10 MG immediate release tablet  Commonly known as: Roxicodone   Take 1 Tablet by mouth every 6 hours as needed for Severe Pain for up to 5 days.  Dose: 10 mg            CHANGE how you take these medications        Instructions   levETIRAcetam 100 MG/ML Soln  What changed:   how much to take  how to take this  when to take this  Commonly known as: Keppra   5 ml in AM and 10 ml in pm     * ondansetron 4 MG Tbdp  What changed: Another medication with the same name was added. Make sure you understand how and when to take each.  Commonly known as: Zofran ODT   Dissolve 1 Tablet by mouth every 6 hours as needed for Nausea/Vomiting.  Dose: 4 mg     * ondansetron 4 MG Tbdp  What changed: You were already taking a medication with the same name, and this prescription was added. Make sure you understand how and when to take each.  Commonly known as: Zofran ODT   Take 1 Tablet by mouth every 6 hours as needed for Nausea/Vomiting.  Dose: 4 mg           * This list has 2 medication(s) that are the same as other medications prescribed for you. Read the directions carefully, and ask your doctor or other care provider to review them with you.                CONTINUE taking these medications        Instructions   busPIRone 10 MG Tabs tablet  Commonly known as: Buspar   Take 1 Tablet by mouth 3 times a day.  Dose: 10 mg     DULoxetine 60 MG Cpep delayed-release capsule  Commonly known as: Cymbalta   Take 1 capsule by mouth once daily  Dose: 60 mg     fluticasone 50  MCG/ACT nasal spray  Commonly known as: Flonase   Spray 1 Spray in nose every day.  Dose: 1 Spray     gabapentin 100 MG Caps  Commonly known as: Neurontin   Take 1 Capsule by mouth 3 times a day.  Dose: 100 mg     levothyroxine 50 MCG Tabs  Commonly known as: Synthroid   TAKE 1 TABLET BY MOUTH EVERY DAY IN THE MORNING ON AN EMPTY STOMACH     promethazine 25 MG Supp  Commonly known as: Phenergan   Insert 1 Suppository into the rectum every 6 hours as needed for Nausea/Vomiting.  Dose: 25 mg     Reglan 10 MG Tabs  Generic drug: metoclopramide   Take 10-30 mg by mouth 1 time a day as needed. Indications: Migraine Headache, Nausea and Vomiting  Dose: 10-30 mg     Senna Plus 8.6-50 MG Tabs  Generic drug: senna-docusate   Take 2 Tablets by mouth 2 times a day as needed for Constipation.  Dose: 2 Tablet     sumatriptan 100 MG tablet  Commonly known as: Imitrex   1 tab at headache onset; repeat in 1 hour prn              Allergies  Allergies   Allergen Reactions    Phentermine Hcl      Swelling short of breath    Morphine Vomiting    2-Phenylbenzimidazole-5-Sulfonic Acid [Phenyl Benzimidazole Sulfonic Acid]      Other reaction(s): Reaction:throat swelling       DIET  Orders Placed This Encounter   Procedures    Diet Order Diet: Low Fiber(GI Soft)     Standing Status:   Standing     Number of Occurrences:   1     Order Specific Question:   Diet:     Answer:   Low Fiber(GI Soft) [2]       ACTIVITY  As tolerated.  Weight bearing as tolerated    CONSULTATIONS  GI    PROCEDURES  none    LABORATORY  Lab Results   Component Value Date    SODIUM 143 02/16/2024    POTASSIUM 4.7 02/16/2024    CHLORIDE 105 02/16/2024    CO2 29 02/16/2024    GLUCOSE 83 02/16/2024    BUN 10 02/16/2024    CREATININE 0.48 (L) 02/16/2024    CREATININE 0.8 03/06/2009        Lab Results   Component Value Date    WBC 5.2 02/16/2024    HEMOGLOBIN 13.4 02/16/2024    HEMATOCRIT 40.2 02/16/2024    PLATELETCT 174 02/16/2024        Total time of the discharge  process exceeds 34 minutes.

## 2024-02-16 NOTE — PROGRESS NOTES
Patient to be discharged today - patient aware and agreeable to plan. D/c instructions reviewed with patient - ?'s/concerns answered. No piv present, meds to beds already delivered.

## 2024-02-16 NOTE — PROGRESS NOTES
[facility] update:    I discussed with Dr. Raheel Osuna of [facility] yesterday 2/15/2024. He is agreeable to evaluate patient via telemedicine.  His team will reach out to her to arrange an appointment.     iOpener message sent to Daxa today.    ..Gina Caldwell DNP,  APRN

## 2024-02-16 NOTE — DISCHARGE INSTRUCTIONS
Pancreatitis Eating Plan  Pancreatitis is when your pancreas gets irritated and swollen (inflamed). The pancreas is a small gland behind your stomach. It helps your body digest food and regulate your blood sugar. Pancreatitis can affect how your body digests food, especially foods with fat. You may also have other symptoms such as pain in your abdomen (abdominal pain) or nausea.  When you have pancreatitis, following a low-fat eating plan may help you manage symptoms and recover faster. Work with your health care provider or a dietitian to create an eating plan that is right for you.  What are tips for following this plan?  Reading food labels  Use the information on food labels to help keep track of how much fat you eat:  Check the serving size.  Look for the amount of total fat in grams (g) in one serving.  Low-fat foods have 3 g of fat or less per serving.  Fat-free foods have 0.5 g of fat or less per serving.  Keep track of how much fat you eat based on how many servings you eat.  For example, if you eat two servings, the amount of fat you eat will be twice what is listed on the label.  Shopping    Buy low-fat or nonfat foods, such as:  Fresh, frozen, or canned fruits and vegetables.  Grains, including pasta, bread, and rice.  Lean meat, poultry, fish, and other protein foods.  Low-fat or nonfat dairy.  Avoid buying bakery products and other sweets made with whole milk, butter, and eggs.  Avoid buying snack foods with added fat, such as anything with butter or cheese flavoring.  Cooking  Remove skin from poultry, and remove extra fat from meat.  Limit the amount of fat and oil you use to 6 tsp (30 mL) or less per day.  Cook using low-fat methods, such as boiling, broiling, grilling, steaming, or baking.  Use spray oil to cook. Add fat-free chicken broth to add flavor and moisture.  Avoid adding cream to thicken soups or sauces. Use other thickeners such as corn starch or tomato paste.  Meal planning    Eat a  low-fat diet as told by your dietitian. For most people, this means having no more than 55-65 g of fat each day.  Eat small, frequent meals throughout the day. For example, you may have 5 or 6 small meals instead of 3 large meals.  Drink enough fluid to keep your urine pale yellow.  Do not drink alcohol. Talk to your health care provider if you need help stopping.  Limit how much caffeine you have, including black coffee, black and green tea, soft drinks with caffeine, and energy drinks.  Plan to include a variety of foods in your diet. Include fruits, vegetables, whole grains and lean proteins, and low-fat or nonfat dairy. You need a balanced diet for good overall health.  General information  Let your health care provider or dietitian know if you have unplanned weight loss on this eating plan.  You may be told to follow a clear liquid or soft food diet when symptoms come back, which is called a flare. Talk with your health care provider about how to manage your diet during symptoms of a flare.  Take any vitamins or supplements as told by your health care provider. You may need to take:  Extra vitamins that dissolve in fat (are fat soluble), such as vitamins A, D, E, and K.  Nutritional supplements.  Work with a dietitian, especially if you have other conditions such as obesity, osteoporosis, or diabetes mellitus.  Some people need extra treatments, such as:  Pills or capsules to replace enzymes (oral pancreatic enzyme replacement therapy).  Feedings through a tube in the stomach or intestine (enteral feedings).  What foods should I avoid?  Fruits  Fried fruits. Fruits served with butter or cream.  Vegetables  Fried vegetables. Vegetables cooked with butter, cheese, or cream.  Grains  Biscuits, waffles, donuts, pastries, and croissants. Pies and cookies. Butter-flavored popcorn. Regular crackers.  Meats and other proteins  Fatty cuts of meat. Poultry with skin. Organ meats. Precooked or cured meat, such as sausages  or meat loaves. Whole eggs. Nuts and nut butters.  Dairy  Whole and 2% milk. Whole milk yogurt. Whole milk ice cream. Cream and half-and-half. Cheese, such as cream cheese. Sour cream.  Beverages  Wine, beer, and liquor.  The items listed above may not be a complete list of foods and beverages you should avoid. Contact a dietitian for more information.  Summary  Pancreatitis can affect how your body digests food, especially foods with fat.  When you have pancreatitis, it is recommended that you follow a low-fat eating plan to help you recover faster and manage symptoms.  Do not drink alcohol. Limit the amount of caffeine you have, and drink enough fluid to keep your urine pale yellow.  This information is not intended to replace advice given to you by your health care provider. Make sure you discuss any questions you have with your health care provider.  Document Revised: 12/07/2022 Document Reviewed: 12/07/2022  Elsevier Patient Education © 2023 [employer]

## 2024-02-16 NOTE — CARE PLAN
The patient is Stable - Low risk of patient condition declining or worsening    Shift Goals  Clinical Goals: Pain control at comfort level less than 5/10 within 12 hour shift  Patient Goals: Pain control, Rest  Family Goals: ERWIN    Progress made toward(s) clinical / shift goals:  Reported pain levels of 6-8/10, in constant need of pain medications, medicated as per MAR, some relief verbalized. Non-pharmacologic comfort measures encouraged. Call bell placed within easy reach, safety precautions in placed.     Patient is not progressing towards the following goals:    Problem: Pain - Standard  Goal: Alleviation of pain or a reduction in pain to the patient’s comfort goal  Description: Target End Date:  Prior to discharge or change in level of care    Document on Vitals flowsheet    1.  Document pain using the appropriate pain scale per order or unit policy  2.  Educate and implement non-pharmacologic comfort measures (i.e. relaxation, distraction, massage, cold/heat therapy, etc.)  3.  Pain management medications as ordered  4.  Reassess pain after pain med administration per policy  5.  If opiods administered assess patient's response to pain medication is appropriate per POSS sedation scale  6.  Follow pain management plan developed in collaboration with patient and interdisciplinary team (including palliative care or pain specialists if applicable)  Outcome: Not Progressing

## 2024-02-26 DIAGNOSIS — G43.109 MIGRAINE WITH AURA AND WITHOUT STATUS MIGRAINOSUS, NOT INTRACTABLE: ICD-10-CM

## 2024-02-26 RX ORDER — METOCLOPRAMIDE 10 MG/1
TABLET ORAL
Qty: 45 TABLET | Refills: 0 | Status: SHIPPED | OUTPATIENT
Start: 2024-02-26

## 2024-02-26 NOTE — TELEPHONE ENCOUNTER
Metoclopramide HCl 10 MG Oral Tablet     Received request via: Pharmacy    Was the patient seen in the last year in this department? Yes    Does the patient have an active prescription (recently filled or refills available) for medication(s) requested?  Yes    Pharmacy Name: [facility] [address]    Does the patient have [facility]iary Plus and need 100 day supply (blood pressure, diabetes and cholesterol meds only)? Patient does not have SCP

## 2024-03-01 DIAGNOSIS — R10.84 GENERALIZED ABDOMINAL PAIN: ICD-10-CM

## 2024-03-01 RX ORDER — GABAPENTIN 100 MG/1
100 CAPSULE ORAL 3 TIMES DAILY
Qty: 270 CAPSULE | Refills: 3 | Status: SHIPPED | OUTPATIENT
Start: 2024-03-01

## 2024-03-01 NOTE — TELEPHONE ENCOUNTER
Received request via: Patient    Was the patient seen in the last year in this department? Yes    Does the patient have an active prescription (recently filled or refills available) for medication(s) requested? No      Does the patient have assisted Plus and need 100 day supply (blood pressure, diabetes and cholesterol meds only)? Patient does not have SCP

## 2024-03-20 RX ORDER — BUSPIRONE HYDROCHLORIDE 10 MG/1
10 TABLET ORAL 3 TIMES DAILY
Qty: 270 TABLET | Refills: 0 | Status: SHIPPED | OUTPATIENT
Start: 2024-03-20

## 2024-03-20 RX ORDER — ONDANSETRON 4 MG/1
TABLET, FILM COATED ORAL
Qty: 30 TABLET | Refills: 0 | OUTPATIENT
Start: 2024-03-20

## 2024-03-20 NOTE — TELEPHONE ENCOUNTER
Received request via: Pharmacy    Was the patient seen in the last year in this department? Yes    Does the patient have an active prescription (recently filled or refills available) for medication(s) requested? No    [facility]    Does the patient have halfway Plus and need 100 day supply (blood pressure, diabetes and cholesterol meds only)? Patient does not have SCP

## 2024-04-06 ENCOUNTER — HOSPITAL ENCOUNTER (EMERGENCY)
Facility: MEDICAL CENTER | Age: 49
End: 2024-04-06
Attending: EMERGENCY MEDICINE
Payer: COMMERCIAL

## 2024-04-06 VITALS
DIASTOLIC BLOOD PRESSURE: 75 MMHG | HEIGHT: 59 IN | OXYGEN SATURATION: 99 % | HEART RATE: 51 BPM | SYSTOLIC BLOOD PRESSURE: 119 MMHG | RESPIRATION RATE: 17 BRPM | TEMPERATURE: 97.6 F | BODY MASS INDEX: 24.58 KG/M2 | WEIGHT: 121.91 LBS

## 2024-04-06 DIAGNOSIS — R10.9 ABDOMINAL PAIN, UNSPECIFIED ABDOMINAL LOCATION: ICD-10-CM

## 2024-04-06 DIAGNOSIS — R10.84 GENERALIZED ABDOMINAL PAIN: ICD-10-CM

## 2024-04-06 LAB
ALBUMIN SERPL BCP-MCNC: 4.6 G/DL (ref 3.2–4.9)
ALBUMIN/GLOB SERPL: 1.8 G/DL
ALP SERPL-CCNC: 84 U/L (ref 30–99)
ALT SERPL-CCNC: 47 U/L (ref 2–50)
ANION GAP SERPL CALC-SCNC: 13 MMOL/L (ref 7–16)
APPEARANCE UR: CLEAR
AST SERPL-CCNC: 41 U/L (ref 12–45)
BACTERIA #/AREA URNS HPF: NEGATIVE /HPF
BASOPHILS # BLD AUTO: 1.8 % (ref 0–1.8)
BASOPHILS # BLD: 0.08 K/UL (ref 0–0.12)
BILIRUB SERPL-MCNC: 0.3 MG/DL (ref 0.1–1.5)
BILIRUB UR QL STRIP.AUTO: NEGATIVE
BUN SERPL-MCNC: 9 MG/DL (ref 8–22)
CALCIUM ALBUM COR SERPL-MCNC: 9.4 MG/DL (ref 8.5–10.5)
CALCIUM SERPL-MCNC: 9.9 MG/DL (ref 8.5–10.5)
CHLORIDE SERPL-SCNC: 103 MMOL/L (ref 96–112)
CO2 SERPL-SCNC: 24 MMOL/L (ref 20–33)
COLOR UR: YELLOW
CREAT SERPL-MCNC: 0.56 MG/DL (ref 0.5–1.4)
EOSINOPHIL # BLD AUTO: 0.48 K/UL (ref 0–0.51)
EOSINOPHIL NFR BLD: 10.6 % (ref 0–6.9)
EPI CELLS #/AREA URNS HPF: NEGATIVE /HPF
ERYTHROCYTE [DISTWIDTH] IN BLOOD BY AUTOMATED COUNT: 39.6 FL (ref 35.9–50)
GFR SERPLBLD CREATININE-BSD FMLA CKD-EPI: 113 ML/MIN/1.73 M 2
GLOBULIN SER CALC-MCNC: 2.5 G/DL (ref 1.9–3.5)
GLUCOSE SERPL-MCNC: 101 MG/DL (ref 65–99)
GLUCOSE UR STRIP.AUTO-MCNC: NEGATIVE MG/DL
HCT VFR BLD AUTO: 47.3 % (ref 37–47)
HGB BLD-MCNC: 15.6 G/DL (ref 12–16)
HYALINE CASTS #/AREA URNS LPF: NORMAL /LPF
IMM GRANULOCYTES # BLD AUTO: 0.01 K/UL (ref 0–0.11)
IMM GRANULOCYTES NFR BLD AUTO: 0.2 % (ref 0–0.9)
KETONES UR STRIP.AUTO-MCNC: NEGATIVE MG/DL
LEUKOCYTE ESTERASE UR QL STRIP.AUTO: ABNORMAL
LIPASE SERPL-CCNC: 36 U/L (ref 11–82)
LYMPHOCYTES # BLD AUTO: 1.37 K/UL (ref 1–4.8)
LYMPHOCYTES NFR BLD: 30.4 % (ref 22–41)
MCH RBC QN AUTO: 29.3 PG (ref 27–33)
MCHC RBC AUTO-ENTMCNC: 33 G/DL (ref 32.2–35.5)
MCV RBC AUTO: 88.9 FL (ref 81.4–97.8)
MICRO URNS: ABNORMAL
MONOCYTES # BLD AUTO: 0.32 K/UL (ref 0–0.85)
MONOCYTES NFR BLD AUTO: 7.1 % (ref 0–13.4)
NEUTROPHILS # BLD AUTO: 2.25 K/UL (ref 1.82–7.42)
NEUTROPHILS NFR BLD: 49.9 % (ref 44–72)
NITRITE UR QL STRIP.AUTO: NEGATIVE
NRBC # BLD AUTO: 0 K/UL
NRBC BLD-RTO: 0 /100 WBC (ref 0–0.2)
PH UR STRIP.AUTO: 6 [PH] (ref 5–8)
PLATELET # BLD AUTO: 196 K/UL (ref 164–446)
PMV BLD AUTO: 11.2 FL (ref 9–12.9)
POTASSIUM SERPL-SCNC: 4.4 MMOL/L (ref 3.6–5.5)
PROT SERPL-MCNC: 7.1 G/DL (ref 6–8.2)
PROT UR QL STRIP: NEGATIVE MG/DL
RBC # BLD AUTO: 5.32 M/UL (ref 4.2–5.4)
RBC # URNS HPF: NORMAL /HPF
RBC UR QL AUTO: NEGATIVE
SODIUM SERPL-SCNC: 140 MMOL/L (ref 135–145)
SP GR UR STRIP.AUTO: 1.01
UROBILINOGEN UR STRIP.AUTO-MCNC: 0.2 MG/DL
WBC # BLD AUTO: 4.5 K/UL (ref 4.8–10.8)
WBC #/AREA URNS HPF: NORMAL /HPF

## 2024-04-06 PROCEDURE — 81001 URINALYSIS AUTO W/SCOPE: CPT

## 2024-04-06 PROCEDURE — 700101 HCHG RX REV CODE 250: Performed by: EMERGENCY MEDICINE

## 2024-04-06 PROCEDURE — 99285 EMERGENCY DEPT VISIT HI MDM: CPT

## 2024-04-06 PROCEDURE — 36415 COLL VENOUS BLD VENIPUNCTURE: CPT

## 2024-04-06 PROCEDURE — 700105 HCHG RX REV CODE 258: Performed by: EMERGENCY MEDICINE

## 2024-04-06 PROCEDURE — 700111 HCHG RX REV CODE 636 W/ 250 OVERRIDE (IP): Performed by: EMERGENCY MEDICINE

## 2024-04-06 PROCEDURE — 83690 ASSAY OF LIPASE: CPT

## 2024-04-06 PROCEDURE — 80053 COMPREHEN METABOLIC PANEL: CPT

## 2024-04-06 PROCEDURE — 96376 TX/PRO/DX INJ SAME DRUG ADON: CPT

## 2024-04-06 PROCEDURE — 96365 THER/PROPH/DIAG IV INF INIT: CPT

## 2024-04-06 PROCEDURE — 96375 TX/PRO/DX INJ NEW DRUG ADDON: CPT

## 2024-04-06 PROCEDURE — 85025 COMPLETE CBC W/AUTO DIFF WBC: CPT

## 2024-04-06 RX ORDER — SODIUM CHLORIDE 9 MG/ML
1000 INJECTION, SOLUTION INTRAVENOUS ONCE
Status: COMPLETED | OUTPATIENT
Start: 2024-04-06 | End: 2024-04-06

## 2024-04-06 RX ORDER — KETOROLAC TROMETHAMINE 15 MG/ML
15 INJECTION, SOLUTION INTRAMUSCULAR; INTRAVENOUS ONCE
Status: COMPLETED | OUTPATIENT
Start: 2024-04-06 | End: 2024-04-06

## 2024-04-06 RX ORDER — GABAPENTIN 100 MG/1
200 CAPSULE ORAL 3 TIMES DAILY
Qty: 270 CAPSULE | Refills: 3 | Status: SHIPPED | OUTPATIENT
Start: 2024-04-06

## 2024-04-06 RX ORDER — ONDANSETRON 2 MG/ML
4 INJECTION INTRAMUSCULAR; INTRAVENOUS ONCE
Status: COMPLETED | OUTPATIENT
Start: 2024-04-06 | End: 2024-04-06

## 2024-04-06 RX ORDER — HYDROMORPHONE HYDROCHLORIDE 1 MG/ML
0.5 INJECTION, SOLUTION INTRAMUSCULAR; INTRAVENOUS; SUBCUTANEOUS
Status: DISCONTINUED | OUTPATIENT
Start: 2024-04-06 | End: 2024-04-06 | Stop reason: HOSPADM

## 2024-04-06 RX ADMIN — KETAMINE HYDROCHLORIDE 25 MG: 10 INJECTION INTRAMUSCULAR; INTRAVENOUS at 08:32

## 2024-04-06 RX ADMIN — HYDROMORPHONE HYDROCHLORIDE 0.5 MG: 1 INJECTION, SOLUTION INTRAMUSCULAR; INTRAVENOUS; SUBCUTANEOUS at 06:56

## 2024-04-06 RX ADMIN — SODIUM CHLORIDE 1000 ML: 9 INJECTION, SOLUTION INTRAVENOUS at 07:01

## 2024-04-06 RX ADMIN — ONDANSETRON 4 MG: 2 INJECTION INTRAMUSCULAR; INTRAVENOUS at 08:22

## 2024-04-06 RX ADMIN — KETOROLAC TROMETHAMINE 15 MG: 15 INJECTION, SOLUTION INTRAMUSCULAR; INTRAVENOUS at 08:21

## 2024-04-06 RX ADMIN — ONDANSETRON 4 MG: 2 INJECTION INTRAMUSCULAR; INTRAVENOUS at 06:56

## 2024-04-06 ASSESSMENT — FIBROSIS 4 INDEX: FIB4 SCORE: 1.39

## 2024-04-06 ASSESSMENT — PAIN DESCRIPTION - DESCRIPTORS: DESCRIPTORS: BURNING

## 2024-04-06 ASSESSMENT — LIFESTYLE VARIABLES: DO YOU DRINK ALCOHOL: NO

## 2024-04-06 NOTE — DISCHARGE INSTRUCTIONS
Increase neurontin to 200mg three times a day  No pancreatitis today or obstruction/infection  Follow up with pain management

## 2024-04-06 NOTE — ED NOTES
Patient ambulated to the bathroom with stable gait. Returned to room and placed back on monitors. Patient states that she does have some relief of pain at this time.

## 2024-04-06 NOTE — ED PROVIDER NOTES
"ER Provider Note    Scribed for Vijay Younger M.d. by Ellie Thapa. 4/6/2024  6:37 AM    Primary Care Provider: Maria De Jesus Sadler M.D.    CHIEF COMPLAINT   Chief Complaint   Patient presents with    Abdominal Pain     Pt  states she has acute pancreatis, pt reports she is here for pain management. Pt states her MRI showed a blockage. Pt has an appointment in [place] for a surgery schedule. Pt reports the pain is in her abdomen, radiating to her abdomen and vagina.\" Pt states that her MD made a referral for pain mangement.      EXTERNAL RECORDS REVIEWED  Other Patient was hospitalized 2/14/24 for two days for pancreatitis and is scheduled at [facility] for an EDGE procedure. GI in hospital recommended pain management and not ERCP. She is a gastric bypass surgery patient and Dr Fernandez is involved.     HPI/ROS  LIMITATION TO HISTORY   Select: : None  OUTSIDE HISTORIAN(S):  Family  who provides some history    Tamela Stern is a 48 y.o. female who presents to the ED complaining of abdominal pain onset two days ago. The patient reports that she has a history of acute pancreatitis and is here for pain management. She is followed by Dr Fernandez and has been referred to pain management, nutritional interventions, and a possible feeding tube. Patient now explains that she has severe pain in her lower left side that is typically managed with marijuana. However, her pain now radiates into her genital region and throughout her back and is \"unbearable.\" She has associated symptoms of nausea, but denies vomiting. With her pain, she has been unable to consume food and has been trying to drink protein shakes and smoothies, but at times she is unable to stomach this. She endorses that she has an appointment in [place] scheduled for an EDGE procedure and an ERCP by stent for a biopsy to assess the blockage in her ampulla duct.     PAST MEDICAL HISTORY  Past Medical History:   Diagnosis Date    Anemia     " "Anesthesia 04/19/2022    \"vitals dropped making it hard to come out\" after surgery in 2009    Arthritis 04/19/2022    rheumatoid-ankle    Cancer (HCC)     Pineal tumor; still present; asymptomatic; possible stent placement; no chemotherapy or radiation.    Cervical spondylosis 12/11/2015    Concussion     Constipation     medicated    Major depression 01/20/2015    depression and anxiety    Migraine     Neoplasm of uncertain behavior of skin 05/02/2017    Other specified symptom associated with female genital organs     scar tissue    Pain 04/19/2022    abdomen    PONV (postoperative nausea and vomiting)     Right acoustic neuroma (HCC)     removed 3/3/2105    Seizure disorder (HCC) 1999    not medicated at present - was due to apnea in past, last seizure was 04/12/2022, last EEG was clear    Shingles 01/13/2016    Sleep apnea     does not use cpap, last sleep study showed she was clear for apnea    Stroke (HCC) 01/2016    TIA-residual weakness and some left sided drooping occasionally    Thyroid activity decreased     medicated - hypothyroid       SURGICAL HISTORY  Past Surgical History:   Procedure Laterality Date    AR LAP,RMV  ADNEXAL STRUCTURE Bilateral 11/2/2022    Procedure: SALPINGO-OOPHORECTOMY, BILATERAL, LAPAROSCOPIC;  Surgeon: Rhonda Dsouza M.D.;  Location: SURGERY SAME DAY [facility];  Service: Gynecology    AR CYSTOURETHROSCOPY N/A 11/2/2022    Procedure: CYSTOSCOPY;  Surgeon: Rhonda Dsouza M.D.;  Location: SURGERY SAME DAY [facility];  Service: Gynecology    HYSTERECTOMY LAPAROSCOPY N/A 11/2/2022    Procedure: TOTAL LAPAROSCOPIC HYSTERECTOMY ,;  Surgeon: Rhonda Dsouza M.D.;  Location: SURGERY SAME DAY [facility];  Service: Gynecology    LAPAROSCOPIC LYSIS OF ADHESIONS N/A 11/2/2022    Procedure: LYSIS, ENDOMETRIOSIS, LAPAROSCOPIC;  Surgeon: Rhonda Dsouza M.D.;  Location: SURGERY SAME DAY [facility];  Service: Gynecology    AR LAP,DIAGNOSTIC ABDOMEN  4/27/2022    Procedure: LAPAROSCOPY - " DIAGNOSTIC;  Surgeon: Jonnie Donovan M.D.;  Location: SURGERY [facility];  Service: General    LAPAROSCOPIC LYSIS OF ADHESIONS  4/27/2022    Procedure: LYSIS, ADHESIONS, LAPAROSCOPIC;  Surgeon: Jonnie Donovan M.D.;  Location: SURGERY [facility];  Service: General    MO UPPER GI ENDOSCOPY,DIAGNOSIS N/A 4/11/2022    Procedure: GASTROSCOPY;  Surgeon: Sandra Reed M.D.;  Location: SURGERY SAME DAY [facility];  Service: Gastroenterology    MO UPPER GI ENDOSCOPY,BIOPSY N/A 4/11/2022    Procedure: GASTROSCOPY, WITH BIOPSY;  Surgeon: Sandra Reed M.D.;  Location: SURGERY SAME DAY [facility];  Service: Gastroenterology    OTHER ORTHOPEDIC SURGERY Right 2019    ankle fusion    OTHER ORTHOPEDIC SURGERY Right 2018    ankle fusion    CERVICAL DISK AND FUSION ANTERIOR  4/27/2016    Procedure: CERVICAL DISK AND FUSION ANTERIOR C5-6;  Surgeon: Jorge Pozo M.D.;  Location: SURGERY [facility];  Service:     MASTOIDECTOMY Right 2015    ARIADNA BY LAPAROSCOPY  8/15/2010    Performed by JONNIE DONOVAN at SURGERY [facility] ORS    LYSIS ADHESIONS GENERAL  8/15/2010    Performed by JONNIE DONOVAN at SURGERY [facility] ORS    GASTROSCOPY-ENDO  8/12/2010    Performed by KHUSHI MURCIA at ENDOSCOPY [facility] ORS    PELVISCOPY  6/16/2009    Performed by ABDULKADIR SMITH at SURGERY SAME DAY [facility] ORS    LYSIS ADHESIONS GYN  6/16/2009    Performed by ABDULKADIR SMITH at SURGERY SAME DAY [facility] ORS    COLONOSCOPY - ENDO  1/22/2009    Performed by ASHLEY TALBOT at SURGERY [facility] ORS    GASTROSCOPY-ENDO  1/20/2009    Performed by BRENDA BENNETT at SURGERY [facility] ORS    OTHER ABDOMINAL SURGERY  2009    ariadna    OTHER ABDOMINAL SURGERY  1997    C - section    APPENDECTOMY  1994    CARPAL TUNNEL RELEASE      RUE - 1998, LUE - 2000    GASTRIC BYPASS LAPAROSCOPIC      GYN SURGERY      TUBE, EAR ULTRASIL         FAMILY HISTORY  Family History   Problem Relation Age of Onset    Cancer Mother      Alcohol/Drug Mother         ETOH    Diabetes Mother     Breast Cancer Paternal Aunt     Cancer Paternal Aunt         breast    Diabetes Maternal Grandfather     Ovarian Cancer Paternal Grandmother     Cancer Paternal Grandmother         cervical     Heart Disease Paternal Grandfather         MI    Cancer Paternal Grandfather         lung    Hypertension Paternal Grandfather     Hyperlipidemia Paternal Grandfather     Tubal Cancer Neg Hx     Peritoneal Cancer Neg Hx     Colorectal Cancer Neg Hx        SOCIAL HISTORY   reports that she has been smoking cigarettes. She has quit using smokeless tobacco. She reports current drug use. Drugs: Marijuana and Oral. She reports that she does not drink alcohol.    CURRENT MEDICATIONS  Current Discharge Medication List        CONTINUE these medications which have NOT CHANGED    Details   busPIRone (BUSPAR) 10 MG Tab tablet TAKE 1 TABLET BY MOUTH THREE TIMES DAILY  Qty: 270 Tablet, Refills: 0      metoclopramide (REGLAN) 10 MG Tab TAKE 1-3 TABLETS BY MOUTH ONCE AT ONSET OF HEADACHE/NAUSEA; REPEAT IN 4-6 HOURS AS NEEDED  Qty: 45 Tablet, Refills: 0    Associated Diagnoses: Migraine with aura and without status migrainosus, not intractable      !! ondansetron (ZOFRAN ODT) 4 MG TABLET DISPERSIBLE Take 1 Tablet by mouth every 6 hours as needed for Nausea/Vomiting.  Qty: 10 Tablet, Refills: 0    Associated Diagnoses: Pancreatitis, unspecified pancreatitis type      senna-docusate (SENNA PLUS) 8.6-50 MG Tab Take 2 Tablets by mouth 2 times a day as needed for Constipation.      DULoxetine (CYMBALTA) 60 MG Cap DR Particles delayed-release capsule Take 1 capsule by mouth once daily  Qty: 90 Capsule, Refills: 3      !! ondansetron (ZOFRAN ODT) 4 MG TABLET DISPERSIBLE Dissolve 1 Tablet by mouth every 6 hours as needed for Nausea/Vomiting.  Qty: 10 Tablet, Refills: 1      levothyroxine (SYNTHROID) 50 MCG Tab TAKE 1 TABLET BY MOUTH EVERY DAY IN THE MORNING ON AN EMPTY STOMACH  Qty: 90 Tablet,  "Refills: 1    Associated Diagnoses: Hypothyroidism, unspecified type      levETIRAcetam (KEPPRA) 100 MG/ML Solution 5 ml in AM and 10 ml in pm  Qty: 450 mL, Refills: 11    Associated Diagnoses: Simple partial seizures evolving to complex partial seizures, then to generalized tonic-clonic seizures (HCC)      sumatriptan (IMITREX) 100 MG tablet 1 tab at headache onset; repeat in 1 hour prn  Qty: 9 Tablet, Refills: 6    Associated Diagnoses: Migraine with aura and without status migrainosus, not intractable      promethazine (PHENERGAN) 25 MG Suppos Insert 1 Suppository into the rectum every 6 hours as needed for Nausea/Vomiting.  Qty: 12 Suppository, Refills: 0    Associated Diagnoses: Abdominal pain, unspecified abdominal location      fluticasone (FLONASE) 50 MCG/ACT nasal spray Spray 1 Spray in nose every day.  Qty: 16 g, Refills: 0    Associated Diagnoses: Acute maxillary sinusitis, recurrence not specified; Cough       !! - Potential duplicate medications found. Please discuss with provider.          ALLERGIES  Phentermine hcl, Morphine, and 2-phenylbenzimidazole-5-sulfonic acid [phenyl benzimidazole sulfonic acid]    PHYSICAL EXAM  /84   Pulse 68   Temp 35.9 °C (96.6 °F) (Temporal)   Resp 18   Ht 1.499 m (4' 11\")   Wt 55.3 kg (121 lb 14.6 oz)   LMP 05/15/2017 Comment: irregular for the last 3 months  SpO2 98%   BMI 24.62 kg/m²   Constitutional: Well developed, Well nourished, No acute distress, Non-toxic appearance.   HENT: Normocephalic, Atraumatic, Bilateral external ears normal, Oropharynx is clear mucous membranes are moist. No oral exudates or nasal discharge.   Eyes: Pupils are equal round and reactive, EOMI, Conjunctiva normal, No discharge.   Neck: Normal range of motion, No tenderness, Supple, No stridor. No meningismus.  Lymphatic: No lymphadenopathy noted.   Cardiovascular: Regular rate and rhythm without murmur rub or gallop.  Thorax & Lungs: Clear breath sounds bilaterally without " wheezes, rhonchi or rales. There is no chest wall tenderness.   Abdomen: Soft non-distended. There is no rebound or guarding. No organomegaly is appreciated. Bowel sounds are normal. Diffuse abdominal tenderness  Skin: Normal without rash.   Back: No CVA or spinal tenderness.   Extremities: Intact distal pulses, No edema, No tenderness, No cyanosis, No clubbing. Capillary refill is less than 2 seconds.  Musculoskeletal: Good range of motion in all major joints. No tenderness to palpation or major deformities noted.   Neurologic: Alert & oriented x 3, Normal motor function, Normal sensory function, No focal deficits noted. Reflexes are normal.  Psychiatric: Affect normal, Judgment normal, Mood normal. There is no suicidal ideation or patient reported hallucinations.      DIAGNOSTIC STUDIES    LABS  Results for orders placed or performed during the hospital encounter of 04/06/24   CBC with Differential   Result Value Ref Range    WBC 4.5 (L) 4.8 - 10.8 K/uL    RBC 5.32 4.20 - 5.40 M/uL    Hemoglobin 15.6 12.0 - 16.0 g/dL    Hematocrit 47.3 (H) 37.0 - 47.0 %    MCV 88.9 81.4 - 97.8 fL    MCH 29.3 27.0 - 33.0 pg    MCHC 33.0 32.2 - 35.5 g/dL    RDW 39.6 35.9 - 50.0 fL    Platelet Count 196 164 - 446 K/uL    MPV 11.2 9.0 - 12.9 fL    Neutrophils-Polys 49.90 44.00 - 72.00 %    Lymphocytes 30.40 22.00 - 41.00 %    Monocytes 7.10 0.00 - 13.40 %    Eosinophils 10.60 (H) 0.00 - 6.90 %    Basophils 1.80 0.00 - 1.80 %    Immature Granulocytes 0.20 0.00 - 0.90 %    Nucleated RBC 0.00 0.00 - 0.20 /100 WBC    Neutrophils (Absolute) 2.25 1.82 - 7.42 K/uL    Lymphs (Absolute) 1.37 1.00 - 4.80 K/uL    Monos (Absolute) 0.32 0.00 - 0.85 K/uL    Eos (Absolute) 0.48 0.00 - 0.51 K/uL    Baso (Absolute) 0.08 0.00 - 0.12 K/uL    Immature Granulocytes (abs) 0.01 0.00 - 0.11 K/uL    NRBC (Absolute) 0.00 K/uL   Complete Metabolic Panel   Result Value Ref Range    Sodium 140 135 - 145 mmol/L    Potassium 4.4 3.6 - 5.5 mmol/L    Chloride 103 96  - 112 mmol/L    Co2 24 20 - 33 mmol/L    Anion Gap 13.0 7.0 - 16.0    Glucose 101 (H) 65 - 99 mg/dL    Bun 9 8 - 22 mg/dL    Creatinine 0.56 0.50 - 1.40 mg/dL    Calcium 9.9 8.5 - 10.5 mg/dL    Correct Calcium 9.4 8.5 - 10.5 mg/dL    AST(SGOT) 41 12 - 45 U/L    ALT(SGPT) 47 2 - 50 U/L    Alkaline Phosphatase 84 30 - 99 U/L    Total Bilirubin 0.3 0.1 - 1.5 mg/dL    Albumin 4.6 3.2 - 4.9 g/dL    Total Protein 7.1 6.0 - 8.2 g/dL    Globulin 2.5 1.9 - 3.5 g/dL    A-G Ratio 1.8 g/dL   Lipase   Result Value Ref Range    Lipase 36 11 - 82 U/L   Urinalysis    Specimen: Urine   Result Value Ref Range    Color Yellow     Character Clear     Specific Gravity 1.008 <1.035    Ph 6.0 5.0 - 8.0    Glucose Negative Negative mg/dL    Ketones Negative Negative mg/dL    Protein Negative Negative mg/dL    Bilirubin Negative Negative    Urobilinogen, Urine 0.2 Negative    Nitrite Negative Negative    Leukocyte Esterase Small (A) Negative    Occult Blood Negative Negative    Micro Urine Req Microscopic    ESTIMATED GFR   Result Value Ref Range    GFR (CKD-EPI) 113 >60 mL/min/1.73 m 2   URINE MICROSCOPIC (W/UA)   Result Value Ref Range    WBC 0-2 /hpf    RBC 0-2 /hpf    Bacteria Negative None /hpf    Epithelial Cells Negative /hpf    Hyaline Cast 0-2 /lpf     I have independently interpreted this EKG    Labs Reviewed   CBC WITH DIFFERENTIAL - Abnormal; Notable for the following components:       Result Value    WBC 4.5 (*)     Hematocrit 47.3 (*)     Eosinophils 10.60 (*)     All other components within normal limits   COMP METABOLIC PANEL - Abnormal; Notable for the following components:    Glucose 101 (*)     All other components within normal limits   URINALYSIS - Abnormal; Notable for the following components:    Leukocyte Esterase Small (*)     All other components within normal limits   LIPASE   ESTIMATED GFR   URINE MICROSCOPIC (W/UA)         COURSE & MEDICAL DECISION MAKING     ASSESSMENT, COURSE AND PLAN  Care Narrative:     6:53  AM - Patient seen and examined at bedside. Discussed plan of care, including labs and imaging. Patient agrees to the plan of care. The patient will be resuscitated with 1L NS IV and medicated with Dilaudid 0.5mg, Zofran 4mg.     Laboratory evaluation reveals no leukocytosis, shift, anemia, electrolyte derangements or acidosis.  Lipase is normal at 36.  Urinalysis is negative for infection.    8:07 AM - Patient was reevaluated at bedside.I informed her that her labs look good and there is no evidence of pancreatitis. I also informed her that I would like to stop narcotic use and try Ketamine. I educated the patient on this and she agrees to treatment plan. I also informed her that her use of marijuana as pain management may be doing more harm than good in her case.    After ketamine she says seem to be improved.  Pain is not altogether gone though.  I am encouraging her to go to her primary doctor for referral to pain management which has not materialized from Dr. Martinez yet.  I am increasing her Neurontin dose to 200 mg 3 times a day.    9:51 AM - Patient was reevaluated at bedside. The patient will be discharged with Gabapentin 100mg and should return if symptoms worsen or if new symptoms arise. The patient understands and agrees to plan.      Hydration: HYDRATION: Based on the patient's presentation of Dehydration the patient was given IV fluids. IV Hydration was used because oral hydration was not adequate alone. Upon recheck following hydration, the patient was improved.      DISPOSITION AND DISCUSSIONS  I have discussed management of the patient with the following physicians and PARISH's:  None    Discussion of management with other [facility] or appropriate source(s): None     The patient will return for new or worsening symptoms and is stable at the time of discharge.    The patient is referred to a primary physician for blood pressure management, diabetic screening, and for all other preventative health  concerns.    DISPOSITION:  Patient will be discharged home in stable condition.    FOLLOW UP:  Maria De Jesus Sadler M.D.  [address]  [address]  [place] [id number] [phone]    Schedule an appointment as soon as possible for a visit         OUTPATIENT MEDICATIONS:  Current Discharge Medication List           FINAL DIAGNOSIS  1. Abdominal pain, unspecified abdominal location    2. Generalized abdominal pain       Ellie GREER (Scribe), am scribing for, and in the presence of, Vijay Younger M.D..    Electronically signed by: Ellie Thapa (Scribe), 4/6/2024    Vijay GREER M.D. personally performed the services described in this documentation, as scribed by Ellie Thapa in my presence, and it is both accurate and complete.      The note accurately reflects work and decisions made by me.  Vijay Younger M.D.  4/6/2024  10:13 AM

## 2024-04-06 NOTE — ED NOTES
Bedside report received from TORRI Shaver. Assumed patient care. Verified patient identification. Patient resting in bed, connected to monitor, respirations even and unlabored. Patient currently on RA,  Vital signs is stable. Discussed plan of care. Patient denied any new complaints. Standard safety precautions in place. [place] in low position, side rail up for pt safety. Call light within reach.

## 2024-04-23 ENCOUNTER — PATIENT MESSAGE (OUTPATIENT)
Dept: MEDICAL GROUP | Facility: MEDICAL CENTER | Age: 49
End: 2024-04-23
Payer: COMMERCIAL

## 2024-04-23 DIAGNOSIS — K85.90 PANCREATITIS, UNSPECIFIED PANCREATITIS TYPE: ICD-10-CM

## 2024-04-23 RX ORDER — ONDANSETRON 4 MG/1
4 TABLET, ORALLY DISINTEGRATING ORAL EVERY 6 HOURS PRN
Qty: 90 TABLET | Refills: 0 | Status: SHIPPED | OUTPATIENT
Start: 2024-04-23

## 2024-05-25 DIAGNOSIS — G40.409 SIMPLE PARTIAL SEIZURES EVOLVING TO COMPLEX PARTIAL SEIZURES, THEN TO GENERALIZED TONIC-CLONIC SEIZURES (HCC): ICD-10-CM

## 2024-05-28 RX ORDER — LEVETIRACETAM 100 MG/ML
SOLUTION ORAL
Qty: 450 ML | Refills: 5 | Status: SHIPPED | OUTPATIENT
Start: 2024-05-28

## 2024-05-28 NOTE — TELEPHONE ENCOUNTER
Received request via: Pharmacy    Medication Name/Dosage Keppra     When was medication last prescribed 5/10/2023    How many refills were previously provided 11    How many Refills does he patient have left from last prescription 0    Was the patient seen in the last year in this department? No   Date of last office visit 5/10/2023     Per last Neurology Office Visit, when was the date of next follow up visit set for?                          6 mths   Date of office visit follow up request 11/10/2023     Does the patient have an upcoming appointment? No   If yes, when              If no, schedule appointment Pt was called and a voicemail was left     Does the patient have long-term Plus and need 100 day supply (blood pressure, diabetes and cholesterol meds only)? Medication is not for cholesterol, blood pressure or diabetes

## 2024-05-29 ENCOUNTER — TELEPHONE (OUTPATIENT)
Dept: NEUROLOGY | Facility: MEDICAL CENTER | Age: 49
End: 2024-05-29
Payer: COMMERCIAL

## 2024-05-29 NOTE — TELEPHONE ENCOUNTER
Patient called returning a call to [facility] to schedule an appointment with Dr. Morales her number is [phone]

## 2024-06-17 RX ORDER — BUSPIRONE HYDROCHLORIDE 10 MG/1
10 TABLET ORAL 3 TIMES DAILY
Qty: 270 TABLET | Refills: 0 | Status: SHIPPED | OUTPATIENT
Start: 2024-06-17

## 2024-07-02 ENCOUNTER — APPOINTMENT (OUTPATIENT)
Dept: RADIOLOGY | Facility: MEDICAL CENTER | Age: 49
End: 2024-07-02
Attending: EMERGENCY MEDICINE
Payer: COMMERCIAL

## 2024-07-02 ENCOUNTER — HOSPITAL ENCOUNTER (OUTPATIENT)
Facility: MEDICAL CENTER | Age: 49
End: 2024-07-03
Attending: EMERGENCY MEDICINE | Admitting: INTERNAL MEDICINE
Payer: COMMERCIAL

## 2024-07-02 DIAGNOSIS — R11.2 INTRACTABLE NAUSEA AND VOMITING: ICD-10-CM

## 2024-07-02 PROBLEM — E87.6 HYPOKALEMIA: Status: ACTIVE | Noted: 2024-07-02

## 2024-07-02 LAB
ALBUMIN SERPL BCP-MCNC: 5 G/DL (ref 3.2–4.9)
ALBUMIN/GLOB SERPL: 1.6 G/DL
ALP SERPL-CCNC: 104 U/L (ref 30–99)
ALT SERPL-CCNC: 26 U/L (ref 2–50)
ANION GAP SERPL CALC-SCNC: 19 MMOL/L (ref 7–16)
APPEARANCE UR: CLEAR
AST SERPL-CCNC: 26 U/L (ref 12–45)
BASOPHILS # BLD AUTO: 0.7 % (ref 0–1.8)
BASOPHILS # BLD: 0.06 K/UL (ref 0–0.12)
BILIRUB SERPL-MCNC: 1 MG/DL (ref 0.1–1.5)
BILIRUB UR QL STRIP.AUTO: NEGATIVE
BUN SERPL-MCNC: 31 MG/DL (ref 8–22)
CALCIUM ALBUM COR SERPL-MCNC: 9.6 MG/DL (ref 8.5–10.5)
CALCIUM SERPL-MCNC: 10.4 MG/DL (ref 8.5–10.5)
CHLORIDE SERPL-SCNC: 99 MMOL/L (ref 96–112)
CO2 SERPL-SCNC: 20 MMOL/L (ref 20–33)
COLOR UR: YELLOW
CREAT SERPL-MCNC: 0.66 MG/DL (ref 0.5–1.4)
EOSINOPHIL # BLD AUTO: 0 K/UL (ref 0–0.51)
EOSINOPHIL NFR BLD: 0 % (ref 0–6.9)
ERYTHROCYTE [DISTWIDTH] IN BLOOD BY AUTOMATED COUNT: 37.9 FL (ref 35.9–50)
GFR SERPLBLD CREATININE-BSD FMLA CKD-EPI: 108 ML/MIN/1.73 M 2
GLOBULIN SER CALC-MCNC: 3.1 G/DL (ref 1.9–3.5)
GLUCOSE SERPL-MCNC: 137 MG/DL (ref 65–99)
GLUCOSE UR STRIP.AUTO-MCNC: NEGATIVE MG/DL
HCT VFR BLD AUTO: 53.2 % (ref 37–47)
HGB BLD-MCNC: 18.3 G/DL (ref 12–16)
IMM GRANULOCYTES # BLD AUTO: 0.03 K/UL (ref 0–0.11)
IMM GRANULOCYTES NFR BLD AUTO: 0.3 % (ref 0–0.9)
KETONES UR STRIP.AUTO-MCNC: ABNORMAL MG/DL
LEUKOCYTE ESTERASE UR QL STRIP.AUTO: NEGATIVE
LIPASE SERPL-CCNC: 45 U/L (ref 11–82)
LYMPHOCYTES # BLD AUTO: 1.32 K/UL (ref 1–4.8)
LYMPHOCYTES NFR BLD: 14.5 % (ref 22–41)
MAGNESIUM SERPL-MCNC: 2.2 MG/DL (ref 1.5–2.5)
MCH RBC QN AUTO: 29.2 PG (ref 27–33)
MCHC RBC AUTO-ENTMCNC: 34.4 G/DL (ref 32.2–35.5)
MCV RBC AUTO: 85 FL (ref 81.4–97.8)
MICRO URNS: ABNORMAL
MONOCYTES # BLD AUTO: 0.53 K/UL (ref 0–0.85)
MONOCYTES NFR BLD AUTO: 5.8 % (ref 0–13.4)
NEUTROPHILS # BLD AUTO: 7.16 K/UL (ref 1.82–7.42)
NEUTROPHILS NFR BLD: 78.7 % (ref 44–72)
NITRITE UR QL STRIP.AUTO: NEGATIVE
NRBC # BLD AUTO: 0 K/UL
NRBC BLD-RTO: 0 /100 WBC (ref 0–0.2)
PH UR STRIP.AUTO: 6 [PH] (ref 5–8)
PLATELET # BLD AUTO: 313 K/UL (ref 164–446)
PMV BLD AUTO: 11.1 FL (ref 9–12.9)
POTASSIUM SERPL-SCNC: 3.4 MMOL/L (ref 3.6–5.5)
PROT SERPL-MCNC: 8.1 G/DL (ref 6–8.2)
PROT UR QL STRIP: NEGATIVE MG/DL
RBC # BLD AUTO: 6.26 M/UL (ref 4.2–5.4)
RBC UR QL AUTO: NEGATIVE
SODIUM SERPL-SCNC: 138 MMOL/L (ref 135–145)
SP GR UR STRIP.AUTO: 1.04
UROBILINOGEN UR STRIP.AUTO-MCNC: 0.2 MG/DL
WBC # BLD AUTO: 9.1 K/UL (ref 4.8–10.8)

## 2024-07-02 PROCEDURE — 81003 URINALYSIS AUTO W/O SCOPE: CPT

## 2024-07-02 PROCEDURE — G0378 HOSPITAL OBSERVATION PER HR: HCPCS

## 2024-07-02 PROCEDURE — 96372 THER/PROPH/DIAG INJ SC/IM: CPT

## 2024-07-02 PROCEDURE — 83690 ASSAY OF LIPASE: CPT

## 2024-07-02 PROCEDURE — 85025 COMPLETE CBC W/AUTO DIFF WBC: CPT

## 2024-07-02 PROCEDURE — 80053 COMPREHEN METABOLIC PANEL: CPT

## 2024-07-02 PROCEDURE — 96375 TX/PRO/DX INJ NEW DRUG ADDON: CPT

## 2024-07-02 PROCEDURE — 96374 THER/PROPH/DIAG INJ IV PUSH: CPT

## 2024-07-02 PROCEDURE — 74177 CT ABD & PELVIS W/CONTRAST: CPT

## 2024-07-02 PROCEDURE — 700111 HCHG RX REV CODE 636 W/ 250 OVERRIDE (IP): Mod: JZ | Performed by: INTERNAL MEDICINE

## 2024-07-02 PROCEDURE — 99222 1ST HOSP IP/OBS MODERATE 55: CPT | Performed by: INTERNAL MEDICINE

## 2024-07-02 PROCEDURE — 700117 HCHG RX CONTRAST REV CODE 255: Performed by: EMERGENCY MEDICINE

## 2024-07-02 PROCEDURE — 83735 ASSAY OF MAGNESIUM: CPT

## 2024-07-02 PROCEDURE — 96376 TX/PRO/DX INJ SAME DRUG ADON: CPT

## 2024-07-02 PROCEDURE — A9270 NON-COVERED ITEM OR SERVICE: HCPCS | Performed by: INTERNAL MEDICINE

## 2024-07-02 PROCEDURE — 700111 HCHG RX REV CODE 636 W/ 250 OVERRIDE (IP): Mod: JZ | Performed by: EMERGENCY MEDICINE

## 2024-07-02 PROCEDURE — 36415 COLL VENOUS BLD VENIPUNCTURE: CPT

## 2024-07-02 PROCEDURE — 700101 HCHG RX REV CODE 250: Performed by: INTERNAL MEDICINE

## 2024-07-02 PROCEDURE — 700102 HCHG RX REV CODE 250 W/ 637 OVERRIDE(OP): Performed by: INTERNAL MEDICINE

## 2024-07-02 PROCEDURE — 700105 HCHG RX REV CODE 258: Performed by: EMERGENCY MEDICINE

## 2024-07-02 PROCEDURE — 99285 EMERGENCY DEPT VISIT HI MDM: CPT

## 2024-07-02 RX ORDER — SUCRALFATE 1 G/1
1 TABLET ORAL EVERY 6 HOURS
Status: DISCONTINUED | OUTPATIENT
Start: 2024-07-02 | End: 2024-07-03 | Stop reason: HOSPADM

## 2024-07-02 RX ORDER — ONDANSETRON 4 MG/1
4 TABLET, ORALLY DISINTEGRATING ORAL EVERY 4 HOURS PRN
Status: DISCONTINUED | OUTPATIENT
Start: 2024-07-02 | End: 2024-07-03 | Stop reason: HOSPADM

## 2024-07-02 RX ORDER — SODIUM CHLORIDE AND POTASSIUM CHLORIDE 150; 900 MG/100ML; MG/100ML
INJECTION, SOLUTION INTRAVENOUS CONTINUOUS
Status: DISCONTINUED | OUTPATIENT
Start: 2024-07-02 | End: 2024-07-03 | Stop reason: HOSPADM

## 2024-07-02 RX ORDER — OXYCODONE HYDROCHLORIDE 10 MG/1
10 TABLET ORAL
Status: DISCONTINUED | OUTPATIENT
Start: 2024-07-02 | End: 2024-07-03 | Stop reason: HOSPADM

## 2024-07-02 RX ORDER — GABAPENTIN 100 MG/1
200 CAPSULE ORAL 3 TIMES DAILY
Status: DISCONTINUED | OUTPATIENT
Start: 2024-07-02 | End: 2024-07-03 | Stop reason: HOSPADM

## 2024-07-02 RX ORDER — OXYCODONE HYDROCHLORIDE 5 MG/1
5 TABLET ORAL
Status: DISCONTINUED | OUTPATIENT
Start: 2024-07-02 | End: 2024-07-03 | Stop reason: HOSPADM

## 2024-07-02 RX ORDER — METOCLOPRAMIDE HYDROCHLORIDE 5 MG/ML
10 INJECTION INTRAMUSCULAR; INTRAVENOUS EVERY 6 HOURS
Status: DISCONTINUED | OUTPATIENT
Start: 2024-07-02 | End: 2024-07-03 | Stop reason: HOSPADM

## 2024-07-02 RX ORDER — ENOXAPARIN SODIUM 100 MG/ML
40 INJECTION SUBCUTANEOUS DAILY
Status: DISCONTINUED | OUTPATIENT
Start: 2024-07-02 | End: 2024-07-03 | Stop reason: HOSPADM

## 2024-07-02 RX ORDER — AMLODIPINE BESYLATE 5 MG/1
5 TABLET ORAL
Status: DISCONTINUED | OUTPATIENT
Start: 2024-07-02 | End: 2024-07-03 | Stop reason: HOSPADM

## 2024-07-02 RX ORDER — ACETAMINOPHEN 325 MG/1
650 TABLET ORAL EVERY 6 HOURS PRN
Status: DISCONTINUED | OUTPATIENT
Start: 2024-07-02 | End: 2024-07-03 | Stop reason: HOSPADM

## 2024-07-02 RX ORDER — PROMETHAZINE HYDROCHLORIDE 25 MG/1
12.5-25 SUPPOSITORY RECTAL EVERY 4 HOURS PRN
Status: DISCONTINUED | OUTPATIENT
Start: 2024-07-02 | End: 2024-07-03 | Stop reason: HOSPADM

## 2024-07-02 RX ORDER — LEVOTHYROXINE SODIUM 0.05 MG/1
50 TABLET ORAL
Status: DISCONTINUED | OUTPATIENT
Start: 2024-07-03 | End: 2024-07-03 | Stop reason: HOSPADM

## 2024-07-02 RX ORDER — LEVETIRACETAM 500 MG/1
1000 TABLET ORAL EVERY EVENING
Status: DISCONTINUED | OUTPATIENT
Start: 2024-07-02 | End: 2024-07-03 | Stop reason: HOSPADM

## 2024-07-02 RX ORDER — HALOPERIDOL 5 MG/ML
5 INJECTION INTRAMUSCULAR ONCE
Status: COMPLETED | OUTPATIENT
Start: 2024-07-02 | End: 2024-07-02

## 2024-07-02 RX ORDER — LEVETIRACETAM 100 MG/ML
750 SOLUTION ORAL EVERY 12 HOURS
Status: DISCONTINUED | OUTPATIENT
Start: 2024-07-02 | End: 2024-07-02

## 2024-07-02 RX ORDER — POLYETHYLENE GLYCOL 3350 17 G/17G
1 POWDER, FOR SOLUTION ORAL
Status: DISCONTINUED | OUTPATIENT
Start: 2024-07-02 | End: 2024-07-03 | Stop reason: HOSPADM

## 2024-07-02 RX ORDER — DULOXETIN HYDROCHLORIDE 60 MG/1
60 CAPSULE, DELAYED RELEASE ORAL DAILY
Status: DISCONTINUED | OUTPATIENT
Start: 2024-07-03 | End: 2024-07-03 | Stop reason: HOSPADM

## 2024-07-02 RX ORDER — LEVETIRACETAM 500 MG/1
500 TABLET ORAL EVERY MORNING
Status: DISCONTINUED | OUTPATIENT
Start: 2024-07-03 | End: 2024-07-03 | Stop reason: HOSPADM

## 2024-07-02 RX ORDER — HYDRALAZINE HYDROCHLORIDE 20 MG/ML
20 INJECTION INTRAMUSCULAR; INTRAVENOUS EVERY 6 HOURS PRN
Status: DISCONTINUED | OUTPATIENT
Start: 2024-07-02 | End: 2024-07-03 | Stop reason: HOSPADM

## 2024-07-02 RX ORDER — ONDANSETRON 2 MG/ML
4 INJECTION INTRAMUSCULAR; INTRAVENOUS EVERY 4 HOURS PRN
Status: DISCONTINUED | OUTPATIENT
Start: 2024-07-02 | End: 2024-07-03 | Stop reason: HOSPADM

## 2024-07-02 RX ORDER — PROMETHAZINE HYDROCHLORIDE 25 MG/1
12.5-25 TABLET ORAL EVERY 4 HOURS PRN
Status: DISCONTINUED | OUTPATIENT
Start: 2024-07-02 | End: 2024-07-03 | Stop reason: HOSPADM

## 2024-07-02 RX ORDER — HYDROMORPHONE HYDROCHLORIDE 1 MG/ML
0.5 INJECTION, SOLUTION INTRAMUSCULAR; INTRAVENOUS; SUBCUTANEOUS
Status: DISCONTINUED | OUTPATIENT
Start: 2024-07-02 | End: 2024-07-03 | Stop reason: HOSPADM

## 2024-07-02 RX ORDER — SODIUM CHLORIDE 9 MG/ML
1000 INJECTION, SOLUTION INTRAVENOUS ONCE
Status: COMPLETED | OUTPATIENT
Start: 2024-07-02 | End: 2024-07-03

## 2024-07-02 RX ORDER — SODIUM CHLORIDE, SODIUM LACTATE, POTASSIUM CHLORIDE, CALCIUM CHLORIDE 600; 310; 30; 20 MG/100ML; MG/100ML; MG/100ML; MG/100ML
1000 INJECTION, SOLUTION INTRAVENOUS ONCE
Status: COMPLETED | OUTPATIENT
Start: 2024-07-02 | End: 2024-07-02

## 2024-07-02 RX ORDER — PROCHLORPERAZINE EDISYLATE 5 MG/ML
5-10 INJECTION INTRAMUSCULAR; INTRAVENOUS EVERY 4 HOURS PRN
Status: DISCONTINUED | OUTPATIENT
Start: 2024-07-02 | End: 2024-07-03 | Stop reason: HOSPADM

## 2024-07-02 RX ORDER — LABETALOL HYDROCHLORIDE 5 MG/ML
10 INJECTION, SOLUTION INTRAVENOUS EVERY 4 HOURS PRN
Status: DISCONTINUED | OUTPATIENT
Start: 2024-07-02 | End: 2024-07-03 | Stop reason: HOSPADM

## 2024-07-02 RX ORDER — ONDANSETRON 2 MG/ML
4 INJECTION INTRAMUSCULAR; INTRAVENOUS ONCE
Status: DISCONTINUED | OUTPATIENT
Start: 2024-07-02 | End: 2024-07-02

## 2024-07-02 RX ORDER — AMOXICILLIN 250 MG
2 CAPSULE ORAL EVERY EVENING
Status: DISCONTINUED | OUTPATIENT
Start: 2024-07-02 | End: 2024-07-03 | Stop reason: HOSPADM

## 2024-07-02 RX ORDER — NICOTINE 21 MG/24HR
21 PATCH, TRANSDERMAL 24 HOURS TRANSDERMAL
Status: DISCONTINUED | OUTPATIENT
Start: 2024-07-02 | End: 2024-07-03 | Stop reason: HOSPADM

## 2024-07-02 RX ORDER — OXYCODONE HYDROCHLORIDE 10 MG/1
10 TABLET ORAL 4 TIMES DAILY
COMMUNITY

## 2024-07-02 RX ORDER — BUSPIRONE HYDROCHLORIDE 10 MG/1
10 TABLET ORAL 3 TIMES DAILY
Status: DISCONTINUED | OUTPATIENT
Start: 2024-07-02 | End: 2024-07-03 | Stop reason: HOSPADM

## 2024-07-02 RX ADMIN — POTASSIUM CHLORIDE AND SODIUM CHLORIDE: 900; 150 INJECTION, SOLUTION INTRAVENOUS at 20:55

## 2024-07-02 RX ADMIN — HALOPERIDOL LACTATE 5 MG: 5 INJECTION, SOLUTION INTRAMUSCULAR at 17:33

## 2024-07-02 RX ADMIN — ENOXAPARIN SODIUM 40 MG: 100 INJECTION SUBCUTANEOUS at 20:47

## 2024-07-02 RX ADMIN — LEVETIRACETAM 1000 MG: 500 TABLET, FILM COATED ORAL at 20:48

## 2024-07-02 RX ADMIN — IOHEXOL 25 ML: 180 INJECTION INTRAVENOUS at 18:30

## 2024-07-02 RX ADMIN — HALOPERIDOL LACTATE 5 MG: 5 INJECTION, SOLUTION INTRAMUSCULAR at 15:04

## 2024-07-02 RX ADMIN — OXYCODONE HYDROCHLORIDE 10 MG: 10 TABLET ORAL at 20:50

## 2024-07-02 RX ADMIN — METOCLOPRAMIDE 10 MG: 5 INJECTION, SOLUTION INTRAMUSCULAR; INTRAVENOUS at 20:47

## 2024-07-02 RX ADMIN — SODIUM CHLORIDE, POTASSIUM CHLORIDE, SODIUM LACTATE AND CALCIUM CHLORIDE 1000 ML: 600; 310; 30; 20 INJECTION, SOLUTION INTRAVENOUS at 15:03

## 2024-07-02 RX ADMIN — DOCUSATE SODIUM 50 MG AND SENNOSIDES 8.6 MG 2 TABLET: 8.6; 5 TABLET, FILM COATED ORAL at 20:47

## 2024-07-02 RX ADMIN — FAMOTIDINE 20 MG: 10 INJECTION, SOLUTION INTRAVENOUS at 20:47

## 2024-07-02 RX ADMIN — BUSPIRONE HYDROCHLORIDE 10 MG: 10 TABLET ORAL at 23:19

## 2024-07-02 RX ADMIN — SODIUM CHLORIDE 1000 ML: 9 INJECTION, SOLUTION INTRAVENOUS at 19:30

## 2024-07-02 RX ADMIN — GABAPENTIN 200 MG: 100 CAPSULE ORAL at 20:47

## 2024-07-02 RX ADMIN — SUCRALFATE 1 G: 1 TABLET ORAL at 20:48

## 2024-07-02 RX ADMIN — IOHEXOL 80 ML: 350 INJECTION, SOLUTION INTRAVENOUS at 18:09

## 2024-07-02 SDOH — ECONOMIC STABILITY: TRANSPORTATION INSECURITY
IN THE PAST 12 MONTHS, HAS LACK OF RELIABLE TRANSPORTATION KEPT YOU FROM MEDICAL APPOINTMENTS, MEETINGS, WORK OR FROM GETTING THINGS NEEDED FOR DAILY LIVING?: NO

## 2024-07-02 SDOH — ECONOMIC STABILITY: TRANSPORTATION INSECURITY
IN THE PAST 12 MONTHS, HAS THE LACK OF TRANSPORTATION KEPT YOU FROM MEDICAL APPOINTMENTS OR FROM GETTING MEDICATIONS?: NO

## 2024-07-02 ASSESSMENT — ENCOUNTER SYMPTOMS
FEVER: 0
FLANK PAIN: 0
PHOTOPHOBIA: 0
VOMITING: 1
ORTHOPNEA: 0
DIARRHEA: 0
WEIGHT LOSS: 0
TREMORS: 0
BRUISES/BLEEDS EASILY: 0
NAUSEA: 1
BACK PAIN: 0
POLYDIPSIA: 0
HALLUCINATIONS: 0
DOUBLE VISION: 0
COUGH: 0
PALPITATIONS: 0
HEARTBURN: 0
NERVOUS/ANXIOUS: 0
BLURRED VISION: 0
CONSTIPATION: 0
NECK PAIN: 0
HEADACHES: 0
HEMOPTYSIS: 0
CHILLS: 0
SPEECH CHANGE: 0
SPUTUM PRODUCTION: 0
ABDOMINAL PAIN: 1
FOCAL WEAKNESS: 0

## 2024-07-02 ASSESSMENT — LIFESTYLE VARIABLES
ALCOHOL_USE: NO
CONSUMPTION TOTAL: NEGATIVE
TOTAL SCORE: 0
EVER HAD A DRINK FIRST THING IN THE MORNING TO STEADY YOUR NERVES TO GET RID OF A HANGOVER: NO
HOW MANY TIMES IN THE PAST YEAR HAVE YOU HAD 5 OR MORE DRINKS IN A DAY: 0
HAVE PEOPLE ANNOYED YOU BY CRITICIZING YOUR DRINKING: NO
HAVE YOU EVER FELT YOU SHOULD CUT DOWN ON YOUR DRINKING: NO
DOES PATIENT WANT TO STOP DRINKING: CANNOT ASSESS
EVER FELT BAD OR GUILTY ABOUT YOUR DRINKING: NO
ON A TYPICAL DAY WHEN YOU DRINK ALCOHOL HOW MANY DRINKS DO YOU HAVE: 0
TOTAL SCORE: 0
AVERAGE NUMBER OF DAYS PER WEEK YOU HAVE A DRINK CONTAINING ALCOHOL: 0
TOTAL SCORE: 0
SUBSTANCE_ABUSE: 0

## 2024-07-02 ASSESSMENT — PATIENT HEALTH QUESTIONNAIRE - PHQ9
2. FEELING DOWN, DEPRESSED, IRRITABLE, OR HOPELESS: NOT AT ALL
SUM OF ALL RESPONSES TO PHQ9 QUESTIONS 1 AND 2: 0
1. LITTLE INTEREST OR PLEASURE IN DOING THINGS: NOT AT ALL

## 2024-07-02 ASSESSMENT — SOCIAL DETERMINANTS OF HEALTH (SDOH)
WITHIN THE PAST 12 MONTHS, YOU WORRIED THAT YOUR FOOD WOULD RUN OUT BEFORE YOU GOT THE MONEY TO BUY MORE: NEVER TRUE
WITHIN THE LAST YEAR, HAVE YOU BEEN HUMILIATED OR EMOTIONALLY ABUSED IN OTHER WAYS BY YOUR PARTNER OR EX-PARTNER?: NO
WITHIN THE LAST YEAR, HAVE TO BEEN RAPED OR FORCED TO HAVE ANY KIND OF SEXUAL ACTIVITY BY YOUR PARTNER OR EX-PARTNER?: NO
IN THE PAST 12 MONTHS, HAS THE ELECTRIC, GAS, OIL, OR WATER COMPANY THREATENED TO SHUT OFF SERVICE IN YOUR HOME?: NO
WITHIN THE LAST YEAR, HAVE YOU BEEN KICKED, HIT, SLAPPED, OR OTHERWISE PHYSICALLY HURT BY YOUR PARTNER OR EX-PARTNER?: NO
WITHIN THE PAST 12 MONTHS, THE FOOD YOU BOUGHT JUST DIDN'T LAST AND YOU DIDN'T HAVE MONEY TO GET MORE: NEVER TRUE
WITHIN THE LAST YEAR, HAVE YOU BEEN AFRAID OF YOUR PARTNER OR EX-PARTNER?: NO

## 2024-07-02 ASSESSMENT — FIBROSIS 4 INDEX
FIB4 SCORE: 0.77
FIB4 SCORE: 1.43

## 2024-07-02 ASSESSMENT — PAIN DESCRIPTION - PAIN TYPE: TYPE: ACUTE PAIN

## 2024-07-03 ENCOUNTER — PHARMACY VISIT (OUTPATIENT)
Dept: PHARMACY | Facility: MEDICAL CENTER | Age: 49
End: 2024-07-03
Payer: COMMERCIAL

## 2024-07-03 VITALS
HEART RATE: 84 BPM | RESPIRATION RATE: 16 BRPM | TEMPERATURE: 97.7 F | WEIGHT: 113.98 LBS | OXYGEN SATURATION: 96 % | HEIGHT: 59 IN | DIASTOLIC BLOOD PRESSURE: 55 MMHG | BODY MASS INDEX: 22.98 KG/M2 | SYSTOLIC BLOOD PRESSURE: 100 MMHG

## 2024-07-03 LAB
ALBUMIN SERPL BCP-MCNC: 4.2 G/DL (ref 3.2–4.9)
ALBUMIN/GLOB SERPL: 1.8 G/DL
ALP SERPL-CCNC: 82 U/L (ref 30–99)
ALT SERPL-CCNC: 19 U/L (ref 2–50)
ANION GAP SERPL CALC-SCNC: 16 MMOL/L (ref 7–16)
AST SERPL-CCNC: 21 U/L (ref 12–45)
BASOPHILS # BLD AUTO: 1.1 % (ref 0–1.8)
BASOPHILS # BLD: 0.07 K/UL (ref 0–0.12)
BILIRUB SERPL-MCNC: 0.8 MG/DL (ref 0.1–1.5)
BUN SERPL-MCNC: 18 MG/DL (ref 8–22)
CALCIUM ALBUM COR SERPL-MCNC: 9 MG/DL (ref 8.5–10.5)
CALCIUM SERPL-MCNC: 9.2 MG/DL (ref 8.5–10.5)
CHLORIDE SERPL-SCNC: 104 MMOL/L (ref 96–112)
CO2 SERPL-SCNC: 20 MMOL/L (ref 20–33)
CREAT SERPL-MCNC: 0.4 MG/DL (ref 0.5–1.4)
EOSINOPHIL # BLD AUTO: 0.06 K/UL (ref 0–0.51)
EOSINOPHIL NFR BLD: 0.9 % (ref 0–6.9)
ERYTHROCYTE [DISTWIDTH] IN BLOOD BY AUTOMATED COUNT: 40.4 FL (ref 35.9–50)
GFR SERPLBLD CREATININE-BSD FMLA CKD-EPI: 122 ML/MIN/1.73 M 2
GLOBULIN SER CALC-MCNC: 2.4 G/DL (ref 1.9–3.5)
GLUCOSE SERPL-MCNC: 94 MG/DL (ref 65–99)
HCT VFR BLD AUTO: 47.3 % (ref 37–47)
HGB BLD-MCNC: 15.5 G/DL (ref 12–16)
IMM GRANULOCYTES # BLD AUTO: 0.01 K/UL (ref 0–0.11)
IMM GRANULOCYTES NFR BLD AUTO: 0.2 % (ref 0–0.9)
LYMPHOCYTES # BLD AUTO: 1.76 K/UL (ref 1–4.8)
LYMPHOCYTES NFR BLD: 27.4 % (ref 22–41)
MCH RBC QN AUTO: 28.8 PG (ref 27–33)
MCHC RBC AUTO-ENTMCNC: 32.8 G/DL (ref 32.2–35.5)
MCV RBC AUTO: 87.8 FL (ref 81.4–97.8)
MONOCYTES # BLD AUTO: 0.67 K/UL (ref 0–0.85)
MONOCYTES NFR BLD AUTO: 10.4 % (ref 0–13.4)
NEUTROPHILS # BLD AUTO: 3.86 K/UL (ref 1.82–7.42)
NEUTROPHILS NFR BLD: 60 % (ref 44–72)
NRBC # BLD AUTO: 0 K/UL
NRBC BLD-RTO: 0 /100 WBC (ref 0–0.2)
PLATELET # BLD AUTO: 210 K/UL (ref 164–446)
PMV BLD AUTO: 11.7 FL (ref 9–12.9)
POTASSIUM SERPL-SCNC: 3.4 MMOL/L (ref 3.6–5.5)
PROT SERPL-MCNC: 6.6 G/DL (ref 6–8.2)
RBC # BLD AUTO: 5.39 M/UL (ref 4.2–5.4)
SODIUM SERPL-SCNC: 140 MMOL/L (ref 135–145)
WBC # BLD AUTO: 6.4 K/UL (ref 4.8–10.8)

## 2024-07-03 PROCEDURE — 96376 TX/PRO/DX INJ SAME DRUG ADON: CPT

## 2024-07-03 PROCEDURE — RXMED WILLOW AMBULATORY MEDICATION CHARGE: Performed by: INTERNAL MEDICINE

## 2024-07-03 PROCEDURE — 99239 HOSP IP/OBS DSCHRG MGMT >30: CPT | Performed by: INTERNAL MEDICINE

## 2024-07-03 PROCEDURE — 80053 COMPREHEN METABOLIC PANEL: CPT

## 2024-07-03 PROCEDURE — 700102 HCHG RX REV CODE 250 W/ 637 OVERRIDE(OP): Mod: JZ | Performed by: INTERNAL MEDICINE

## 2024-07-03 PROCEDURE — 700102 HCHG RX REV CODE 250 W/ 637 OVERRIDE(OP): Performed by: INTERNAL MEDICINE

## 2024-07-03 PROCEDURE — A9270 NON-COVERED ITEM OR SERVICE: HCPCS | Performed by: INTERNAL MEDICINE

## 2024-07-03 PROCEDURE — 85025 COMPLETE CBC W/AUTO DIFF WBC: CPT

## 2024-07-03 PROCEDURE — 96375 TX/PRO/DX INJ NEW DRUG ADDON: CPT

## 2024-07-03 PROCEDURE — G0378 HOSPITAL OBSERVATION PER HR: HCPCS

## 2024-07-03 PROCEDURE — 700101 HCHG RX REV CODE 250: Performed by: INTERNAL MEDICINE

## 2024-07-03 PROCEDURE — 700111 HCHG RX REV CODE 636 W/ 250 OVERRIDE (IP): Mod: JZ | Performed by: INTERNAL MEDICINE

## 2024-07-03 PROCEDURE — A9270 NON-COVERED ITEM OR SERVICE: HCPCS | Mod: JZ | Performed by: INTERNAL MEDICINE

## 2024-07-03 RX ORDER — OMEPRAZOLE 20 MG/1
20 CAPSULE, DELAYED RELEASE ORAL DAILY
Qty: 30 CAPSULE | Refills: 0 | Status: SHIPPED | OUTPATIENT
Start: 2024-07-03

## 2024-07-03 RX ORDER — POTASSIUM CHLORIDE 20 MEQ/1
40 TABLET, EXTENDED RELEASE ORAL ONCE
Status: COMPLETED | OUTPATIENT
Start: 2024-07-03 | End: 2024-07-03

## 2024-07-03 RX ORDER — AMLODIPINE BESYLATE 5 MG/1
5 TABLET ORAL DAILY
Qty: 30 TABLET | Refills: 0 | Status: SHIPPED | OUTPATIENT
Start: 2024-07-04 | End: 2024-07-31

## 2024-07-03 RX ORDER — ONDANSETRON 4 MG/1
4 TABLET, ORALLY DISINTEGRATING ORAL EVERY 6 HOURS PRN
Qty: 30 TABLET | Refills: 0 | Status: SHIPPED | OUTPATIENT
Start: 2024-07-03

## 2024-07-03 RX ADMIN — DULOXETINE HYDROCHLORIDE 60 MG: 60 CAPSULE, DELAYED RELEASE ORAL at 05:18

## 2024-07-03 RX ADMIN — OXYCODONE HYDROCHLORIDE 10 MG: 10 TABLET ORAL at 05:23

## 2024-07-03 RX ADMIN — SUCRALFATE 1 G: 1 TABLET ORAL at 02:34

## 2024-07-03 RX ADMIN — LEVOTHYROXINE SODIUM 50 MCG: 0.05 TABLET ORAL at 05:18

## 2024-07-03 RX ADMIN — PROCHLORPERAZINE EDISYLATE 10 MG: 5 INJECTION INTRAMUSCULAR; INTRAVENOUS at 05:22

## 2024-07-03 RX ADMIN — SUCRALFATE 1 G: 1 TABLET ORAL at 10:31

## 2024-07-03 RX ADMIN — OXYCODONE HYDROCHLORIDE 10 MG: 10 TABLET ORAL at 10:36

## 2024-07-03 RX ADMIN — FAMOTIDINE 20 MG: 10 INJECTION, SOLUTION INTRAVENOUS at 05:18

## 2024-07-03 RX ADMIN — LEVETIRACETAM 500 MG: 500 TABLET, FILM COATED ORAL at 05:19

## 2024-07-03 RX ADMIN — BUSPIRONE HYDROCHLORIDE 10 MG: 10 TABLET ORAL at 05:24

## 2024-07-03 RX ADMIN — ONDANSETRON 4 MG: 2 INJECTION INTRAMUSCULAR; INTRAVENOUS at 08:03

## 2024-07-03 RX ADMIN — METOCLOPRAMIDE 10 MG: 5 INJECTION, SOLUTION INTRAMUSCULAR; INTRAVENOUS at 10:31

## 2024-07-03 RX ADMIN — POTASSIUM CHLORIDE AND SODIUM CHLORIDE: 900; 150 INJECTION, SOLUTION INTRAVENOUS at 05:32

## 2024-07-03 RX ADMIN — POTASSIUM CHLORIDE 40 MEQ: 1500 TABLET, EXTENDED RELEASE ORAL at 08:01

## 2024-07-03 RX ADMIN — OXYCODONE HYDROCHLORIDE 10 MG: 10 TABLET ORAL at 02:34

## 2024-07-03 RX ADMIN — GABAPENTIN 200 MG: 100 CAPSULE ORAL at 05:19

## 2024-07-03 RX ADMIN — METOCLOPRAMIDE 10 MG: 5 INJECTION, SOLUTION INTRAMUSCULAR; INTRAVENOUS at 02:34

## 2024-07-03 ASSESSMENT — PAIN DESCRIPTION - PAIN TYPE
TYPE: ACUTE PAIN
TYPE: ACUTE PAIN

## 2024-07-11 RX ORDER — BUSPIRONE HYDROCHLORIDE 10 MG/1
10 TABLET ORAL 3 TIMES DAILY
Qty: 270 TABLET | Refills: 3 | Status: SHIPPED | OUTPATIENT
Start: 2024-07-11

## 2024-07-19 RX ORDER — ONDANSETRON 4 MG/1
TABLET, FILM COATED ORAL
Qty: 30 TABLET | Refills: 0 | Status: SHIPPED | OUTPATIENT
Start: 2024-07-19 | End: 2024-07-31

## 2024-07-31 ENCOUNTER — OFFICE VISIT (OUTPATIENT)
Dept: NEUROLOGY | Facility: MEDICAL CENTER | Age: 49
End: 2024-07-31
Attending: PSYCHIATRY & NEUROLOGY
Payer: COMMERCIAL

## 2024-07-31 VITALS
OXYGEN SATURATION: 98 % | RESPIRATION RATE: 15 BRPM | HEIGHT: 59 IN | BODY MASS INDEX: 22.31 KG/M2 | DIASTOLIC BLOOD PRESSURE: 54 MMHG | SYSTOLIC BLOOD PRESSURE: 90 MMHG | HEART RATE: 78 BPM | WEIGHT: 110.67 LBS

## 2024-07-31 DIAGNOSIS — G40.409 SIMPLE PARTIAL SEIZURES EVOLVING TO COMPLEX PARTIAL SEIZURES, THEN TO GENERALIZED TONIC-CLONIC SEIZURES (HCC): ICD-10-CM

## 2024-07-31 DIAGNOSIS — G43.109 MIGRAINE WITH AURA AND WITHOUT STATUS MIGRAINOSUS, NOT INTRACTABLE: ICD-10-CM

## 2024-07-31 PROCEDURE — 99212 OFFICE O/P EST SF 10 MIN: CPT | Performed by: PSYCHIATRY & NEUROLOGY

## 2024-07-31 RX ORDER — METOCLOPRAMIDE 10 MG/1
TABLET ORAL
Qty: 45 TABLET | Refills: 0 | Status: SHIPPED | OUTPATIENT
Start: 2024-07-31

## 2024-07-31 RX ORDER — LEVETIRACETAM 100 MG/ML
SOLUTION ORAL
Qty: 450 ML | Refills: 11 | Status: SHIPPED | OUTPATIENT
Start: 2024-07-31

## 2024-07-31 RX ORDER — SUMATRIPTAN 100 MG/1
TABLET, FILM COATED ORAL
Qty: 27 TABLET | Refills: 3 | Status: SHIPPED | OUTPATIENT
Start: 2024-07-31

## 2024-07-31 ASSESSMENT — ENCOUNTER SYMPTOMS
HEADACHES: 0
FOCAL WEAKNESS: 0
SEIZURES: 1
WEAKNESS: 0
LOSS OF CONSCIOUSNESS: 1
MEMORY LOSS: 0
DIZZINESS: 1

## 2024-07-31 ASSESSMENT — FIBROSIS 4 INDEX: FIB4 SCORE: 1.08

## 2024-08-02 ENCOUNTER — PATIENT MESSAGE (OUTPATIENT)
Dept: MEDICAL GROUP | Facility: MEDICAL CENTER | Age: 49
End: 2024-08-02

## 2024-08-02 ENCOUNTER — APPOINTMENT (OUTPATIENT)
Dept: MEDICAL GROUP | Facility: MEDICAL CENTER | Age: 49
End: 2024-08-02
Payer: COMMERCIAL

## 2024-08-02 VITALS
BODY MASS INDEX: 22.3 KG/M2 | SYSTOLIC BLOOD PRESSURE: 94 MMHG | OXYGEN SATURATION: 94 % | HEIGHT: 59 IN | WEIGHT: 110.6 LBS | DIASTOLIC BLOOD PRESSURE: 52 MMHG | RESPIRATION RATE: 14 BRPM | TEMPERATURE: 98 F | HEART RATE: 76 BPM

## 2024-08-02 DIAGNOSIS — R63.4 WEIGHT LOSS, UNINTENTIONAL: ICD-10-CM

## 2024-08-02 DIAGNOSIS — T78.40XD ALLERGY, SUBSEQUENT ENCOUNTER: ICD-10-CM

## 2024-08-02 DIAGNOSIS — M79.7 FIBROMYALGIA: ICD-10-CM

## 2024-08-02 DIAGNOSIS — R41.840 INATTENTION: ICD-10-CM

## 2024-08-02 DIAGNOSIS — Z23 IMMUNIZATION DUE: ICD-10-CM

## 2024-08-02 PROCEDURE — 99214 OFFICE O/P EST MOD 30 MIN: CPT | Mod: 25 | Performed by: STUDENT IN AN ORGANIZED HEALTH CARE EDUCATION/TRAINING PROGRAM

## 2024-08-02 PROCEDURE — 3078F DIAST BP <80 MM HG: CPT | Performed by: STUDENT IN AN ORGANIZED HEALTH CARE EDUCATION/TRAINING PROGRAM

## 2024-08-02 PROCEDURE — 90636 HEP A/HEP B VACC ADULT IM: CPT | Performed by: STUDENT IN AN ORGANIZED HEALTH CARE EDUCATION/TRAINING PROGRAM

## 2024-08-02 PROCEDURE — 3074F SYST BP LT 130 MM HG: CPT | Performed by: STUDENT IN AN ORGANIZED HEALTH CARE EDUCATION/TRAINING PROGRAM

## 2024-08-02 PROCEDURE — 90471 IMMUNIZATION ADMIN: CPT | Performed by: STUDENT IN AN ORGANIZED HEALTH CARE EDUCATION/TRAINING PROGRAM

## 2024-08-02 RX ORDER — FLUTICASONE PROPIONATE 50 MCG
1 SPRAY, SUSPENSION (ML) NASAL DAILY
Qty: 16 G | Refills: 3 | Status: SHIPPED | OUTPATIENT
Start: 2024-08-02

## 2024-08-02 ASSESSMENT — FIBROSIS 4 INDEX: FIB4 SCORE: 1.08

## 2024-08-02 NOTE — PROGRESS NOTES
"Subjective:     CC: Arthritis, weight loss, fibromyalgia    HPI:   Tamela presents today in hopes of getting referral to rheumatology.  She has several chronic medical conditions which cause weight loss, arthritis and fibromyalgia.  She has a history of gastric bypass.  She also had a recent concern for GI cancer which was evaluated at [facility] and likely biopsies came back normal.  She is feeling very fatigued as well as has chronic pain.  She is hoping to see a rheumatologist to rule out any other underlying abnormality as a reason for her symptoms.    ROS:  ROS    Objective:     Exam:  BP 94/52 (BP Location: Left arm, Patient Position: Sitting, BP Cuff Size: Adult)   Pulse 76   Temp 36.7 °C (98 °F)   Resp 14   Ht 1.499 m (4' 11\")   Wt 50.2 kg (110 lb 9.6 oz)   LMP 05/15/2017 Comment: irregular for the last 3 months  SpO2 94%   BMI 22.34 kg/m²  Body mass index is 22.34 kg/m².    Physical Exam  Vitals reviewed.   Constitutional:       General: She is not in acute distress.     Appearance: She is not toxic-appearing.   HENT:      Head: Normocephalic and atraumatic.      Right Ear: External ear normal.      Left Ear: External ear normal.   Eyes:      General:         Right eye: No discharge.         Left eye: No discharge.      Extraocular Movements: Extraocular movements intact.      Conjunctiva/sclera: Conjunctivae normal.   Pulmonary:      Effort: Pulmonary effort is normal. No respiratory distress.   Skin:     General: Skin is warm and dry.   Neurological:      Mental Status: She is alert.   Psychiatric:         Mood and Affect: Mood normal.         Behavior: Behavior normal.         Thought Content: Thought content normal.         Judgment: Judgment normal.           Assessment & Plan:     48 y.o. female with the following -     1. Fibromyalgia  Referral to rheumatology sent  - Referral to Rheumatology    2. Weight loss, unintentional  There are definitely several different causes for this " including previous weight loss surgery but there may be an underlying rheumatologic cause as well, referral sent  - Referral to Rheumatology    3. Immunization due  - [place] HepA/HepB IM    4. Allergy, subsequent encounter  - fluticasone (FLONASE) 50 MCG/ACT nasal spray; Administer 1 Spray into affected nostril(S) every day.  Dispense: 16 g; Refill: 3

## 2024-08-23 ENCOUNTER — HOSPITAL ENCOUNTER (OUTPATIENT)
Dept: RADIOLOGY | Facility: MEDICAL CENTER | Age: 49
End: 2024-08-23
Attending: STUDENT IN AN ORGANIZED HEALTH CARE EDUCATION/TRAINING PROGRAM
Payer: COMMERCIAL

## 2024-08-23 DIAGNOSIS — K83.1 OBSTRUCTION OF BILE DUCT: ICD-10-CM

## 2024-08-23 DIAGNOSIS — K85.90 ACUTE PANCREATITIS, UNSPECIFIED COMPLICATION STATUS, UNSPECIFIED PANCREATITIS TYPE: ICD-10-CM

## 2024-08-23 DIAGNOSIS — K83.4 SPASM OF SPHINCTER OF ODDI: ICD-10-CM

## 2024-08-23 PROCEDURE — A9579 GAD-BASE MR CONTRAST NOS,1ML: HCPCS | Mod: JZ

## 2024-08-23 PROCEDURE — 74183 MRI ABD W/O CNTR FLWD CNTR: CPT

## 2024-08-23 PROCEDURE — 700117 HCHG RX CONTRAST REV CODE 255: Mod: JZ

## 2024-08-23 RX ADMIN — GADOTERIDOL 10 ML: 279.3 INJECTION, SOLUTION INTRAVENOUS at 08:29

## 2024-08-30 DIAGNOSIS — E03.9 HYPOTHYROIDISM, UNSPECIFIED TYPE: ICD-10-CM

## 2024-08-30 RX ORDER — LEVOTHYROXINE SODIUM 50 UG/1
50 TABLET ORAL
Qty: 90 TABLET | Refills: 0 | Status: SHIPPED | OUTPATIENT
Start: 2024-08-30

## 2024-08-30 NOTE — TELEPHONE ENCOUNTER
Received request via: Pharmacy    Was the patient seen in the last year in this department? Yes    Does the patient have an active prescription (recently filled or refills available) for medication(s) requested? No    Pharmacy Name: [facility]    Does the patient have senior living Plus and need 100-day supply? (This applies to ALL medications) Patient does not have SCP

## 2024-09-30 DIAGNOSIS — R11.0 NAUSEA: ICD-10-CM

## 2024-09-30 DIAGNOSIS — E53.8 B12 DEFICIENCY: ICD-10-CM

## 2024-10-01 RX ORDER — CYANOCOBALAMIN 1000 UG/ML
INJECTION, SOLUTION INTRAMUSCULAR; SUBCUTANEOUS
Qty: 3 ML | Refills: 0 | Status: SHIPPED | OUTPATIENT
Start: 2024-10-01

## 2024-10-01 RX ORDER — ONDANSETRON 4 MG/1
TABLET, FILM COATED ORAL
Qty: 10 TABLET | Refills: 0 | Status: SHIPPED | OUTPATIENT
Start: 2024-10-01 | End: 2024-10-06

## 2024-11-24 DIAGNOSIS — E03.9 HYPOTHYROIDISM, UNSPECIFIED TYPE: ICD-10-CM

## 2024-11-24 RX ORDER — LEVOTHYROXINE SODIUM 50 UG/1
50 TABLET ORAL
Qty: 90 TABLET | Refills: 0 | Status: SHIPPED | OUTPATIENT
Start: 2024-11-24

## 2024-11-24 NOTE — TELEPHONE ENCOUNTER
Received request via: Pharmacy    Was the patient seen in the last year in this department? Yes    Does the patient have an active prescription (recently filled or refills available) for medication(s) requested? No    [facility]    Does the patient have [facility] Plus and need 100-day supply? (This applies to ALL medications) no

## 2024-11-27 RX ORDER — ONDANSETRON 4 MG/1
TABLET, FILM COATED ORAL
Qty: 30 TABLET | Refills: 0 | Status: SHIPPED | OUTPATIENT
Start: 2024-11-27

## 2024-11-27 NOTE — TELEPHONE ENCOUNTER
Received request via: Pharmacy    Was the patient seen in the last year in this department? Yes    Does the patient have an active prescription (recently filled or refills available) for medication(s) requested? No    [facility]    Does the patient have [facility] Plus and need 100-day supply? (This applies to ALL medications) Patient does not have SCP

## 2024-12-05 ENCOUNTER — OFFICE VISIT (OUTPATIENT)
Dept: MEDICAL GROUP | Facility: MEDICAL CENTER | Age: 49
End: 2024-12-05
Payer: COMMERCIAL

## 2024-12-05 VITALS
WEIGHT: 108.47 LBS | HEIGHT: 59 IN | TEMPERATURE: 98.5 F | SYSTOLIC BLOOD PRESSURE: 114 MMHG | HEART RATE: 70 BPM | OXYGEN SATURATION: 95 % | BODY MASS INDEX: 21.87 KG/M2 | DIASTOLIC BLOOD PRESSURE: 64 MMHG

## 2024-12-05 DIAGNOSIS — R05.1 ACUTE COUGH: ICD-10-CM

## 2024-12-05 DIAGNOSIS — Z12.12 SCREENING FOR COLORECTAL CANCER: ICD-10-CM

## 2024-12-05 DIAGNOSIS — J02.9 SORE THROAT: ICD-10-CM

## 2024-12-05 DIAGNOSIS — J11.1 FLU: ICD-10-CM

## 2024-12-05 DIAGNOSIS — M79.7 FIBROMYALGIA: ICD-10-CM

## 2024-12-05 DIAGNOSIS — E53.8 B12 DEFICIENCY: ICD-10-CM

## 2024-12-05 DIAGNOSIS — Z12.11 SCREENING FOR COLORECTAL CANCER: ICD-10-CM

## 2024-12-05 DIAGNOSIS — Z23 NEED FOR VACCINATION: ICD-10-CM

## 2024-12-05 LAB
FLUAV RNA SPEC QL NAA+PROBE: POSITIVE
FLUBV RNA SPEC QL NAA+PROBE: NEGATIVE
RSV RNA SPEC QL NAA+PROBE: NEGATIVE
S PYO DNA SPEC NAA+PROBE: NOT DETECTED
SARS-COV-2 RNA RESP QL NAA+PROBE: NEGATIVE

## 2024-12-05 PROCEDURE — 90656 IIV3 VACC NO PRSV 0.5 ML IM: CPT | Performed by: FAMILY MEDICINE

## 2024-12-05 PROCEDURE — 90636 HEP A/HEP B VACC ADULT IM: CPT | Performed by: FAMILY MEDICINE

## 2024-12-05 PROCEDURE — 0241U POCT CEPHEID COV-2, FLU A/B, RSV - PCR: CPT | Performed by: FAMILY MEDICINE

## 2024-12-05 PROCEDURE — 90472 IMMUNIZATION ADMIN EACH ADD: CPT | Performed by: FAMILY MEDICINE

## 2024-12-05 PROCEDURE — 3074F SYST BP LT 130 MM HG: CPT | Performed by: FAMILY MEDICINE

## 2024-12-05 PROCEDURE — 87651 STREP A DNA AMP PROBE: CPT | Performed by: FAMILY MEDICINE

## 2024-12-05 PROCEDURE — 90471 IMMUNIZATION ADMIN: CPT | Performed by: FAMILY MEDICINE

## 2024-12-05 PROCEDURE — 99214 OFFICE O/P EST MOD 30 MIN: CPT | Mod: 25 | Performed by: FAMILY MEDICINE

## 2024-12-05 PROCEDURE — 3078F DIAST BP <80 MM HG: CPT | Performed by: FAMILY MEDICINE

## 2024-12-05 RX ORDER — OSELTAMIVIR PHOSPHATE 75 MG/1
75 CAPSULE ORAL 2 TIMES DAILY
Qty: 10 CAPSULE | Refills: 0 | Status: SHIPPED | OUTPATIENT
Start: 2024-12-05

## 2024-12-05 ASSESSMENT — ENCOUNTER SYMPTOMS
FEVER: 0
COUGH: 1
SPUTUM PRODUCTION: 1
SORE THROAT: 1
SHORTNESS OF BREATH: 1
PALPITATIONS: 0
CHILLS: 0

## 2024-12-05 ASSESSMENT — FIBROSIS 4 INDEX: FIB4 SCORE: 1.101195522578696476

## 2024-12-05 NOTE — PROGRESS NOTES
Verbal consent was acquired by the patient to use [employer] ambient listening note generation during this visit.      Daxa was seen today for follow-up.    Diagnoses and all orders for this visit:    Fibromyalgia    Acute cough  -     POCT CoV-2, Flu A/B, RSV by PCR  -     POCT GROUP A STREP, PCR    Sore throat    Screening for colorectal cancer  -     Referral to GI for Colonoscopy    Need for vaccination  -     INFLUENZA VACCINE TRI INJ (PF)  -     TWINRIX HepA/HepB IM                  Assessment & Plan  1. Cough:  New condition, unstable  - Reports cough with clear phlegm, nasal congestion, and sore throat  - Tests for COVID-19, influenza, and strep throat will be conducted  - Continue using Flonase nasal spray  - May use over-the-counter medications such as Theraflu  - If COVID-19 is detected, prescribe Paxlovid  - If strep throat is diagnosed, prescribe an antibiotic  - If influenza is detected, prescribe Tamiflu  - If RSV is detected, adopt a wait-and-see approach for approximately 7 days, if symptoms get worse then we will prescribe antibiotic after 7 days.    2. Fibromyalgia  Chronic condition, unstable  - Difficulty walking sometimes due to fibromyalgia  - [facility] placard paperwork will be filled out to assist with her condition    3. Vaccination  - Administer second dose of hepatitis A and B vaccine today  - Third dose scheduled for 6 months from the first dose and at least 4 months from the second dose    Recommended patient to discuss about anxiety medications with PCP.    Follow-up as needed.      Chief complaint::Diagnoses of Fibromyalgia, Acute cough, Sore throat, Screening for colorectal cancer, and Need for vaccination were pertinent to this visit.      History of Present Illness  The patient is a 49-year-old female who presents for DMV placard paperwork, cough, and to get up to date on vaccines. Jeovanny is for fibromyalgia. She has fibromyalgia and has difficulty walking sometimes, so we are going to  fill her paperwork for placard.    Fibromyalgia  She has fibromyalgia and has difficulty walking sometimes.  - Character: Difficulty walking.    Cough and Associated Symptoms  She reports a cough with clear phlegm and nasal congestion. She also experiences some difficulty in breathing. She has been using Flonase nasal spray daily. Additionally, she has a sore throat.  - Character: Cough with clear phlegm, nasal congestion, difficulty in breathing, sore throat.  - Alleviating Factors: Using Flonase nasal spray daily.    Vaccination Status  She has received the first dose of the hepatitis A and B vaccine.        Review of Systems   Constitutional:  Negative for chills and fever.   HENT:  Positive for congestion and sore throat.    Respiratory:  Positive for cough, sputum production and shortness of breath.    Cardiovascular:  Negative for chest pain, palpitations and leg swelling.          Medications and Allergies:     Current Outpatient Medications   Medication Sig Dispense Refill    ondansetron (ZOFRAN) 4 MG Tab tablet TAKE 1 TABLET BY MOUTH EVERY 8 HOURS AS NEEDED FOR NAUSEA AND VOMITING 30 Tablet 0    levothyroxine (SYNTHROID) 50 MCG Tab TAKE 1 TABLET BY MOUTH ONCE DAILY IN THE MORNING ON AN EMPTY STOMACH 90 Tablet 0    cyanocobalamin (VITAMIN B-12) 1000 MCG/ML Solution INJECT 1 ML INTRAMUSCULARLY ONCE EVERY 30 DAYS 3 mL 0    fluticasone (FLONASE) 50 MCG/ACT nasal spray Administer 1 Spray into affected nostril(S) every day. 16 g 3    levETIRAcetam (KEPPRA) 100 MG/ML Solution TAKE 5 MLS BY MOUTH EVERY MORNING AND 10 MLS EVERY EVENING 450 mL 11    metoclopramide (REGLAN) 10 MG Tab TAKE 1-3 TABLETS BY MOUTH ONCE AT ONSET OF HEADACHE/NAUSEA; REPEAT IN 4-6 HOURS AS NEEDED 45 Tablet 0    sumatriptan (IMITREX) 100 MG tablet 1 tab at headache onset; repeat in 1 hour prn 27 Tablet 3    busPIRone (BUSPAR) 10 MG Tab tablet TAKE 1 TABLET BY MOUTH THREE TIMES DAILY 270 Tablet 3    ondansetron (ZOFRAN ODT) 4 MG TABLET  "DISPERSIBLE Dissolve 1 Tablet by mouth every 6 hours as needed for Nausea/Vomiting. 30 Tablet 0    omeprazole (PRILOSEC) 20 MG delayed-release capsule Take 1 Capsule by mouth every day. 30 Capsule 0    gabapentin (NEURONTIN) 100 MG Cap Take 2 Capsules by mouth 3 times a day. 270 Capsule 3    senna-docusate (SENNA PLUS) 8.6-50 MG Tab Take 2 Tablets by mouth 2 times a day as needed for Constipation.      DULoxetine (CYMBALTA) 60 MG Cap DR Particles delayed-release capsule Take 1 capsule by mouth once daily 90 Capsule 3    promethazine (PHENERGAN) 25 MG Suppos Insert 1 Suppository into the rectum every 6 hours as needed for Nausea/Vomiting. 12 Suppository 0     No current facility-administered medications for this visit.       /64 (BP Location: Left arm, Patient Position: Sitting, BP Cuff Size: Adult)   Pulse 70   Temp 36.9 °C (98.5 °F) (Temporal)   Ht 1.499 m (4' 11\")   Wt 49.2 kg (108 lb 7.5 oz)   SpO2 95% , Body mass index is 21.91 kg/m².      Physical Exam  Constitutional:       Appearance: Normal appearance. She is well-developed and well-groomed.   HENT:      Head: Normocephalic and atraumatic.   Eyes:      General:         Right eye: No discharge.         Left eye: No discharge.      Conjunctiva/sclera: Conjunctivae normal.   Cardiovascular:      Rate and Rhythm: Normal rate and regular rhythm.      Heart sounds: Normal heart sounds. No murmur heard.     No friction rub. No gallop.   Pulmonary:      Effort: Pulmonary effort is normal. No respiratory distress.      Breath sounds: Normal breath sounds. No wheezing or rales.   Neurological:      General: No focal deficit present.      Mental Status: She is alert. Mental status is at baseline.      Gait: Gait is intact.   Psychiatric:         Mood and Affect: Mood and affect normal.         Behavior: Behavior normal.              Please note that this dictation was created using voice recognition software. I have made every reasonable attempt to correct " obvious errors, but I expect that there are errors of grammar and possibly content that I did not discover before finalizing the note.

## 2024-12-06 ENCOUNTER — TELEPHONE (OUTPATIENT)
Dept: PHARMACY | Facility: MEDICAL CENTER | Age: 49
End: 2024-12-06
Payer: COMMERCIAL

## 2024-12-06 ENCOUNTER — TELEPHONE (OUTPATIENT)
Dept: NEUROLOGY | Facility: MEDICAL CENTER | Age: 49
End: 2024-12-06
Payer: COMMERCIAL

## 2024-12-06 DIAGNOSIS — G43.109 MIGRAINE WITH AURA AND WITHOUT STATUS MIGRAINOSUS, NOT INTRACTABLE: ICD-10-CM

## 2024-12-06 DIAGNOSIS — G40.409 SIMPLE PARTIAL SEIZURES EVOLVING TO COMPLEX PARTIAL SEIZURES, THEN TO GENERALIZED TONIC-CLONIC SEIZURES (HCC): ICD-10-CM

## 2024-12-06 RX ORDER — SUMATRIPTAN SUCCINATE 100 MG/1
TABLET ORAL
Qty: 27 TABLET | Refills: 3 | Status: SHIPPED | OUTPATIENT
Start: 2024-12-06

## 2024-12-06 RX ORDER — CYANOCOBALAMIN 1000 UG/ML
INJECTION, SOLUTION INTRAMUSCULAR; SUBCUTANEOUS
Qty: 3 ML | Refills: 0 | Status: SHIPPED | OUTPATIENT
Start: 2024-12-06 | End: 2024-12-26

## 2024-12-06 RX ORDER — LEVETIRACETAM 100 MG/ML
SOLUTION ORAL
Qty: 450 ML | Refills: 11 | Status: SHIPPED | OUTPATIENT
Start: 2024-12-06

## 2024-12-06 RX ORDER — METOCLOPRAMIDE 10 MG/1
TABLET ORAL
Qty: 45 TABLET | Refills: 4 | Status: SHIPPED | OUTPATIENT
Start: 2024-12-06

## 2024-12-06 NOTE — TELEPHONE ENCOUNTER
Received request via: Patient    Medication Name/Dosage sEE ATTACHED     When was medication last prescribed 7/31/24-- FOR ALL 3     How many refills were previously provided kEPPRA; 11 SUMATRIPTAN- 3 REGLAN- 0     How many Refills does he patient have left from last prescription 0    Was the patient seen in the last year in this department? Yes   Date of last office visit 7/31/24     Per last Neurology Office Visit, when was the date of next follow up visit set for?                          1 YR   Date of office visit follow up request 7/31/25     Does the patient have an upcoming appointment? Yes   If yes, when 7/31/25             If no, schedule appointment     Does the patient have [facility] Plus and need 100 day supply (blood pressure, diabetes and cholesterol meds only)? MEDICATION IS NOT FOR BLOOD PRESSURE, DIABETES, OR CHOLESTEROL

## 2024-12-06 NOTE — TELEPHONE ENCOUNTER
Patient called saying she really needs her Keppra refilled she said that she is out of it and that the pharmacy has sent a requests over and they have not heard anything back.

## 2024-12-07 NOTE — TELEPHONE ENCOUNTER
Received New Start request via [facility]  for levETIRAcetam (KEPPRA) 100 MG/ML Solution. (Quantity:450, Day Supply:30)     Insurance: [employer]  Member ID:  [id number]  BIN: [id number]  PCN: [id number]  Group: [id number]     Ran Test claim via [place] & medication Pays for a $0 copay. Will outreach to patient to offer specialty pharmacy services and or release to preferred pharmacy    Pt has already declined services. Will release to pt's preferred pharmacy on file.

## 2024-12-07 NOTE — TELEPHONE ENCOUNTER
Received [facility] Start request via [facility]  for sumatriptan (IMITREX) 100 MG tablet. (Quantity:27, Day Supply:90)     Insurance: [employer]  Member ID:  [id number]  BIN: [id number]  PCN: [id number]  Group: [id number]     Ran Test claim via [place] & medication  RTC RTS - will release to pt's preferred pharmacy on file. Pt has already declined service.

## 2024-12-26 DIAGNOSIS — E53.8 B12 DEFICIENCY: ICD-10-CM

## 2024-12-26 RX ORDER — CYANOCOBALAMIN 1000 UG/ML
INJECTION, SOLUTION INTRAMUSCULAR; SUBCUTANEOUS
Qty: 3 ML | Refills: 0 | Status: SHIPPED | OUTPATIENT
Start: 2024-12-26

## 2025-01-02 ENCOUNTER — TELEPHONE (OUTPATIENT)
Dept: MEDICAL GROUP | Facility: MEDICAL CENTER | Age: 50
End: 2025-01-02
Payer: COMMERCIAL

## 2025-01-02 DIAGNOSIS — E53.8 B12 DEFICIENCY: ICD-10-CM

## 2025-01-02 DIAGNOSIS — R10.84 GENERALIZED ABDOMINAL PAIN: ICD-10-CM

## 2025-01-02 RX ORDER — GABAPENTIN 100 MG/1
200 CAPSULE ORAL 3 TIMES DAILY
Qty: 270 CAPSULE | Refills: 3 | Status: SHIPPED | OUTPATIENT
Start: 2025-01-02

## 2025-01-02 RX ORDER — DULOXETIN HYDROCHLORIDE 60 MG/1
60 CAPSULE, DELAYED RELEASE ORAL DAILY
Qty: 90 CAPSULE | Refills: 3 | Status: SHIPPED | OUTPATIENT
Start: 2025-01-02 | End: 2025-01-06 | Stop reason: SDUPTHER

## 2025-01-02 RX ORDER — CYANOCOBALAMIN 1000 UG/ML
1000 INJECTION, SOLUTION INTRAMUSCULAR; SUBCUTANEOUS
Qty: 3 ML | Refills: 0 | Status: SHIPPED | OUTPATIENT
Start: 2025-01-02 | End: 2025-04-02

## 2025-01-03 NOTE — TELEPHONE ENCOUNTER
Received request via: Patient    Was the patient seen in the last year in this department? Yes    Does the patient have an active prescription (recently filled or refills available) for medication(s) requested? No    Ph[facility] Name: [facility]    Does the patient have [facility] Plus and need 100-day supply? (This applies to ALL medications) Patient does not have SCP

## 2025-01-03 NOTE — TELEPHONE ENCOUNTER
VOICEMAIL  1. Caller Name: Tamela Stern  Call Back Number: [phone]      2. Message: Pt left a voice message letting us know that she finished her prescription of Tamiflu for Flu A and is still symptomatic and would like to know if she can be prescribed more antibiotics.

## 2025-01-06 ENCOUNTER — OFFICE VISIT (OUTPATIENT)
Dept: MEDICAL GROUP | Facility: MEDICAL CENTER | Age: 50
End: 2025-01-06
Payer: COMMERCIAL

## 2025-01-06 VITALS
SYSTOLIC BLOOD PRESSURE: 106 MMHG | HEART RATE: 65 BPM | HEIGHT: 59 IN | OXYGEN SATURATION: 95 % | BODY MASS INDEX: 23.18 KG/M2 | DIASTOLIC BLOOD PRESSURE: 62 MMHG | TEMPERATURE: 98.4 F | WEIGHT: 115 LBS

## 2025-01-06 DIAGNOSIS — F41.9 ANXIETY: ICD-10-CM

## 2025-01-06 DIAGNOSIS — J40 BRONCHITIS: ICD-10-CM

## 2025-01-06 PROCEDURE — 3078F DIAST BP <80 MM HG: CPT | Performed by: STUDENT IN AN ORGANIZED HEALTH CARE EDUCATION/TRAINING PROGRAM

## 2025-01-06 PROCEDURE — 99214 OFFICE O/P EST MOD 30 MIN: CPT | Performed by: STUDENT IN AN ORGANIZED HEALTH CARE EDUCATION/TRAINING PROGRAM

## 2025-01-06 PROCEDURE — 3074F SYST BP LT 130 MM HG: CPT | Performed by: STUDENT IN AN ORGANIZED HEALTH CARE EDUCATION/TRAINING PROGRAM

## 2025-01-06 RX ORDER — AMOXICILLIN 875 MG/1
875 TABLET, COATED ORAL 2 TIMES DAILY
Qty: 10 TABLET | Refills: 0 | Status: SHIPPED | OUTPATIENT
Start: 2025-01-06

## 2025-01-06 RX ORDER — DULOXETIN HYDROCHLORIDE 60 MG/1
120 CAPSULE, DELAYED RELEASE ORAL DAILY
Qty: 180 CAPSULE | Refills: 3 | Status: SHIPPED | OUTPATIENT
Start: 2025-01-06

## 2025-01-06 ASSESSMENT — PATIENT HEALTH QUESTIONNAIRE - PHQ9
5. POOR APPETITE OR OVEREATING: 3 - NEARLY EVERY DAY
SUM OF ALL RESPONSES TO PHQ QUESTIONS 1-9: 18
CLINICAL INTERPRETATION OF PHQ2 SCORE: 3

## 2025-01-06 ASSESSMENT — FIBROSIS 4 INDEX: FIB4 SCORE: 1.101195522578696476

## 2025-01-06 NOTE — PROGRESS NOTES
"Subjective:     CC: cough, congestion, anxiety    HPI:   Tamela presents today with    Verbal consent was acquired by the patient to use [employer] ambient listening note generation during this visit Yes     History of Present Illness  The patient presents for evaluation of cough and anxiety.    They have persistent body aches, headaches, and a productive cough with phlegm that has changed color. No fever. Symptoms began in 12/2024 after influenza A diagnosis by Dr. Bird, who prescribed Tamiflu. Despite completing Tamiflu, symptoms persist. Reports severe right ear pain and sinus congestion. Home pulse oximetry recorded 89 yesterday.    Currently on Cymbalta and BuSpar for anxiety. Finds Cymbalta effective and considers dosage increase. Approved for disability leave starting 10/2024. Consulted Dr. Bird for a handicap sticker and discussed anxiety. Moved in with their daughter and granddaughters for support, alleviating pressure on their spouse.    Scheduled for colonoscopy and contacted by GI department. Planning dermatologist visit for lab work. Reports being cancer-free.    Postponed surgery with Dr. Culp due to cancer concerns last year.    SOCIAL HISTORY  Moved in with their daughter and granddaughters.    ALLERGIES  No known allergies to antibiotics.    MEDICATIONS  Current: Cymbalta, BuSpar  Past: Tamiflu        ROS:  ROS    Objective:     Exam:  /62 (BP Location: Left arm, Patient Position: Sitting, BP Cuff Size: Adult)   Pulse 65   Temp 36.9 °C (98.4 °F) (Temporal)   Ht 1.499 m (4' 11\")   Wt 52.2 kg (115 lb)   LMP 05/15/2017 Comment: irregular for the last 3 months  SpO2 95%   BMI 23.23 kg/m²  Body mass index is 23.23 kg/m².    Physical Exam  Vitals reviewed.   Constitutional:       General: She is not in acute distress.     Appearance: She is not toxic-appearing.   HENT:      Head: Normocephalic and atraumatic.      Right Ear: Tympanic membrane, ear canal and external ear normal. There is " no impacted cerumen.      Left Ear: Tympanic membrane, ear canal and external ear normal. There is no impacted cerumen.      Nose: Nose normal. No congestion.   Eyes:      General:         Right eye: No discharge.         Left eye: No discharge.      Extraocular Movements: Extraocular movements intact.      Conjunctiva/sclera: Conjunctivae normal.   Cardiovascular:      Rate and Rhythm: Normal rate and regular rhythm.      Heart sounds: Normal heart sounds. No murmur heard.  Pulmonary:      Effort: Pulmonary effort is normal. No respiratory distress.      Breath sounds: Normal breath sounds. No wheezing or rales.   Skin:     General: Skin is warm and dry.   Neurological:      Mental Status: She is alert.   Psychiatric:         Mood and Affect: Mood normal.         Behavior: Behavior normal.         Thought Content: Thought content normal.         Judgment: Judgment normal.           Assessment & Plan:     49 y.o. female with the following -     Assessment & Plan  1. Cough  - Symptoms suggest possible sinus or chest infection  - No ear infection, slight crackles in right lung  - Prescribed 5-day amoxicillin  - Discussed potential side effects  - Prescription [facility] to [facility] [address]    2. Anxiety  - Increase Cymbalta to 2 capsules daily  - May take 4 weeks for changes  - If no improvement, contact office to discuss increasing BuSpar    3. Health maintenance  - Advised to receive updated COVID-19 vaccine at local pharmacy  - Reminded to schedule colonoscopy, which was due a month ago    1. Bronchitis  amoxicillin (AMOXIL) 875 MG tablet      2. Anxiety  DULoxetine (CYMBALTA) 60 MG Cap DR Particles delayed-release capsule          No follow-ups on file.    Please note that this dictation was created using voice recognition software. I have made every reasonable attempt to correct obvious errors, but I expect that there are errors of grammar and possibly content that I did not discover before finalizing the  note.

## 2025-02-14 ENCOUNTER — TELEMEDICINE (OUTPATIENT)
Dept: MEDICAL GROUP | Facility: MEDICAL CENTER | Age: 50
End: 2025-02-14
Payer: COMMERCIAL

## 2025-02-14 VITALS — WEIGHT: 100 LBS | HEIGHT: 59 IN | BODY MASS INDEX: 20.16 KG/M2

## 2025-02-14 DIAGNOSIS — F51.01 PRIMARY INSOMNIA: ICD-10-CM

## 2025-02-14 DIAGNOSIS — F41.9 ANXIETY: ICD-10-CM

## 2025-02-14 PROCEDURE — 99214 OFFICE O/P EST MOD 30 MIN: CPT | Performed by: STUDENT IN AN ORGANIZED HEALTH CARE EDUCATION/TRAINING PROGRAM

## 2025-02-14 RX ORDER — HYDROXYZINE HYDROCHLORIDE 25 MG/1
25 TABLET, FILM COATED ORAL NIGHTLY PRN
Qty: 30 TABLET | Refills: 0 | Status: SHIPPED | OUTPATIENT
Start: 2025-02-14

## 2025-02-14 RX ORDER — BUSPIRONE HYDROCHLORIDE 15 MG/1
15 TABLET ORAL 3 TIMES DAILY
Qty: 270 TABLET | Refills: 0 | Status: SHIPPED | OUTPATIENT
Start: 2025-02-14

## 2025-02-14 ASSESSMENT — FIBROSIS 4 INDEX: FIB4 SCORE: 1.101195522578696476

## 2025-02-14 NOTE — PROGRESS NOTES
Virtual Visit: Established Patient   This visit was conducted via Teams using secure and encrypted videoconferencing technology.   The patient was in their home in the [facility].    The patient's identity was confirmed and verbal consent was obtained for this virtual visit.    Subjective:   CC:   Chief Complaint   Patient presents with    Medication Management     Duoloxetine increase follow up.         Difficulty Sleeping     Intermittent sleep, 4 hours a night     Tamela Stern is a 49 y.o. female presenting for evaluation and management of:    Problem   Primary Insomnia    Chronic condition, worsening, hoping for something to help with sleep, partner gets good relief with hydroxyzine, wondering if this is an option     Anxiety    This is a chronic, uncontrolled condition.  She currently takes BuSpar 10 mg daily and Cymbalta 120 mg daily.  She feels like the increase in cymbalta was useful but still feels like she could have better control of her anxiety       Current medicines (including changes today)  Current Outpatient Medications   Medication Sig Dispense Refill    busPIRone (BUSPAR) 15 MG tablet Take 1 Tablet by mouth 3 times a day. 270 Tablet 0    hydrOXYzine HCl (ATARAX) 25 MG Tab Take 1 Tablet by mouth at bedtime as needed (sleep). 30 Tablet 0    DULoxetine (CYMBALTA) 60 MG Cap DR Particles delayed-release capsule Take 2 Capsules by mouth every day. 180 Capsule 3    cyanocobalamin (VITAMIN B-12) 1000 MCG/ML Solution Inject 1 mL into the shoulder, thigh, or buttocks every 30 days for 90 days. 3 mL 0    gabapentin (NEURONTIN) 100 MG Cap Take 2 Capsules by mouth 3 times a day. 270 Capsule 3    levETIRAcetam (KEPPRA) 100 MG/ML Solution TAKE 5 MLS BY MOUTH EVERY MORNING AND 10 MLS EVERY EVENING 450 mL 11    sumatriptan (IMITREX) 100 MG tablet 1 tab at headache onset; repeat in 1 hour prn 27 Tablet 3    metoclopramide (REGLAN) 10 MG Tab TAKE 1-3 TABLETS BY MOUTH ONCE AT ONSET OF  HEADACHE/NAUSEA; REPEAT IN 4-6 HOURS AS NEEDED 45 Tablet 4    ondansetron (ZOFRAN) 4 MG Tab tablet TAKE 1 TABLET BY MOUTH EVERY 8 HOURS AS NEEDED FOR NAUSEA AND VOMITING 30 Tablet 0    levothyroxine (SYNTHROID) 50 MCG Tab TAKE 1 TABLET BY MOUTH ONCE DAILY IN THE MORNING ON AN EMPTY STOMACH 90 Tablet 0    fluticasone (FLONASE) 50 MCG/ACT nasal spray Administer 1 Spray into affected nostril(S) every day. 16 g 3    omeprazole (PRILOSEC) 20 MG delayed-release capsule Take 1 Capsule by mouth every day. 30 Capsule 0    senna-docusate (SENNA PLUS) 8.6-50 MG Tab Take 2 Tablets by mouth 2 times a day as needed for Constipation.      promethazine (PHENERGAN) 25 MG Suppos Insert 1 Suppository into the rectum every 6 hours as needed for Nausea/Vomiting. 12 Suppository 0     No current facility-administered medications for this visit.       Patient Active Problem List    Diagnosis Date Noted    Primary insomnia 02/14/2025    Intractable nausea and vomiting 07/02/2024    Hypokalemia 07/02/2024    Dilated pancreatic duct 02/06/2024    Generalized abdominal pain 02/06/2024    Tobacco use 01/24/2024    Other chronic pain 01/18/2023    Pineal gland cyst 01/04/2023    S/P gastric bypass 04/12/2022    Iron deficiency anemia 04/12/2022    Marijuana abuse 04/08/2022    Cyclical vomiting with nausea 04/08/2022    Hemorrhagic cyst of left ovary 04/08/2022    Weight loss, unintentional 04/08/2022    Neoplasm of uncertain behavior of skin 05/02/2017    Vitamin D deficiency 02/06/2017    Postmenopausal 10/06/2016    Cervical postlaminectomy syndrome 07/13/2016    Chronic neck pain 12/11/2015    Simple partial seizures evolving to complex partial seizures, then to generalized tonic-clonic seizures (HCC) 01/20/2015    Vitamin B 12 deficiency 01/20/2015    Nausea 01/20/2015    Moderate episode of recurrent major depressive disorder (HCC) 01/20/2015    Hypothyroidism due to acquired atrophy of thyroid 01/20/2015    Fibromyalgia 09/02/2014     "Migraine with aura and without status migrainosus, not intractable 09/01/2014    Anxiety 08/13/2010        Objective:   Ht 1.499 m (4' 11\")   Wt 45.4 kg (100 lb)   LMP 05/15/2017 Comment: irregular for the last 3 months  BMI 20.20 kg/m²     Physical Exam:  Constitutional: Alert, no distress, well-groomed.  Skin: No rashes in visible areas.  Eye: Round. Conjunctiva clear, lids normal. No icterus.   ENMT: Lips pink without lesions, good dentition, moist mucous membranes. Phonation normal.  Neck: No masses, no thyromegaly. Moves freely without pain.  Respiratory: Unlabored respiratory effort, no cough or audible wheeze  Psych: Alert and oriented x3, normal affect and mood.     Assessment and Plan:   The following treatment plan was discussed:     1. Anxiety  Increase buspar to 15mg TID  - busPIRone (BUSPAR) 15 MG tablet; Take 1 Tablet by mouth 3 times a day.  Dispense: 270 Tablet; Refill: 0    2. Primary insomnia  Discussed the risks and benefits of starting hydroxyzine as needed for sleep  - hydrOXYzine HCl (ATARAX) 25 MG Tab; Take 1 Tablet by mouth at bedtime as needed (sleep).  Dispense: 30 Tablet; Refill: 0    Please note that this dictation was created using voice recognition software. I have made every reasonable attempt to correct obvious errors, but I expect that there are errors of grammar and possibly content that I did not discover before finalizing the note.      Follow-up: No follow-ups on file.         "

## 2025-02-19 DIAGNOSIS — E03.9 HYPOTHYROIDISM, UNSPECIFIED TYPE: ICD-10-CM

## 2025-02-19 RX ORDER — LEVOTHYROXINE SODIUM 50 UG/1
50 TABLET ORAL
Qty: 90 TABLET | Refills: 3 | Status: SHIPPED | OUTPATIENT
Start: 2025-02-19

## 2025-02-19 NOTE — TELEPHONE ENCOUNTER
Received request via: Pharmacy    Was the patient seen in the last year in this department? Yes    Does the patient have an active prescription (recently filled or refills available) for medication(s) requested? No    Pharmacy Name: [id number] - [place], [place] - [address]     Does the patient have nursing home Plus and need 100-day supply? (This applies to ALL medications) Patient does not have SCP

## 2025-03-12 DIAGNOSIS — F51.01 PRIMARY INSOMNIA: ICD-10-CM

## 2025-03-12 RX ORDER — HYDROXYZINE HYDROCHLORIDE 25 MG/1
25 TABLET, FILM COATED ORAL
Qty: 90 TABLET | Refills: 1 | Status: SHIPPED | OUTPATIENT
Start: 2025-03-12

## 2025-03-19 RX ORDER — ONDANSETRON 4 MG/1
4 TABLET, FILM COATED ORAL EVERY 8 HOURS PRN
Qty: 30 TABLET | Refills: 0 | Status: SHIPPED | OUTPATIENT
Start: 2025-03-19

## 2025-05-12 RX ORDER — ONDANSETRON 4 MG/1
4 TABLET, FILM COATED ORAL EVERY 8 HOURS PRN
Qty: 30 TABLET | Refills: 0 | Status: SHIPPED | OUTPATIENT
Start: 2025-05-12

## 2025-05-23 ENCOUNTER — PATIENT MESSAGE (OUTPATIENT)
Dept: MEDICAL GROUP | Facility: MEDICAL CENTER | Age: 50
End: 2025-05-23
Payer: COMMERCIAL

## 2025-05-23 DIAGNOSIS — M13.0 POLYARTHRITIS: ICD-10-CM

## 2025-05-23 DIAGNOSIS — M79.7 FIBROMYALGIA: Primary | ICD-10-CM

## 2025-05-23 DIAGNOSIS — E03.9 HYPOTHYROIDISM, UNSPECIFIED TYPE: ICD-10-CM

## 2025-05-27 ENCOUNTER — PATIENT MESSAGE (OUTPATIENT)
Dept: MEDICAL GROUP | Facility: MEDICAL CENTER | Age: 50
End: 2025-05-27
Payer: COMMERCIAL

## 2025-05-27 DIAGNOSIS — E03.9 HYPOTHYROIDISM, UNSPECIFIED TYPE: ICD-10-CM

## 2025-05-27 DIAGNOSIS — M13.0 POLYARTHRITIS: ICD-10-CM

## 2025-05-27 DIAGNOSIS — M79.7 FIBROMYALGIA: Primary | ICD-10-CM

## 2025-05-29 DIAGNOSIS — E53.8 B12 DEFICIENCY: ICD-10-CM

## 2025-05-29 RX ORDER — CYANOCOBALAMIN 1000 UG/ML
INJECTION, SOLUTION INTRAMUSCULAR; SUBCUTANEOUS
Qty: 3 ML | Refills: 0 | Status: SHIPPED | OUTPATIENT
Start: 2025-05-29

## 2025-06-04 NOTE — Clinical Note
REFERRAL APPROVAL NOTICE         Sent on June 4, 2025                   Daxa Montana Ryan Stern  [address]  [address]  [place] [address]                   Dear MsAdonay Ryan Leena,    After a careful review of the medical information and benefit coverage, [facility] has processed your referral. See below for additional details.    If applicable, you must be actively enrolled with your insurance for coverage of the authorized service. If you have any questions regarding your coverage, please contact your insurance directly.    REFERRAL INFORMATION   Referral #:  [id number]  Referred-To Provider    Referred-By Provider:  Rheumatology    Maria De Jesus Sadler M.D.   [facility]      [address]  [address]  [place] [id number] [phone] [address]  [place] [address]  [phone]    Referral Start Date:  05/27/2025  Referral End Date:   05/27/2026             SCHEDULING  If you do not already have an appointment, please call [phone] to make an appointment.     MORE INFORMATION  If you do not already have a [employer] account, sign up at: [email or web address]  You can access your medical information, make appointments, see lab results, billing information, and more.  If you have questions regarding this referral, please contact  the [facility] Referrals department at:             [phone]. Monday - Friday 8:00AM - 5:00PM.     Sincerely,    [facility]

## 2025-07-07 DIAGNOSIS — F41.9 ANXIETY: ICD-10-CM

## 2025-07-08 RX ORDER — BUSPIRONE HYDROCHLORIDE 15 MG/1
15 TABLET ORAL 3 TIMES DAILY
Qty: 270 TABLET | Refills: 0 | Status: SHIPPED | OUTPATIENT
Start: 2025-07-08

## 2025-07-31 ENCOUNTER — OFFICE VISIT (OUTPATIENT)
Facility: MEDICAL CENTER | Age: 50
End: 2025-07-31
Attending: PSYCHIATRY & NEUROLOGY
Payer: COMMERCIAL

## 2025-07-31 VITALS
OXYGEN SATURATION: 90 % | BODY MASS INDEX: 20.62 KG/M2 | TEMPERATURE: 98.2 F | WEIGHT: 105 LBS | RESPIRATION RATE: 16 BRPM | DIASTOLIC BLOOD PRESSURE: 60 MMHG | SYSTOLIC BLOOD PRESSURE: 106 MMHG | HEIGHT: 60 IN | HEART RATE: 73 BPM

## 2025-07-31 DIAGNOSIS — G43.109 MIGRAINE WITH AURA AND WITHOUT STATUS MIGRAINOSUS, NOT INTRACTABLE: ICD-10-CM

## 2025-07-31 DIAGNOSIS — G40.409 SIMPLE PARTIAL SEIZURES EVOLVING TO COMPLEX PARTIAL SEIZURES, THEN TO GENERALIZED TONIC-CLONIC SEIZURES (HCC): Primary | ICD-10-CM

## 2025-07-31 PROCEDURE — RXMED WILLOW AMBULATORY MEDICATION CHARGE: Performed by: PSYCHIATRY & NEUROLOGY

## 2025-07-31 RX ORDER — METOCLOPRAMIDE 10 MG/1
TABLET ORAL
Qty: 45 TABLET | Refills: 4 | Status: SHIPPED | OUTPATIENT
Start: 2025-07-31

## 2025-07-31 RX ORDER — DIPHENHYDRAMINE HCL 25 MG
25 TABLET ORAL EVERY 6 HOURS PRN
COMMUNITY

## 2025-07-31 RX ORDER — UBROGEPANT 100 MG/1
1 TABLET ORAL PRN
Qty: 8 TABLET | Refills: 0 | Status: SHIPPED | OUTPATIENT
Start: 2025-07-31

## 2025-07-31 RX ORDER — LEVETIRACETAM 100 MG/ML
SOLUTION ORAL
Qty: 450 ML | Refills: 11 | Status: SHIPPED | OUTPATIENT
Start: 2025-07-31

## 2025-07-31 ASSESSMENT — ENCOUNTER SYMPTOMS
DIZZINESS: 0
HEADACHES: 1
SEIZURES: 0
LOSS OF CONSCIOUSNESS: 0
DOUBLE VISION: 0
DEPRESSION: 1
WEIGHT LOSS: 1
MEMORY LOSS: 0
BLURRED VISION: 0
FALLS: 0

## 2025-07-31 ASSESSMENT — PATIENT HEALTH QUESTIONNAIRE - PHQ9
CLINICAL INTERPRETATION OF PHQ2 SCORE: 4
5. POOR APPETITE OR OVEREATING: 3 - NEARLY EVERY DAY
SUM OF ALL RESPONSES TO PHQ QUESTIONS 1-9: 21

## 2025-07-31 ASSESSMENT — FIBROSIS 4 INDEX: FIB4 SCORE: 1.101195522578696476

## 2025-07-31 NOTE — ASSESSMENT & PLAN NOTE
Stable, we continue the Keppra elixir unchanged.  She is tolerating the drug without sign of deficit or toxicity.  Drug level probably does need to be checked as a baseline.    She understands the risk for breakthrough seizures can also elevate and times of stress and sleep deprivation.  She understands the need for compliance throughout.    Orders:    levETIRAcetam (KEPPRA) 100 MG/ML Solution; TAKE 5 MLS BY MOUTH EVERY MORNING AND 10 MLS EVERY EVENING    Patient has been identified as having a positive depression screening. Appropriate orders and counseling have been given.    KEPPRA; Future

## 2025-07-31 NOTE — ASSESSMENT & PLAN NOTE
Headache increased probably is due to the stress in her life, though there is light at the end of the tunnel, it is not clear when that might actually happen.  Imitrex should be considered a failure, she has already failed Topamax and rizatriptan due to lack of efficacy.    She has not yet crossed the threshold where maintenance therapies are indicated, but her rescue medication regimen does need to be changed. I will use Ubrelvy 100 mg along with the Reglan 10 mg taken either with early pain onset and if effective, we might be able to try with onset of aura symptoms.  Ubrelvy has been studied in patients with prodrome associated with migraine, and if taken during the prodromal phase, a significant percentage of patients saw reduction in headache occurrence as part of the migraine episodes.  She was reassured that taking the Ubrelvy twice within a day is safe, in fact it can be taken within an hour or longer depending on response to the first dose.  Samples are provided, she will notify the office so that a larger prescription can be obtained.    Orders:    Ubrogepant (UBRELVY) 100 MG Tab; Take 1 Tablet by mouth as needed (1 tablet at headache onset, repeat in 2 hours as needed).    metoclopramide (REGLAN) 10 MG Tab; TAKE 1-3 TABLETS BY MOUTH ONCE AT ONSET OF HEADACHE/NAUSEA; REPEAT IN 4-6 HOURS AS NEEDED

## 2025-07-31 NOTE — PROGRESS NOTES
Subjective     Daxa Stern is a 49 y.o. female who presents for routine follow-up, with a history of focal onset seizures with secondary generalization and migraine with visual aura.     HPI    Migraine: Daxa traditionally has done well with her headache control, headaches occurring once every couple of months, and for which sumatriptan 100 mg with or without Reglan 10 mg usually works well, the headache resolving within a matter of hours, she takes the drug with pain onset.  Visual distortions start up and can last upwards of 1 hour, the headache beginning with their presence.    Due to stress related to her upcoming divorce, headaches have increased, now having 2 or 3 attacks every 1 or 2 months.  Though not huge, for her this is significant.  She is also found that the sumatriptan is still making her sleepy and tired, but is no longer working effectively, the headaches come back on consecutive days, leaving her incapacitated for longer.  Though the metoclopramide works for the nausea, she has not taken it simultaneously with the sumatriptan hand to see if the cocktail works better than sumatriptan alone.    Seizures: She has not had a breakthrough seizure over the last year, having reported 2 in 2024, the first in February, the last in July when she was at a restaurant with family.  February of that was secondarily generalized, the first remained focal with altered sensorium.  There was no provoking circumstance with either these events, simple breakthrough, and I was uncomfortable changing her medication regimen with which she had been compliant.  She takes Keppra elixir, 100 mg/ml, 5 mL in the morning and 10 mL every evening.    She tolerates the drug without issue, she has been compliant, she has not had problems getting the drug though her old [facility] pharmacy was making it difficult.    Medical, surgical and family histories are reviewed, there are no new drug allergies.  Following her  bariatric surgery earlier last year, she has been able to maintain the weight between 100-110 pounds.  She is going through a divorce, this is elevated stress levels, there is clear anxiety as well as sleep change.  There has been no change in her psychotropic medication regimen.    She is on Keppra elixir 500 mg in the morning and 1000 mg in the evening, Neurontin 200 mg 3 times daily, vitamin B12, Cymbalta 120 mg daily, BuSpar 15 mg, 3 times daily, Imitrex 100 mg as needed, Reglan 10 mg as needed, Synthroid, Prilosec, Benadryl, Zofran,  and Atarax.    Review of Systems   Constitutional:  Positive for malaise/fatigue and weight loss.   Eyes:  Negative for blurred vision and double vision.   Musculoskeletal:  Negative for falls.   Neurological:  Positive for headaches. Negative for dizziness, seizures and loss of consciousness.   Psychiatric/Behavioral:  Positive for depression. Negative for memory loss.    All other systems reviewed and are negative.    Objective     /60 (BP Location: Left arm, Patient Position: Sitting, BP Cuff Size: Adult)   Pulse 73   Temp 36.8 °C (98.2 °F) (Temporal)   Resp 16   Ht 1.524 m (5')   Wt 47.6 kg (105 lb)   LMP 05/15/2017 Comment: irregular for the last 3 months  SpO2 90%   BMI 20.51 kg/m²      Physical Exam    She appears in no acute distress.  She is quite cooperative, very pleasant in spirit and demeanor.  Vital signs are stable.  There is no malar rash, jaw or temporal tenderness, jaw claudication, or allodynia.  The neck is supple, carotid pulses are present bilaterally without asymmetry or bruits.  Cardiac evaluation reveals a regular rhythm.     Neurological Exam    Fully oriented, there is no aphasia, agnosia, apraxia, or inattention.    PERRLA/EOMI, visual fields are full to finger counting on confrontation bilaterally.  Facial movements are symmetric, sensory exam is intact to temperature and light touch.  The tongue and uvula are midline, there is no bulbar  dysfunction.  Jaw movements are intact.  Shoulder shrug and head rotation are normal.    Musculoskeletal exam reveals normal tone throughout, there is no tremor or drift.  Strength is intact throughout though range of motion is limited at the right ankle due to an arthrodesis.  Reflexes are symmetric, the right is absent, the left easily obtained.    Repetitive movements are absent in the right foot, intact with the left foot as well as both upper extremities.  There is no appendicular dystaxia in any extremities.  She walks in stable fashion, the right lower extremity elevated to a great degree at the hip because of the right ankle limited ROM.  Heel and toe walking obviously could not be done.    Sensory exam is intact to vibration and temperature.  Assessment & Plan  Simple partial seizures evolving to complex partial seizures, then to generalized tonic-clonic seizures (HCC)  Stable, we continue the Keppra elixir unchanged.  She is tolerating the drug without sign of deficit or toxicity.  Drug level probably does need to be checked as a baseline.    She understands the risk for breakthrough seizures can also elevate and times of stress and sleep deprivation.  She understands the need for compliance throughout.    Orders:    levETIRAcetam (KEPPRA) 100 MG/ML Solution; TAKE 5 MLS BY MOUTH EVERY MORNING AND 10 MLS EVERY EVENING    Patient has been identified as having a positive depression screening. Appropriate orders and counseling have been given.    KEPPRA; Future    Migraine with aura and without status migrainosus, not intractable  Headache increased probably is due to the stress in her life, though there is light at the end of the tunnel, it is not clear when that might actually happen.  Imitrex should be considered a failure, she has already failed Topamax and rizatriptan due to lack of efficacy.    She has not yet crossed the threshold where maintenance therapies are indicated, but her rescue medication  regimen does need to be changed. I will use Ubrelvy 100 mg along with the Reglan 10 mg taken either with early pain onset and if effective, we might be able to try with onset of aura symptoms.  Ubrelvy has been studied in patients with prodrome associated with migraine, and if taken during the prodromal phase, a significant percentage of patients saw reduction in headache occurrence as part of the migraine episodes.  She was reassured that taking the Ubrelvy twice within a day is safe, in fact it can be taken within an hour or longer depending on response to the first dose.  Samples are provided, she will notify the office so that a larger prescription can be obtained.    Orders:    Ubrogepant (UBRELVY) 100 MG Tab; Take 1 Tablet by mouth as needed (1 tablet at headache onset, repeat in 2 hours as needed).    metoclopramide (REGLAN) 10 MG Tab; TAKE 1-3 TABLETS BY MOUTH ONCE AT ONSET OF HEADACHE/NAUSEA; REPEAT IN 4-6 HOURS AS NEEDED    A one year follow-up is scheduled.    Time: 40 minutes in total spent on patient care including pre-charting, record review, discussion with healthcare staff and documentation. This includes face-to-face time for exam, review, discussion, as well as counseling and coordinating care.

## 2025-08-07 ENCOUNTER — PHARMACY VISIT (OUTPATIENT)
Dept: PHARMACY | Facility: MEDICAL CENTER | Age: 50
End: 2025-08-07
Payer: COMMERCIAL

## 2025-08-07 RX ORDER — ONDANSETRON 4 MG/1
4 TABLET, FILM COATED ORAL EVERY 8 HOURS PRN
Qty: 30 TABLET | Refills: 0 | Status: SHIPPED | OUTPATIENT
Start: 2025-08-07

## 2025-08-19 DIAGNOSIS — E53.8 B12 DEFICIENCY: ICD-10-CM

## 2025-08-19 RX ORDER — CYANOCOBALAMIN 1000 UG/ML
INJECTION, SOLUTION INTRAMUSCULAR; SUBCUTANEOUS
Qty: 3 ML | Refills: 0 | Status: SHIPPED | OUTPATIENT
Start: 2025-08-19 | End: 2025-08-19

## 2025-08-19 RX ORDER — CYANOCOBALAMIN 1000 UG/ML
1000 INJECTION, SOLUTION INTRAMUSCULAR; SUBCUTANEOUS
Qty: 3 ML | Refills: 1 | Status: SHIPPED | OUTPATIENT
Start: 2025-08-19

## 2025-08-25 RX ORDER — BUSPIRONE HYDROCHLORIDE 10 MG/1
10 TABLET ORAL 3 TIMES DAILY
Qty: 270 TABLET | Refills: 0 | Status: SHIPPED | OUTPATIENT
Start: 2025-08-25

## (undated) DEVICE — SODIUM CHL IRRIGATION 0.9% 1000ML (12EA/CA)

## (undated) DEVICE — SUCTION INSTRUMENT YANKAUER BULBOUS TIP W/O VENT (50EA/CA)

## (undated) DEVICE — TUBE CONNECTING SUCTION - CLEAR PLASTIC STERILE 72 IN (50EA/CA)

## (undated) DEVICE — KIT  I.V. START (100EA/CA)

## (undated) DEVICE — Device

## (undated) DEVICE — TROCAR Z THREAD 11 X 100 - BLADED (6/BX)

## (undated) DEVICE — GLOVE SZ 6.5 BIOGEL PI MICRO - PF LF (50PR/BX)

## (undated) DEVICE — SET SUCTION/IRRIGATION WITH DISPOSABLE TIP (6/CA )PART #0250-070-520 IS A SUB

## (undated) DEVICE — GLOVE SZ 7 BIOGEL PI MICRO - PF LF (50PR/BX 4BX/CA)

## (undated) DEVICE — PENCIL ELECTSURG 10FT BTN SWH - (50/CA)

## (undated) DEVICE — KIT CUSTOM PROCEDURE SINGLE FOR ENDO  (15/CA)

## (undated) DEVICE — TOWEL STOP TIMEOUT SAFETY FLAG (40EA/CA)

## (undated) DEVICE — KIT ANESTHESIA W/CIRCUIT & 3/LT BAG W/FILTER (20EA/CA)

## (undated) DEVICE — MASK OXYGEN VNYL ADLT MED CONC WITH 7 FOOT TUBING  - (50EA/CA)

## (undated) DEVICE — SET IRRIGATION CYSTOSCOPY Y-TYPE L81 IN (20EA/CA)

## (undated) DEVICE — TROCAR Z THREAD12MM OPTICAL - NON BLADED (6/BX)

## (undated) DEVICE — SUTURE 0 VICRYL PLUS CT-2 - 27 INCH (36/BX)

## (undated) DEVICE — LIGASURE 5MM BLUNT TIP LONG - 44CM (6EA/PK)

## (undated) DEVICE — CONTAINER, SPECIMEN, STERILE

## (undated) DEVICE — CANISTER SUCTION 3000ML MECHANICAL FILTER AUTO SHUTOFF MEDI-VAC NONSTERILE LF DISP  (40EA/CA)

## (undated) DEVICE — BANDAID SHEER STRIP 3/4 IN (100EA/BX 12BX/CA)

## (undated) DEVICE — CHLORAPREP 26 ML APPLICATOR - ORANGE TINT(25/CA)

## (undated) DEVICE — DRAPESURG STERI-DRAPE LONG - (10/BX 4BX/CA)

## (undated) DEVICE — ELECTRODE DUAL RETURN W/ CORD - (50/PK)

## (undated) DEVICE — TUBING LAPAROSCOPIC PLUME DEVICE (10EA/CA)

## (undated) DEVICE — GLOVE BIOGEL INDICATOR SZ 7SURGICAL PF LTX - (50/BX 4BX/CA)

## (undated) DEVICE — MASK PANORAMIC OXYGEN PRO2 (30EA/CA)

## (undated) DEVICE — CANISTER SUCTION RIGID RED 1500CC (40EA/CA)

## (undated) DEVICE — CANNULA W/SEAL 5X100 Z-THRE - ADED KII (12/BX)

## (undated) DEVICE — SUTURE 4-0 MONOCRYL PLUS PS-2 - 27 INCH (36/BX)

## (undated) DEVICE — SLEEVE VASO CALF MED - (10PR/CA)

## (undated) DEVICE — ELECTRODE 850 FOAM ADHESIVE - HYDROGEL RADIOTRNSPRNT (50/PK)

## (undated) DEVICE — CLOSURE SKIN STRIP 1/2 X 4 IN - (STERI STRIP) (50/BX 4BX/CA)

## (undated) DEVICE — HEMOSTAT SURG ABSORBABLE - 4 X 8 IN SURGICEL (24EA/CA)

## (undated) DEVICE — DRESSING SURGICAL NUKNIT 6X9 (10EA/BX)

## (undated) DEVICE — SUTURE 2-0 STRATAFIX SPIRAL PDS SH (12EA/BX)

## (undated) DEVICE — SENSOR SPO2 NEO LNCS ADHESIVE (20/BX) SEE USER NOTES

## (undated) DEVICE — LACTATED RINGERS INJ 1000 ML - (14EA/CA 60CA/PF)

## (undated) DEVICE — GOWN WARMING STANDARD FLEX - (30/CA)

## (undated) DEVICE — SET TUBING PNEUMOCLEAR HIGH FLOW SMOKE EVACUATION (10EA/BX)

## (undated) DEVICE — SUTURE 4-0 VICRYL PLUSFS-1 - 27 INCH (36/BX)

## (undated) DEVICE — JELLY SURGILUBE STERILE TUBE 4.25 OZ (1/EA)

## (undated) DEVICE — DRAPE STRLE REG TOWEL 18X24 - (10/BX 4BX/CA)"

## (undated) DEVICE — ELECTRODE 5MM LHK LAPSCP STERILE DISP- MEGADYNE  (5/CA)

## (undated) DEVICE — LIGASURE TISSUE FUSION  - SINGLE USE (6/CA)

## (undated) DEVICE — APPLICATOR ENDOSCOPIC SURGICEL (5EA/BX)

## (undated) DEVICE — SENSOR OXIMETER ADULT SPO2 RD SET (20EA/BX)

## (undated) DEVICE — PAD SANITARY 11IN MAXI IND WRAPPED  (12EA/PK 24PK/CA)

## (undated) DEVICE — TROCAR 5X100 BLADED ADVANCE - FIXATION (6/BX)

## (undated) DEVICE — DRESSING TRANSPARENT FILM TEGADERM 2.375 X 2.75"  (100EA/BX)"

## (undated) DEVICE — NEPTUNE 4 PORT MANIFOLD - (20/PK)

## (undated) DEVICE — TUBING CLEARLINK DUO-VENT - C-FLO (48EA/CA)

## (undated) DEVICE — FILM CASSETTE ENDO

## (undated) DEVICE — PAD OR TABLE DA VINCI 2IN X 20IN X 72IN - (12EA/CA)

## (undated) DEVICE — HEAD HOLDER JUNIOR/ADULT

## (undated) DEVICE — SET LEADWIRE 5 LEAD BEDSIDE DISPOSABLE ECG (1SET OF 5/EA)

## (undated) DEVICE — SUTURE GENERAL

## (undated) DEVICE — PROTECTOR ULNA NERVE - (36PR/CA)

## (undated) DEVICE — TRAY FOLEY CATHETER STATLOCK 16FR SURESTEP  (10EA/CA)

## (undated) DEVICE — STERI STRIP COMPOUND BENZOIN - TINCTURE 0.6ML WITH APPLICATOR (40EA/BX)

## (undated) DEVICE — SPONGE GAUZESTER. 2X2 4-PL - (2/PK 50PK/BX 30BX/CS)

## (undated) DEVICE — SET EXTENSION WITH 2 PORTS (48EA/CA) ***PART #2C8610 IS A SUBSTITUTE*****

## (undated) DEVICE — SLEEVE, VASO, THIGH, MED

## (undated) DEVICE — DRAPE VAGINAL BIB W/ POUCH (10EA/CA)

## (undated) DEVICE — WATER IRRIGATION STERILE 1000ML (12EA/CA)

## (undated) DEVICE — SET IRRIGATION CYSTOSCOPY TUBE L80 IN (20EA/CA)

## (undated) DEVICE — ADHESIVE DERMABOND HVD MINI (12EA/BX)

## (undated) DEVICE — TROCAR 5X100 NON BLADED Z-TH - READ KII (6/BX)

## (undated) DEVICE — SUTURE 3-0 VICRYL PLUS CT-1 - 36 INCH (36/BX)

## (undated) DEVICE — UTERINE MANIP V-CARE STANDARD DAVINCI (8EA/CA)

## (undated) DEVICE — SUTURE 1 STRATAFIX SYMMETRIC PDS PLUS CT-1 45CM (12EA/BX)

## (undated) DEVICE — MASK ANESTHESIA ADULT  - (100/CA)

## (undated) DEVICE — GLOVE BIOGEL PI INDICATOR SZ 7.5 SURGICAL PF LF -(50/BX 4BX/CA)

## (undated) DEVICE — BITE BLOCK ADULT 60FR (100EA/CA)

## (undated) DEVICE — NEEDLE INSFL 120MM 14GA VRRS - (20/BX)

## (undated) DEVICE — CANNULA W/ SUPPLY TUBING O2 - (50/CA)

## (undated) DEVICE — SYSTEM CLEARIFY VISUALIZATION (10EA/PK)